# Patient Record
Sex: FEMALE | Race: WHITE
[De-identification: names, ages, dates, MRNs, and addresses within clinical notes are randomized per-mention and may not be internally consistent; named-entity substitution may affect disease eponyms.]

---

## 2017-03-16 ENCOUNTER — HOSPITAL ENCOUNTER (OUTPATIENT)
Dept: HOSPITAL 35 - PAIN | Age: 72
Discharge: HOME | End: 2017-03-16
Attending: ANESTHESIOLOGY
Payer: COMMERCIAL

## 2017-03-16 VITALS — WEIGHT: 284.6 LBS | BODY MASS INDEX: 50.43 KG/M2 | HEIGHT: 62.99 IN

## 2017-03-16 VITALS — DIASTOLIC BLOOD PRESSURE: 69 MMHG | SYSTOLIC BLOOD PRESSURE: 134 MMHG

## 2017-03-16 DIAGNOSIS — G89.29: ICD-10-CM

## 2017-03-16 DIAGNOSIS — M54.5: ICD-10-CM

## 2017-03-16 DIAGNOSIS — Z87.891: ICD-10-CM

## 2017-03-16 DIAGNOSIS — M54.16: Primary | ICD-10-CM

## 2017-05-31 ENCOUNTER — HOSPITAL ENCOUNTER (OUTPATIENT)
Dept: HOSPITAL 61 - PCVCCLINIC | Age: 72
Discharge: HOME | End: 2017-05-31
Attending: INTERNAL MEDICINE
Payer: MEDICARE

## 2017-05-31 DIAGNOSIS — Z96.653: ICD-10-CM

## 2017-05-31 DIAGNOSIS — Z79.82: ICD-10-CM

## 2017-05-31 DIAGNOSIS — Z87.891: ICD-10-CM

## 2017-05-31 DIAGNOSIS — Z90.710: ICD-10-CM

## 2017-05-31 DIAGNOSIS — I65.8: ICD-10-CM

## 2017-05-31 DIAGNOSIS — I48.91: Primary | ICD-10-CM

## 2017-05-31 DIAGNOSIS — Z79.899: ICD-10-CM

## 2017-05-31 DIAGNOSIS — I10: ICD-10-CM

## 2017-05-31 PROCEDURE — 80061 LIPID PANEL: CPT

## 2017-05-31 PROCEDURE — G0463 HOSPITAL OUTPT CLINIC VISIT: HCPCS

## 2017-05-31 PROCEDURE — 93005 ELECTROCARDIOGRAM TRACING: CPT

## 2017-06-06 ENCOUNTER — HOSPITAL ENCOUNTER (OUTPATIENT)
Dept: HOSPITAL 61 - PCVCIMAG | Age: 72
Discharge: HOME | End: 2017-06-06
Attending: INTERNAL MEDICINE
Payer: MEDICARE

## 2017-06-06 DIAGNOSIS — I48.91: Primary | ICD-10-CM

## 2017-06-06 PROCEDURE — 93306 TTE W/DOPPLER COMPLETE: CPT

## 2017-06-06 NOTE — PCVCIMAG
--------------- APPROVED REPORT --------------





Study performed:  06/06/2017 16:00:30



EXAM: Comprehensive 2D, Doppler, and color-flow 

Echocardiogram

Status:  routine



Indications

Dyspnea 

Atrial Fibrillation



2D Dimensions

IVSd:  14.69 (7-11mm)

LVDd:  39.16 mm

PWd:  13.63 (7-11mm)

LVDs:  33.25 (25-40mm)

Left Atrium:  45.24 (27-40mm)

Aortic Root:  34.47 mm

LV Single Plane 4CH:  42.90 %

LV Single Plane 2CH:  44.49 %Lott's LVEF:  43.69 %

Biplane EF:  43.3 %



Volumes

Left Atrial Volume (Systole)

Single Plane 4CH:  103.63 mLSingle Plane 2CH:  95.01 mL

LA ESV Index:  46.00 mL/m2



Aortic Valve

AoV Peak Merrick.:  1.45 m/s

AO Peak Gr.:  8.91 mmHgLVOT Max PG:  3.65 mmHg

LVOT Max V:  0.95 m/s



Pulmonary Valve

PV Peak Merrick.:  0.82 m/sPV Peak Gr.:  2.75 mmHg



Tricuspid Valve

TR Peak Merrick.:  2.44 m/s

TR Peak Gr.:  24.20 mmHg



Left Ventricle

The left ventricle is normal size. There is normal LV segmental wall 

motion. Moderate concentric left ventricular hypertrophy. Left 

ventricular systolic function is normal. The left ventricular 

ejection fraction is within the lower limits of normal range. 

Globally mildly decreased. LVEF is 45-50%. The left ventricular 

diastolic function is normal.



Right Ventricle

The right ventricle is normal size. The right ventricular systolic 

function is normal.



Atria

Left atrium is moderately- severely dilated. The right atrium size is 

mildly dilated.



Aortic Valve

The aortic valve is normal in structure. No aortic regurgitation is 

present. There is no aortic valvular stenosis.



Mitral Valve

Moderate posterior mitral annular calcification. Mild to moderate 

mitral regurgitation. No evidence of mitral valve 

stenosis.



Tricuspid Valve

The tricuspid valve is normal in structure. Mild tricuspid 

regurgitation with PAP of 34 mmHg.



Pulmonic Valve

The pulmonary valve is normal in structure. There is no pulmonic 

valvular regurgitation.



Great Vessels

The aortic root is normal in size. IVC is normal in size and 

collapses with >50% inspiration



Pericardium

There is no pericardial effusion.



<Conclusion>

The left ventricle is normal size.

Moderate concentric left ventricular hypertrophy.

Moderate concentric left ventricular hypertrophy.

Left ventricular systolic function is normal.

The left ventricular ejection fraction is within the lower limits of 

normal range. Globally mildly decreased.

LVEF is 45-50%.

The right ventricle is normal size.

Left atrium is moderately- severely dilated.

The right atrium size is mildly dilated.

The aortic valve is normal in structure.

Mild to moderate mitral regurgitation.

Mild tricuspid regurgitation with PAP of 34 mmHg.

IVC is normal in size and collapses with >50% inspiration

## 2017-06-27 ENCOUNTER — HOSPITAL ENCOUNTER (OUTPATIENT)
Dept: HOSPITAL 61 - PCVCCLINIC | Age: 72
Discharge: HOME | End: 2017-06-27
Attending: INTERNAL MEDICINE
Payer: MEDICARE

## 2017-06-27 DIAGNOSIS — I10: Primary | ICD-10-CM

## 2017-06-27 DIAGNOSIS — Z79.899: ICD-10-CM

## 2017-06-27 DIAGNOSIS — G47.33: ICD-10-CM

## 2017-06-27 DIAGNOSIS — R01.1: ICD-10-CM

## 2017-06-27 DIAGNOSIS — I48.1: ICD-10-CM

## 2017-06-27 DIAGNOSIS — Z87.891: ICD-10-CM

## 2017-06-27 DIAGNOSIS — E78.4: ICD-10-CM

## 2017-06-27 PROCEDURE — 93005 ELECTROCARDIOGRAM TRACING: CPT

## 2017-06-27 PROCEDURE — 80061 LIPID PANEL: CPT

## 2017-06-27 PROCEDURE — G0463 HOSPITAL OUTPT CLINIC VISIT: HCPCS

## 2017-07-18 ENCOUNTER — HOSPITAL ENCOUNTER (OUTPATIENT)
Dept: HOSPITAL 35 - CATH | Age: 72
Discharge: HOME | End: 2017-07-18
Attending: INTERNAL MEDICINE
Payer: COMMERCIAL

## 2017-07-18 VITALS — WEIGHT: 280 LBS | BODY MASS INDEX: 51.53 KG/M2 | HEIGHT: 62 IN

## 2017-07-18 VITALS — DIASTOLIC BLOOD PRESSURE: 66 MMHG | SYSTOLIC BLOOD PRESSURE: 110 MMHG

## 2017-07-18 DIAGNOSIS — Z82.49: ICD-10-CM

## 2017-07-18 DIAGNOSIS — Z98.890: ICD-10-CM

## 2017-07-18 DIAGNOSIS — Z96.653: ICD-10-CM

## 2017-07-18 DIAGNOSIS — E78.5: ICD-10-CM

## 2017-07-18 DIAGNOSIS — Z79.899: ICD-10-CM

## 2017-07-18 DIAGNOSIS — E66.01: ICD-10-CM

## 2017-07-18 DIAGNOSIS — I48.91: Primary | ICD-10-CM

## 2017-07-18 DIAGNOSIS — I10: ICD-10-CM

## 2017-07-18 DIAGNOSIS — Z79.01: ICD-10-CM

## 2017-07-18 LAB
ALBUMIN SERPL-MCNC: 3.3 G/DL (ref 3.4–5)
ALP SERPL-CCNC: 68 U/L (ref 46–116)
ALT SERPL-CCNC: 19 U/L (ref 30–65)
ANION GAP SERPL CALC-SCNC: 5 MMOL/L (ref 7–16)
AST SERPL-CCNC: 14 U/L (ref 15–37)
BILIRUB SERPL-MCNC: 0.7 MG/DL
BUN SERPL-MCNC: 21 MG/DL (ref 7–18)
CALCIUM SERPL-MCNC: 8.7 MG/DL (ref 8.5–10.1)
CHLORIDE SERPL-SCNC: 103 MMOL/L (ref 98–107)
CO2 SERPL-SCNC: 34 MMOL/L (ref 21–32)
CREAT SERPL-MCNC: 0.7 MG/DL (ref 0.6–1)
ERYTHROCYTE [DISTWIDTH] IN BLOOD BY AUTOMATED COUNT: 13.6 % (ref 10.5–14.5)
GLUCOSE SERPL-MCNC: 100 MG/DL (ref 74–106)
HCT VFR BLD CALC: 42.6 % (ref 37–47)
HGB BLD-MCNC: 14.1 GM/DL (ref 12–15)
INR PPP: 1.1
MCH RBC QN AUTO: 31.2 PG (ref 26–34)
MCHC RBC AUTO-ENTMCNC: 33.1 G/DL (ref 28–37)
MCV RBC: 94.1 FL (ref 80–100)
PLATELET # BLD: 218 THOU/UL (ref 150–400)
POTASSIUM SERPL-SCNC: 3.6 MMOL/L (ref 3.5–5.1)
PROT SERPL-MCNC: 6.9 G/DL (ref 6.4–8.2)
PROTHROMBIN TIME: 11.1 SECONDS (ref 9.3–11.4)
RBC # BLD AUTO: 4.53 MIL/UL (ref 4.2–5)
SODIUM SERPL-SCNC: 142 MMOL/L (ref 136–145)
WBC # BLD AUTO: 5.6 THOU/UL (ref 4–11)

## 2017-07-18 PROCEDURE — 62110: CPT

## 2017-07-18 PROCEDURE — 62900: CPT

## 2017-08-17 ENCOUNTER — HOSPITAL ENCOUNTER (OUTPATIENT)
Dept: HOSPITAL 35 - PAIN | Age: 72
Discharge: HOME | End: 2017-08-17
Attending: ANESTHESIOLOGY
Payer: COMMERCIAL

## 2017-08-17 VITALS — DIASTOLIC BLOOD PRESSURE: 47 MMHG | SYSTOLIC BLOOD PRESSURE: 113 MMHG

## 2017-08-17 VITALS — BODY MASS INDEX: 50.88 KG/M2 | WEIGHT: 280 LBS | HEIGHT: 62.01 IN

## 2017-08-17 DIAGNOSIS — E66.01: ICD-10-CM

## 2017-08-17 DIAGNOSIS — M51.36: Primary | ICD-10-CM

## 2017-08-17 DIAGNOSIS — I48.91: ICD-10-CM

## 2017-08-17 DIAGNOSIS — M54.16: ICD-10-CM

## 2017-08-17 DIAGNOSIS — Z79.899: ICD-10-CM

## 2017-08-17 DIAGNOSIS — Z98.890: ICD-10-CM

## 2017-08-17 DIAGNOSIS — G89.29: ICD-10-CM

## 2017-10-24 ENCOUNTER — HOSPITAL ENCOUNTER (OUTPATIENT)
Dept: HOSPITAL 35 - PAIN | Age: 72
Discharge: HOME | End: 2017-10-24
Attending: ANESTHESIOLOGY
Payer: COMMERCIAL

## 2017-10-24 VITALS — HEIGHT: 62.01 IN | WEIGHT: 283 LBS | BODY MASS INDEX: 51.42 KG/M2

## 2017-10-24 VITALS — DIASTOLIC BLOOD PRESSURE: 60 MMHG | SYSTOLIC BLOOD PRESSURE: 115 MMHG

## 2017-10-24 DIAGNOSIS — G89.29: ICD-10-CM

## 2017-10-24 DIAGNOSIS — M54.16: Primary | ICD-10-CM

## 2017-10-24 DIAGNOSIS — Z79.899: ICD-10-CM

## 2018-01-22 ENCOUNTER — HOSPITAL ENCOUNTER (OUTPATIENT)
Dept: HOSPITAL 35 - PAIN | Age: 73
Discharge: HOME | End: 2018-01-22
Attending: ANESTHESIOLOGY
Payer: COMMERCIAL

## 2018-01-22 VITALS — DIASTOLIC BLOOD PRESSURE: 59 MMHG | SYSTOLIC BLOOD PRESSURE: 100 MMHG

## 2018-01-22 VITALS — HEIGHT: 62.01 IN | BODY MASS INDEX: 51.78 KG/M2 | WEIGHT: 285 LBS

## 2018-01-22 DIAGNOSIS — Z79.899: ICD-10-CM

## 2018-01-22 DIAGNOSIS — E66.01: ICD-10-CM

## 2018-01-22 DIAGNOSIS — Z79.01: ICD-10-CM

## 2018-01-22 DIAGNOSIS — G89.29: ICD-10-CM

## 2018-01-22 DIAGNOSIS — I48.91: ICD-10-CM

## 2018-01-22 DIAGNOSIS — M54.16: Primary | ICD-10-CM

## 2018-01-22 DIAGNOSIS — M19.90: ICD-10-CM

## 2018-03-19 ENCOUNTER — HOSPITAL ENCOUNTER (OUTPATIENT)
Dept: HOSPITAL 61 - PCVCCLINIC | Age: 73
Discharge: HOME | End: 2018-03-19
Attending: INTERNAL MEDICINE
Payer: MEDICARE

## 2018-03-19 DIAGNOSIS — I08.1: ICD-10-CM

## 2018-03-19 DIAGNOSIS — Z79.899: ICD-10-CM

## 2018-03-19 DIAGNOSIS — Z87.891: ICD-10-CM

## 2018-03-19 DIAGNOSIS — E78.00: ICD-10-CM

## 2018-03-19 DIAGNOSIS — G47.33: ICD-10-CM

## 2018-03-19 DIAGNOSIS — I48.91: Primary | ICD-10-CM

## 2018-03-19 DIAGNOSIS — R94.31: ICD-10-CM

## 2018-03-19 PROCEDURE — 93005 ELECTROCARDIOGRAM TRACING: CPT

## 2018-03-19 PROCEDURE — 80061 LIPID PANEL: CPT

## 2018-04-23 ENCOUNTER — HOSPITAL ENCOUNTER (OUTPATIENT)
Dept: HOSPITAL 61 - PCVCCLINIC | Age: 73
Discharge: HOME | End: 2018-04-23
Attending: INTERNAL MEDICINE
Payer: MEDICARE

## 2018-04-23 DIAGNOSIS — I48.91: ICD-10-CM

## 2018-04-23 DIAGNOSIS — R06.02: ICD-10-CM

## 2018-04-23 DIAGNOSIS — Z79.899: ICD-10-CM

## 2018-04-23 DIAGNOSIS — Z01.818: Primary | ICD-10-CM

## 2018-04-23 DIAGNOSIS — I08.1: ICD-10-CM

## 2018-04-23 DIAGNOSIS — I87.303: ICD-10-CM

## 2018-04-23 DIAGNOSIS — Z87.891: ICD-10-CM

## 2018-04-23 DIAGNOSIS — R94.31: ICD-10-CM

## 2018-04-23 DIAGNOSIS — G47.33: ICD-10-CM

## 2018-04-23 DIAGNOSIS — I10: ICD-10-CM

## 2018-04-23 PROCEDURE — 93005 ELECTROCARDIOGRAM TRACING: CPT

## 2018-04-30 ENCOUNTER — HOSPITAL ENCOUNTER (OUTPATIENT)
Dept: HOSPITAL 61 - PCVCIMAG | Age: 73
Discharge: HOME | End: 2018-04-30
Attending: INTERNAL MEDICINE
Payer: MEDICARE

## 2018-04-30 DIAGNOSIS — R06.00: ICD-10-CM

## 2018-04-30 DIAGNOSIS — G47.33: ICD-10-CM

## 2018-04-30 DIAGNOSIS — E78.5: ICD-10-CM

## 2018-04-30 DIAGNOSIS — I10: ICD-10-CM

## 2018-04-30 DIAGNOSIS — Z01.818: Primary | ICD-10-CM

## 2018-04-30 PROCEDURE — 93306 TTE W/DOPPLER COMPLETE: CPT

## 2018-06-28 ENCOUNTER — HOSPITAL ENCOUNTER (OUTPATIENT)
Dept: HOSPITAL 35 - PAIN | Age: 73
Discharge: HOME | End: 2018-06-28
Attending: ANESTHESIOLOGY
Payer: COMMERCIAL

## 2018-06-28 VITALS — WEIGHT: 275.6 LBS | HEIGHT: 62.01 IN | BODY MASS INDEX: 50.08 KG/M2

## 2018-06-28 VITALS — DIASTOLIC BLOOD PRESSURE: 53 MMHG | SYSTOLIC BLOOD PRESSURE: 104 MMHG

## 2018-06-28 DIAGNOSIS — M54.16: Primary | ICD-10-CM

## 2018-06-28 DIAGNOSIS — E66.01: ICD-10-CM

## 2018-06-28 DIAGNOSIS — I48.91: ICD-10-CM

## 2018-06-28 DIAGNOSIS — Z79.899: ICD-10-CM

## 2018-06-28 DIAGNOSIS — M17.12: ICD-10-CM

## 2018-11-05 ENCOUNTER — HOSPITAL ENCOUNTER (OUTPATIENT)
Dept: HOSPITAL 35 - PAIN | Age: 73
Discharge: HOME | End: 2018-11-05
Attending: ANESTHESIOLOGY
Payer: COMMERCIAL

## 2018-11-05 VITALS — WEIGHT: 287 LBS | BODY MASS INDEX: 50.85 KG/M2 | HEIGHT: 62.99 IN

## 2018-11-05 VITALS — DIASTOLIC BLOOD PRESSURE: 49 MMHG | SYSTOLIC BLOOD PRESSURE: 104 MMHG

## 2018-11-05 DIAGNOSIS — G89.29: ICD-10-CM

## 2018-11-05 DIAGNOSIS — I48.91: ICD-10-CM

## 2018-11-05 DIAGNOSIS — M17.0: ICD-10-CM

## 2018-11-05 DIAGNOSIS — E66.01: ICD-10-CM

## 2018-11-05 DIAGNOSIS — M54.16: Primary | ICD-10-CM

## 2018-11-05 DIAGNOSIS — Z79.899: ICD-10-CM

## 2018-11-05 DIAGNOSIS — Z98.890: ICD-10-CM

## 2018-11-05 DIAGNOSIS — Z96.653: ICD-10-CM

## 2018-11-05 DIAGNOSIS — Z79.01: ICD-10-CM

## 2018-12-13 VITALS — DIASTOLIC BLOOD PRESSURE: 60 MMHG | SYSTOLIC BLOOD PRESSURE: 110 MMHG

## 2018-12-20 ENCOUNTER — HOSPITAL ENCOUNTER (OUTPATIENT)
Dept: HOSPITAL 35 - PAIN | Age: 73
Discharge: HOME | End: 2018-12-20
Attending: ANESTHESIOLOGY
Payer: COMMERCIAL

## 2018-12-20 VITALS — BODY MASS INDEX: 51.52 KG/M2 | WEIGHT: 290.8 LBS | HEIGHT: 62.99 IN

## 2018-12-20 VITALS — SYSTOLIC BLOOD PRESSURE: 106 MMHG | DIASTOLIC BLOOD PRESSURE: 52 MMHG

## 2018-12-20 DIAGNOSIS — M17.0: ICD-10-CM

## 2018-12-20 DIAGNOSIS — Z79.899: ICD-10-CM

## 2018-12-20 DIAGNOSIS — E66.01: ICD-10-CM

## 2018-12-20 DIAGNOSIS — I10: ICD-10-CM

## 2018-12-20 DIAGNOSIS — M47.26: Primary | ICD-10-CM

## 2018-12-20 DIAGNOSIS — Z79.01: ICD-10-CM

## 2018-12-20 DIAGNOSIS — Z98.890: ICD-10-CM

## 2018-12-20 DIAGNOSIS — Z96.653: ICD-10-CM

## 2018-12-20 DIAGNOSIS — I48.91: ICD-10-CM

## 2018-12-20 DIAGNOSIS — G89.29: ICD-10-CM

## 2019-01-10 ENCOUNTER — HOSPITAL ENCOUNTER (OUTPATIENT)
Dept: HOSPITAL 61 - PCVCCLINIC | Age: 74
Discharge: HOME | End: 2019-01-10
Attending: INTERNAL MEDICINE
Payer: MEDICARE

## 2019-01-10 DIAGNOSIS — J45.909: ICD-10-CM

## 2019-01-10 DIAGNOSIS — I10: ICD-10-CM

## 2019-01-10 DIAGNOSIS — E66.9: ICD-10-CM

## 2019-01-10 DIAGNOSIS — I48.0: Primary | ICD-10-CM

## 2019-01-10 DIAGNOSIS — E78.00: ICD-10-CM

## 2019-01-10 DIAGNOSIS — G47.33: ICD-10-CM

## 2019-01-10 DIAGNOSIS — Z87.891: ICD-10-CM

## 2019-01-10 DIAGNOSIS — R06.02: ICD-10-CM

## 2019-01-10 DIAGNOSIS — Z79.899: ICD-10-CM

## 2019-01-10 PROCEDURE — 80061 LIPID PANEL: CPT

## 2019-01-10 PROCEDURE — 93005 ELECTROCARDIOGRAM TRACING: CPT

## 2019-01-10 PROCEDURE — 36415 COLL VENOUS BLD VENIPUNCTURE: CPT

## 2019-01-10 PROCEDURE — G0463 HOSPITAL OUTPT CLINIC VISIT: HCPCS

## 2019-03-27 ENCOUNTER — HOSPITAL ENCOUNTER (OUTPATIENT)
Dept: HOSPITAL 35 - MRI | Age: 74
End: 2019-03-27
Attending: FAMILY MEDICINE
Payer: COMMERCIAL

## 2019-03-27 DIAGNOSIS — H53.2: Primary | ICD-10-CM

## 2019-03-27 DIAGNOSIS — R90.82: ICD-10-CM

## 2019-03-27 DIAGNOSIS — H49.10: ICD-10-CM

## 2019-03-27 LAB
ALBUMIN SERPL-MCNC: 3.6 G/DL (ref 3.4–5)
ALT SERPL-CCNC: 27 U/L (ref 30–65)
ANION GAP SERPL CALC-SCNC: 5 MMOL/L (ref 7–16)
AST SERPL-CCNC: 17 U/L (ref 15–37)
BILIRUB SERPL-MCNC: 0.5 MG/DL
BUN SERPL-MCNC: 16 MG/DL (ref 7–18)
CALCIUM SERPL-MCNC: 9 MG/DL (ref 8.5–10.1)
CHLORIDE SERPL-SCNC: 107 MMOL/L (ref 98–107)
CO2 SERPL-SCNC: 31 MMOL/L (ref 21–32)
CREAT SERPL-MCNC: 0.8 MG/DL (ref 0.6–1)
GLUCOSE SERPL-MCNC: 103 MG/DL (ref 74–106)
POTASSIUM SERPL-SCNC: 3.6 MMOL/L (ref 3.5–5.1)
PROT SERPL-MCNC: 6.7 G/DL (ref 6.4–8.2)
SODIUM SERPL-SCNC: 143 MMOL/L (ref 136–145)

## 2019-04-08 ENCOUNTER — HOSPITAL ENCOUNTER (OUTPATIENT)
Dept: HOSPITAL 61 - PCVCCLINIC | Age: 74
Discharge: HOME | End: 2019-04-08
Attending: INTERNAL MEDICINE
Payer: MEDICARE

## 2019-04-08 DIAGNOSIS — I48.0: Primary | ICD-10-CM

## 2019-04-08 DIAGNOSIS — G47.33: ICD-10-CM

## 2019-04-08 DIAGNOSIS — I87.303: ICD-10-CM

## 2019-04-08 DIAGNOSIS — R60.0: ICD-10-CM

## 2019-04-08 DIAGNOSIS — E78.5: ICD-10-CM

## 2019-04-08 DIAGNOSIS — J45.909: ICD-10-CM

## 2019-04-08 DIAGNOSIS — I10: ICD-10-CM

## 2019-04-08 PROCEDURE — G0463 HOSPITAL OUTPT CLINIC VISIT: HCPCS

## 2019-04-08 PROCEDURE — 93005 ELECTROCARDIOGRAM TRACING: CPT

## 2019-04-10 ENCOUNTER — HOSPITAL ENCOUNTER (OUTPATIENT)
Dept: HOSPITAL 61 - PCVCIMAG | Age: 74
Discharge: HOME | End: 2019-04-10
Attending: PREVENTIVE MEDICINE
Payer: MEDICARE

## 2019-04-10 ENCOUNTER — HOSPITAL ENCOUNTER (OUTPATIENT)
Dept: HOSPITAL 35 - HYPER | Age: 74
End: 2019-04-10
Attending: PREVENTIVE MEDICINE
Payer: COMMERCIAL

## 2019-04-10 DIAGNOSIS — E66.9: ICD-10-CM
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
## 2019-04-10 DIAGNOSIS — I73.9: Primary | ICD-10-CM
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
## 2019-04-10 DIAGNOSIS — Z79.01: ICD-10-CM

## 2019-04-10 DIAGNOSIS — Z87.891: ICD-10-CM
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
## 2019-04-10 DIAGNOSIS — I48.0: ICD-10-CM

## 2019-04-10 DIAGNOSIS — S85.892A: ICD-10-CM

## 2019-04-10 DIAGNOSIS — I87.303: Primary | ICD-10-CM
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
## 2019-04-10 DIAGNOSIS — R60.0: ICD-10-CM

## 2019-04-10 DIAGNOSIS — L97.821: ICD-10-CM

## 2019-04-10 DIAGNOSIS — L97.909: ICD-10-CM

## 2019-04-10 DIAGNOSIS — G47.33: ICD-10-CM

## 2019-04-10 DIAGNOSIS — E78.5: ICD-10-CM

## 2019-04-10 DIAGNOSIS — L97.811: ICD-10-CM

## 2019-04-10 DIAGNOSIS — J45.909: ICD-10-CM

## 2019-04-10 DIAGNOSIS — M19.90: ICD-10-CM

## 2019-04-10 PROCEDURE — 93925 LOWER EXTREMITY STUDY: CPT

## 2019-04-10 NOTE — PCVCIMAG
EXAM: BILATERAL LOWER EXTREMITY ARTERIAL DUPLEX



INDICATION: Peripheral Arterial Disease. Leg pain.  Nonhealing ulcer

left lower leg.



FINDINGS: 

Right Leg: Satisfactory arterial waveforms throughout the

common/profunda/superficial femoral, popliteal, anterior tibial,

peroneal, and posterior tibial arteries. No flow limiting stenosis

seen.    



Left Leg:  Satisfactory arterial waveforms throughout the

common/profunda/superficial femoral, popliteal, anterior tibial,

peroneal, and posterior tibial arteries. No flow limiting stenosis

seen.    



IMPRESSION: 

No flow limiting stenosis in the right lower extremity.

No flow limiting stenosis in the left lower extremity.

   



LOC:[id number]

## 2019-04-15 ENCOUNTER — HOSPITAL ENCOUNTER (OUTPATIENT)
Dept: HOSPITAL 35 - PAIN | Age: 74
Discharge: HOME | End: 2019-04-15
Attending: ANESTHESIOLOGY
Payer: COMMERCIAL

## 2019-04-15 VITALS — BODY MASS INDEX: 47.24 KG/M2 | WEIGHT: 266.6 LBS | HEIGHT: 62.99 IN

## 2019-04-15 VITALS — SYSTOLIC BLOOD PRESSURE: 116 MMHG | DIASTOLIC BLOOD PRESSURE: 41 MMHG

## 2019-04-15 DIAGNOSIS — I10: ICD-10-CM

## 2019-04-15 DIAGNOSIS — M48.061: ICD-10-CM

## 2019-04-15 DIAGNOSIS — Z98.890: ICD-10-CM

## 2019-04-15 DIAGNOSIS — G89.29: ICD-10-CM

## 2019-04-15 DIAGNOSIS — M17.0: ICD-10-CM

## 2019-04-15 DIAGNOSIS — Z96.653: ICD-10-CM

## 2019-04-15 DIAGNOSIS — Z79.899: ICD-10-CM

## 2019-04-15 DIAGNOSIS — Z79.01: ICD-10-CM

## 2019-04-15 DIAGNOSIS — M51.16: Primary | ICD-10-CM

## 2019-04-15 NOTE — NUR
Pain Clinic Assessment:
 
1. History of Osteoarthritis:
Left Lower Extremity
Right Lower Extremity
   History of Rheumatoid Arthritis:
Not Applicable
 
2. Height: 5 ft. 3 in. 160.0 cm.
   Weight: 266.6 lb.  oz. 120.929 kg.
   Patient's BMI: 47.2
 
3. Vital Signs:
   BP: 116/41 Pulse: 73 Resp: 16
   Temp:  02 Sat: 95 ECG Mon:
 
4. Pain Intensity: 2-3
 
5. Fall Risk:
   Dizziness: N  Needs help standing or walking: N
   Fallen in the last 3 months: N
   Fall risk comments:
 
 
6. Patient on Blood Thinner: ELIQUIS
 
7. History of Hypertension: Y
 
8. Opioid Therapy greater than 6 weeks: N
   Opiate Contract Signed:
 
9. Risk Assessment Tool Provided: low risk
 
10. Functional Assessment Tool: 35/70
 
11. Recreational Drug Use: Never Drug Type:
    Tobacco Use: Never Smoker Tobacco Type:
       Amount or Packs/day:  How Many Years:
    Alcohol Use: Yes  Frequency:  Quant:

## 2019-04-18 ENCOUNTER — HOSPITAL ENCOUNTER (OUTPATIENT)
Dept: HOSPITAL 35 - HYPER | Age: 74
End: 2019-04-18
Attending: PREVENTIVE MEDICINE
Payer: COMMERCIAL

## 2019-04-18 DIAGNOSIS — L97.811: ICD-10-CM

## 2019-04-18 DIAGNOSIS — J45.909: ICD-10-CM

## 2019-04-18 DIAGNOSIS — M19.90: ICD-10-CM

## 2019-04-18 DIAGNOSIS — Z79.01: ICD-10-CM

## 2019-04-18 DIAGNOSIS — G47.33: ICD-10-CM

## 2019-04-18 DIAGNOSIS — E66.9: ICD-10-CM

## 2019-04-18 DIAGNOSIS — R60.0: ICD-10-CM

## 2019-04-18 DIAGNOSIS — I48.0: ICD-10-CM

## 2019-04-18 DIAGNOSIS — E78.5: ICD-10-CM

## 2019-04-18 DIAGNOSIS — I87.313: Primary | ICD-10-CM

## 2019-04-18 DIAGNOSIS — Z87.891: ICD-10-CM

## 2019-04-18 DIAGNOSIS — L97.821: ICD-10-CM

## 2019-04-18 DIAGNOSIS — Z96.653: ICD-10-CM

## 2019-07-15 ENCOUNTER — HOSPITAL ENCOUNTER (OUTPATIENT)
Dept: HOSPITAL 61 - PCVCIMAG | Age: 74
Discharge: HOME | End: 2019-07-15
Attending: INTERNAL MEDICINE
Payer: MEDICARE

## 2019-07-15 DIAGNOSIS — J45.909: ICD-10-CM

## 2019-07-15 DIAGNOSIS — E78.00: ICD-10-CM

## 2019-07-15 DIAGNOSIS — I08.3: Primary | ICD-10-CM

## 2019-07-15 DIAGNOSIS — I48.0: ICD-10-CM

## 2019-07-15 DIAGNOSIS — G47.33: ICD-10-CM

## 2019-07-15 DIAGNOSIS — I87.2: ICD-10-CM

## 2019-07-15 DIAGNOSIS — I10: ICD-10-CM

## 2019-07-15 PROCEDURE — 93306 TTE W/DOPPLER COMPLETE: CPT

## 2019-07-15 PROCEDURE — 93005 ELECTROCARDIOGRAM TRACING: CPT

## 2019-07-15 PROCEDURE — G0463 HOSPITAL OUTPT CLINIC VISIT: HCPCS

## 2019-07-15 PROCEDURE — 80061 LIPID PANEL: CPT

## 2019-07-15 PROCEDURE — 36415 COLL VENOUS BLD VENIPUNCTURE: CPT

## 2019-07-15 NOTE — PCVCIMAG
--------------- APPROVED REPORT --------------





Study performed:  07/15/2019 10:31:58



EXAM: Comprehensive 2D, Doppler, and color-flow 

Echocardiogram

Patient Location: Echo lab

Status:  routine



BSA:         2.24

HR: 58 bpmBP:          138/78 mmHg

Rhythm: Bradycardia



Other Information 

Study Quality: Adequate



Risk Factors: 

Cardiac Risk Factors:  HTN



Indications

Atrial Fibrillation

Dyspnea 

obesity, edema



2D Dimensions

IVSd:  13.08 (7-11mm)

LVDd:  41.35 mm

PWd:  13.08 (7-11mm)Ascending Ao:  38.42 (22-36mm)

LVDs:  27.94 (25-40mm)

Left Atrium:  45.47 (27-40mm)

Aortic Root:  33.56 mm

LV Single Plane 4CH:  57.78 %

LV Single Plane 2CH:  56.94 %

Biplane EF:  57.6 %



Volumes

Left Atrial Volume (Systole)

Single Plane 4CH:  125.82 mLSingle Plane 2CH:  85.24 mL

LA ESV Index:  48.00 mL/m2



Aortic Valve

AoV Peak Merrick.:  2.28 m/s

AO Peak Gr.:  20.78 mmHgLVOT Max PG:  10.73 mmHg

LVOT Max V:  1.64 m/s



Mitral Valve

E/A Ratio:  0.8

MV Decel. Time:  259.18 ms

MV E Max Merrick.:  0.92 m/s

MV A Merrick.:  1.19 m/s

IVRT:  100.35 ms



Pulmonary Valve

PV Peak Merrick.:  0.92 m/sPV Peak Gr.:  3.37 mmHg



Pulmonary Vein

P Vein S:    0.39 m/sP Vein A:  0.29 m/s

P Vein D:   0.53 m/sP Vein A Dur.:  141.9 msec

P Vein S/D Ratio:  0.74



Tricuspid Valve

TR Peak Merrick.:  2.93 m/s

TR Peak Gr.:  34.42 mmHg

TV Vmax:  0.80 m/s



Left Ventricle

The left ventricle is normal size. There is normal LV segmental wall 

motion. Mild concentric left ventricular hypertrophy. Left 

ventricular systolic function is normal. The left ventricular 

ejection fraction is within the normal range. LVEF is 55-60%. Grade I 

- abnormal relaxation pattern.



Right Ventricle

The right ventricle is normal size. The right ventricular systolic 

function is normal.



Atria

Left atrium is severely dilated. Right atrium is moderately 

dilated.



Aortic Valve

The aortic valve is mildly sclerotic. Mild aortic regurgitation. 

There is no aortic valvular stenosis. LVOT velocity is mildly 

increased but no obstruction with valsalva maneuver.



Mitral Valve

The mitral valve is normal in structure. Mild mitral regurgitation. 

No evidence of mitral valve stenosis.



Tricuspid Valve

The tricuspid valve is normal in structure. Mild tricuspid 

regurgitation with PAP of 41 mmHg.



Pulmonic Valve

The pulmonary valve is normal in structure. There is trace pulmonic 

valvular regurgitation.



Great Vessels

The aortic root is normal in size. The ascending aorta is borderline 

dilated to 3.9 cm. IVC is normal in size and collapses >50% with 

inspiration.



Pericardium

There is no pericardial effusion. There is no pleural 

effusion.



<Conclusion>

The left ventricle is normal size.

LVEF is 55-60%.

Grade I - abnormal relaxation pattern.

The right ventricle is normal size.

Left atrium is severely dilated.

Right atrium is moderately dilated.

The aortic valve is mildly sclerotic.

Mild aortic regurgitation.

There is no aortic valvular stenosis. LVOT velocity is mildly 

increased but no obstruction with valsalva maneuver.

Mild mitral regurgitation.

Mild tricuspid regurgitation with PAP of 41 mmHg.

The aortic root is normal in size.

The ascending aorta is borderline dilated to 3.9 cm.

There is no pericardial effusion.

## 2019-07-22 ENCOUNTER — HOSPITAL ENCOUNTER (OUTPATIENT)
Dept: HOSPITAL 35 - PAIN | Age: 74
Discharge: HOME | End: 2019-07-22
Attending: ANESTHESIOLOGY
Payer: COMMERCIAL

## 2019-07-22 VITALS — DIASTOLIC BLOOD PRESSURE: 54 MMHG | SYSTOLIC BLOOD PRESSURE: 123 MMHG

## 2019-07-22 VITALS — WEIGHT: 285 LBS | BODY MASS INDEX: 50.5 KG/M2 | HEIGHT: 62.99 IN

## 2019-07-22 DIAGNOSIS — M51.16: Primary | ICD-10-CM

## 2019-07-22 DIAGNOSIS — G47.30: ICD-10-CM

## 2019-07-22 DIAGNOSIS — M19.90: ICD-10-CM

## 2019-07-22 DIAGNOSIS — I10: ICD-10-CM

## 2019-07-22 DIAGNOSIS — Z79.899: ICD-10-CM

## 2019-07-22 DIAGNOSIS — G89.29: ICD-10-CM

## 2019-07-22 NOTE — NUR
Pain Clinic Assessment:
 
1. History of Osteoarthritis:
Left Lower Extremity
Right Lower Extremity
   History of Rheumatoid Arthritis:
Not Applicable
 
2. Height: 5 ft. 3 in. 160.0 cm.
   Weight: 285.0 lb.  oz. 129.276 kg.
   Patient's BMI: 50.5
 
3. Vital Signs:
   BP: 123/54 Pulse: 60 Resp: 16
   Temp:  02 Sat: 98 ECG Mon:
 
4. Pain Intensity: 2-3
 
5. Fall Risk:
   Dizziness: Y  Needs help standing or walking: N
   Fallen in the last 3 months: N
   Fall risk comments:
 
 
6. Patient on Blood Thinner: ELIQUIS
 
7. History of Hypertension: Y
 
8. Opioid Therapy greater than 6 weeks: N
   Opiate Contract Signed:
 
9. Risk Assessment Tool Provided: low risk
 
10. Functional Assessment Tool: 35/70
 
11. Recreational Drug Use: Never Drug Type:
    Tobacco Use: Never Smoker Tobacco Type:
       Amount or Packs/day:  How Many Years:
    Alcohol Use: Yes  Frequency:  Quant:

## 2020-01-27 ENCOUNTER — HOSPITAL ENCOUNTER (OUTPATIENT)
Dept: HOSPITAL 35 - SJCVC | Age: 75
End: 2020-01-27
Attending: INTERNAL MEDICINE
Payer: COMMERCIAL

## 2020-01-27 DIAGNOSIS — R94.31: Primary | ICD-10-CM

## 2020-01-27 DIAGNOSIS — G47.33: ICD-10-CM

## 2020-01-27 DIAGNOSIS — I48.0: ICD-10-CM

## 2020-01-27 DIAGNOSIS — Z87.891: ICD-10-CM

## 2020-01-27 DIAGNOSIS — E78.5: ICD-10-CM

## 2020-01-27 DIAGNOSIS — I50.43: ICD-10-CM

## 2020-01-27 DIAGNOSIS — Z72.89: ICD-10-CM

## 2020-01-27 DIAGNOSIS — R06.02: ICD-10-CM

## 2020-01-27 DIAGNOSIS — I11.0: ICD-10-CM

## 2020-01-27 DIAGNOSIS — D68.59: ICD-10-CM

## 2020-01-27 DIAGNOSIS — Z86.79: ICD-10-CM

## 2020-09-16 ENCOUNTER — HOSPITAL ENCOUNTER (OUTPATIENT)
Dept: HOSPITAL 35 - SJCVCIMAG | Age: 75
End: 2020-09-16
Attending: INTERNAL MEDICINE
Payer: COMMERCIAL

## 2020-09-16 DIAGNOSIS — D68.59: ICD-10-CM

## 2020-09-16 DIAGNOSIS — Z86.79: ICD-10-CM

## 2020-09-16 DIAGNOSIS — E66.01: ICD-10-CM

## 2020-09-16 DIAGNOSIS — I35.8: ICD-10-CM

## 2020-09-16 DIAGNOSIS — I50.32: ICD-10-CM

## 2020-09-16 DIAGNOSIS — I35.0: ICD-10-CM

## 2020-09-16 DIAGNOSIS — I11.0: ICD-10-CM

## 2020-09-16 DIAGNOSIS — E78.5: ICD-10-CM

## 2020-09-16 DIAGNOSIS — E78.00: ICD-10-CM

## 2020-09-16 DIAGNOSIS — R94.31: Primary | ICD-10-CM

## 2020-09-16 DIAGNOSIS — R06.02: ICD-10-CM

## 2020-09-16 DIAGNOSIS — I48.0: ICD-10-CM

## 2021-12-08 NOTE — NUR
1045-PT RECOVERED FROM EGD. CONSENT FOR VIDEO CAPSULE ENDOSCOPY SIGNED PRIOR
TO EGD. VIDEO RECORDER AND BELT PLACED ON PT AND PILLCAM SYNCED. DEVICE
SWALLOWED. INSTRUCTED NPO X 2 HRS, THEN CL LIQ X 2 HRS, THEN MAY EAT LIGHT
MEAL AND RESUME MEDS. INSTRUCTED TO VIEW BLINKING LIGHT ON RECORDER EVERRY
30 MINUTES AND WHEN STOPS AT END OF DAY TO BRING RECORDER AND BELT BACK TO
 IN THE ED. VOICES UNDERSTANDING OF ABOVE. PILLCAM RECORDING AT
TIME  OF DISCHARGE.

## 2021-12-15 NOTE — P
[facility]
[address]
[place]   [address]                     PROCEDURE REPORT              
_______________________________________________________________________________
 
Name:       JOSE JUAN BELTRE               Room #:                     REG ALEXUSABRIL RUTLEDGE#:      [id number]                       Account #:      [id number]  
Admission:  12/08/21    Attend Phys:    Jose Martin Tan
Discharge:              Date of Birth:  [birth year]  
                                                          Report #: [id number]
                                                                    [id number] 
_______________________________________________________________________________
THIS REPORT FOR:  
 
cc:  Isidro Lora MD, Rene P. MD McElhinney, Christian C. MD                                   ~
 
 
cc:     Isidro Lora MD, Juan Ramon Hunt MD
DATE OF SERVICE: 12/08/2021
 
PROCEDURE PERFORMED:  Upper endoscopy.
 
HISTORY OF PRESENT ILLNESS:  The patient is a 76-year-old female, began noticing
dark stools within the last week.  Hemoglobin recently was in the 10 range.  She
had a repeat hemoglobin on Monday that was 7.7, a repeat today was 7.2.  She had
been on Eliquis.  She has been holding this for several days.  She was started 
on Protonix recently as well.  Previously no history of gastroesophageal reflux 
disease or peptic ulcer disease.  She had a colonoscopy by myself on 10/21/2019 
in which several polyps were removed and multiple diverticula were noted in the 
sigmoid colon.  Plan is for upper endoscopy.
 
DESCRIPTION OF PROCEDURE:  The risks and benefits of the procedure were 
explained to the patient, those risks including but not limited to bleeding, 
perforation and the risk of sedation.  She understood these risks and gave 
informed consent.  Sedation was given using propofol per anesthesia.  Next, 
using a standard Olympus upper endoscope, the scope was placed in the patient's 
mouth and advanced under direct vision through the esophagus, stomach and into 
the third portion of the duodenum.  The larynx was normal in appearance.  The 
esophagus was normal throughout.  The GE junction was normal.  Upon entering the
stomach, a small hiatal hernia was noted.  A single small 4 mm gastric erosion 
was noted in the upper body.  No evidence of bleeding.  Otherwise, the gastric 
mucosa was normal.  No evidence of ulcerations or blood were noted throughout 
the exam today.  The pylorus was normal and patent.  The duodenal bulb and first
portion were normal.  In the second portion of the duodenum, a large duodenal 
diverticulum was noted.  No evidence of bleeding or ulceration.  The third 
portion of the duodenum was normal.  No obvious AVMs, or blood was noted 
throughout the duodenum.  The scope was then withdrawn and the procedure 
terminated.  The patient tolerated the procedure well.
 
IMPRESSION:
1.  Small hiatal hernia.
2.  Small gastric erosion.
3.  Large duodenal diverticulum.
4.  Otherwise, normal upper endoscopy.
 
RECOMMENDATIONS:  No evidence of bleeding was noted on exam today.  [address]   [address]                     PROCEDURE REPORT              
_______________________________________________________________________________
 
Name:       JOSE JUAN BELTRE               Room #:                     REG LAUREN RUTLEDGE#:      [id number]                       Account #:      [id number]  
Admission:  12/08/21    Attend Phys:    Jose Martin Tan
Discharge:              Date of Birth:  [birth year]  
                                                          Report #: [id number]
                                                                    [id number] 
_______________________________________________________________________________
 
erosion was small.  Although this could have been an ulcer recently in the 
process of healing on PPI therapy.  I would recommend proceeding with a M2 
capsule endoscopy for further evaluation at this point.  I would repeat 
hemoglobin in the next few days.
 
Thank you for allowing me to participate in her care.
 
 
 
 
 
 
 
 
 
 
 
 
 
 
 
 
 
 
 
 
 
 
 
 
 
 
 
 
 
 
 
 
 
 
 
 
 
  <ELECTRONICALLY SIGNED>
   By: Jose Martin Macias MD   
  12/15/21     0813
D: 12/08/21 0930                           _____________________________________
T: 12/08/21 1303                           Jose Martin Macias MD     /nt

## 2023-03-01 ENCOUNTER — APPOINTMENT (OUTPATIENT)
Dept: CT IMAGING | Facility: HOSPITAL | Age: 78
End: 2023-03-01
Attending: EMERGENCY MEDICINE
Payer: MEDICARE

## 2023-03-01 ENCOUNTER — HOSPITAL ENCOUNTER (EMERGENCY)
Facility: HOSPITAL | Age: 78
Discharge: HOME | End: 2023-03-01
Attending: EMERGENCY MEDICINE
Payer: MEDICARE

## 2023-03-01 ENCOUNTER — TELEPHONE (OUTPATIENT)
Dept: SURGERY | Facility: CLINIC | Age: 78
End: 2023-03-01

## 2023-03-01 VITALS
OXYGEN SATURATION: 95 % | RESPIRATION RATE: 18 BRPM | HEART RATE: 71 BPM | TEMPERATURE: 98 F | WEIGHT: 276 LBS | DIASTOLIC BLOOD PRESSURE: 68 MMHG | SYSTOLIC BLOOD PRESSURE: 129 MMHG
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
## 2023-03-01 DIAGNOSIS — S01.01XA SCALP LACERATION, INITIAL ENCOUNTER: Primary | ICD-10-CM

## 2023-03-01 PROCEDURE — 99284 EMERGENCY DEPT VISIT MOD MDM: CPT

## 2023-03-01 PROCEDURE — 70450 CT HEAD/BRAIN W/O DYE: CPT | Performed by: EMERGENCY MEDICINE

## 2023-03-01 PROCEDURE — 70486 CT MAXILLOFACIAL W/O DYE: CPT | Performed by: EMERGENCY MEDICINE

## 2023-03-01 PROCEDURE — 12013 RPR F/E/E/N/L/M 2.6-5.0 CM: CPT

## 2023-03-01 PROCEDURE — 90471 IMMUNIZATION ADMIN: CPT

## 2023-03-01 RX ORDER — TETANUS AND DIPHTHERIA TOXOIDS ADSORBED 2; 2 [LF]/.5ML; [LF]/.5ML
0.5 INJECTION INTRAMUSCULAR ONCE
Status: COMPLETED | OUTPATIENT
Start: 2023-03-01 | End: 2023-03-01

## 2023-03-01 RX ORDER — LIDOCAINE HYDROCHLORIDE AND EPINEPHRINE 20; 5 MG/ML; UG/ML
INJECTION, SOLUTION EPIDURAL; INFILTRATION; INTRACAUDAL; PERINEURAL
Status: COMPLETED
Start: 2023-03-01 | End: 2023-03-01

## 2023-03-01 RX ORDER — LIDOCAINE HYDROCHLORIDE AND EPINEPHRINE 20; 5 MG/ML; UG/ML
20 INJECTION, SOLUTION EPIDURAL; INFILTRATION; INTRACAUDAL; PERINEURAL ONCE
Status: COMPLETED | OUTPATIENT
Start: 2023-03-01 | End: 2023-03-01

## 2023-03-01 NOTE — ED INITIAL ASSESSMENT (HPI)
Patient complains of falling today, hit her head sans loc, on blood thinners, lac to left forehead, denies other complaints

## 2023-03-01 NOTE — TELEPHONE ENCOUNTER
Received consult information from RN Dorla Osgood at [facility] ED. Laceration to left forehead. Dr. Zana Felton, Plastic Surgery, was consulted. Photo was received. Dr. Zana Felton ok with [facility] doing the repair. Patient offered scar management consult with Dr. Zana Felton 3-5 weeks after sutures are removed.

## 2023-03-02 NOTE — DISCHARGE INSTRUCTIONS
You should keep your laceration clean and dry for 48 hours and then washing it twice a day with a damp soapy towel until you have the sutures removed. Your stitches need to come out in 7 days.  You should arrange to have this done your primary care doctor's office or can go to urgent care to have them out   Please come back right away for severe pain, changes speech or vision, numbness, tingling, weakness redness warmth swelling or discharge around the wound or any other concerns

## 2023-03-02 NOTE — ED QUICK NOTES
Family bedside, pt and pt's family updated regarding next steps. Pt and pt's family verbalized understanding and denies needing anything at this moment.

## 2023-03-02 NOTE — ED QUICK NOTES
Pt ambulating in room, pt denies needing anything at this moment. Pt's laceration wrapped with bleeding controlled.

## 2023-03-02 NOTE — ED QUICK NOTES
Pt discharged in stable condition. Discharge instructions and follow up care reviewed with pt bedside. Pt denies any questions and/or concerns.  Steady gait

## 2023-03-18 ENCOUNTER — HOSPITAL ENCOUNTER (EMERGENCY)
Facility: HOSPITAL | Age: 78
Discharge: HOME | End: 2023-03-18
Attending: EMERGENCY MEDICINE
Payer: MEDICARE

## 2023-03-18 VITALS
TEMPERATURE: 97 F | DIASTOLIC BLOOD PRESSURE: 72 MMHG | OXYGEN SATURATION: 98 % | HEART RATE: 73 BPM | SYSTOLIC BLOOD PRESSURE: 158 MMHG | RESPIRATION RATE: 20 BRPM | WEIGHT: 276 LBS

## 2023-03-18 DIAGNOSIS — H11.32 SUBCONJUNCTIVAL HEMORRHAGE OF LEFT EYE: Primary | ICD-10-CM

## 2023-03-18 PROCEDURE — 99283 EMERGENCY DEPT VISIT LOW MDM: CPT

## 2023-03-18 PROCEDURE — 99282 EMERGENCY DEPT VISIT SF MDM: CPT

## 2023-03-18 NOTE — ED INITIAL ASSESSMENT (HPI)
The patient reports noticed left eye bloody sclera that started today. She incidentally was seen here 17 days ago for a laceration above her left eyebrow. The sclera is bloody.

## 2023-03-18 NOTE — ED QUICK NOTES
Pt  approx 5pm noticed her lt eye had something in it. States went to the mirror and found the sclera to have blood pooling.  17 days ago did have to be seen for laceration to lt eye brow,but has not had any issues with healing. Still has some healing bruises around lt eye.  is on blood thinners. Denies vision changes.

## 2023-04-27 ENCOUNTER — LAB ENCOUNTER (OUTPATIENT)
Dept: LAB | Facility: HOSPITAL | Age: 78
End: 2023-04-27
Attending: UROLOGY
Payer: MEDICARE

## 2023-04-27 DIAGNOSIS — R39.15 URGENCY OF URINATION: Primary | ICD-10-CM

## 2023-04-27 DIAGNOSIS — R35.0 URINARY FREQUENCY: ICD-10-CM

## 2023-04-27 LAB
ANION GAP SERPL CALC-SCNC: 8 MMOL/L (ref 0–18)
BUN BLD-MCNC: 19 MG/DL (ref 7–18)
BUN/CREAT SERPL: 23.5 (ref 10–20)
CALCIUM BLD-MCNC: 9.4 MG/DL (ref 8.5–10.1)
CHLORIDE SERPL-SCNC: 107 MMOL/L (ref 98–112)
CO2 SERPL-SCNC: 29 MMOL/L (ref 21–32)
CREAT BLD-MCNC: 0.81 MG/DL
FASTING STATUS PATIENT QL REPORTED: NO
GFR SERPLBLD BASED ON 1.73 SQ M-ARVRAT: 75 ML/MIN/1.73M2 (ref 60–?)
GLUCOSE BLD-MCNC: 100 MG/DL (ref 70–99)
OSMOLALITY SERPL CALC.SUM OF ELEC: 300 MOSM/KG (ref 275–295)
POTASSIUM SERPL-SCNC: 3.6 MMOL/L (ref 3.5–5.1)
SODIUM SERPL-SCNC: 144 MMOL/L (ref 136–145)

## 2023-04-27 PROCEDURE — 80048 BASIC METABOLIC PNL TOTAL CA: CPT

## 2023-04-27 PROCEDURE — 36415 COLL VENOUS BLD VENIPUNCTURE: CPT

## 2023-05-26 ENCOUNTER — HOSPITAL ENCOUNTER (OUTPATIENT)
Dept: ULTRASOUND IMAGING | Facility: HOSPITAL | Age: 78
Discharge: HOME | End: 2023-05-26
Attending: UROLOGY
Payer: MEDICARE

## 2023-05-26 DIAGNOSIS — R35.0 FREQUENCY OF URINATION: ICD-10-CM

## 2023-05-26 DIAGNOSIS — R39.15 URGENCY OF URINATION: ICD-10-CM

## 2023-05-26 PROCEDURE — 76770 US EXAM ABDO BACK WALL COMP: CPT | Performed by: UROLOGY

## 2023-06-14 ENCOUNTER — ORDER TRANSCRIPTION (OUTPATIENT)
Dept: PHYSICAL THERAPY | Facility: HOSPITAL | Age: 78
End: 2023-06-14

## 2023-06-14 ENCOUNTER — TELEPHONE (OUTPATIENT)
Dept: PHYSICAL THERAPY | Facility: HOSPITAL | Age: 78
End: 2023-06-14

## 2023-06-19 ENCOUNTER — ORDER TRANSCRIPTION (OUTPATIENT)
Dept: PHYSICAL THERAPY | Facility: HOSPITAL | Age: 78
End: 2023-06-19

## 2023-06-19 ENCOUNTER — TELEPHONE (OUTPATIENT)
Dept: PHYSICAL THERAPY | Facility: HOSPITAL | Age: 78
End: 2023-06-19

## 2023-06-19 DIAGNOSIS — R39.15 URGENCY OF URINATION: Primary | ICD-10-CM

## 2023-07-24 ENCOUNTER — HOSPITAL ENCOUNTER (OUTPATIENT)
Dept: CT IMAGING | Facility: HOSPITAL | Age: 78
Discharge: HOME | End: 2023-07-24
Attending: RADIOLOGY
Payer: MEDICARE

## 2023-07-24 DIAGNOSIS — I73.9 PAD (PERIPHERAL ARTERY DISEASE) (HCC): ICD-10-CM

## 2023-07-24 LAB
CREAT BLD-MCNC: 1 MG/DL
EGFRCR SERPLBLD CKD-EPI 2021: 58 ML/MIN/1.73M2 (ref 60–?)

## 2023-07-24 PROCEDURE — 82565 ASSAY OF CREATININE: CPT

## 2023-07-24 PROCEDURE — 73706 CT ANGIO LWR EXTR W/O&W/DYE: CPT | Performed by: RADIOLOGY

## 2023-08-14 ENCOUNTER — TELEPHONE (OUTPATIENT)
Dept: PHYSICAL THERAPY | Facility: HOSPITAL | Age: 78
End: 2023-08-14

## 2023-08-14 ENCOUNTER — APPOINTMENT (OUTPATIENT)
Dept: PHYSICAL THERAPY | Age: 78
End: 2023-08-14
Attending: UROLOGY
Payer: MEDICARE

## 2023-08-29 ENCOUNTER — TELEPHONE (OUTPATIENT)
Dept: PHYSICAL THERAPY | Facility: HOSPITAL | Age: 78
End: 2023-08-29

## 2023-08-29 ENCOUNTER — APPOINTMENT (OUTPATIENT)
Dept: PHYSICAL THERAPY | Age: 78
End: 2023-08-29
Attending: UROLOGY
Payer: MEDICARE

## 2023-09-05 ENCOUNTER — OFFICE VISIT (OUTPATIENT)
Dept: PHYSICAL THERAPY | Age: 78
End: 2023-09-05
Attending: UROLOGY
Payer: MEDICARE

## 2023-09-05 DIAGNOSIS — R39.15 URGENCY OF URINATION: Primary | ICD-10-CM

## 2023-09-05 PROCEDURE — 97110 THERAPEUTIC EXERCISES: CPT

## 2023-09-05 PROCEDURE — 97162 PT EVAL MOD COMPLEX 30 MIN: CPT

## 2023-09-05 PROCEDURE — 97112 NEUROMUSCULAR REEDUCATION: CPT

## 2023-09-05 NOTE — PROGRESS NOTES
PELVIC FLOOR EVALUATION:   Referring Physician: Dr. Ashley Howard  Diagnosis:   Urgency of urination (R39.15)     Date of onset: chronic Date of Service: 9/5/2023     PATIENT SUMMARY   Cristi Bautista is a 70year old female  who presents to therapy today with complaints of urinary urgency and leakage  Current symptoms include: urgency/frequency, incomplete emptying, and leakage  History of condition:  Patient reports she is taking water medication for retension. She has urgency and inability to hold her bladder. Mybetriq is really helping. Obybutin too. Now she is having minimal issues. Some urgency and not dripping urine. She is doing kegels and that helps. She wouldn't  leave her house until noon due to frequency but now she can leave the house earlier now. She is not waking up in the middle of the night to void. She is fully emptying urine. She can start flow easily. Rarely leaking with coughing and sneezing. She will leak on the way to the bathroom occasionally now but it is much less. Bowels:  is regular. Will have BM every day or every other. Bowels will start hard and then will be loose. Denies any pain. Obstetrical/Gynecological history:  Pregnant Now: No  [id number]  Complications during pregnancy/delivery: vaginal   Last menstrual period: mid to late 40's, had hysterectomy due to bleeding. Surgical history:hystecetomy   Urodynamic Test:   Occupation/Activities:     Claire Gross describes prior level of function no limitations  Pt goals include improve leakage. Past medical history was reviewed with Claire Gross.  Significant findings include   Past Medical History:   Diagnosis Date    Atrial fibrillation ([address]     Essential hypertension         Precautions:  None    URINARY HABITS  Types of symptoms: urge incontinence  Events associated with the onset of urinary complaints: unsure  Abdominal/Vaginal Pressure complaints: No   Urinary Frequency: 2-3 hrs, less in AM  Urine Stream: normal   Amount: normal   Leaking occurs: on the way to the bathroom  Episodes of Leakage: 2-3 times per week  Amount of leakage:  drips. Pad use: none  Pad Change frequency: NA  Nocturia: 0x  Fluid Intake: decaf a few times per week, water: 4-6 cups,  orange and cranberry juice, manhattan   Bladder irritants: juice  Urine Stop test: no,  Post void dribble: no  Hovering: no  Empty bladder just in case: yes  Do you ever leak urine without knowing it? no    BOWEL HABITS  Types of symptoms: regular    Frequency of bowel movements: daily  Stool consistency: [place] Stool Scale: 4  Do you strain with defecation: No   Laxative/Stool softener use: No      ASSESSMENT  Jose A Briggs presents to physical therapy evaluation with primary c/o urinary urgency and leakage. She reports much improvement with using 2 bladder medications. She is able to control her bladder much better with less leakage. She denies any bowel issues. Denies any pelvic pain. The results of the objective tests and measures show decreased PF awareness and decreased PF strength and endurance. Functional deficits include but are not limited to leakage of urine and urgency of urination. Signs and symptoms are consistent with diagnosis of Urge incontinence. Pt and PT discussed evaluation findings, pathology, POC and HEP. Pt voiced understanding and performs HEP correctly without reported pain. Skilled Pelvic Physical Therapy is medically necessary to address the above impairments and reach functional goals. OBJECTIVE:   Pelvic Floor Muscle strength: (PERF= Power/Endurance/Reps/Fast) MMT: 3 sec hold  External Anal Sphincter: WFL  Accessory Muscle Use: gluteals    Diastasis Recti: not tested    Today's Treatment and Response:   Patient education was provided on objective findings of external and internal evaluation and expectations with treatment outcomes.  Educated on pelvic anatomy and function with diagrams and pelvic model, bladder normatives, adequate hydration levels, proper toileting posture, stress/urge urinary incontinence strategies, and diaphragmatic breathing for PNS activation and pelvic floor relaxation     Patient was instructed in and issued a HEP for:     Charges: PT Jose Manuel Moderate Complexity, 1TE 1NM      Total Timed Treatment: 30 min     Total Treatment Time: 45 min     Based on clinical rationale and outcome measures, this evaluation involved Moderate Complexity decision making due to 1-2 personal factors/comorbidities, 3 body structures involved/activity limitations, and evolving symptoms including urge urinary incontinence  PLAN OF CARE:    Goals: (to be met in 10 visits)  Patient instructed on bladder irritants, increased water intake to 64 ounces /day    Patient to utilize proper toileting posture to allow for 5-7 BM per week for improved bowel function. Patient to report urinary frequency to every 2-4 hours to allow for independent ADLs  Patient instructed on Levator Ani contraction inverse to diaphragmatic breathing to allow for pelvic brace with ADLs without valsalva. Patient exhibits an increase in pelvic floor strength to 4/5  with 10 count hold to allow for pelvic brace with ADLs. Patient reports a reduction in UUI from 2x/ day to 0-1x/ day      Patient understands importance of performing HEP to prevent reoccurrence of symptoms. Frequency / Duration: Patient will be seen for 1 x/week or a total of 10 visits over a 90 day period. Treatment will include: Neuromuscular Re-education, Self-Care Home Management, Therapeutic Activities, Therapeutic Exercise, and Home Exercise Program instruction     Education or treatment limitation: None  Rehab Potential:good      Patient/Family/Caregiver was advised of these findings, precautions, and treatment options and has agreed to actively participate in planning and for this course of care.     Thank you for your referral. Please co-sign or sign and return this letter via fax as soon as possible to [phone]. If you have any questions, please contact me at Dept: [phone]    Sincerely,  Electronically signed by therapist: Cong Mcbride PT  Physician's certification required: Yes  I certify the need for these services furnished under this plan of treatment and while under my care.     X___________________________________________________ Date____________________    Certification From: 4/8/3501  To:12/4/2023

## 2023-09-11 ENCOUNTER — OFFICE VISIT (OUTPATIENT)
Dept: PHYSICAL THERAPY | Age: 78
End: 2023-09-11
Attending: UROLOGY
Payer: MEDICARE

## 2023-09-11 PROCEDURE — 97112 NEUROMUSCULAR REEDUCATION: CPT

## 2023-09-11 NOTE — PROGRESS NOTES
Dx:  Urgency of urination (R39.15)           Insurance (Authorized # of Visits):  Medicare 10 per POC       Authorizing Physician: Dr. Twila Cullen  Next MD visit: none scheduled  Fall Risk: standard         Precautions: n/a           Subjective: No leakage. Bowels are fine. Doing pretty well. Objective: min leakage    Assessment: Patient presents with no leakage. Able to hold bladder for normal voiding times. Initiated PF strengthening and coordination exercises. She is progressing well towards goals. Goals: (to be met in 10 visits)  Patient instructed on bladder irritants, increased water intake to 64 ounces /day IN PROGRESS     Patient to utilize proper toileting posture to allow for 5-7 BM per week for improved bowel function. IN PROGRESS     Patient to report urinary frequency to every 2-4 hours to allow for independent ADLs IN PROGRESS  Patient instructed on Levator Ani contraction inverse to diaphragmatic breathing to allow for pelvic brace with ADLs without valsalva. IN PROGRESS     Patient exhibits an increase in pelvic floor strength to 4/5  with 10 count hold to allow for pelvic brace with ADLs. IN PROGRESS     Patient reports a reduction in UUI from 2x/ day to 0-1x/ day  IN PROGRESS     Patient understands importance of performing HEP to prevent reoccurrence of symptoms. IN PROGRESS    Plan: Continue with PF strengthening and coordination  Date: 9/11/2023  TX#: 2/10 Date:                 TX#: 3/ Date:                 TX#: 4/ Date:                 TX#: 5/ Date:    Tx#: 6/   TherEx:       Manual:       NeuroMuscular Otis  Diaphragmatic breathing 20x  Diaphragmatic breathing with PF 10x   Hooklying add sqz 5 sec 15x  Seated PF with exhale 15x         Self care Home management:       HEP: PFC with breathing     Charges: 3NM 40 min      Total Timed Treatment: 40 min  Total Treatment Time: 45 min Assessment/Plan  DMT2: 59y Male with DM T2  day 2 post  surgery,  continue IV insulin drip  and close glucose monitoring  BFS this AM 97  CAD: day 2 post-op on medications, stable, monitored.  HTN: Controlled, On med.  HLD: On statin      Case discussed with the NP  Dr Alford [phone]

## 2023-09-18 ENCOUNTER — OFFICE VISIT (OUTPATIENT)
Dept: SURGERY | Facility: CLINIC | Age: 78
End: 2023-09-18
Payer: MEDICARE

## 2023-09-18 VITALS
HEIGHT: 62 IN | WEIGHT: 285 LBS | DIASTOLIC BLOOD PRESSURE: 70 MMHG | HEART RATE: 87 BPM | OXYGEN SATURATION: 94 % | BODY MASS INDEX: 52.44 KG/M2 | SYSTOLIC BLOOD PRESSURE: 108 MMHG

## 2023-09-18 DIAGNOSIS — M15.9 PRIMARY OSTEOARTHRITIS INVOLVING MULTIPLE JOINTS: ICD-10-CM

## 2023-09-18 DIAGNOSIS — G47.33 OSA ON CPAP: ICD-10-CM

## 2023-09-18 DIAGNOSIS — E78.5 DYSLIPIDEMIA: Primary | ICD-10-CM

## 2023-09-18 DIAGNOSIS — E66.01 MORBID OBESITY WITH BMI OF 50.0-59.9, ADULT (HCC): ICD-10-CM

## 2023-09-18 PROBLEM — M15.0 PRIMARY OSTEOARTHRITIS INVOLVING MULTIPLE JOINTS: Status: ACTIVE | Noted: 2023-09-18

## 2023-09-18 RX ORDER — GABAPENTIN 100 MG/1
CAPSULE ORAL
COMMUNITY

## 2023-09-18 RX ORDER — METOPROLOL SUCCINATE 100 MG/1
100 TABLET, EXTENDED RELEASE ORAL DAILY
COMMUNITY

## 2023-09-18 RX ORDER — SIMVASTATIN 10 MG
1 TABLET ORAL AS DIRECTED
COMMUNITY
Start: 2023-05-30

## 2023-09-18 RX ORDER — MIRABEGRON 25 MG/1
TABLET, FILM COATED, EXTENDED RELEASE ORAL
COMMUNITY
Start: 2023-08-29

## 2023-09-19 ENCOUNTER — OFFICE VISIT (OUTPATIENT)
Dept: PHYSICAL THERAPY | Age: 78
End: 2023-09-19
Attending: UROLOGY
Payer: MEDICARE

## 2023-09-19 PROCEDURE — 97112 NEUROMUSCULAR REEDUCATION: CPT

## 2023-09-19 NOTE — PROGRESS NOTES
Discharge Summary  Pt has attended 3 visits in Physical Therapy. Dx:  Urgency of urination (R39.15)           Insurance (Authorized # of Visits):  Medicare 10 per POC       Authorizing Physician: Dr. Saul Morgan MD visit: none scheduled  Fall Risk: standard         Precautions: n/a           Subjective: No bowel or bladder issues. Not thinking about bladder as much anymore. Objective: normal urgency    Assessment: Patient has attended 3 sessions of therapy for tx of Urgency of urination. She has met all functional goals. Able to perform activities in AM which she previously limited. She is able to hold bladder without leakage. She will be DC from therapy. Goals: (to be met in 10 visits)  Patient instructed on bladder irritants, increased water intake to 64 ounces /day met  Patient to utilize proper toileting posture to allow for 5-7 BM per week for improved bowel function. met     Patient to report urinary frequency to every 2-4 hours to allow for independent ADLs met  Patient instructed on Levator Ani contraction inverse to diaphragmatic breathing to allow for pelvic brace with ADLs without valsalva. met     Patient exhibits an increase in pelvic floor strength to 4/5  with 10 count hold to allow for pelvic brace with ADLs. met     Patient reports a reduction in UUI from 2x/ day to 0-1x/ day  met     Patient understands importance of performing HEP to prevent reoccurrence of symptoms. met      Date: 9/11/2023  TX#: 2/10 Date: 9/19/12             TX#: 3/10 Date:                 TX#: 4/ Date:                 TX#: 5/ Date:    Tx#: 6/   TherEx:       Manual:       NeuroMuscular Otis  Diaphragmatic breathing 20x  Diaphragmatic breathing with PF 10x   Hooklying add sqz 5 sec 15x  Seated PF with exhale 15x   NeuroMuscular Otis  Diaphragmatic breathing 20x  Diaphragmatic breathing with PF 10x   Hooklying add sqz 5 sec 15x  Seated PF with exhale 15x      Self care Home management:       HEP: full review of POC      Plan: DC to HEP    Patient/Family/Caregiver was advised of these findings, precautions, and treatment options and has agreed to actively participate in planning and for this course of care. Thank you for your referral. If you have any questions, please contact me at Dept: [phone].     Sincerely,  Electronically signed by therapist: Tip Winter PT       Charges: 3NM 42 min      Total Timed Treatment: 42 min  Total Treatment Time: 44 min

## 2023-09-26 ENCOUNTER — APPOINTMENT (OUTPATIENT)
Dept: PHYSICAL THERAPY | Age: 78
End: 2023-09-26
Attending: UROLOGY
Payer: MEDICARE

## 2023-10-03 ENCOUNTER — APPOINTMENT (OUTPATIENT)
Dept: PHYSICAL THERAPY | Age: 78
End: 2023-10-03
Attending: UROLOGY
Payer: MEDICARE

## 2023-10-10 ENCOUNTER — APPOINTMENT (OUTPATIENT)
Dept: PHYSICAL THERAPY | Age: 78
End: 2023-10-10
Attending: UROLOGY
Payer: MEDICARE

## 2023-10-12 ENCOUNTER — ORDER TRANSCRIPTION (OUTPATIENT)
Dept: PHYSICAL THERAPY | Facility: HOSPITAL | Age: 78
End: 2023-10-12

## 2023-10-12 DIAGNOSIS — G89.29 OTHER CHRONIC PAIN: ICD-10-CM

## 2023-10-12 DIAGNOSIS — M54.50 LOW BACK PAIN: Primary | ICD-10-CM

## 2023-10-12 DIAGNOSIS — R20.2 PARESTHESIA: ICD-10-CM

## 2023-10-17 ENCOUNTER — APPOINTMENT (OUTPATIENT)
Dept: PHYSICAL THERAPY | Age: 78
End: 2023-10-17
Attending: UROLOGY
Payer: MEDICARE

## 2023-10-26 ENCOUNTER — APPOINTMENT (OUTPATIENT)
Dept: PHYSICAL THERAPY | Facility: HOSPITAL | Age: 78
End: 2023-10-26
Attending: Other
Payer: MEDICARE

## 2023-10-30 ENCOUNTER — TELEPHONE (OUTPATIENT)
Dept: PHYSICAL THERAPY | Facility: HOSPITAL | Age: 78
End: 2023-10-30

## 2023-11-15 ENCOUNTER — OFFICE VISIT (OUTPATIENT)
Dept: PHYSICAL THERAPY | Facility: HOSPITAL | Age: 78
End: 2023-11-15
Attending: Other
Payer: MEDICARE

## 2023-11-15 PROCEDURE — 97110 THERAPEUTIC EXERCISES: CPT

## 2023-11-15 PROCEDURE — 97162 PT EVAL MOD COMPLEX 30 MIN: CPT

## 2023-11-17 ENCOUNTER — TELEPHONE (OUTPATIENT)
Dept: PHYSICAL THERAPY | Facility: HOSPITAL | Age: 78
End: 2023-11-17

## 2023-11-20 ENCOUNTER — OFFICE VISIT (OUTPATIENT)
Dept: PHYSICAL THERAPY | Facility: HOSPITAL | Age: 78
End: 2023-11-20
Attending: INTERNAL MEDICINE
Payer: MEDICARE

## 2023-11-20 PROCEDURE — 97140 MANUAL THERAPY 1/> REGIONS: CPT

## 2023-11-20 PROCEDURE — 97110 THERAPEUTIC EXERCISES: CPT

## 2023-11-20 PROCEDURE — 97112 NEUROMUSCULAR REEDUCATION: CPT

## 2023-11-20 NOTE — PROGRESS NOTES
Diagnosis:    Low back pain (M54.50)  Other chronic pain (G89.29)  Paresthesia (R20.2)     L foot pain: O89.706    Referring Provider: Jaron Ospina  Date of Evaluation:    11/15/2023    Precautions/PMH:  A-fib, HTN, obesity, possible neuropathy,  Taking cholesterol medication Next MD visit:   uncertain  Date of Surgery: n/a     Insurance Primary/Secondary: MEDICARE / [facility]     # Auth Visits:             Subjective: Pt reports no L lateral foot pain now but had it last evening and it was 6/10 with intermittent stabbing pain during standing/walking. Pain: 0/10 now. PER IE  complaints of sharp pain in the L foot, lateral > medial, since June 2023 for no apparent reason. Pt points to both the 5th ray and 1st ray on the plantar surface. Pt also c/o numbness in the middle of the foot on the plantar surface and reports being Dx with  neuropathy in the L foot. L foot pain:    Pt describes pain level current 0/10, at best 0/10, at worst 3-4/10 since meds, highest was 9/10;    Current functional limitations include standing, walking, doing stairs,  transferring sit<>stand, and bending over, difficulty grocery shopping (does grocery pick -up)   Misc. Pain better with: rest, meds: gabapentin and topical cream.    Night pain: no  Sleep position: back , sleep apnea/uses c -pap   Day pattern of pain: no pattern   Occupation: retired speech pathologist  Living situation:  [facility]  Activity level: no activities; sits about 60-75% of the day. Leisure activities: walking     Objective:     11/20/23  Posture:  fhp,with kyphotic-lordotic posture, extended knees, pes plano valgus L>R. R Bunion noted ; Pt wearing slippers  Gait:  slow, wide based waddling antalgic gait with insufficient DF resulting in midfoot over pronation,  L forefoot abduction and movement impairment of lateral column compression syndrome. Left STJ unable to convert from eversion to inversion to provide for solid lever for push off. Stairs:  not formally tested, assumed to be step to pattern with B rails based on gait and subj information. Functional Mobility:  Sit to stand: labored, slow, difficult,  appears to require arm rests most of the time    Stand to sit: mod. indep  Donning shoes/socks: unable to bend over to don/doff shoes, required assist from this therapist for same. Palpation: callus formation pl surface foot at cuboid as well as MTP  Edema:  mod edema noted L ankle/foot and minimal on the R with bandaids present, pt reports slight \"weeping\"   Arch in standing:  R mild pronation. L pes planovalgus       AROM: (* denotes performed with pain)  Foot/Ankle   DF: R 5; L 0  PF: R wfl; L wfl  Gross INV: R wfl; L 0  Gross EV: R wfl; L 5  Great toe ext: R 55; L 40      Accessory motion: Ankle /foot accessory jt motion restricted as follows: TCJ distraction, post talar glides;  Jt dysfunction medial cuneiform / MT 1  Restricted  A/P glides metatarsals; Restricted A-P glides proximal/distal tib/fib jts,  Restricted motion TMT 1,2 3. Foot  MMT   DF: R 4/5; L 4-/5   FDB: R 3+/5; L 3-/5  FDL: R 3+/5; L 3-/5  Lumbricals: R 3/5; L 3-/5  Soleus: R 3/5; L 3-/5  Gastroc: R 3-/5; L 2+/5  Tib Post: R 3/5; L 3-/5       Flexibility:  Hip Flexor: R short, L short  Gastroc-soleus: R short; L short        Assessment: Signs and symptoms are consistent with diagnosis of L foot pain as well as hypomobility of the L talocrural joint with insufficient left ankle dorsiflexion with midfoot over pronation, calcaneal eversion, and L forefoot abduction, resulting in movement impairment of lateral column compression syndrome       Goals:  (to be met in 10-12 visits)      Balance to be assessed next visit with appropriate goals   Pt will improve ROM of  talocrural jt  DF by 5-10 deg to promote normalized gait . Pt will be able to walk for 1-2 blocks on level ground  with minimal to no discomfort.    Pt will be able to stand 10-15 minutes with minimal to no discomfort. Pt will be able to go up/down stairs reciprocally with rails. Pt will improve L LE strength by  1/2 - 1 muscle grade to increase stability with gait and stairs. Pt will be indep with doing car/tub transfers with minimal to no discomfort. Pt will be able to do usual work/housework with minimal to no discomfort. Pt will be Indep with HEP for symptom management and recovery of function. Pt will improve LEFS score by 9 pts to show functional improvement. Plan: Plan to continue skilled PT to address jt restrictions, restore ROM, and progress with strengthening to achieve goals as outlined and to promote functional ADL. Date: 11/20/2023  TX#: 2/10-12 Date:                 TX#: 3/ Date:                 TX#: 4/ Date:                 TX#: 5/ Date:    Tx#: 6/   MT ( 20 min)   Jt mob L ankle/foot: gr3:   - TCJ  distraction,  post talar glides; MWM for ankle DF;    -mob medial,  intermediate, and lateral cuneiforms    - A/P glides metatarsals I,II,III  - A-P glides proximal/distal tib/fib jts   -Mobilization of navicular on talus, medial cuneiform on navicular, 1st metatarsal on medial cuneiform  -Mob L cuboid on 4th, 5th metatarsals      TE ( 15 min)  Seated:  -ankle df/pf 10x  -ankle inv/ev 10x (L unable to invert)   -ankle circles cw/ccw 10x2   -towel scrunches  -make a fist with toes/foot    -Shown how to do marble   at home      -jose luis stretch level 3 20 sec x3;     -Sidestepping at rail 5 ft x2 R/L ;      -DL heel raise slowly 10x    -hip hikes for PGM up-training:  R/L 15 sec x4 R/L;     NMR (10 min)  -ankle df with knee flex with foot on 6\" step with \"arch raise\" 10x ea foot.   -4\" step up with the L, down with the L;  then up with the R, down with the L still; (L knee pain when lowering R leg down 1st)  -airex step overs in ll bars 10x R/L                 HEP:  Towel scrunches  [place]   Ankle  rom ex  Standing Hip hikes HEP: HEP: HEP: HEP:            Charges: MT x1; TE x1; NMR x 1        Total Timed Treatment: 45 min  Total Treatment Time: 46 min

## 2023-11-21 ENCOUNTER — TELEPHONE (OUTPATIENT)
Dept: PHYSICAL THERAPY | Facility: HOSPITAL | Age: 78
End: 2023-11-21

## 2023-11-22 ENCOUNTER — APPOINTMENT (OUTPATIENT)
Dept: PHYSICAL THERAPY | Facility: HOSPITAL | Age: 78
End: 2023-11-22
Attending: Other
Payer: MEDICARE

## 2023-11-26 ENCOUNTER — HOSPITAL ENCOUNTER (INPATIENT)
Facility: HOSPITAL | Age: 78
LOS: 15 days | Discharge: HOME HEALTH | End: 2023-12-12
Attending: EMERGENCY MEDICINE | Admitting: HOSPITALIST
Payer: MEDICARE

## 2023-11-26 ENCOUNTER — APPOINTMENT (OUTPATIENT)
Dept: GENERAL RADIOLOGY | Facility: HOSPITAL | Age: 78
End: 2023-11-26
Attending: EMERGENCY MEDICINE
Payer: MEDICARE

## 2023-11-26 DIAGNOSIS — I50.9 ACUTE ON CHRONIC CONGESTIVE HEART FAILURE, UNSPECIFIED HEART FAILURE TYPE (HCC): ICD-10-CM

## 2023-11-26 DIAGNOSIS — I48.91 ATRIAL FIBRILLATION WITH RAPID VENTRICULAR RESPONSE (HCC): Primary | ICD-10-CM

## 2023-11-26 LAB
ALBUMIN SERPL-MCNC: 4.4 G/DL (ref 3.2–4.8)
ALBUMIN/GLOB SERPL: 1.6 {RATIO} (ref 1–2)
ALP LIVER SERPL-CCNC: 88 U/L
ALT SERPL-CCNC: 10 U/L
ANION GAP SERPL CALC-SCNC: 9 MMOL/L (ref 0–18)
AST SERPL-CCNC: 14 U/L (ref ?–34)
BASOPHILS # BLD AUTO: 0.02 X10(3) UL (ref 0–0.2)
BASOPHILS NFR BLD AUTO: 0.2 %
BILIRUB SERPL-MCNC: 0.4 MG/DL (ref 0.2–1.1)
BNP SERPL-MCNC: 371 PG/ML
BUN BLD-MCNC: 13 MG/DL (ref 9–23)
BUN/CREAT SERPL: 13.5 (ref 10–20)
CALCIUM BLD-MCNC: 9.1 MG/DL (ref 8.7–10.4)
CHLORIDE SERPL-SCNC: 108 MMOL/L (ref 98–112)
CO2 SERPL-SCNC: 28 MMOL/L (ref 21–32)
CREAT BLD-MCNC: 0.96 MG/DL
DEPRECATED RDW RBC AUTO: 47.7 FL (ref 35.1–46.3)
EGFRCR SERPLBLD CKD-EPI 2021: 61 ML/MIN/1.73M2 (ref 60–?)
EOSINOPHIL # BLD AUTO: 0.21 X10(3) UL (ref 0–0.7)
EOSINOPHIL NFR BLD AUTO: 2 %
ERYTHROCYTE [DISTWIDTH] IN BLOOD BY AUTOMATED COUNT: 13 % (ref 11–15)
FLUAV + FLUBV RNA SPEC NAA+PROBE: NEGATIVE
FLUAV + FLUBV RNA SPEC NAA+PROBE: NEGATIVE
GLOBULIN PLAS-MCNC: 2.7 G/DL (ref 2.8–4.4)
GLUCOSE BLD-MCNC: 114 MG/DL (ref 70–99)
HCT VFR BLD AUTO: 43.8 %
HGB BLD-MCNC: 14.1 G/DL
IMM GRANULOCYTES # BLD AUTO: 0.05 X10(3) UL (ref 0–1)
IMM GRANULOCYTES NFR BLD: 0.5 %
LYMPHOCYTES # BLD AUTO: 1 X10(3) UL (ref 1–4)
LYMPHOCYTES NFR BLD AUTO: 9.7 %
MCH RBC QN AUTO: 32.1 PG (ref 26–34)
MCHC RBC AUTO-ENTMCNC: 32.2 G/DL (ref 31–37)
MCV RBC AUTO: 99.8 FL
MONOCYTES # BLD AUTO: 0.28 X10(3) UL (ref 0.1–1)
MONOCYTES NFR BLD AUTO: 2.7 %
NEUTROPHILS # BLD AUTO: 8.7 X10 (3) UL (ref 1.5–7.7)
NEUTROPHILS # BLD AUTO: 8.7 X10(3) UL (ref 1.5–7.7)
NEUTROPHILS NFR BLD AUTO: 84.9 %
OSMOLALITY SERPL CALC.SUM OF ELEC: 301 MOSM/KG (ref 275–295)
PLATELET # BLD AUTO: 238 10(3)UL (ref 150–450)
POTASSIUM SERPL-SCNC: 3.1 MMOL/L (ref 3.5–5.1)
PROT SERPL-MCNC: 7.1 G/DL (ref 5.7–8.2)
RBC # BLD AUTO: 4.39 X10(6)UL
RSV RNA SPEC NAA+PROBE: NEGATIVE
SARS-COV-2 RNA RESP QL NAA+PROBE: NOT DETECTED
SODIUM SERPL-SCNC: 145 MMOL/L (ref 136–145)
TROPONIN I SERPL HS-MCNC: 5 NG/L
WBC # BLD AUTO: 10.3 X10(3) UL (ref 4–11)

## 2023-11-26 PROCEDURE — 71045 X-RAY EXAM CHEST 1 VIEW: CPT | Performed by: EMERGENCY MEDICINE

## 2023-11-26 RX ORDER — ONDANSETRON 2 MG/ML
4 INJECTION INTRAMUSCULAR; INTRAVENOUS ONCE
Status: COMPLETED | OUTPATIENT
Start: 2023-11-26 | End: 2023-11-26

## 2023-11-26 RX ORDER — DILTIAZEM HYDROCHLORIDE 5 MG/ML
INJECTION INTRAVENOUS
Status: COMPLETED
Start: 2023-11-26 | End: 2023-11-27

## 2023-11-26 RX ORDER — ONDANSETRON 2 MG/ML
4 INJECTION INTRAMUSCULAR; INTRAVENOUS ONCE
Status: COMPLETED | OUTPATIENT
Start: 2023-11-26 | End: 2023-11-27

## 2023-11-26 RX ORDER — DILTIAZEM HYDROCHLORIDE 5 MG/ML
5 INJECTION INTRAVENOUS ONCE
Status: COMPLETED | OUTPATIENT
Start: 2023-11-26 | End: 2023-11-26

## 2023-11-26 RX ORDER — DILTIAZEM HYDROCHLORIDE 5 MG/ML
INJECTION INTRAVENOUS
Status: COMPLETED
Start: 2023-11-26 | End: 2023-11-26

## 2023-11-26 RX ORDER — ONDANSETRON 2 MG/ML
INJECTION INTRAMUSCULAR; INTRAVENOUS
Status: COMPLETED
Start: 2023-11-26 | End: 2023-11-27

## 2023-11-26 RX ORDER — ONDANSETRON 2 MG/ML
INJECTION INTRAMUSCULAR; INTRAVENOUS
Status: COMPLETED
Start: 2023-11-26 | End: 2023-11-26

## 2023-11-26 RX ORDER — DILTIAZEM HYDROCHLORIDE 5 MG/ML
10 INJECTION INTRAVENOUS ONCE
Status: COMPLETED | OUTPATIENT
Start: 2023-11-26 | End: 2023-11-26

## 2023-11-26 RX ORDER — DILTIAZEM HYDROCHLORIDE 5 MG/ML
5 INJECTION INTRAVENOUS ONCE
Status: DISCONTINUED | OUTPATIENT
Start: 2023-11-26 | End: 2023-11-26

## 2023-11-27 ENCOUNTER — APPOINTMENT (OUTPATIENT)
Dept: GENERAL RADIOLOGY | Facility: HOSPITAL | Age: 78
End: 2023-11-27
Attending: HOSPITALIST
Payer: MEDICARE

## 2023-11-27 ENCOUNTER — TELEPHONE (OUTPATIENT)
Dept: PHYSICAL THERAPY | Facility: HOSPITAL | Age: 78
End: 2023-11-27

## 2023-11-27 ENCOUNTER — APPOINTMENT (OUTPATIENT)
Dept: CT IMAGING | Facility: HOSPITAL | Age: 78
End: 2023-11-27
Attending: HOSPITALIST
Payer: MEDICARE

## 2023-11-27 PROBLEM — R73.9 HYPERGLYCEMIA: Status: ACTIVE | Noted: 2023-11-27

## 2023-11-27 PROBLEM — E87.6 HYPOKALEMIA: Status: ACTIVE | Noted: 2023-11-27

## 2023-11-27 PROBLEM — E87.0 HYPERNATREMIA: Status: ACTIVE | Noted: 2023-11-27

## 2023-11-27 LAB
MAGNESIUM SERPL-MCNC: 1.7 MG/DL (ref 1.6–2.6)
POTASSIUM SERPL-SCNC: 2.9 MMOL/L (ref 3.5–5.1)
Q-T INTERVAL: 254 MS
QRS DURATION: 86 MS
QTC CALCULATION (BEZET): 403 MS
R AXIS: 83 DEGREES
T AXIS: 255 DEGREES
VENTRICULAR RATE: 152 BPM

## 2023-11-27 PROCEDURE — 74019 RADEX ABDOMEN 2 VIEWS: CPT | Performed by: HOSPITALIST

## 2023-11-27 PROCEDURE — 74176 CT ABD & PELVIS W/O CONTRAST: CPT | Performed by: HOSPITALIST

## 2023-11-27 RX ORDER — TRIAMCINOLONE ACETONIDE 1 MG/G
1 CREAM TOPICAL 2 TIMES DAILY PRN
COMMUNITY
Start: 2023-11-01 | End: 2024-10-26

## 2023-11-27 RX ORDER — MAGNESIUM SULFATE HEPTAHYDRATE 40 MG/ML
2 INJECTION, SOLUTION INTRAVENOUS ONCE
Status: COMPLETED | OUTPATIENT
Start: 2023-11-27 | End: 2023-11-27

## 2023-11-27 RX ORDER — OXYBUTYNIN CHLORIDE 10 MG/1
10 TABLET, EXTENDED RELEASE ORAL DAILY
COMMUNITY
End: 2023-12-12

## 2023-11-27 RX ORDER — POLYETHYLENE GLYCOL 3350 17 G/17G
17 POWDER, FOR SOLUTION ORAL DAILY PRN
Status: DISCONTINUED | OUTPATIENT
Start: 2023-11-27 | End: 2023-12-12

## 2023-11-27 RX ORDER — BISACODYL 10 MG
10 SUPPOSITORY, RECTAL RECTAL
Status: DISCONTINUED | OUTPATIENT
Start: 2023-11-27 | End: 2023-12-12

## 2023-11-27 RX ORDER — PROCHLORPERAZINE EDISYLATE 5 MG/ML
10 INJECTION INTRAMUSCULAR; INTRAVENOUS EVERY 6 HOURS PRN
Status: DISCONTINUED | OUTPATIENT
Start: 2023-11-27 | End: 2023-11-30

## 2023-11-27 RX ORDER — ONDANSETRON 2 MG/ML
4 INJECTION INTRAMUSCULAR; INTRAVENOUS EVERY 6 HOURS PRN
Status: DISCONTINUED | OUTPATIENT
Start: 2023-11-27 | End: 2023-12-12

## 2023-11-27 RX ORDER — POTASSIUM CHLORIDE 20 MEQ/1
40 TABLET, EXTENDED RELEASE ORAL EVERY 4 HOURS
Status: COMPLETED | OUTPATIENT
Start: 2023-11-27 | End: 2023-11-27

## 2023-11-27 RX ORDER — METOPROLOL SUCCINATE 25 MG/1
25 TABLET, EXTENDED RELEASE ORAL
Status: DISCONTINUED | OUTPATIENT
Start: 2023-11-27 | End: 2023-11-28

## 2023-11-27 RX ORDER — MELATONIN
1000 DAILY
COMMUNITY

## 2023-11-27 RX ORDER — FUROSEMIDE 10 MG/ML
40 INJECTION INTRAMUSCULAR; INTRAVENOUS ONCE
Status: COMPLETED | OUTPATIENT
Start: 2023-11-27 | End: 2023-11-27

## 2023-11-27 RX ORDER — ACETAMINOPHEN 500 MG
500 TABLET ORAL EVERY 4 HOURS PRN
Status: DISCONTINUED | OUTPATIENT
Start: 2023-11-27 | End: 2023-12-12

## 2023-11-27 RX ORDER — ACETAMINOPHEN 325 MG/1
650 TABLET ORAL 2 TIMES DAILY PRN
COMMUNITY

## 2023-11-27 RX ORDER — DILTIAZEM HYDROCHLORIDE 5 MG/ML
10 INJECTION INTRAVENOUS ONCE
Status: COMPLETED | OUTPATIENT
Start: 2023-11-27 | End: 2023-11-27

## 2023-11-27 RX ORDER — CYCLOSPORINE 0.5 MG/ML
1 EMULSION OPHTHALMIC 2 TIMES DAILY
COMMUNITY

## 2023-11-27 RX ORDER — METOCLOPRAMIDE HYDROCHLORIDE 5 MG/ML
5 INJECTION INTRAMUSCULAR; INTRAVENOUS EVERY 8 HOURS PRN
Status: DISCONTINUED | OUTPATIENT
Start: 2023-11-27 | End: 2023-12-12

## 2023-11-27 RX ORDER — MELATONIN
3 NIGHTLY PRN
Status: DISCONTINUED | OUTPATIENT
Start: 2023-11-27 | End: 2023-12-12

## 2023-11-27 RX ORDER — FUROSEMIDE 10 MG/ML
40 INJECTION INTRAMUSCULAR; INTRAVENOUS
Status: DISCONTINUED | OUTPATIENT
Start: 2023-11-27 | End: 2023-12-10

## 2023-11-27 RX ORDER — ENEMA 19; 7 G/133ML; G/133ML
1 ENEMA RECTAL ONCE AS NEEDED
Status: DISCONTINUED | OUTPATIENT
Start: 2023-11-27 | End: 2023-12-12

## 2023-11-27 RX ORDER — SENNOSIDES 8.6 MG
17.2 TABLET ORAL NIGHTLY PRN
Status: DISCONTINUED | OUTPATIENT
Start: 2023-11-27 | End: 2023-12-12

## 2023-11-27 RX ORDER — TORSEMIDE 20 MG/1
20 TABLET ORAL DAILY
COMMUNITY

## 2023-11-27 RX ORDER — PRAVASTATIN SODIUM 20 MG
20 TABLET ORAL NIGHTLY
Status: DISCONTINUED | OUTPATIENT
Start: 2023-11-27 | End: 2023-12-12

## 2023-11-27 RX ORDER — POTASSIUM CHLORIDE 14.9 MG/ML
20 INJECTION INTRAVENOUS ONCE
Qty: 100 ML | Refills: 0 | Status: COMPLETED | OUTPATIENT
Start: 2023-11-27 | End: 2023-11-27

## 2023-11-27 RX ORDER — POTASSIUM CHLORIDE 20 MEQ/1
10 TABLET, EXTENDED RELEASE ORAL DAILY
COMMUNITY

## 2023-11-27 RX ORDER — ACETAMINOPHEN 160 MG
2000 TABLET,DISINTEGRATING ORAL DAILY
COMMUNITY

## 2023-11-27 RX ORDER — GABAPENTIN 100 MG/1
200 CAPSULE ORAL 2 TIMES DAILY
Status: DISCONTINUED | OUTPATIENT
Start: 2023-11-27 | End: 2023-12-12

## 2023-11-27 NOTE — ED NOTES
Orders for admission, patient is aware of plan and ready to go upstairs. Any questions, please call ED RN Bernabe Sosa  at extension [phone].    Type of COVID test sent:genex  COVID Suspicion level: Low    Titratable drug(s) infusing: cardizem   Rate:15mg/hr     LOC at time of transport:x4    Other pertinent information:    Normally ambulatory     CIWA score=n/a  NIH score=n/a

## 2023-11-27 NOTE — PROGRESS NOTES
Received patient from ER to room 217. On O2 at 4 liters /NC, and Cardizem drip running at 15 ml/hr. Patient assisted with transfer. Alert and oriented x4. Hooked up to the monitor. Skin assessment done. Patient remains on Afib with HR in the 130's. Oriented to room, call light, plan of care. Verbalized understanding.

## 2023-11-27 NOTE — ED INITIAL ASSESSMENT (HPI)
Patient arrives via [place] [facility] with c/o of afib  w/rvr. Patient states around 1600 she got a notification on her apple watch that her heart rate was high, states that since then increasing fatigue, shortness of breath. Patient actively vomiting in ER.

## 2023-11-28 ENCOUNTER — APPOINTMENT (OUTPATIENT)
Dept: PHYSICAL THERAPY | Facility: HOSPITAL | Age: 78
End: 2023-11-28
Attending: Other
Payer: MEDICARE

## 2023-11-28 LAB
ANION GAP SERPL CALC-SCNC: 8 MMOL/L (ref 0–18)
BUN BLD-MCNC: 30 MG/DL (ref 9–23)
BUN/CREAT SERPL: 21.3 (ref 10–20)
CALCIUM BLD-MCNC: 8.7 MG/DL (ref 8.7–10.4)
CHLORIDE SERPL-SCNC: 105 MMOL/L (ref 98–112)
CO2 SERPL-SCNC: 31 MMOL/L (ref 21–32)
CREAT BLD-MCNC: 1.41 MG/DL
DEPRECATED RDW RBC AUTO: 49.4 FL (ref 35.1–46.3)
EGFRCR SERPLBLD CKD-EPI 2021: 39 ML/MIN/1.73M2 (ref 60–?)
ERYTHROCYTE [DISTWIDTH] IN BLOOD BY AUTOMATED COUNT: 13.6 % (ref 11–15)
GLUCOSE BLD-MCNC: 117 MG/DL (ref 70–99)
GLUCOSE BLDC GLUCOMTR-MCNC: 91 MG/DL (ref 70–99)
GLUCOSE BLDC GLUCOMTR-MCNC: 96 MG/DL (ref 70–99)
HCT VFR BLD AUTO: 38.1 %
HGB BLD-MCNC: 12.3 G/DL
MAGNESIUM SERPL-MCNC: 2.1 MG/DL (ref 1.6–2.6)
MCH RBC QN AUTO: 31.9 PG (ref 26–34)
MCHC RBC AUTO-ENTMCNC: 32.3 G/DL (ref 31–37)
MCV RBC AUTO: 98.7 FL
OSMOLALITY SERPL CALC.SUM OF ELEC: 305 MOSM/KG (ref 275–295)
PLATELET # BLD AUTO: 209 10(3)UL (ref 150–450)
POTASSIUM SERPL-SCNC: 3.8 MMOL/L (ref 3.5–5.1)
POTASSIUM SERPL-SCNC: 4 MMOL/L (ref 3.5–5.1)
RBC # BLD AUTO: 3.86 X10(6)UL
SODIUM SERPL-SCNC: 144 MMOL/L (ref 136–145)
WBC # BLD AUTO: 18.5 X10(3) UL (ref 4–11)

## 2023-11-28 RX ORDER — FAMOTIDINE 10 MG/ML
20 INJECTION, SOLUTION INTRAVENOUS DAILY
Status: DISCONTINUED | OUTPATIENT
Start: 2023-11-28 | End: 2023-11-29

## 2023-11-28 RX ORDER — POTASSIUM CHLORIDE 1.5 G/1.58G
40 POWDER, FOR SOLUTION ORAL ONCE
Status: COMPLETED | OUTPATIENT
Start: 2023-11-28 | End: 2023-11-28

## 2023-11-28 RX ORDER — FAMOTIDINE 10 MG
10 TABLET ORAL DAILY
Status: DISCONTINUED | OUTPATIENT
Start: 2023-11-28 | End: 2023-11-29

## 2023-11-28 RX ORDER — METOPROLOL SUCCINATE 50 MG/1
50 TABLET, EXTENDED RELEASE ORAL
Status: DISCONTINUED | OUTPATIENT
Start: 2023-11-29 | End: 2023-12-12

## 2023-11-28 RX ORDER — METOPROLOL SUCCINATE 25 MG/1
25 TABLET, EXTENDED RELEASE ORAL ONCE
Status: COMPLETED | OUTPATIENT
Start: 2023-11-28 | End: 2023-11-28

## 2023-11-28 RX ORDER — METOPROLOL SUCCINATE 25 MG/1
25 TABLET, EXTENDED RELEASE ORAL ONCE
Qty: 1 TABLET | Refills: 0 | Status: DISCONTINUED | OUTPATIENT
Start: 2023-11-28 | End: 2023-11-28

## 2023-11-28 NOTE — PHYSICAL THERAPY NOTE
PT evaluation orders recd, chart reviewed. Spoke with RN, Maureen Palmer, who requests to hold therapy at this time due to elevated HR and newly found to be + for C Diff, work up in progress. Will re attempt when appropriate.

## 2023-11-29 ENCOUNTER — APPOINTMENT (OUTPATIENT)
Dept: GENERAL RADIOLOGY | Facility: HOSPITAL | Age: 78
End: 2023-11-29
Attending: HOSPITALIST
Payer: MEDICARE

## 2023-11-29 ENCOUNTER — TELEPHONE (OUTPATIENT)
Dept: PHYSICAL THERAPY | Facility: HOSPITAL | Age: 78
End: 2023-11-29

## 2023-11-29 LAB
ADENOVIRUS F 40/41 PCR: NEGATIVE
ANION GAP SERPL CALC-SCNC: 5 MMOL/L (ref 0–18)
ASTROVIRUS PCR: NEGATIVE
BUN BLD-MCNC: 28 MG/DL (ref 9–23)
BUN/CREAT SERPL: 25.7 (ref 10–20)
C CAYETANENSIS DNA SPEC QL NAA+PROBE: NEGATIVE
C DIFF TOX B STL QL: NEGATIVE
CALCIUM BLD-MCNC: 9 MG/DL (ref 8.7–10.4)
CAMPY SP DNA.DIARRHEA STL QL NAA+PROBE: NEGATIVE
CHLORIDE SERPL-SCNC: 106 MMOL/L (ref 98–112)
CO2 SERPL-SCNC: 31 MMOL/L (ref 21–32)
CREAT BLD-MCNC: 1.09 MG/DL
CRYPTOSP DNA SPEC QL NAA+PROBE: NEGATIVE
DEPRECATED RDW RBC AUTO: 50.3 FL (ref 35.1–46.3)
EAEC PAA PLAS AGGR+AATA ST NAA+NON-PRB: NEGATIVE
EC STX1+STX2 + H7 FLIC SPEC NAA+PROBE: NEGATIVE
EGFRCR SERPLBLD CKD-EPI 2021: 53 ML/MIN/1.73M2 (ref 60–?)
ENTAMOEBA HISTOLYTICA PCR: NEGATIVE
EPEC EAE GENE STL QL NAA+NON-PROBE: NEGATIVE
ERYTHROCYTE [DISTWIDTH] IN BLOOD BY AUTOMATED COUNT: 13.3 % (ref 11–15)
ETEC LTA+ST1A+ST1B TOX ST NAA+NON-PROBE: NEGATIVE
GIARDIA LAMBLIA PCR: NEGATIVE
GLUCOSE BLD-MCNC: 103 MG/DL (ref 70–99)
HCT VFR BLD AUTO: 37.6 %
HGB BLD-MCNC: 12.1 G/DL
MCH RBC QN AUTO: 32.6 PG (ref 26–34)
MCHC RBC AUTO-ENTMCNC: 32.2 G/DL (ref 31–37)
MCV RBC AUTO: 101.3 FL
NOROVIRUS GI/GII PCR: NEGATIVE
OSMOLALITY SERPL CALC.SUM OF ELEC: 300 MOSM/KG (ref 275–295)
P SHIGELLOIDES DNA STL QL NAA+PROBE: NEGATIVE
PLATELET # BLD AUTO: 200 10(3)UL (ref 150–450)
POTASSIUM SERPL-SCNC: 4.7 MMOL/L (ref 3.5–5.1)
POTASSIUM SERPL-SCNC: 4.7 MMOL/L (ref 3.5–5.1)
RBC # BLD AUTO: 3.71 X10(6)UL
ROTAVIRUS A PCR: NEGATIVE
SALMONELLA DNA SPEC QL NAA+PROBE: NEGATIVE
SAPOVIRUS PCR: NEGATIVE
SHIGELLA SP+EIEC IPAH ST NAA+NON-PROBE: NEGATIVE
SODIUM SERPL-SCNC: 142 MMOL/L (ref 136–145)
V CHOLERAE DNA SPEC QL NAA+PROBE: NEGATIVE
VIBRIO DNA SPEC NAA+PROBE: NEGATIVE
WBC # BLD AUTO: 13.3 X10(3) UL (ref 4–11)
YERSINIA DNA SPEC NAA+PROBE: NEGATIVE

## 2023-11-29 PROCEDURE — 71045 X-RAY EXAM CHEST 1 VIEW: CPT | Performed by: HOSPITALIST

## 2023-11-29 RX ORDER — FUROSEMIDE 10 MG/ML
40 INJECTION INTRAMUSCULAR; INTRAVENOUS ONCE
Status: COMPLETED | OUTPATIENT
Start: 2023-11-29 | End: 2023-11-29

## 2023-11-29 RX ORDER — PANTOPRAZOLE SODIUM 40 MG/1
40 TABLET, DELAYED RELEASE ORAL
Status: DISCONTINUED | OUTPATIENT
Start: 2023-11-29 | End: 2023-12-04

## 2023-11-29 NOTE — PHYSICAL THERAPY NOTE
PHYSICAL THERAPY EVALUATION - INPATIENT     Room Number: 217/217-A  Evaluation Date: 11/29/2023  Type of Evaluation: Initial   Physician Order: PT Eval and Treat    Presenting Problem: acute on chronic CHF, afib with rvr  Co-Morbidities : afib, chf, htn, obesity  Reason for Therapy: Mobility Dysfunction and Discharge Planning    PHYSICAL THERAPY ASSESSMENT     Patient is a 70year old female admitted 11/26/2023 for acute on chronic CHF. Patient's current functional deficits include impaired bed mobility, transfers, ambulation, which are below the patient's pre-admission status. Patient will benefit from continued inpatient physical therapy to address above issues so that patient may achieve highest functional mobility level. Pt ok to see per rn, pt recd in supine, educated in role of PT, goals for session, importance of consistent mobility. Pt required much encouragement to work with therapy. Pt is alert and oriented x 4, able to follow all commands. Pt transferred supine to sit with min a, good sitting balance, /88 with no dizziness. Pt reporting chest pain, describes it as more superficial, rn aware. Pt stood to rw with min a, gait training with rw with emphasis on energy conservation, upright posture. Pt tolerating short distance ambulation well, no LOB, kyphotic, with no increased SOB. Pt required extra time in bathroom, Bm with extra time for pericare. Pt ambulated with rw with min a, made comfortable in recliner. Discussed POC, dc recommendations. Pt in agreement with home PT. Recommend cont ambulation also with [facility] staff in order to cont to improve pt endurance, strength, and activity tolerance. Rn made aware of session. The patient's Approx Degree of Impairment: 46.58% has been calculated based on documentation in the [facility] '6 clicks' Inpatient Basic Mobility Short Form.   Research supports that patients with this level of impairment may benefit from home with home PT.      DISCHARGE RECOMMENDATIONS  PT Discharge Recommendations: Home with home health PT    PLAN  PT Treatment Plan: Endurance; Energy conservation;Patient education;Gait training;Strengthening  Rehab Potential : Good  Frequency (Obs): 3-5x/week       PHYSICAL THERAPY MEDICAL/SOCIAL HISTORY        Problem List  Principal Problem:    Atrial fibrillation with rapid ventricular response (HCC)  Active Problems:    Hypernatremia    Hypokalemia    Hyperglycemia      HOME SITUATION  Home Situation  Type of Home: Independent living facility  Home Layout: One level  Lives With: Alone  Drives: Yes  Patient Owned Equipment: None     Prior Level of [place]: Pt lives in [address] [place] [address], reports ind with all mobility including ambulation without assistive device, ind with adls. SUBJECTIVE  Pt required much encouragement to participate with therapy    PHYSICAL THERAPY EXAMINATION     OBJECTIVE  Precautions: None  Fall Risk: Standard fall risk    WEIGHT BEARING RESTRICTION                PAIN ASSESSMENT  Rating: Unable to rate  Location: epigastric  Management Techniques: Activity promotion    COGNITION  Overall Cognitive Status:  WFL - within functional limits    RANGE OF MOTION AND STRENGTH ASSESSMENT  Upper extremity ROM and strength are within functional limits   Lower extremity ROM is within functional limits   Lower extremity strength is within functional limits , bilat LE with edema, redness. BALANCE  Static Sitting: Good  Dynamic Sitting: Good  Static Standing: Poor +  Dynamic Standing: Poor +      ACTIVITY TOLERANCE  Pulse: 103 (to 140's with h/o afib)  Heart Rate Source: Monitor     BP: 101/88  BP Location: Right arm  BP Method: Automatic  Patient Position: Sitting    O2 WALK  Oxygen Therapy  SPO2% on Oxygen at Rest: 95  At rest oxygen flow (liters per minute): 5    AM-PAC '6-Clicks' INPATIENT SHORT FORM - BASIC MOBILITY  How much difficulty does the patient currently have. ..   Patient Difficulty: Turning over in bed (including adjusting bedclothes, sheets and blankets)?: A Little   Patient Difficulty: Sitting down on and standing up from a chair with arms (e.g., wheelchair, bedside commode, etc.): A Little   Patient Difficulty: Moving from lying on back to sitting on the side of the bed?: A Little   How much help from another person does the patient currently need. .. Help from Another: Moving to and from a bed to a chair (including a wheelchair)?: A Little   Help from Another: Need to walk in hospital room?: A Little   Help from Another: Climbing 3-5 steps with a railing?: A Little     AM-PAC Score:  Raw Score: 18   Approx Degree of Impairment: 46.58%   Standardized Score (AM-PAC Scale): 43.63   CMS Modifier (G-Code): CK    FUNCTIONAL ABILITY STATUS  Functional Mobility/Gait Assessment  Gait Assistance: Minimum assistance  Distance (ft): 10'x2  Assistive Device: Rolling walker  Pattern: Shuffle      Exercise/Education Provided:  Bed mobility  Energy conservation  Functional activity tolerated  Gait training  Posture  Transfer training    Patient End of Session: Up in chair;Needs met;Call light within reach;RN aware of session/findings; All patient questions and concerns addressed    CURRENT GOALS    Goals to be met by: 12/4/23  Patient Goal Patient's self-stated goal is: to go home   Goal #1 Patient is able to demonstrate supine - sit EOB @ level: modified independent     Goal #1   Current Status    Goal #2 Patient is able to demonstrate transfers Sit to/from Stand at assistance level: modified independent with walker - rolling     Goal #2  Current Status    Goal #3 Patient is able to ambulate 50 feet with assist device: walker - rolling at assistance level: modified independent   Goal #3   Current Status    Goal #4    Goal #4   Current Status    Goal #5 Patient to demonstrate independence with home activity/exercise instructions provided to patient in preparation for discharge.    Goal #5   Current Status    Goal #6    Goal #6  Current Status Patient Evaluation Complexity Level:  History Moderate - 1 or 2 personal factors and/or co-morbidities   Examination of body systems Moderate - addressing a total of 3 or more elements   Clinical Presentation Moderate - Evolving   Clinical Decision Making Moderate Complexity     Therapeutic Activity: 30 minutes

## 2023-11-30 ENCOUNTER — APPOINTMENT (OUTPATIENT)
Dept: PHYSICAL THERAPY | Facility: HOSPITAL | Age: 78
End: 2023-11-30
Attending: Other
Payer: MEDICARE

## 2023-11-30 ENCOUNTER — APPOINTMENT (OUTPATIENT)
Dept: GENERAL RADIOLOGY | Facility: HOSPITAL | Age: 78
End: 2023-11-30
Attending: INTERNAL MEDICINE
Payer: MEDICARE

## 2023-11-30 LAB
ANION GAP SERPL CALC-SCNC: 7 MMOL/L (ref 0–18)
BUN BLD-MCNC: 20 MG/DL (ref 9–23)
BUN/CREAT SERPL: 25.6 (ref 10–20)
CALCIUM BLD-MCNC: 8.8 MG/DL (ref 8.7–10.4)
CHLORIDE SERPL-SCNC: 105 MMOL/L (ref 98–112)
CO2 SERPL-SCNC: 31 MMOL/L (ref 21–32)
CREAT BLD-MCNC: 0.78 MG/DL
DEPRECATED RDW RBC AUTO: 46.4 FL (ref 35.1–46.3)
EGFRCR SERPLBLD CKD-EPI 2021: 79 ML/MIN/1.73M2 (ref 60–?)
ERYTHROCYTE [DISTWIDTH] IN BLOOD BY AUTOMATED COUNT: 12.9 % (ref 11–15)
GLUCOSE BLD-MCNC: 102 MG/DL (ref 70–99)
HCT VFR BLD AUTO: 36.3 %
HGB BLD-MCNC: 11.9 G/DL
MCH RBC QN AUTO: 32.1 PG (ref 26–34)
MCHC RBC AUTO-ENTMCNC: 32.8 G/DL (ref 31–37)
MCV RBC AUTO: 97.8 FL
OSMOLALITY SERPL CALC.SUM OF ELEC: 299 MOSM/KG (ref 275–295)
PLATELET # BLD AUTO: 219 10(3)UL (ref 150–450)
POTASSIUM SERPL-SCNC: 3.3 MMOL/L (ref 3.5–5.1)
POTASSIUM SERPL-SCNC: 3.6 MMOL/L (ref 3.5–5.1)
POTASSIUM SERPL-SCNC: 4 MMOL/L (ref 3.5–5.1)
RBC # BLD AUTO: 3.71 X10(6)UL
SODIUM SERPL-SCNC: 143 MMOL/L (ref 136–145)
WBC # BLD AUTO: 10 X10(3) UL (ref 4–11)

## 2023-11-30 PROCEDURE — 74021 RADEX ABDOMEN 3+ VIEWS: CPT | Performed by: INTERNAL MEDICINE

## 2023-11-30 RX ORDER — POTASSIUM CHLORIDE 20 MEQ/1
40 TABLET, EXTENDED RELEASE ORAL EVERY 4 HOURS
Status: DISCONTINUED | OUTPATIENT
Start: 2023-11-30 | End: 2023-11-30

## 2023-11-30 RX ORDER — IPRATROPIUM BROMIDE AND ALBUTEROL SULFATE 2.5; .5 MG/3ML; MG/3ML
SOLUTION RESPIRATORY (INHALATION)
Status: DISPENSED
Start: 2023-11-30 | End: 2023-11-30

## 2023-11-30 RX ORDER — POTASSIUM CHLORIDE 20 MEQ/1
40 TABLET, EXTENDED RELEASE ORAL EVERY 4 HOURS
Qty: 4 TABLET | Refills: 0 | Status: COMPLETED | OUTPATIENT
Start: 2023-11-30 | End: 2023-11-30

## 2023-11-30 RX ORDER — IPRATROPIUM BROMIDE AND ALBUTEROL SULFATE 2.5; .5 MG/3ML; MG/3ML
3 SOLUTION RESPIRATORY (INHALATION) EVERY 6 HOURS PRN
Status: DISCONTINUED | OUTPATIENT
Start: 2023-11-30 | End: 2023-12-12

## 2023-11-30 RX ORDER — METOPROLOL TARTRATE 1 MG/ML
5 INJECTION, SOLUTION INTRAVENOUS EVERY 6 HOURS PRN
Status: ACTIVE | OUTPATIENT
Start: 2023-11-30 | End: 2023-12-01

## 2023-11-30 RX ORDER — METOCLOPRAMIDE HYDROCHLORIDE 5 MG/ML
10 INJECTION INTRAMUSCULAR; INTRAVENOUS EVERY 8 HOURS
Status: DISCONTINUED | OUTPATIENT
Start: 2023-11-30 | End: 2023-12-12

## 2023-11-30 NOTE — PLAN OF CARE
Patient is Aox4, vital signs stable, nasal cannula at 5L, heart rate controlled. Patient complains of having abdominal pain after swallowing the medication, MD aware. Xray done and results forwarded to the attending. Call light within reach. Fall precautions maintained. Problem: CARDIOVASCULAR - ADULT  Goal: Maintains optimal cardiac output and hemodynamic stability  Description: INTERVENTIONS:  - Monitor vital signs, rhythm, and trends  - Monitor for bleeding, hypotension and signs of decreased cardiac output  - Evaluate effectiveness of vasoactive medications to optimize hemodynamic stability  - Monitor arterial and/or venous puncture sites for bleeding and/or hematoma  - Assess quality of pulses, skin color and temperature  - Assess for signs of decreased coronary artery perfusion - ex.  Angina  - Evaluate fluid balance, assess for edema, trend weights  Outcome: Progressing     Problem: SKIN/TISSUE INTEGRITY - ADULT  Goal: Skin integrity remains intact  Description: INTERVENTIONS  - Assess and document risk factors for pressure ulcer development  - Assess and document skin integrity  - Monitor for areas of redness and/or skin breakdown  - Initiate interventions, skin care algorithm/standards of care as needed  Outcome: Progressing

## 2023-12-01 ENCOUNTER — APPOINTMENT (OUTPATIENT)
Dept: GENERAL RADIOLOGY | Facility: HOSPITAL | Age: 78
End: 2023-12-01
Attending: SURGERY
Payer: MEDICARE

## 2023-12-01 LAB
ANION GAP SERPL CALC-SCNC: 4 MMOL/L (ref 0–18)
BUN BLD-MCNC: 14 MG/DL (ref 9–23)
BUN/CREAT SERPL: 16.7 (ref 10–20)
CALCIUM BLD-MCNC: 9 MG/DL (ref 8.7–10.4)
CHLORIDE SERPL-SCNC: 106 MMOL/L (ref 98–112)
CO2 SERPL-SCNC: 34 MMOL/L (ref 21–32)
CREAT BLD-MCNC: 0.84 MG/DL
DEPRECATED RDW RBC AUTO: 46.8 FL (ref 35.1–46.3)
EGFRCR SERPLBLD CKD-EPI 2021: 72 ML/MIN/1.73M2 (ref 60–?)
ERYTHROCYTE [DISTWIDTH] IN BLOOD BY AUTOMATED COUNT: 12.8 % (ref 11–15)
GLUCOSE BLD-MCNC: 100 MG/DL (ref 70–99)
HCT VFR BLD AUTO: 38.7 %
HGB BLD-MCNC: 12.4 G/DL
MAGNESIUM SERPL-MCNC: 2.3 MG/DL (ref 1.6–2.6)
MCH RBC QN AUTO: 31.7 PG (ref 26–34)
MCHC RBC AUTO-ENTMCNC: 32 G/DL (ref 31–37)
MCV RBC AUTO: 99 FL
OSMOLALITY SERPL CALC.SUM OF ELEC: 299 MOSM/KG (ref 275–295)
PLATELET # BLD AUTO: 236 10(3)UL (ref 150–450)
POTASSIUM SERPL-SCNC: 4.3 MMOL/L (ref 3.5–5.1)
RBC # BLD AUTO: 3.91 X10(6)UL
SODIUM SERPL-SCNC: 144 MMOL/L (ref 136–145)
WBC # BLD AUTO: 7.3 X10(3) UL (ref 4–11)

## 2023-12-01 PROCEDURE — 74250 X-RAY XM SM INT 1CNTRST STD: CPT | Performed by: SURGERY

## 2023-12-01 RX ORDER — DILTIAZEM HYDROCHLORIDE 60 MG/1
60 TABLET, FILM COATED ORAL EVERY 6 HOURS SCHEDULED
Status: DISCONTINUED | OUTPATIENT
Start: 2023-12-01 | End: 2023-12-03

## 2023-12-01 RX ORDER — FUROSEMIDE 10 MG/ML
20 INJECTION INTRAMUSCULAR; INTRAVENOUS ONCE
Status: COMPLETED | OUTPATIENT
Start: 2023-12-01 | End: 2023-12-01

## 2023-12-01 RX ORDER — AMIODARONE HYDROCHLORIDE 200 MG/1
200 TABLET ORAL 2 TIMES DAILY WITH MEALS
Status: DISCONTINUED | OUTPATIENT
Start: 2023-12-02 | End: 2023-12-12

## 2023-12-01 NOTE — PROGRESS NOTES
OT orders received and chart reviewed. RN approving of pt participation in therapy. Contact coordinated w/ PT. Pt received in bed, alert and oriented x4. Role of OT in hospital setting discussed. Pt declining therapy at this time stating that she had been in radiology all day and just returned an hour ago and was too tired. Importance of oob activity stressed. OT eval deferred per pt.  RN aware

## 2023-12-01 NOTE — CM/SW NOTE
Social work met with the patient at bedside to discuss therapy recommendations. Social work advised the patient that PT is recommending home health. The patient is agreeable to the recommendation. Social work provided the patient with the [place] list.     Social work will follow up with choice. AMMON/JASON to remain available for support and/or discharge planning.      Emily NAVARRO, [facility]  Discharge Planner [id number]

## 2023-12-01 NOTE — PROGRESS NOTES
Attempted to insert NG tube, unsuccessful. TIMOTHY Fraire attempted as well, meeting resistance right away upon insertion. Surgeon aware.

## 2023-12-01 NOTE — PHYSICAL THERAPY NOTE
PT follow-up treatment attempted. RN approved activity. Patient in bed upon arrival, adamantly refusing participation in therapeutic interventions stating \"I've been in radiology all day and just returned one hour ago. I need a nap. \" Educated patient on benefits of out of bed activity and mobilization, continuing to decline. RN aware.     Maria Spann, PT, DPT  [facility]  Ext: [phone]

## 2023-12-02 LAB
ANION GAP SERPL CALC-SCNC: 5 MMOL/L (ref 0–18)
BUN BLD-MCNC: 13 MG/DL (ref 9–23)
BUN/CREAT SERPL: 14.8 (ref 10–20)
CALCIUM BLD-MCNC: 9.1 MG/DL (ref 8.7–10.4)
CHLORIDE SERPL-SCNC: 104 MMOL/L (ref 98–112)
CO2 SERPL-SCNC: 39 MMOL/L (ref 21–32)
CREAT BLD-MCNC: 0.88 MG/DL
DEPRECATED RDW RBC AUTO: 45.7 FL (ref 35.1–46.3)
EGFRCR SERPLBLD CKD-EPI 2021: 68 ML/MIN/1.73M2 (ref 60–?)
ERYTHROCYTE [DISTWIDTH] IN BLOOD BY AUTOMATED COUNT: 12.6 % (ref 11–15)
GLUCOSE BLD-MCNC: 95 MG/DL (ref 70–99)
HCT VFR BLD AUTO: 39.3 %
HGB BLD-MCNC: 13 G/DL
MAGNESIUM SERPL-MCNC: 2.3 MG/DL (ref 1.6–2.6)
MCH RBC QN AUTO: 32.5 PG (ref 26–34)
MCHC RBC AUTO-ENTMCNC: 33.1 G/DL (ref 31–37)
MCV RBC AUTO: 98.3 FL
OSMOLALITY SERPL CALC.SUM OF ELEC: 306 MOSM/KG (ref 275–295)
PLATELET # BLD AUTO: 255 10(3)UL (ref 150–450)
POTASSIUM SERPL-SCNC: 3.1 MMOL/L (ref 3.5–5.1)
POTASSIUM SERPL-SCNC: 3.2 MMOL/L (ref 3.5–5.1)
RBC # BLD AUTO: 4 X10(6)UL
SODIUM SERPL-SCNC: 148 MMOL/L (ref 136–145)
WBC # BLD AUTO: 7.5 X10(3) UL (ref 4–11)

## 2023-12-02 RX ORDER — DEXTROSE MONOHYDRATE 50 MG/ML
INJECTION, SOLUTION INTRAVENOUS CONTINUOUS
Status: DISCONTINUED | OUTPATIENT
Start: 2023-12-02 | End: 2023-12-02

## 2023-12-02 RX ORDER — POTASSIUM CHLORIDE 20 MEQ/1
40 TABLET, EXTENDED RELEASE ORAL EVERY 4 HOURS
Status: COMPLETED | OUTPATIENT
Start: 2023-12-02 | End: 2023-12-02

## 2023-12-02 RX ORDER — DILTIAZEM HYDROCHLORIDE 5 MG/ML
10 INJECTION INTRAVENOUS EVERY 2 HOUR PRN
Status: DISCONTINUED | OUTPATIENT
Start: 2023-12-02 | End: 2023-12-12

## 2023-12-02 NOTE — PHYSICAL THERAPY NOTE
PHYSICAL THERAPY TREATMENT NOTE - INPATIENT     Room Number: 340/340-A       Presenting Problem: acute on chronic CHF, afib with rvr    Problem List  Principal Problem:    Atrial fibrillation with rapid ventricular response (HCC)  Active Problems:    Hypernatremia    Hypokalemia    Hyperglycemia      PHYSICAL THERAPY ASSESSMENT   Chart reviewed. THIEN Boyd approved participation in physical therapy. PPE worn by therapist: gloves. Patient was not wearing a mask during session. Patient presented in bed and alarm activated with do not rate/10 pain. Patient with good  progress towards goals during this session. Education provided on Physical therapy plan of care, physiological benefits of out of bed mobility, and therapeutic exer BLE, body mechanics with bed mobility . Patient with good carryover. Patient able to sit EOB ~ 10 min d/t dizziness; VS checked and RN aware and approved transfer to chair. Amb limited d/t weakness and still c/o dizzy. Bed mobility: Min assist  Transfers: Min assist  Gait Assistance: Minimum assistance  Distance (ft): 10  Assistive Device: Rolling walker  Pattern: Shuffle     Patient was left in bedside chair at end of session with all needs in reach. The patient's Approx Degree of Impairment: 50.57% has been calculated based on documentation in the [facility] '6 clicks' Inpatient Basic Mobility Short Form. Research supports that patients with this level of impairment may benefit from Subacute Rehab. Patient may progress towards her functional indep and may return home. RN aware of patient status post session. DISCHARGE RECOMMENDATIONS  PT Discharge Recommendations: Sub-acute rehabilitation (if progressing pt able to HHPT)     PLAN  PT Treatment Plan: Endurance; Body mechanics;Gait training;Patient education;Transfer training;Balance training;Stair training    SUBJECTIVE  I will try today.      OBJECTIVE  Precautions: None    WEIGHT BEARING RESTRICTION  Weight Bearing Restriction: None PAIN ASSESSMENT   Rating: Unable to rate  Location: stomach; legs  Management Techniques: Body mechanics; Activity promotion    BALANCE                                                                                                                       Static Sitting: Good  Dynamic Sitting: Good           Static Standing: Fair -  Dynamic Standing: Poor +    ACTIVITY TOLERANCE  Pulse: (!) 152  Heart Rate Source: Monitor  Resp: 18  BP: 111/75  BP Location: Right arm  BP Method: Automatic  Patient Position: Sitting    O2 WALK  Oxygen Therapy  SPO2% on Room Air at Rest: 92  SPO2% on Oxygen at Rest: 4    AM-PAC '6-Clicks' INPATIENT SHORT FORM - BASIC MOBILITY  How much difficulty does the patient currently have. .. Patient Difficulty: Turning over in bed (including adjusting bedclothes, sheets and blankets)?: A Little   Patient Difficulty: Sitting down on and standing up from a chair with arms (e.g., wheelchair, bedside commode, etc.): A Little   Patient Difficulty: Moving from lying on back to sitting on the side of the bed?: A Little   How much help from another person does the patient currently need. .. Help from Another: Moving to and from a bed to a chair (including a wheelchair)?: A Little   Help from Another: Need to walk in hospital room?: A Little   Help from Another: Climbing 3-5 steps with a railing?: A Lot     AM-PAC Score:  Raw Score: 17   Approx Degree of Impairment: 50.57%   Standardized Score (AM-PAC Scale): 42.13   CMS Modifier (G-Code): CK      Additional information:     THERAPEUTIC EXERCISES  Lower Extremity Alternating marching  Ankle pumps  Knee extension     Position Sitting       Patient End of Session: Up in chair; All patient questions and concerns addressed;RN aware of session/findings;Call light within reach; Needs met; With [facility] staff    CURRENT GOALS     Goals to be met by: 12/4/23  Patient Goal Patient's self-stated goal is: to go home   Goal #1 Patient is able to demonstrate supine - sit EOB @ level: modified independent     Goal #1   Current Status Min A   Goal #2 Patient is able to demonstrate transfers Sit to/from Stand at assistance level: modified independent with walker - rolling     Goal #2  Current Status Min A   Goal #3 Patient is able to ambulate 50 feet with assist device: walker - rolling at assistance level: modified independent   Goal #3   Current Status 10 ft RW Min A   Goal #4    Goal #4   Current Status    Goal #5 Patient to demonstrate independence with home activity/exercise instructions provided to patient in preparation for discharge.    Goal #5   Current Status In progress   Goal #6    Goal #6  Current Status      Gait Training: 10 minutes  Therapeutic Activity: 25 minutes  Neuromuscular Re-education:  minutes  Therapeutic Exercise: 10 minutes   Canalith Repositioning:  minutes  Manual Therapy:  minutes  Can add/delete any of these

## 2023-12-03 LAB
ANION GAP SERPL CALC-SCNC: 4 MMOL/L (ref 0–18)
BUN BLD-MCNC: 12 MG/DL (ref 9–23)
BUN/CREAT SERPL: 15.2 (ref 10–20)
CALCIUM BLD-MCNC: 8.6 MG/DL (ref 8.7–10.4)
CHLORIDE SERPL-SCNC: 97 MMOL/L (ref 98–112)
CO2 SERPL-SCNC: 37 MMOL/L (ref 21–32)
CREAT BLD-MCNC: 0.79 MG/DL
DEPRECATED RDW RBC AUTO: 45.5 FL (ref 35.1–46.3)
EGFRCR SERPLBLD CKD-EPI 2021: 77 ML/MIN/1.73M2 (ref 60–?)
ERYTHROCYTE [DISTWIDTH] IN BLOOD BY AUTOMATED COUNT: 12.8 % (ref 11–15)
GLUCOSE BLD-MCNC: 105 MG/DL (ref 70–99)
HCT VFR BLD AUTO: 36.8 %
HGB BLD-MCNC: 12.4 G/DL
MAGNESIUM SERPL-MCNC: 2.2 MG/DL (ref 1.6–2.6)
MCH RBC QN AUTO: 32.4 PG (ref 26–34)
MCHC RBC AUTO-ENTMCNC: 33.7 G/DL (ref 31–37)
MCV RBC AUTO: 96.1 FL
OSMOLALITY SERPL CALC.SUM OF ELEC: 286 MOSM/KG (ref 275–295)
PLATELET # BLD AUTO: 276 10(3)UL (ref 150–450)
POTASSIUM SERPL-SCNC: 2.9 MMOL/L (ref 3.5–5.1)
POTASSIUM SERPL-SCNC: 3 MMOL/L (ref 3.5–5.1)
RBC # BLD AUTO: 3.83 X10(6)UL
SODIUM SERPL-SCNC: 138 MMOL/L (ref 136–145)
WBC # BLD AUTO: 7.7 X10(3) UL (ref 4–11)

## 2023-12-03 RX ORDER — FUROSEMIDE 10 MG/ML
20 INJECTION INTRAMUSCULAR; INTRAVENOUS ONCE
Status: COMPLETED | OUTPATIENT
Start: 2023-12-03 | End: 2023-12-03

## 2023-12-03 RX ORDER — POTASSIUM CHLORIDE 20 MEQ/1
40 TABLET, EXTENDED RELEASE ORAL EVERY 4 HOURS
Status: COMPLETED | OUTPATIENT
Start: 2023-12-03 | End: 2023-12-03

## 2023-12-04 ENCOUNTER — ANESTHESIA (OUTPATIENT)
Dept: ENDOSCOPY | Facility: HOSPITAL | Age: 78
End: 2023-12-04
Payer: MEDICARE

## 2023-12-04 ENCOUNTER — ANESTHESIA EVENT (OUTPATIENT)
Dept: ENDOSCOPY | Facility: HOSPITAL | Age: 78
End: 2023-12-04
Payer: MEDICARE

## 2023-12-04 PROBLEM — Z51.5 PALLIATIVE CARE BY SPECIALIST: Status: ACTIVE | Noted: 2023-12-04

## 2023-12-04 PROBLEM — Z71.89 GOALS OF CARE, COUNSELING/DISCUSSION: Status: ACTIVE | Noted: 2023-12-04

## 2023-12-04 PROBLEM — Z71.89 ADVANCE CARE PLANNING: Status: ACTIVE | Noted: 2023-12-04

## 2023-12-04 LAB
ANION GAP SERPL CALC-SCNC: 3 MMOL/L (ref 0–18)
BUN BLD-MCNC: 12 MG/DL (ref 9–23)
BUN/CREAT SERPL: 12.8 (ref 10–20)
CALCIUM BLD-MCNC: 8.5 MG/DL (ref 8.7–10.4)
CHLORIDE SERPL-SCNC: 99 MMOL/L (ref 98–112)
CO2 SERPL-SCNC: 37 MMOL/L (ref 21–32)
CREAT BLD-MCNC: 0.94 MG/DL
DEPRECATED RDW RBC AUTO: 44.6 FL (ref 35.1–46.3)
EGFRCR SERPLBLD CKD-EPI 2021: 63 ML/MIN/1.73M2 (ref 60–?)
ERYTHROCYTE [DISTWIDTH] IN BLOOD BY AUTOMATED COUNT: 12.7 % (ref 11–15)
GLUCOSE BLD-MCNC: 111 MG/DL (ref 70–99)
HCT VFR BLD AUTO: 37.9 %
HGB BLD-MCNC: 12.8 G/DL
MAGNESIUM SERPL-MCNC: 2.1 MG/DL (ref 1.6–2.6)
MCH RBC QN AUTO: 32.1 PG (ref 26–34)
MCHC RBC AUTO-ENTMCNC: 33.8 G/DL (ref 31–37)
MCV RBC AUTO: 95 FL
OSMOLALITY SERPL CALC.SUM OF ELEC: 288 MOSM/KG (ref 275–295)
PLATELET # BLD AUTO: 315 10(3)UL (ref 150–450)
POTASSIUM SERPL-SCNC: 3.3 MMOL/L (ref 3.5–5.1)
POTASSIUM SERPL-SCNC: 3.4 MMOL/L (ref 3.5–5.1)
POTASSIUM SERPL-SCNC: 3.5 MMOL/L (ref 3.5–5.1)
RBC # BLD AUTO: 3.99 X10(6)UL
SODIUM SERPL-SCNC: 139 MMOL/L (ref 136–145)
WBC # BLD AUTO: 8.2 X10(3) UL (ref 4–11)

## 2023-12-04 PROCEDURE — 99222 1ST HOSP IP/OBS MODERATE 55: CPT | Performed by: REGISTERED NURSE

## 2023-12-04 PROCEDURE — 0DJ08ZZ INSPECTION OF UPPER INTESTINAL TRACT, VIA NATURAL OR ARTIFICIAL OPENING ENDOSCOPIC: ICD-10-PCS | Performed by: INTERNAL MEDICINE

## 2023-12-04 RX ORDER — LIDOCAINE HYDROCHLORIDE 10 MG/ML
INJECTION, SOLUTION EPIDURAL; INFILTRATION; INTRACAUDAL; PERINEURAL AS NEEDED
Status: DISCONTINUED | OUTPATIENT
Start: 2023-12-04 | End: 2023-12-04 | Stop reason: SURG

## 2023-12-04 RX ORDER — POTASSIUM CHLORIDE 20 MEQ/1
40 TABLET, EXTENDED RELEASE ORAL EVERY 4 HOURS
Status: COMPLETED | OUTPATIENT
Start: 2023-12-04 | End: 2023-12-04

## 2023-12-04 RX ORDER — SODIUM CHLORIDE, SODIUM LACTATE, POTASSIUM CHLORIDE, CALCIUM CHLORIDE 600; 310; 30; 20 MG/100ML; MG/100ML; MG/100ML; MG/100ML
INJECTION, SOLUTION INTRAVENOUS CONTINUOUS
Status: DISCONTINUED | OUTPATIENT
Start: 2023-12-04 | End: 2023-12-07

## 2023-12-04 RX ORDER — POTASSIUM CHLORIDE 20 MEQ/1
40 TABLET, EXTENDED RELEASE ORAL EVERY 4 HOURS
Status: COMPLETED | OUTPATIENT
Start: 2023-12-04 | End: 2023-12-05

## 2023-12-04 RX ORDER — NALOXONE HYDROCHLORIDE 0.4 MG/ML
0.08 INJECTION, SOLUTION INTRAMUSCULAR; INTRAVENOUS; SUBCUTANEOUS ONCE AS NEEDED
Status: DISCONTINUED | OUTPATIENT
Start: 2023-12-04 | End: 2023-12-04 | Stop reason: HOSPADM

## 2023-12-04 RX ADMIN — LIDOCAINE HYDROCHLORIDE 50 MG: 10 INJECTION, SOLUTION EPIDURAL; INFILTRATION; INTRACAUDAL; PERINEURAL at 14:51:00

## 2023-12-04 NOTE — H&P
The H&P dated 12/2/23 by Dr. Jozef Avila was reviewed by Sangeeta Escalante MD today, the patient was examined and no significant changes have occurred in the patient's condition since the H&P was performed. I discussed with the patient and/or legal representative the potential benefits, risks and side effects of this procedure; the likelihood of the patient achieving goals; and potential problems that might occur during recuperation. I discussed reasonable alternatives to the procedure, including risks, benefits and side effects related to the alternatives and risks related to not receiving this procedure. We will proceed with procedure as planned. Informed consent was obtained for esophagogastroduodenoscopy with possible biopsy, dilation, polypectomy, therapy, banding and control of bleeding after explanation of risks, benefits and alternatives to the procedure. Risks include but not limited to bleeding, infection, perforation, missed polyps or cancer and risks of sedation.

## 2023-12-04 NOTE — ANESTHESIA POSTPROCEDURE EVALUATION
Patient: Jalil Garza    Procedure Summary       Date: 12/04/23 Room / Location: [address] [place] [address] ENDOSCOPY 05 / [address] [place] [address] ENDOSCOPY    Anesthesia Start: 8122 Anesthesia Stop:     Procedure: ESOPHAGOGASTRODUODENOSCOPY (EGD) Diagnosis: (gastroparesis, erosive esophagitis)    Surgeons: Dede Root MD Anesthesiologist: Silvestre Ken MD    Anesthesia Type: MAC ASA Status: 3            Anesthesia Type: MAC    Vitals Value Taken Time   /99 12/04/23 1512   Temp  12/04/23 1513   Pulse 92 12/04/23 1512   Resp 24 12/04/23 1512   SpO2 92 % 12/04/23 1512       [address] [place] [address] AN Post Evaluation:   Patient Evaluated in PACU  Patient Participation: complete - patient participated  Level of Consciousness: awake  Pain Score: 0  Pain Management: adequate  Airway Patency:patent  Dental exam unchanged from preop  Yes    Cardiovascular Status: acceptable  Respiratory Status: acceptable  Postoperative Hydration acceptable      Milagros Simmons MD  12/4/2023 3:13 PM

## 2023-12-04 NOTE — PHYSICAL THERAPY NOTE
PHYSICAL THERAPY TREATMENT NOTE - INPATIENT     Room Number: 340/340-A       Presenting Problem: acute on chronic CHF, afib with rvr  Co-Morbidities : CHF, HTN, obesity    Problem List  Principal Problem:    Atrial fibrillation with rapid ventricular response (HCC)  Active Problems:    Hypernatremia    Hypokalemia    Hyperglycemia    Goals of care, counseling/discussion    Advance care planning    Palliative care by specialist      PHYSICAL THERAPY ASSESSMENT     Pt ed with bed mobility with min a to EOB. Pt ed with transfers with min A with RW for all transfers and balance with CGA. Pt ed with gait progression 30' with RW with CGA with step to gait decreased step length and alana. Pt ed with therex jrarett  chair for ankle pumps, marching dn LAQ's x 10 reps. Pt reports she is motivated to return home with son assist and [facility] PT and has no interest in SHARYN options. A RW and ongoing [address] PT is indicated to decrease fall risk and improve functional mobility ain the home. The patient's Approx Degree of Impairment: 54.16% has been calculated based on documentation in the [facility] '6 clicks' Inpatient Basic Mobility Short Form. Research supports that patients with this level of impairment may benefit from Carlos[address] PT with family 24 hr assist with mobility and DL need in the home is recommended to regain maximal PLOF and safety with a  RW upon home D/C. DISCHARGE RECOMMENDATIONS  PT Discharge Recommendations: Home with home health PT;24 hour care/supervision (family assist with stairs and ADL needs with RW)     PLAN  PT Treatment Plan: Bed mobility; Body mechanics; Endurance; Energy conservation;Patient education;Gait training;Strengthening;Transfer training;Balance training  Frequency (Obs): 3-5x/week    SUBJECTIVE  I feel a bit of dizziness sometimes with movement but right now as long as I take my time I am ok.      OBJECTIVE  Precautions: None    WEIGHT BEARING RESTRICTION                PAIN ASSESSMENT   Ratin  Location: stomach; legs  Management Techniques: Body mechanics; Activity promotion    BALANCE  Static Sitting: Fair +  Dynamic Sitting: Fair  Static Standing: Fair -  Dynamic Standing: Poor +    ACTIVITY TOLERANCE  Pulse: 88  Heart Rate Source: Monitor  Resp: 18  BP: 92/50  BP Location: Right arm  BP Method: Automatic  Patient Position: Sitting     O2 WALK       AM-PAC '6-Clicks' INPATIENT SHORT FORM - BASIC MOBILITY  How much difficulty does the patient currently have. .. Patient Difficulty: Turning over in bed (including adjusting bedclothes, sheets and blankets)?: A Little   Patient Difficulty: Sitting down on and standing up from a chair with arms (e.g., wheelchair, bedside commode, etc.): A Little   Patient Difficulty: Moving from lying on back to sitting on the side of the bed?: A Lot   How much help from another person does the patient currently need. .. Help from Another: Moving to and from a bed to a chair (including a wheelchair)?: A Little   Help from Another: Need to walk in hospital room?: A Little   Help from Another: Climbing 3-5 steps with a railing?: A Lot     AM-PAC Score:  Raw Score: 16   Approx Degree of Impairment: 54.16%   Standardized Score (AM-PAC Scale): 40.78   CMS Modifier (G-Code): CK    FUNCTIONAL ABILITY STATUS  Functional Mobility/Gait Assessment  Gait Assistance: Minimum assistance  Distance (ft): 30  Assistive Device: Rolling walker  Pattern:  (decreased step length and alana, unsteady gait)    Additional information: therex in chair    THERAPEUTIC EXERCISES  Lower Extremity Ankle pumps  Heel raises  LAQ     Position Sitting       Patient End of Session: Up in chair; With [facility] staff;Needs met;Call light within reach;RN aware of session/findings; All patient questions and concerns addressed; Alarm set    CURRENT GOALS   Goals to be met by: 12/4/23  Patient Goal Patient's self-stated goal is: to go home   Goal #1 Patient is able to demonstrate supine - sit EOB @ level: modified independent Goal #1   Current Status  Min A   Goal #2 Patient is able to demonstrate transfers Sit to/from Stand at assistance level: modified independent with walker - rolling      Goal #2  Current Status  CGA with RW   Goal #3 Patient is able to ambulate 50 feet with assist device: walker - rolling at assistance level: modified independent   Goal #3   Current Status  CGA with RW 30' step to gait unsteady   Goal #4     Goal #4   Current Status     Goal #5 Patient to demonstrate independence with home activity/exercise instructions provided to patient in preparation for discharge.    Goal #5   Current Status  Ongoing     Gait Training: 15 minutes  Therapeutic Exercise: 8 minutes

## 2023-12-05 ENCOUNTER — APPOINTMENT (OUTPATIENT)
Dept: PHYSICAL THERAPY | Facility: HOSPITAL | Age: 78
End: 2023-12-05
Attending: Other
Payer: MEDICARE

## 2023-12-05 ENCOUNTER — APPOINTMENT (OUTPATIENT)
Dept: GENERAL RADIOLOGY | Facility: HOSPITAL | Age: 78
End: 2023-12-05
Attending: INTERNAL MEDICINE
Payer: MEDICARE

## 2023-12-05 ENCOUNTER — APPOINTMENT (OUTPATIENT)
Dept: NUCLEAR MEDICINE | Facility: HOSPITAL | Age: 78
End: 2023-12-05
Attending: INTERNAL MEDICINE
Payer: MEDICARE

## 2023-12-05 LAB
ANION GAP SERPL CALC-SCNC: 5 MMOL/L (ref 0–18)
BUN BLD-MCNC: 11 MG/DL (ref 9–23)
BUN/CREAT SERPL: 13.3 (ref 10–20)
CALCIUM BLD-MCNC: 8.5 MG/DL (ref 8.7–10.4)
CHLORIDE SERPL-SCNC: 101 MMOL/L (ref 98–112)
CO2 SERPL-SCNC: 32 MMOL/L (ref 21–32)
CREAT BLD-MCNC: 0.83 MG/DL
DEPRECATED RDW RBC AUTO: 46 FL (ref 35.1–46.3)
EGFRCR SERPLBLD CKD-EPI 2021: 73 ML/MIN/1.73M2 (ref 60–?)
ERYTHROCYTE [DISTWIDTH] IN BLOOD BY AUTOMATED COUNT: 12.8 % (ref 11–15)
GLUCOSE BLD-MCNC: 102 MG/DL (ref 70–99)
HCT VFR BLD AUTO: 38.8 %
HGB BLD-MCNC: 12.6 G/DL
MAGNESIUM SERPL-MCNC: 2.1 MG/DL (ref 1.6–2.6)
MCH RBC QN AUTO: 31.4 PG (ref 26–34)
MCHC RBC AUTO-ENTMCNC: 32.5 G/DL (ref 31–37)
MCV RBC AUTO: 96.8 FL
OSMOLALITY SERPL CALC.SUM OF ELEC: 286 MOSM/KG (ref 275–295)
PLATELET # BLD AUTO: 328 10(3)UL (ref 150–450)
POTASSIUM SERPL-SCNC: 3.4 MMOL/L (ref 3.5–5.1)
POTASSIUM SERPL-SCNC: 3.4 MMOL/L (ref 3.5–5.1)
RBC # BLD AUTO: 4.01 X10(6)UL
SODIUM SERPL-SCNC: 138 MMOL/L (ref 136–145)
WBC # BLD AUTO: 8.3 X10(3) UL (ref 4–11)

## 2023-12-05 PROCEDURE — 99231 SBSQ HOSP IP/OBS SF/LOW 25: CPT | Performed by: REGISTERED NURSE

## 2023-12-05 PROCEDURE — 78264 GASTRIC EMPTYING IMG STUDY: CPT | Performed by: INTERNAL MEDICINE

## 2023-12-05 NOTE — DISCHARGE INSTRUCTIONS
Please follow-up with PCP, cardiology in 1 week    Is follow-up with GI in 2 weeks. Sometimes managing your health at home requires assistance. The [facility] team has recognized your preference to use   [facility] [address]  Phone: [phone]  Fax: [phone]  A representative from the home health agency will contact you or your family to schedule your first visit. Residential Palliative APN to follow at discharge. Please call Residential Palliative care with any questions, they can be reached by phone at [phone].

## 2023-12-05 NOTE — CM/SW NOTE
Social work met with the patient at bedside [facility]. Social work advised the patient that her first choice ([address]) can accept her. The patient is agreeable. Social work reserved [address] in [place]. SW/CM to remain available for support and/or discharge planning.      Rosa Elena Aguilera MSW, [facility]  Discharge Planner [id number]

## 2023-12-06 ENCOUNTER — APPOINTMENT (OUTPATIENT)
Dept: GENERAL RADIOLOGY | Facility: HOSPITAL | Age: 78
End: 2023-12-06
Attending: INTERNAL MEDICINE
Payer: MEDICARE

## 2023-12-06 LAB
ANION GAP SERPL CALC-SCNC: 4 MMOL/L (ref 0–18)
BUN BLD-MCNC: 10 MG/DL (ref 9–23)
BUN/CREAT SERPL: 11.1 (ref 10–20)
CALCIUM BLD-MCNC: 8.4 MG/DL (ref 8.7–10.4)
CHLORIDE SERPL-SCNC: 105 MMOL/L (ref 98–112)
CO2 SERPL-SCNC: 29 MMOL/L (ref 21–32)
CREAT BLD-MCNC: 0.9 MG/DL
DEPRECATED RDW RBC AUTO: 45.5 FL (ref 35.1–46.3)
EGFRCR SERPLBLD CKD-EPI 2021: 66 ML/MIN/1.73M2 (ref 60–?)
ERYTHROCYTE [DISTWIDTH] IN BLOOD BY AUTOMATED COUNT: 12.9 % (ref 11–15)
GLUCOSE BLD-MCNC: 108 MG/DL (ref 70–99)
HCT VFR BLD AUTO: 37.6 %
HGB BLD-MCNC: 12.1 G/DL
MAGNESIUM SERPL-MCNC: 2 MG/DL (ref 1.6–2.6)
MCH RBC QN AUTO: 31.3 PG (ref 26–34)
MCHC RBC AUTO-ENTMCNC: 32.2 G/DL (ref 31–37)
MCV RBC AUTO: 97.2 FL
OSMOLALITY SERPL CALC.SUM OF ELEC: 286 MOSM/KG (ref 275–295)
PLATELET # BLD AUTO: 329 10(3)UL (ref 150–450)
POTASSIUM SERPL-SCNC: 4.1 MMOL/L (ref 3.5–5.1)
POTASSIUM SERPL-SCNC: 4.1 MMOL/L (ref 3.5–5.1)
RBC # BLD AUTO: 3.87 X10(6)UL
SODIUM SERPL-SCNC: 138 MMOL/L (ref 136–145)
WBC # BLD AUTO: 8.8 X10(3) UL (ref 4–11)

## 2023-12-06 PROCEDURE — 74240 X-RAY XM UPR GI TRC 1CNTRST: CPT | Performed by: INTERNAL MEDICINE

## 2023-12-06 NOTE — CM/SW NOTE
Social work received a consult to initiate palliative care referrals. Social work sent a [facility] referral to Residential Palliative. Social work notified the Residential liaison of the referral.     SW/CM to remain available for support and/or discharge planning.      Sun Washington MSW, [facility]  Discharge Planner [id number]

## 2023-12-06 NOTE — DIETARY NOTE
Brief Nutrition Note:    Nutrition reassessment completed yesterday (12/6). Pt reports GI recommending diet education - per GI note on 12/5, nutrition evaluation for gastroparesis diet (low-residue, smaller, more frequent meals). Pt out of room this morning for upper gi series with SBFT. Pt visited, reports wanting to hold off on education and asked writer to come back tomorrow as was told she has to go back later this afternoon for another scan. Provided diet intervention for gastroparesis handout for pt to read. Will f/u with pt as able for education review. RN aware. Please consult nutrition services if earlier intervention is indicated. Nathan Patel MS, FELA, RDN, LDN  Clinical Dietitian  P: [phone]  .

## 2023-12-06 NOTE — PROGRESS NOTES
12/06/23 1130   Clinical Encounter Type   Visited With Patient not available;Health care provider   Referral From [address] [place] [address] Requests During Visit / Hospitalization Unsure - Might Benefit   Patient Spiritual Encounters   Spiritual Assessment Completed No   Trigger for Consult   Trigger for Spiritual Care Consult No     Summary:  received referral for visit from POP patel. Writer attempted to visit but patient was not in room. Spiritual Care support can be requested via an [facility] consult.     -Yola Goodman.

## 2023-12-06 NOTE — PROGRESS NOTES
Community palliative referral received, processed, and can accept. Residential Liaison to reach out to family to discuss community palliative services and role of [facility] Palliative APN on care team. Thank you for the privilege. TREVOR Nicholas 6596  Residential Liaison  [phone]     4:44p, Outbound call to son, Mara Vázquez. Discussed [facility] Palliative services, benefits, philosophy and APN's role on care team once home. Mara Vázquez asks that writer discuss services with his mother as well. Writer to follow up with patient 12/7/23, and will leave Residential Community Palliative Care literature bedside for family review and share. Thank you.   TREVOR Nicholas 8335  Residential Liaison  [phone]

## 2023-12-06 NOTE — PALLIATIVE CARE NOTE
Pt is off the unit for test. GOC are established and planning for home with [facility] and [facility] palliative care program to follow on dc. Pt has completed all her advance directives and has DNAR/DNI-selective wishes. Updated Dr. Kiley Kelly    I will sign off.     Elysia Saldaña, ANP-BC

## 2023-12-07 LAB
ANION GAP SERPL CALC-SCNC: 5 MMOL/L (ref 0–18)
BASOPHILS # BLD AUTO: 0.05 X10(3) UL (ref 0–0.2)
BASOPHILS NFR BLD AUTO: 0.6 %
BUN BLD-MCNC: 9 MG/DL (ref 9–23)
BUN/CREAT SERPL: 10.7 (ref 10–20)
CALCIUM BLD-MCNC: 8.3 MG/DL (ref 8.7–10.4)
CHLORIDE SERPL-SCNC: 107 MMOL/L (ref 98–112)
CO2 SERPL-SCNC: 26 MMOL/L (ref 21–32)
CREAT BLD-MCNC: 0.84 MG/DL
DEPRECATED RDW RBC AUTO: 44.5 FL (ref 35.1–46.3)
EGFRCR SERPLBLD CKD-EPI 2021: 72 ML/MIN/1.73M2 (ref 60–?)
EOSINOPHIL # BLD AUTO: 0.34 X10(3) UL (ref 0–0.7)
EOSINOPHIL NFR BLD AUTO: 4 %
ERYTHROCYTE [DISTWIDTH] IN BLOOD BY AUTOMATED COUNT: 12.7 % (ref 11–15)
GLUCOSE BLD-MCNC: 101 MG/DL (ref 70–99)
HCT VFR BLD AUTO: 35.4 %
HGB BLD-MCNC: 11.9 G/DL
IMM GRANULOCYTES # BLD AUTO: 0.09 X10(3) UL (ref 0–1)
IMM GRANULOCYTES NFR BLD: 1.1 %
LYMPHOCYTES # BLD AUTO: 1.24 X10(3) UL (ref 1–4)
LYMPHOCYTES NFR BLD AUTO: 14.6 %
MCH RBC QN AUTO: 32.1 PG (ref 26–34)
MCHC RBC AUTO-ENTMCNC: 33.6 G/DL (ref 31–37)
MCV RBC AUTO: 95.4 FL
MONOCYTES # BLD AUTO: 0.7 X10(3) UL (ref 0.1–1)
MONOCYTES NFR BLD AUTO: 8.2 %
NEUTROPHILS # BLD AUTO: 6.09 X10 (3) UL (ref 1.5–7.7)
NEUTROPHILS # BLD AUTO: 6.09 X10(3) UL (ref 1.5–7.7)
NEUTROPHILS NFR BLD AUTO: 71.5 %
OSMOLALITY SERPL CALC.SUM OF ELEC: 285 MOSM/KG (ref 275–295)
PLATELET # BLD AUTO: 294 10(3)UL (ref 150–450)
POTASSIUM SERPL-SCNC: 3.4 MMOL/L (ref 3.5–5.1)
POTASSIUM SERPL-SCNC: 3.7 MMOL/L (ref 3.5–5.1)
RBC # BLD AUTO: 3.71 X10(6)UL
SODIUM SERPL-SCNC: 138 MMOL/L (ref 136–145)
WBC # BLD AUTO: 8.5 X10(3) UL (ref 4–11)

## 2023-12-07 RX ORDER — POTASSIUM CHLORIDE 20 MEQ/1
40 TABLET, EXTENDED RELEASE ORAL EVERY 4 HOURS
Status: COMPLETED | OUTPATIENT
Start: 2023-12-07 | End: 2023-12-07

## 2023-12-07 RX ORDER — POTASSIUM CHLORIDE 1.5 G/1.58G
40 POWDER, FOR SOLUTION ORAL ONCE
Status: COMPLETED | OUTPATIENT
Start: 2023-12-07 | End: 2023-12-07

## 2023-12-07 NOTE — PHYSICAL THERAPY NOTE
PHYSICAL THERAPY TREATMENT NOTE - INPATIENT     Room Number: 340/340-A       Presenting Problem: acute on chronic CHF, afib with rvr  Co-Morbidities : CHF, HTN, obesity    Problem List  Principal Problem:    Atrial fibrillation with rapid ventricular response (HCC)  Active Problems:    Hypernatremia    Hypokalemia    Hyperglycemia    Goals of care, counseling/discussion    Advance care planning    Palliative care by specialist      PHYSICAL THERAPY ASSESSMENT     Pt ed with bed mobility and transfers with min A with RW. Pt ed with gait progression 200' with CGA with decreased step length and alana. Pt ed with therex in the chair for ankle pumps, LAQ's x 10 reps. Pt is on track for Shai [address] PT with assist as medical progress allows. Pt would also like to work with OT in hospital setting to consider a toilet bench and training for ADL's in the bathroom. The patient's Approx Degree of Impairment: 46.58% has been calculated based on documentation in the [facility] '6 clicks' Inpatient Basic Mobility Short Form. Research supports that patients with this level of impairment may benefit from [address] PT with assist.    DISCHARGE RECOMMENDATIONS  PT Discharge Recommendations: Home with home health PT;24 hour care/supervision (family assist with stairs and ADL needs with RW)     PLAN  PT Treatment Plan: Bed mobility; Body mechanics; Endurance; Energy conservation;Patient education;Gait training;Strengthening;Transfer training;Balance training;Stoop training  Frequency (Obs): 5x/week    SUBJECTIVE  I feel better walking thanks for helping me progress that was the longest walk yet for me. OBJECTIVE  Precautions: None    WEIGHT BEARING RESTRICTION                PAIN ASSESSMENT   Rating: 3  Location: BLE with movement  Management Techniques: Activity promotion; Body mechanics;Breathing techniques;Relaxation;Repositioning    BALANCE  Static Sitting: Good  Dynamic Sitting: Fair +  Static Standing: Fair -  Dynamic Standing: Timothy Arzola -    ACTIVITY TOLERANCE  Pulse: 85  Heart Rate Source: Monitor  Resp: 20  BP: 93/62  BP Location: Right arm  BP Method: Automatic  Patient Position: Sitting     O2 WALK       AM-PAC '6-Clicks' INPATIENT SHORT FORM - BASIC MOBILITY  How much difficulty does the patient currently have. .. Patient Difficulty: Turning over in bed (including adjusting bedclothes, sheets and blankets)?: None   Patient Difficulty: Sitting down on and standing up from a chair with arms (e.g., wheelchair, bedside commode, etc.): A Little   Patient Difficulty: Moving from lying on back to sitting on the side of the bed?: A Little   How much help from another person does the patient currently need. .. Help from Another: Moving to and from a bed to a chair (including a wheelchair)?: A Little   Help from Another: Need to walk in hospital room?: A Little   Help from Another: Climbing 3-5 steps with a railing?: A Lot     AM-PAC Score:  Raw Score: 18   Approx Degree of Impairment: 46.58%   Standardized Score (AM-PAC Scale): 43.63   CMS Modifier (G-Code): CK    FUNCTIONAL ABILITY STATUS  Functional Mobility/Gait Assessment  Gait Assistance: Contact guard assist  Distance (ft): 200  Assistive Device: Rolling walker  Pattern:  (step throughg ait decreased step length)    Additional information: therex in the chair     THERAPEUTIC EXERCISES  Lower Extremity Ankle pumps  Heel slides  LAQ     Position Sitting       Patient End of Session: Up in chair; With [address] [facility] [address] staff;Needs met;Call light within reach;RN aware of session/findings; All patient questions and concerns addressed; Alarm set    CURRENT GOALS   Goals to be met by: 12/4/23  Patient Goal Patient's self-stated goal is: to go home   Goal #1 Patient is able to demonstrate supine - sit EOB @ level: modified independent      Goal #1   Current Status  Min A   Goal #2 Patient is able to demonstrate transfers Sit to/from Stand at assistance level: modified independent with walker - rolling      Goal #2  Current Status  CGA with RW   Goal #3 Patient is able to ambulate 50 feet with assist device: walker - rolling at assistance level: modified independent   Goal #3   Current Status  CGA with ' step to gait unsteady   Goal #4     Goal #4   Current Status     Goal #5 Patient to demonstrate independence with home activity/exercise instructions provided to patient in preparation for discharge.    Goal #5   Current Status  Ongoing     Gait Trainin minutes  Therapeutic Exercise: 10 minutes

## 2023-12-07 NOTE — PROGRESS NOTES
Residential Liaison met with patient bedside to discuss Residential Community Palliative services, benefits, philosophy and APN's role on care team in home setting. Levy Light voiced being agreeable to Residential Community Palliative APN consult(s). Residential Community Palliative Literature given to Levy Light for review, reference,share and keep. Call back number provided should questions arise prior to and following Kaleigh Miller. Thank you for the privilege.   [address]  Residential Liaison  [phone]

## 2023-12-08 ENCOUNTER — APPOINTMENT (OUTPATIENT)
Dept: PICC SERVICES | Facility: HOSPITAL | Age: 78
End: 2023-12-08
Attending: HOSPITALIST
Payer: MEDICARE

## 2023-12-08 LAB
ANION GAP SERPL CALC-SCNC: 5 MMOL/L (ref 0–18)
BASOPHILS # BLD AUTO: 0.04 X10(3) UL (ref 0–0.2)
BASOPHILS NFR BLD AUTO: 0.5 %
BUN BLD-MCNC: 7 MG/DL (ref 9–23)
BUN/CREAT SERPL: 8 (ref 10–20)
CALCIUM BLD-MCNC: 8.3 MG/DL (ref 8.7–10.4)
CHLORIDE SERPL-SCNC: 108 MMOL/L (ref 98–112)
CO2 SERPL-SCNC: 24 MMOL/L (ref 21–32)
CREAT BLD-MCNC: 0.88 MG/DL
DEPRECATED RDW RBC AUTO: 45.4 FL (ref 35.1–46.3)
EGFRCR SERPLBLD CKD-EPI 2021: 68 ML/MIN/1.73M2 (ref 60–?)
EOSINOPHIL # BLD AUTO: 0.32 X10(3) UL (ref 0–0.7)
EOSINOPHIL NFR BLD AUTO: 3.8 %
ERYTHROCYTE [DISTWIDTH] IN BLOOD BY AUTOMATED COUNT: 12.7 % (ref 11–15)
GLUCOSE BLD-MCNC: 92 MG/DL (ref 70–99)
HCT VFR BLD AUTO: 35.5 %
HGB BLD-MCNC: 11.7 G/DL
IMM GRANULOCYTES # BLD AUTO: 0.1 X10(3) UL (ref 0–1)
IMM GRANULOCYTES NFR BLD: 1.2 %
LYMPHOCYTES # BLD AUTO: 1.12 X10(3) UL (ref 1–4)
LYMPHOCYTES NFR BLD AUTO: 13.3 %
MCH RBC QN AUTO: 32 PG (ref 26–34)
MCHC RBC AUTO-ENTMCNC: 33 G/DL (ref 31–37)
MCV RBC AUTO: 97 FL
MONOCYTES # BLD AUTO: 0.84 X10(3) UL (ref 0.1–1)
MONOCYTES NFR BLD AUTO: 10 %
NEUTROPHILS # BLD AUTO: 5.98 X10 (3) UL (ref 1.5–7.7)
NEUTROPHILS # BLD AUTO: 5.98 X10(3) UL (ref 1.5–7.7)
NEUTROPHILS NFR BLD AUTO: 71.2 %
OSMOLALITY SERPL CALC.SUM OF ELEC: 282 MOSM/KG (ref 275–295)
PLATELET # BLD AUTO: 306 10(3)UL (ref 150–450)
POTASSIUM SERPL-SCNC: 3.8 MMOL/L (ref 3.5–5.1)
POTASSIUM SERPL-SCNC: 3.8 MMOL/L (ref 3.5–5.1)
RBC # BLD AUTO: 3.66 X10(6)UL
SODIUM SERPL-SCNC: 137 MMOL/L (ref 136–145)
WBC # BLD AUTO: 8.4 X10(3) UL (ref 4–11)

## 2023-12-08 RX ORDER — POTASSIUM CHLORIDE 20 MEQ/1
40 TABLET, EXTENDED RELEASE ORAL ONCE
Status: COMPLETED | OUTPATIENT
Start: 2023-12-08 | End: 2023-12-08

## 2023-12-08 RX ORDER — HEPARIN SODIUM 5000 [USP'U]/ML
5000 INJECTION, SOLUTION INTRAVENOUS; SUBCUTANEOUS EVERY 8 HOURS SCHEDULED
Status: DISCONTINUED | OUTPATIENT
Start: 2023-12-08 | End: 2023-12-11

## 2023-12-08 NOTE — PROGRESS NOTES
12/08/23 1326   Wound 11/29/23 Leg Left;Medial   Date First Assessed/Time First Assessed: 11/29/23 2208   Present on Original Admission: No  Primary Wound Type: Venous Ulcer  Location: Leg  Wound Location Orientation: Left;Medial  Wound Description (Comments): small open weeping ulcer   Wound Image     Site Assessment Moist;Fragile;Pink   Drainage Amount Small   Drainage Description Serous   Treatments Cleansed; Saline;Site Care  (lotion to dry skin)   Dressing Aquacel Foam;2x2s;Eva;Tape;Xeroform   Dressing Changed Changed   Dressing Status Clean;Dry; Intact;Dressing Changed   Wound Length (cm) 3.5 cm   Wound Width (cm) 3 cm   Wound Surface Area (cm^2) 10.5 cm^2   Wound Depth (cm) 0.1 cm   Wound Volume (cm^3) 1.05 cm^3   Non-staged Wound Description Partial thickness   Kamryn-wound Assessment Fragile;Edema; Red  (warm to touch)   Wound Odor None   Tunneling? No   Undermining? No   Sinus Tracts? No   Wound Follow Up   Follow up needed Yes     Nursing & Patient consult to see the pt's LLE ulcer, the pt. is sitting up in bed, she notes her legs are usually swollen, she does not wear compression. See the wound assessment, LLE cleansed and dressing, educated the pt. on compression, rec. possibly following up the outpt lymphedema clinic for compression wraps. Educated the pt. on the LLE skin care and dressing. All questions answered. Asked the pt. to keep her legs elevated in the bed and if up in the chair. She verbalized understanding. Spoke with the nurse.

## 2023-12-08 NOTE — VASCULAR ACCESS
Vascular access consult received to evaluate pt for frequently infiltrating PIV. Pt had 6 PIVs previously since admission. Pt currently on Erythromycin IV BID. Spoke with RN and JUN Mcnulty. Plan is to continue Erythromycin infusion until 12/11 when gj tube is placed. Came to assess patient's vein per ultrasound. Rt FA vein with good compressibilty. CVR 31% on 20g Bbraun at .5 cm depth. Patient informed of options PICC vs USG PIV, advantages of each, risks of another PIV infiltration with USG PIV. Patient opted for a short PIV option rather than a PICC. Encouraged patient to inform RN immediately for any pain or discomfort that may be a sign of infiltration/vein irritation. Please call VAT RN for any changes or concerns.

## 2023-12-09 LAB
ANION GAP SERPL CALC-SCNC: 5 MMOL/L (ref 0–18)
BASOPHILS # BLD AUTO: 0.05 X10(3) UL (ref 0–0.2)
BASOPHILS NFR BLD AUTO: 0.7 %
BUN BLD-MCNC: 6 MG/DL (ref 9–23)
BUN/CREAT SERPL: 7.4 (ref 10–20)
CALCIUM BLD-MCNC: 8.1 MG/DL (ref 8.7–10.4)
CHLORIDE SERPL-SCNC: 110 MMOL/L (ref 98–112)
CO2 SERPL-SCNC: 24 MMOL/L (ref 21–32)
CREAT BLD-MCNC: 0.81 MG/DL
DEPRECATED RDW RBC AUTO: 44.6 FL (ref 35.1–46.3)
EGFRCR SERPLBLD CKD-EPI 2021: 75 ML/MIN/1.73M2 (ref 60–?)
EOSINOPHIL # BLD AUTO: 0.32 X10(3) UL (ref 0–0.7)
EOSINOPHIL NFR BLD AUTO: 4.6 %
ERYTHROCYTE [DISTWIDTH] IN BLOOD BY AUTOMATED COUNT: 12.7 % (ref 11–15)
GLUCOSE BLD-MCNC: 92 MG/DL (ref 70–99)
HCT VFR BLD AUTO: 33 %
HGB BLD-MCNC: 11.1 G/DL
IMM GRANULOCYTES # BLD AUTO: 0.05 X10(3) UL (ref 0–1)
IMM GRANULOCYTES NFR BLD: 0.7 %
LYMPHOCYTES # BLD AUTO: 1.02 X10(3) UL (ref 1–4)
LYMPHOCYTES NFR BLD AUTO: 14.7 %
MCH RBC QN AUTO: 32.1 PG (ref 26–34)
MCHC RBC AUTO-ENTMCNC: 33.6 G/DL (ref 31–37)
MCV RBC AUTO: 95.4 FL
MONOCYTES # BLD AUTO: 0.85 X10(3) UL (ref 0.1–1)
MONOCYTES NFR BLD AUTO: 12.2 %
NEUTROPHILS # BLD AUTO: 4.65 X10 (3) UL (ref 1.5–7.7)
NEUTROPHILS # BLD AUTO: 4.65 X10(3) UL (ref 1.5–7.7)
NEUTROPHILS NFR BLD AUTO: 67.1 %
OSMOLALITY SERPL CALC.SUM OF ELEC: 285 MOSM/KG (ref 275–295)
PLATELET # BLD AUTO: 282 10(3)UL (ref 150–450)
POTASSIUM SERPL-SCNC: 3.6 MMOL/L (ref 3.5–5.1)
POTASSIUM SERPL-SCNC: 3.6 MMOL/L (ref 3.5–5.1)
POTASSIUM SERPL-SCNC: 4.5 MMOL/L (ref 3.5–5.1)
RBC # BLD AUTO: 3.46 X10(6)UL
SODIUM SERPL-SCNC: 139 MMOL/L (ref 136–145)
WBC # BLD AUTO: 6.9 X10(3) UL (ref 4–11)

## 2023-12-09 RX ORDER — POTASSIUM CHLORIDE 20 MEQ/1
40 TABLET, EXTENDED RELEASE ORAL EVERY 4 HOURS
Status: COMPLETED | OUTPATIENT
Start: 2023-12-09 | End: 2023-12-09

## 2023-12-10 LAB
ANION GAP SERPL CALC-SCNC: 5 MMOL/L (ref 0–18)
BASOPHILS # BLD AUTO: 0.04 X10(3) UL (ref 0–0.2)
BASOPHILS NFR BLD AUTO: 0.6 %
BUN BLD-MCNC: <5 MG/DL (ref 9–23)
CALCIUM BLD-MCNC: 8.5 MG/DL (ref 8.7–10.4)
CHLORIDE SERPL-SCNC: 113 MMOL/L (ref 98–112)
CO2 SERPL-SCNC: 21 MMOL/L (ref 21–32)
CREAT BLD-MCNC: 0.79 MG/DL
DEPRECATED RDW RBC AUTO: 45.7 FL (ref 35.1–46.3)
EGFRCR SERPLBLD CKD-EPI 2021: 77 ML/MIN/1.73M2 (ref 60–?)
EOSINOPHIL # BLD AUTO: 0.3 X10(3) UL (ref 0–0.7)
EOSINOPHIL NFR BLD AUTO: 4.1 %
ERYTHROCYTE [DISTWIDTH] IN BLOOD BY AUTOMATED COUNT: 12.7 % (ref 11–15)
GLUCOSE BLD-MCNC: 94 MG/DL (ref 70–99)
HCT VFR BLD AUTO: 33.5 %
HGB BLD-MCNC: 10.7 G/DL
IMM GRANULOCYTES # BLD AUTO: 0.05 X10(3) UL (ref 0–1)
IMM GRANULOCYTES NFR BLD: 0.7 %
LYMPHOCYTES # BLD AUTO: 0.98 X10(3) UL (ref 1–4)
LYMPHOCYTES NFR BLD AUTO: 13.5 %
MCH RBC QN AUTO: 31.1 PG (ref 26–34)
MCHC RBC AUTO-ENTMCNC: 31.9 G/DL (ref 31–37)
MCV RBC AUTO: 97.4 FL
MONOCYTES # BLD AUTO: 0.84 X10(3) UL (ref 0.1–1)
MONOCYTES NFR BLD AUTO: 11.6 %
NEUTROPHILS # BLD AUTO: 5.05 X10 (3) UL (ref 1.5–7.7)
NEUTROPHILS # BLD AUTO: 5.05 X10(3) UL (ref 1.5–7.7)
NEUTROPHILS NFR BLD AUTO: 69.5 %
PLATELET # BLD AUTO: 315 10(3)UL (ref 150–450)
POTASSIUM SERPL-SCNC: 4.1 MMOL/L (ref 3.5–5.1)
RBC # BLD AUTO: 3.44 X10(6)UL
SODIUM SERPL-SCNC: 139 MMOL/L (ref 136–145)
WBC # BLD AUTO: 7.3 X10(3) UL (ref 4–11)

## 2023-12-10 RX ORDER — TORSEMIDE 20 MG/1
20 TABLET ORAL DAILY
Status: DISCONTINUED | OUTPATIENT
Start: 2023-12-10 | End: 2023-12-12

## 2023-12-11 ENCOUNTER — APPOINTMENT (OUTPATIENT)
Dept: GENERAL RADIOLOGY | Facility: HOSPITAL | Age: 78
End: 2023-12-11
Attending: HOSPITALIST
Payer: MEDICARE

## 2023-12-11 ENCOUNTER — APPOINTMENT (OUTPATIENT)
Dept: INTERVENTIONAL RADIOLOGY/VASCULAR | Facility: HOSPITAL | Age: 78
End: 2023-12-11
Attending: CLINICAL NURSE SPECIALIST
Payer: MEDICARE

## 2023-12-11 LAB
ANION GAP SERPL CALC-SCNC: 7 MMOL/L (ref 0–18)
BASOPHILS # BLD AUTO: 0.05 X10(3) UL (ref 0–0.2)
BASOPHILS NFR BLD AUTO: 0.8 %
BUN BLD-MCNC: <5 MG/DL (ref 9–23)
CALCIUM BLD-MCNC: 7.9 MG/DL (ref 8.7–10.4)
CHLORIDE SERPL-SCNC: 111 MMOL/L (ref 98–112)
CO2 SERPL-SCNC: 24 MMOL/L (ref 21–32)
CREAT BLD-MCNC: 0.82 MG/DL
DEPRECATED RDW RBC AUTO: 44.3 FL (ref 35.1–46.3)
EGFRCR SERPLBLD CKD-EPI 2021: 74 ML/MIN/1.73M2 (ref 60–?)
EOSINOPHIL # BLD AUTO: 0.27 X10(3) UL (ref 0–0.7)
EOSINOPHIL NFR BLD AUTO: 4.3 %
ERYTHROCYTE [DISTWIDTH] IN BLOOD BY AUTOMATED COUNT: 12.6 % (ref 11–15)
GLUCOSE BLD-MCNC: 97 MG/DL (ref 70–99)
HCT VFR BLD AUTO: 34.1 %
HGB BLD-MCNC: 11.5 G/DL
IMM GRANULOCYTES # BLD AUTO: 0.04 X10(3) UL (ref 0–1)
IMM GRANULOCYTES NFR BLD: 0.6 %
LYMPHOCYTES # BLD AUTO: 0.84 X10(3) UL (ref 1–4)
LYMPHOCYTES NFR BLD AUTO: 13.4 %
MCH RBC QN AUTO: 31.8 PG (ref 26–34)
MCHC RBC AUTO-ENTMCNC: 33.7 G/DL (ref 31–37)
MCV RBC AUTO: 94.2 FL
MONOCYTES # BLD AUTO: 0.91 X10(3) UL (ref 0.1–1)
MONOCYTES NFR BLD AUTO: 14.6 %
NEUTROPHILS # BLD AUTO: 4.14 X10 (3) UL (ref 1.5–7.7)
NEUTROPHILS # BLD AUTO: 4.14 X10(3) UL (ref 1.5–7.7)
NEUTROPHILS NFR BLD AUTO: 66.3 %
PLATELET # BLD AUTO: 311 10(3)UL (ref 150–450)
POTASSIUM SERPL-SCNC: 3 MMOL/L (ref 3.5–5.1)
POTASSIUM SERPL-SCNC: 3.2 MMOL/L (ref 3.5–5.1)
RBC # BLD AUTO: 3.62 X10(6)UL
SODIUM SERPL-SCNC: 142 MMOL/L (ref 136–145)
WBC # BLD AUTO: 6.3 X10(3) UL (ref 4–11)

## 2023-12-11 PROCEDURE — 71045 X-RAY EXAM CHEST 1 VIEW: CPT | Performed by: HOSPITALIST

## 2023-12-11 RX ORDER — MIDAZOLAM HYDROCHLORIDE 1 MG/ML
INJECTION INTRAMUSCULAR; INTRAVENOUS
Status: DISCONTINUED
Start: 2023-12-11 | End: 2023-12-11 | Stop reason: WASHOUT

## 2023-12-11 RX ORDER — POTASSIUM CHLORIDE 14.9 MG/ML
20 INJECTION INTRAVENOUS ONCE
Status: DISCONTINUED | OUTPATIENT
Start: 2023-12-11 | End: 2023-12-12

## 2023-12-11 RX ORDER — LIDOCAINE HYDROCHLORIDE 20 MG/ML
INJECTION, SOLUTION INFILTRATION; PERINEURAL
Status: DISCONTINUED
Start: 2023-12-11 | End: 2023-12-11 | Stop reason: WASHOUT

## 2023-12-11 RX ORDER — POTASSIUM CHLORIDE 20 MEQ/1
40 TABLET, EXTENDED RELEASE ORAL EVERY 4 HOURS
Status: COMPLETED | OUTPATIENT
Start: 2023-12-11 | End: 2023-12-11

## 2023-12-11 NOTE — PHYSICAL THERAPY NOTE
PHYSICAL THERAPY TREATMENT NOTE - INPATIENT     Room Number: 340/340-A       Presenting Problem: acute on chronic CHF, afib with rvr    Problem List  Principal Problem:    Atrial fibrillation with rapid ventricular response (HCC)  Active Problems:    Hypernatremia    Hypokalemia    Hyperglycemia    Goals of care, counseling/discussion    Advance care planning    Palliative care by specialist      PHYSICAL THERAPY ASSESSMENT   Chart reviewed. RN  approved participation in physical therapy. PPE worn by therapist: mask, gloves, and goggles. Patient was wearing a mask during session. Patient presented in bed with 6/10 pain. Patient with good  progress towards goals during this session. Education provided on Physical therapy plan of care and physiological benefits of out of bed mobility. Patient with good carryover. Bed mobility: Min assist  Transfers: NT  Gait Assistance: Not tested            Pt seen daily. Pt refused OOB. PT RX limited to bed mobility ,positioning for pt comfort and there ex. Pt educated on deep breathing,relaxation as well as energy conservation technique. Family present;family education;all questions and concerns addressed. Patient was left in bed at end of session with all needs in reach. The patient's Approx Degree of Impairment: 46.58% has been calculated based on documentation in the [facility] '6 clicks' Inpatient Basic Mobility Short Form. Research supports that patients with this level of impairment may benefit from Home with home health PT.  RN aware of patient status post session. DISCHARGE RECOMMENDATIONS  PT Discharge Recommendations: Home with home health PT     PLAN  PT Treatment Plan: Bed mobility; Body mechanics; Endurance; Patient education; Family education    SUBJECTIVE  Limited participation    OBJECTIVE  Precautions: Bed/chair alarm    WEIGHT BEARING RESTRICTION  Weight Bearing Restriction: None                PAIN ASSESSMENT   Ratin  Location: BLE with movement  Management Techniques: Activity promotion; Body mechanics;Breathing techniques;Relaxation;Repositioning    BALANCE                                                                                                                       Static Sitting: Good  Dynamic Sitting: Fair +           Static Standing: Fair -  Dynamic Standing: Fair -    ACTIVITY TOLERANCE                         O2 WALK       AM-PAC '6-Clicks' INPATIENT SHORT FORM - BASIC MOBILITY  How much difficulty does the patient currently have. .. Patient Difficulty: Turning over in bed (including adjusting bedclothes, sheets and blankets)?: None   Patient Difficulty: Sitting down on and standing up from a chair with arms (e.g., wheelchair, bedside commode, etc.): A Little   Patient Difficulty: Moving from lying on back to sitting on the side of the bed?: A Little   How much help from another person does the patient currently need. .. Help from Another: Moving to and from a bed to a chair (including a wheelchair)?: A Little   Help from Another: Need to walk in hospital room?: A Little   Help from Another: Climbing 3-5 steps with a railing?: A Lot     AM-PAC Score:  Raw Score: 18   Approx Degree of Impairment: 46.58%   Standardized Score (AM-PAC Scale): 43.63   CMS Modifier (G-Code): CK      Additional information:     THERAPEUTIC EXERCISES  Lower Extremity Ankle pumps  Glut sets  Quad sets     Position Supine       Patient End of Session: Call light within reach;RN aware of session/findings; All patient questions and concerns addressed; In bed    CURRENT GOALS     Goal #1 Patient is able to demonstrate supine - sit EOB @ level: modified independent      Goal #1   Current Status  Min A   Goal #2 Patient is able to demonstrate transfers Sit to/from Stand at assistance level: modified independent with walker - rolling      Goal #2  Current Status NT   Goal #3 Patient is able to ambulate 50 feet with assist device: walker - rolling at assistance level: modified independent Goal #3   Current Status NT   Goal #4     Goal #4   Current Status     Goal #5 Patient to demonstrate independence with home activity/exercise instructions provided to patient in preparation for discharge.    Goal #5   Current Status  Ongoing     There activity: 16 minutes  Therapeutic Exercise: 15 minutes

## 2023-12-11 NOTE — PLAN OF CARE
Patient is alert and oriented, slightly forgetful, can be anxious. No acute distress, denies pain. On room air. Standby assist with walker. VAT consulted due to infiltrated IV and poor IV access. NPO. NG placed in R nare @58cm, pending CXR placement verification. Barium prep to be administered prior to G/Jtube procedure at 1230pm, endorsed to [facility].     Problem: GASTROINTESTINAL - ADULT  Goal: Minimal or absence of nausea and vomiting  Description: INTERVENTIONS:  - Maintain adequate hydration with IV or PO as ordered and tolerated  - Nasogastric tube to low intermittent suction as ordered  - Evaluate effectiveness of ordered antiemetic medications  - Provide nonpharmacologic comfort measures as appropriate  - Advance diet as tolerated, if ordered  - Obtain nutritional consult as needed  - Evaluate fluid balance  Outcome: Progressing

## 2023-12-12 ENCOUNTER — APPOINTMENT (OUTPATIENT)
Dept: PHYSICAL THERAPY | Facility: HOSPITAL | Age: 78
End: 2023-12-12
Attending: INTERNAL MEDICINE
Payer: MEDICARE

## 2023-12-12 VITALS
HEART RATE: 80 BPM | HEIGHT: 62 IN | DIASTOLIC BLOOD PRESSURE: 61 MMHG | RESPIRATION RATE: 18 BRPM | SYSTOLIC BLOOD PRESSURE: 107 MMHG | BODY MASS INDEX: 51.89 KG/M2 | TEMPERATURE: 98 F | WEIGHT: 282 LBS | OXYGEN SATURATION: 93 %

## 2023-12-12 LAB
ANION GAP SERPL CALC-SCNC: 9 MMOL/L (ref 0–18)
BASOPHILS # BLD AUTO: 0.05 X10(3) UL (ref 0–0.2)
BASOPHILS NFR BLD AUTO: 0.8 %
BUN BLD-MCNC: 7 MG/DL (ref 9–23)
BUN/CREAT SERPL: 8.8 (ref 10–20)
CALCIUM BLD-MCNC: 8.1 MG/DL (ref 8.7–10.4)
CHLORIDE SERPL-SCNC: 111 MMOL/L (ref 98–112)
CO2 SERPL-SCNC: 23 MMOL/L (ref 21–32)
CREAT BLD-MCNC: 0.8 MG/DL
DEPRECATED RDW RBC AUTO: 44.9 FL (ref 35.1–46.3)
EGFRCR SERPLBLD CKD-EPI 2021: 76 ML/MIN/1.73M2 (ref 60–?)
EOSINOPHIL # BLD AUTO: 0.24 X10(3) UL (ref 0–0.7)
EOSINOPHIL NFR BLD AUTO: 3.7 %
ERYTHROCYTE [DISTWIDTH] IN BLOOD BY AUTOMATED COUNT: 13 % (ref 11–15)
GLUCOSE BLD-MCNC: 92 MG/DL (ref 70–99)
HCT VFR BLD AUTO: 36.8 %
HGB BLD-MCNC: 11.9 G/DL
IMM GRANULOCYTES # BLD AUTO: 0.04 X10(3) UL (ref 0–1)
IMM GRANULOCYTES NFR BLD: 0.6 %
LYMPHOCYTES # BLD AUTO: 1.03 X10(3) UL (ref 1–4)
LYMPHOCYTES NFR BLD AUTO: 15.9 %
MCH RBC QN AUTO: 30.5 PG (ref 26–34)
MCHC RBC AUTO-ENTMCNC: 32.3 G/DL (ref 31–37)
MCV RBC AUTO: 94.4 FL
MONOCYTES # BLD AUTO: 0.96 X10(3) UL (ref 0.1–1)
MONOCYTES NFR BLD AUTO: 14.8 %
NEUTROPHILS # BLD AUTO: 4.15 X10 (3) UL (ref 1.5–7.7)
NEUTROPHILS # BLD AUTO: 4.15 X10(3) UL (ref 1.5–7.7)
NEUTROPHILS NFR BLD AUTO: 64.2 %
OSMOLALITY SERPL CALC.SUM OF ELEC: 294 MOSM/KG (ref 275–295)
PLATELET # BLD AUTO: 338 10(3)UL (ref 150–450)
POTASSIUM SERPL-SCNC: 3.3 MMOL/L (ref 3.5–5.1)
POTASSIUM SERPL-SCNC: 3.5 MMOL/L (ref 3.5–5.1)
POTASSIUM SERPL-SCNC: 3.6 MMOL/L (ref 3.5–5.1)
RBC # BLD AUTO: 3.9 X10(6)UL
SODIUM SERPL-SCNC: 143 MMOL/L (ref 136–145)
WBC # BLD AUTO: 6.5 X10(3) UL (ref 4–11)

## 2023-12-12 RX ORDER — POTASSIUM CHLORIDE 20 MEQ/1
40 TABLET, EXTENDED RELEASE ORAL ONCE
Status: COMPLETED | OUTPATIENT
Start: 2023-12-12 | End: 2023-12-12

## 2023-12-12 RX ORDER — AMIODARONE HYDROCHLORIDE 200 MG/1
200 TABLET ORAL DAILY
Qty: 30 TABLET | Refills: 0 | Status: SHIPPED | OUTPATIENT
Start: 2023-12-12

## 2023-12-12 RX ORDER — DILTIAZEM HYDROCHLORIDE 300 MG/1
300 CAPSULE, EXTENDED RELEASE ORAL DAILY
Qty: 30 CAPSULE | Refills: 0 | Status: SHIPPED | OUTPATIENT
Start: 2023-12-13 | End: 2024-01-12

## 2023-12-12 RX ORDER — PANTOPRAZOLE SODIUM 40 MG/1
40 TABLET, DELAYED RELEASE ORAL
Qty: 60 TABLET | Refills: 0 | Status: SHIPPED | OUTPATIENT
Start: 2023-12-12

## 2023-12-12 RX ORDER — POTASSIUM CHLORIDE 20 MEQ/1
40 TABLET, EXTENDED RELEASE ORAL EVERY 4 HOURS
Status: COMPLETED | OUTPATIENT
Start: 2023-12-12 | End: 2023-12-12

## 2023-12-12 NOTE — PROGRESS NOTES
Interventional Radiology  Progress Note    Patient came to IR for this evening for gastrojejunostomy tube placement. During informed consent discussion, patient expressed concerns about the procedure including its goal, risks, effect on quality of life, and ability to eat. I had an extensive conversation with the patient and her son. The patient decided she does not want to proceed at this time and would like more time to think. Discussed with Dr. George Cornelius (gastroenterology). IR will follow up with the patient tomorrow.     Miguelina Stoner MD  12/11/2023  [address] [place] [address]

## 2023-12-12 NOTE — DISCHARGE PLANNING
Patient was provided with discharge instructions, education, and follow up information. Prescriptions were already sent electronically to patient's pharmacy. Patient verbalizes understanding of follow up information, specifically PCP, cardiology, and GI. Patient has no questions after reviewing all instructions and will be going home.      Jaydon Smoker, Discharge Leader [id number]

## 2023-12-12 NOTE — PROGRESS NOTES
Pt returned from IR. Discussed with Dr. Ruben Baum, G tube not placed. Pt refusing second bag of IV Potassium, wants oral route but is NPO. Per Dr. Horatio Collet in am can have sips with meds for cardiac meds. Sips given with pm Amiodarone. Waiting for okay from GI to advance diet from NPO.

## 2023-12-12 NOTE — DISCHARGE SUMMARY
General Medicine Discharge Summary     Patient ID:  John Adair  70year old  1/30/1945    Admit date: 11/26/2023    Discharge date and time: 12/12/2023    Attending Physician: Heike Sims MD     Consults: IP CONSULT TO CARDIOLOGY  IP CONSULT TO CARDIOLOGY  IP CONSULT TO GENERAL SURGERY  IP CONSULT TO GASTROENTEROLOGY  IP CONSULT PALLIATIVE CARE  IP CONSULT TO SOCIAL WORK  IP CONSULT TO SOCIAL WORK  IP CONSULT TO INTERVENTIONAL RADIOLOGY  CONSULT TO WOUND OSTOMY  IP CONSULT TO VASCULAR ACCESS TEAM  IP CONSULT TO [facility]    Primary Care Physician: Sean Linares MD     Reason for admission: Abdominal pain, A-fib with RVR    Risk For Readmission: Low    Discharge Diagnoses: Atrial fibrillation with rapid ventricular response ([facility] [address] [I48.91]  See Additional Discharge Diagnoses in Hospital Course    Discharged Condition: good    Follow-up with labs/images appointments: Follow-up with PCP, GI, cardiology in 1 to 2 weeks    Exam  Gen: No acute distress  Pulm: Lungs clear, normal respiratory effort  CV: Heart with regular rate and rhythm  Abd: Abdomen soft,   EXT: no edema     HPI:   Ms. Suzie Vázquez is a 71 yo F with PMH of Afib, CHF, HTN, who presented with elevated HR. Patient lives alone in senior living apartment, noticed her HR was elevated on her apple watch and noticed increased shortness of breath and fatigue. Had been feeling ok prior to getting the alert on her watch. No fevers or chills recently. States her cardiologist stopped amiodarone in July as she has been well controlled      In the ED, HR 150s, started on diltiazem gtt. Noted to have nausea/vomiting on admission. States no BM in a few days, normally goes daily. Hospital Course:   Ms. Suzie Vázquez is a 71 yo F with PMH of Afib, CHF, HTN, who presented with elevated HR, found to have Afib with RVR and fluid overload, clinically improved with rate controlling meds, and IV diuresis.   course complicated with ileus vs gastroparesis,  S/p EGD 12/4/23 with esophagitis, erosive gastritis, dilated duodenum. Repeat abdominal films show retained contrast from 12/1/2023. Consistent with gastroparesis. Marked gastric distention. GI recommended GJ tube for patient, which is initially set up for 12/11, however patient changed her mind and would rather try conservative therapy, diet was advanced which patient tolerated, cleared by GI for discharge home, and close follow-up, also cleared by cardiology for discharge home. Commended for close follow-up with GI, cardiology, PCP on discharge. Afib with RVR- improved  - HR 150s in ED  - s/p diltiazem gtt, now complete on orals   - cardiology consulted, started amio  - continue metoprolol, Eliquis BID resumed  -Continue with Amio, Dilt, metoprolol on discharge     Acute hypoxic respiratory failure  Acute on chronic systolic CHF   - EF 84-67% on most recent TTE 2023  - CXR with fluid overload  -Status post IV diuresis, weaned off of oxygen, transition to oral torsemide  -Follow-up with cardiology on discharge     Esophagitis, Erosive gastritis, Dilated duodenum  Nausea/vomiting/diarrhea  Ileus, SBO now ruled out, workup consistent with gastroparesis  - feeling of food being stuck in the epigastric region   - symptoms started around the same time as Afib RVR  - obstructive series with possible SBO, CT A/P with nonspecific gastroenteritis   - C.  Diff and GI stool panel negative   - PPI added  - f/u obstructive series, surgery consulted, NGT to LIS complete  - Incomplete gastric emptying cause unclear, likely gastroparesis  - GI consulted S/p EGD 12/4/23 with esophagitis, erosive gastritis, dilated duodenum  -No weight loss, patient's weight is up 10 pounds from March 2023.  - GI discussed with patient and recommended GJ tube, initial plans for GJ tube arranged with IR to place, however patient changed her mind and decided not to undergo this procedure, diet was initiated and advanced and patient tolerating this well, patient will continue PPI therapy on discharge, follow-up with GI as an outpatient in 1 to 2 weeks. MARINO- improved  Hypernatremia resolved   -Resolved     Leukocytosis resolved   - WBC 18 on admit->now resolved   - no PNA on CXR, possibly 2/2 diarrhea  - C.diff and GI stool panel negative   - monitor off abx for now  - monitor lower ext erythema, stable   HTN  - metoprolol, diltiazem      PAD/HL  - mild on CTA LE 2023  - statin     ILD  SUSAN  - CPAP, O2      Morbid obesity  - weight loss endeavors       Operative Procedures: Procedure(s) (LRB):  ESOPHAGOGASTRODUODENOSCOPY (EGD) (N/A)     Imaging: XR CHEST AP PORTABLE  (CPT=71045)    Result Date: 12/11/2023  CONCLUSION:  1. Borderline cardiomegaly. Tortuous atherosclerotic aorta. 2. Perihilar and bibasilar parenchymal abnormality with slight progression suggesting acute on chronic changes. Mild acute basilar pneumonitis can not be excluded. Left basilar pleural reaction. 3. Metallic tip enteric tube extends into the body of the stomach. New line residual contrast in the colon from previous GI examination. Marked gastric distention has decompressed. Dictated by (CST): Rosie Wong MD on 12/11/2023 at 7:35 AM     Finalized by (CST): Rosie Wong MD on 12/11/2023 at 7:39 AM             Disposition: home    Activity: activity as tolerated  Diet: regular diet  Wound Care: as directed  Code Status: DNAR/Selective Treatment      Home Medication Changes:     Med list     Medication List        START taking these medications      amiodarone 200 MG Tabs  Commonly known as: Pacerone  Take 1 tablet (200 mg total) by mouth daily. dilTIAZem HCl ER Beads 300 MG Cp24  Commonly known as: TIAZAC  Take 1 capsule (300 mg total) by mouth daily.   Start taking on: December 13, 2023     pantoprazole 40 MG Tbec  Commonly known as: Protonix  Take 1 tablet (40 mg total) by mouth every morning before breakfast.            CONTINUE taking these medications acetaminophen 325 MG Tabs  Commonly known as: Tylenol     apixaban 5 MG Tabs  Commonly known as: Eliquis     cholecalciferol 50 MCG (2000 UT) Caps  Commonly known as: Vitamin D3     cyanocobalamin 1000 MCG Tabs  Commonly known as: Vitamin B12     cycloSPORINE 0.05 % Emul  Commonly known as: RESTASIS     gabapentin 100 MG Caps  Commonly known as: Neurontin     metoprolol succinate  MG Tb24  Commonly known as: Toprol XL     Myrbetriq 25 MG Tb24  Generic drug: Mirabegron ER     potassium chloride 20 MEQ Tbcr  Commonly known as: K-Dur     simvastatin 10 MG Tabs  Commonly known as: Zocor     torsemide 20 MG Tabs  Commonly known as: Demadex     triamcinolone 0.1 % Crea  Commonly known as: Kenalog            STOP taking these medications      oxybutynin ER 10 MG Tb24  Commonly known as: Ditropan-XL               Where to Get Your Medications        These medications were sent to [facility]/pharmacy #[id number] - [place], [place] - [address] [facility]. AT [address] [place] [address], [phone], [address] [place] [address], [place] [address]      Hours: 24-hours Phone: [phone]   amiodarone 200 MG Tabs  dilTIAZem HCl ER Beads 300 MG Cp24  pantoprazole 40 MG Tbec         FU   Follow-up Information       Papito Andres MD. Schedule an appointment as soon as possible for a visit in 1 week(s). Specialty: Internal Medicine  Contact information:  [facility]  [phone]               Deann John MD. Schedule an appointment as soon as possible for a visit in 1 week(s). Specialties: Cardiovascular Diseases, CARDIOLOGY  Contact information:  [address]               Lonny Lanza MD. Schedule an appointment as soon as possible for a visit in 2 week(s). Specialty: GASTROENTEROLOGY  Contact information:  [address]  [address] [facility] [address] [phone]                             DC instructions:       Other Discharge Instructions:         Sometimes managing your health at home requires assistance. [facility] team has recognized your preference to use   [address], [address] [place] [address], [address] [place] [address]  Phone: [phone]  Fax: [phone]  A representative from the home health agency will contact you or your family to schedule your first visit. [facility] APN to follow at discharge. Please call [facility] Pall[facility] care with any questions, they can be reached by phone at [phone]. I reconciled current and discharge medications on day of discharge, discussed changes with patient and noted changes above.        Total Time Coordinating Care: Greater than 30 minutes    Patient had opportunity to ask questions and state understand and agree with therapeutic plan as outlined    Thank You,    Blake Chen MD   Hospitalist with [facility]

## 2023-12-12 NOTE — CM/SW NOTE
12/12/23 1000   Discharge disposition   Expected discharge disposition Home-Health   Post Acute Care Provider Other  ([address])   Additional Home Care/Hospice Provider Residential  ([facility])   Home services after discharge None   Discharge transportation Private car     The patient received a MDO for discharge. The patient is reserved with [address] and [facility]. The patient will be transported home by her son via private car. The patient has no questions or concerns at this time. SW/CM to remain available for support and/or discharge planning.      Franchesca NAVARRO, [facility]  Discharge Planner [id number]

## 2023-12-12 NOTE — PLAN OF CARE
Potassium coverage per protocol. Diet changed to soft per GI. NG tube removed without issue. Denies N/V at this time. Oral meds for heart rate control. Problem: CARDIOVASCULAR - ADULT  Goal: Maintains optimal cardiac output and hemodynamic stability  Description: INTERVENTIONS:  - Monitor vital signs, rhythm, and trends  - Monitor for bleeding, hypotension and signs of decreased cardiac output  - Evaluate effectiveness of vasoactive medications to optimize hemodynamic stability  - Monitor arterial and/or venous puncture sites for bleeding and/or hematoma  - Assess quality of pulses, skin color and temperature  - Assess for signs of decreased coronary artery perfusion - ex.  Angina  - Evaluate fluid balance, assess for edema, trend weights  Outcome: Not Progressing  Goal: Absence of cardiac arrhythmias or at baseline  Description: INTERVENTIONS:  - Continuous cardiac monitoring, monitor vital signs, obtain 12 lead EKG if indicated  - Evaluate effectiveness of antiarrhythmic and heart rate control medications as ordered  - Initiate emergency measures for life threatening arrhythmias  - Monitor electrolytes and administer replacement therapy as ordered  Outcome: Not Progressing     Problem: RESPIRATORY - ADULT  Goal: Achieves optimal ventilation and oxygenation  Description: INTERVENTIONS:  - Assess for changes in respiratory status  - Assess for changes in mentation and behavior  - Position to facilitate oxygenation and minimize respiratory effort  - Oxygen supplementation based on oxygen saturation or ABGs  - Provide Smoking Cessation handout, if applicable  - Encourage broncho-pulmonary hygiene including cough, deep breathe, Incentive Spirometry  - Assess the need for suctioning and perform as needed  - Assess and instruct to report SOB or any respiratory difficulty  - Respiratory Therapy support as indicated  - Manage/alleviate anxiety  - Monitor for signs/symptoms of CO2 retention  Outcome: Not Progressing

## 2023-12-12 NOTE — PROGRESS NOTES
PT attempted 3x today. Pt requested PM session in am to allow rest. 2nd attempt pt requesting clean up help from PCT d/t leaking pure wick. 3rd attempt pt requested rest prior to discharging with son home in pm to time to eat and prepare for D/C.

## 2023-12-14 ENCOUNTER — APPOINTMENT (OUTPATIENT)
Dept: PHYSICAL THERAPY | Facility: HOSPITAL | Age: 78
End: 2023-12-14
Attending: INTERNAL MEDICINE
Payer: MEDICARE

## 2023-12-15 NOTE — CM/SW NOTE
Per hand off from [facility] on 12/14/23, stated that patient discharged home from  [address] [place] [address] on 12/12/23 with [facility], after a 15 day admission at [address] [place] [address], and patient is now stating that she feels that she needs [facility] placement. [address] [place] [address] PT notes recommend home with [facility]. Referrals submitted via [facility] for [facility] in [place]. This is the only place patient wants to go. Roger Handing at [facility] stated in [address] that hospital PT notes recommend [facility] but she will speak with [facility] Physical therapist and get back to patient. Roger Handing will keep [facility] updated.

## 2023-12-15 NOTE — CM/SW NOTE
Received call from Baljinder Ellison, admitting with [facility] in [place] and they are accepting patient from home to SNF. [place] reserved [facility] in [place].

## 2023-12-19 ENCOUNTER — APPOINTMENT (OUTPATIENT)
Dept: PHYSICAL THERAPY | Facility: HOSPITAL | Age: 78
End: 2023-12-19
Attending: INTERNAL MEDICINE
Payer: MEDICARE

## 2024-01-23 RX ORDER — DILTIAZEM HYDROCHLORIDE 240 MG/1
240 CAPSULE, EXTENDED RELEASE ORAL DAILY
COMMUNITY

## 2024-01-26 ENCOUNTER — LAB ENCOUNTER (OUTPATIENT)
Dept: LAB | Facility: HOSPITAL | Age: 79
End: 2024-01-26
Attending: INTERNAL MEDICINE
Payer: MEDICARE

## 2024-01-26 DIAGNOSIS — Z01.818 PREOP TESTING: ICD-10-CM

## 2024-01-26 LAB
ANION GAP SERPL CALC-SCNC: 9 MMOL/L (ref 0–18)
BUN BLD-MCNC: 17 MG/DL (ref 9–23)
BUN/CREAT SERPL: 15.9 (ref 10–20)
CALCIUM BLD-MCNC: 8.7 MG/DL (ref 8.7–10.4)
CHLORIDE SERPL-SCNC: 107 MMOL/L (ref 98–112)
CO2 SERPL-SCNC: 24 MMOL/L (ref 21–32)
CREAT BLD-MCNC: 1.07 MG/DL
EGFRCR SERPLBLD CKD-EPI 2021: 54 ML/MIN/1.73M2 (ref 60–?)
FASTING STATUS PATIENT QL REPORTED: NO
GLUCOSE BLD-MCNC: 104 MG/DL (ref 70–99)
OSMOLALITY SERPL CALC.SUM OF ELEC: 292 MOSM/KG (ref 275–295)
POTASSIUM SERPL-SCNC: 3.4 MMOL/L (ref 3.5–5.1)
SODIUM SERPL-SCNC: 140 MMOL/L (ref 136–145)

## 2024-01-26 PROCEDURE — 36415 COLL VENOUS BLD VENIPUNCTURE: CPT

## 2024-01-26 PROCEDURE — 80048 BASIC METABOLIC PNL TOTAL CA: CPT

## 2024-01-30 ENCOUNTER — HOSPITAL ENCOUNTER (OUTPATIENT)
Dept: INTERVENTIONAL RADIOLOGY/VASCULAR | Facility: HOSPITAL | Age: 79
Discharge: HOME | End: 2024-01-30
Attending: INTERNAL MEDICINE | Admitting: INTERNAL MEDICINE
Payer: MEDICARE

## 2024-01-30 VITALS
DIASTOLIC BLOOD PRESSURE: 50 MMHG | BODY MASS INDEX: 50.79 KG/M2 | HEIGHT: 62 IN | SYSTOLIC BLOOD PRESSURE: 100 MMHG | OXYGEN SATURATION: 91 % | WEIGHT: 276 LBS | TEMPERATURE: 97 F | HEART RATE: 72 BPM | RESPIRATION RATE: 21 BRPM

## 2024-01-30 DIAGNOSIS — Z01.818 PREOP TESTING: Primary | ICD-10-CM

## 2024-01-30 DIAGNOSIS — I48.0 PAF (PAROXYSMAL ATRIAL FIBRILLATION) (HCC): ICD-10-CM

## 2024-01-30 LAB
ATRIAL RATE: 68 BPM
P AXIS: 69 DEGREES
P-R INTERVAL: 218 MS
Q-T INTERVAL: 418 MS
QRS DURATION: 94 MS
QTC CALCULATION (BEZET): 444 MS
R AXIS: 31 DEGREES
T AXIS: 53 DEGREES
VENTRICULAR RATE: 68 BPM

## 2024-01-30 PROCEDURE — 93005 ELECTROCARDIOGRAM TRACING: CPT

## 2024-01-30 PROCEDURE — 36415 COLL VENOUS BLD VENIPUNCTURE: CPT

## 2024-01-30 PROCEDURE — 92960 CARDIOVERSION ELECTRIC EXT: CPT | Performed by: INTERNAL MEDICINE

## 2024-01-30 PROCEDURE — 93010 ELECTROCARDIOGRAM REPORT: CPT | Performed by: STUDENT IN AN ORGANIZED HEALTH CARE EDUCATION/TRAINING PROGRAM

## 2024-01-30 PROCEDURE — 5A2204Z RESTORATION OF CARDIAC RHYTHM, SINGLE: ICD-10-PCS | Performed by: INTERNAL MEDICINE

## 2024-01-30 PROCEDURE — 99152 MOD SED SAME PHYS/QHP 5/>YRS: CPT | Performed by: INTERNAL MEDICINE

## 2024-01-30 RX ORDER — MIDAZOLAM HYDROCHLORIDE 1 MG/ML
INJECTION INTRAMUSCULAR; INTRAVENOUS
Status: COMPLETED
Start: 2024-01-30 | End: 2024-01-30

## 2024-01-30 RX ORDER — MIDAZOLAM HYDROCHLORIDE 1 MG/ML
4 INJECTION INTRAMUSCULAR; INTRAVENOUS ONCE
Status: COMPLETED | OUTPATIENT
Start: 2024-01-30 | End: 2024-01-30

## 2024-01-30 RX ORDER — SODIUM CHLORIDE 9 MG/ML
INJECTION, SOLUTION INTRAVENOUS CONTINUOUS
Status: DISCONTINUED | OUTPATIENT
Start: 2024-01-30 | End: 2024-01-30

## 2024-01-30 RX ADMIN — MIDAZOLAM HYDROCHLORIDE 4 MG: 1 INJECTION INTRAMUSCULAR; INTRAVENOUS at 10:00:00

## 2024-01-30 RX ADMIN — SODIUM CHLORIDE: 9 INJECTION, SOLUTION INTRAVENOUS at 08:00:00

## 2024-01-30 NOTE — IVS NOTE
DISCHARGE NOTE     Pt is able to sit up and ambulate without difficulty.   Pt tolerated fluids.   Procedural site remains dry and intact.  Pt denies any pain or discomfort at this time.  IV access removed  Instructions provided, patient/family verbalizes understanding.   Dr. Murphy spoke with patient/family post procedure.     Pt discharge via wheelchair to [facility]      Follow up Appointment: follow-up with Dr. Murphy or Margaret LITTLE in 2 weeks    New Prescription: n/a

## 2024-01-30 NOTE — DISCHARGE INSTRUCTIONS
Discharge Instruction for Cardioversion    Your healthcare provider performed a procedure called cardioversion. Your healthcare provider used a controlled electric shock or a medicine to briefly stop all electrical activity in your heart. This helped restore your heart’s normal rhythm. Here are some instructions to follow while you recover.    Home Care  Because a cardioversion typically requires sedation, you won't be able to drive home, drink alcohol or make any important legal decisions for the next 24 hours. You will need a ride. Wait at least 24 hours before driving a car or operating heavy machinery after receiving sedating medicines.  Don’t be alarmed if the skin on your chest is irritated or feels like it is sunburned. Your healthcare provider may prescribe a soothing lotion to relieve this discomfort. These minor symptoms will go away in a few days  Ask your healthcare provider about medicines to keep your heart rhythm steady.  If you were prescribed medicine, take it as instructed by your healthcare provider. Don’t skip doses or take double doses. Cardioversion requires blood thinners for at least 4 weeks to prevent a delayed risk of stroke when treating atrial fibrillation or atrial flutter. Be sure you discuss which medicine you are taking to prevent stroke. Ask when you need to have your medicine levels checked, and whether you may be able to stop taking it in the future or whether it is recommended that you take it for life. Some of these blood-thinning medicines will have the dose adjusted, and interact with other medicines or foods. Your healthcare team will give you full instructions on what to watch out for. Report bleeding or symptoms of stroke immediately to your healthcare team and seek emergency medical attention.  Learn to take your own pulse. Keep a record of your results. Ask your healthcare provider when you should seek emergency medical attention. He/she will tell you which pulse rate  reading is dangerous.   Keep in mind this procedure may need to be repeated if the abnormal heart rhythm returns. After the procedure, your healthcare provider will tell you if the treatment worked or if you will need further treatments or medication.    Follow Up Care   Make a follow-up appointment, or as directed.    Call 911:  Call 911 right away if you have:  Chest pain  Shortness of breath  Loss of vision, speech, or strength or coordination in any body part    When to call your Healthcare Provider:  Call your healthcare provider right away if you:  Feel faint, dizzy, or lightheaded  Have chest pain with increased activity  Have irregular heartbeat or fast pulse

## 2024-01-30 NOTE — PROCEDURES
Procedure Report    Indication for procedure: persistent atrial fibrillation    Procedures performed:  1) Direct current cardioversion with 360J x 1 with restoration of sinus rhythm  2) Supervision of moderate sedation from 0837 to 0847, with a total of 4mg versed and 75mcg fentanyl.  Sedation administered by certified nursing staff and supervised by me directly    After informed consent was obtained patient was brought to the cardiac holding unit.  Moderate sedation provided by certified nursing staff and supervised by me directly.  Direct current cardioversion performed with 360J x 1 with successful restoration of sinus rhythm.  Patient tolerated procedure well without immediate complications, transferred to recovery area in stable condition.    Conclusions:  Successful DCCV with 360J x 1 with restoration of sinus rhythm    Recommendations:  1) ECG to confirm sinus rhythm  2) Continue metoprolol, diltiazem, amiodarone; titrate metoprolol/diltiazem as needed based on BP  3) Cont eliquis for stroke prevention  4) Discharge home later today, outpatient f/u 1-2 weeks

## 2024-01-30 NOTE — H&P
History & Physical Examination    Patient Name: Adore Covington  MRN: [id number]  CSN: [id number]  YOB: 1945    Diagnosis: persistent atrial fibrillation    Present Illness: 78 year old female with pmh pAF on amiodarone, HL, HFrEF, SUSAN and ILD presenting for DCCV.    Medications Prior to Admission   Medication Sig Dispense Refill Last Dose    dilTIAZem HCl  MG Oral Capsule SR 24 Hr Take 1 capsule (240 mg total) by mouth daily.   1/30/2024    amiodarone 200 MG Oral Tab Take 1 tablet (200 mg total) by mouth daily. 30 tablet 0 1/30/2024    pantoprazole 40 MG Oral Tab EC Take 1 tablet (40 mg total) by mouth every morning before breakfast. 60 tablet 0 1/29/2024    apixaban 5 MG Oral Tab Take 1 tablet (5 mg total) by mouth 2 (two) times daily.   1/30/2024    potassium chloride 20 MEQ Oral Tab CR Take 1 tablet (20 mEq total) by mouth daily.   1/29/2024    torsemide 20 MG Oral Tab Take 1 tablet (20 mg total) by mouth daily. Taking daily and as needed   1/29/2024    cycloSPORINE 0.05 % Ophthalmic Emulsion Place 1 drop into both eyes 2 (two) times daily.   1/29/2024    cholecalciferol 50 MCG (2000 UT) Oral Cap Take 1 capsule (2,000 Units total) by mouth daily.   1/29/2024    cyanocobalamin 1000 MCG Oral Tab Take 1 tablet (1,000 mcg total) by mouth daily.   1/29/2024    simvastatin 10 MG Oral Tab Take 1 tablet (10 mg total) by mouth As Directed.   1/29/2024    gabapentin 100 MG Oral Cap Take 2 capsules (200 mg total) by mouth 2 (two) times daily.   1/30/2024    metoprolol succinate  MG Oral Tablet 24 Hr Take 1 tablet (100 mg total) by mouth daily. 0.5 tablet per day   1/30/2024    acetaminophen 325 MG Oral Tab Take 2 tablets (650 mg total) by mouth 2 (two) times daily as needed for Pain.   Unknown    triamcinolone 0.1 % External Cream Apply 1 Application topically 2 (two) times daily as needed.   Unknown    MYRBETRIQ 25 MG Oral Tablet 24 Hr 1 TABLET BY MOUTH EVERYDAY FOR 90 DAYS   More than a month      Current Facility-Administered Medications   Medication Dose Route Frequency    fentaNYL (Sublimaze) 50 mcg/mL injection        midazolam (Versed) 2 MG/2ML injection        sodium chloride 0.9% infusion   Intravenous Continuous       Allergies: No Known Allergies    Past Medical History:   Diagnosis Date    Arrhythmia     Atrial fibrillation (HCC)     Congestive heart disease (HCC)     COPD (chronic obstructive pulmonary disease) (HCC)     Essential hypertension     High blood pressure     High cholesterol     Morbid obesity with BMI of 50.0-59.9, adult (HCC)     Peripheral vascular disease (HCC)     Sleep apnea      Past Surgical History:   Procedure Laterality Date    KNEE SURGERY Bilateral      History reviewed. No pertinent family history.  Social History     Tobacco Use    Smoking status: Former     Types: Cigarettes     Quit date:      Years since quittin.1    Smokeless tobacco: Never   Substance Use Topics    Alcohol use: Yes     Comment: 1-2 drinks daily       SYSTEM Check if Review is Normal Check if Physical Exam is Normal If not normal, please explain:   HEENT x x    NECK & BACK x x    HEART x x    LUNGS x x    ABDOMEN x x    UROGENITAL x x    EXTREMITIES x x    OTHER        [ x ] I have discussed the risks and benefits and alternatives with the patient/family.  They understand and agree to proceed with plan of care.  [ x ] I have reviewed the History and Physical done within the last 30 days.  Any changes noted above.    Eben Murphy MD  2024  9:04 AM

## 2024-02-09 ENCOUNTER — OFFICE VISIT (OUTPATIENT)
Dept: WOUND CARE | Facility: HOSPITAL | Age: 79
End: 2024-02-09
Attending: FAMILY MEDICINE
Payer: MEDICARE

## 2024-02-09 VITALS
OXYGEN SATURATION: 94 % | TEMPERATURE: 97 F | SYSTOLIC BLOOD PRESSURE: 99 MMHG | WEIGHT: 270 LBS | BODY MASS INDEX: 47.84 KG/M2 | RESPIRATION RATE: 20 BRPM | HEART RATE: 66 BPM | DIASTOLIC BLOOD PRESSURE: 64 MMHG | HEIGHT: 63 IN

## 2024-02-09 DIAGNOSIS — I48.91 ATRIAL FIBRILLATION WITH RAPID VENTRICULAR RESPONSE (HCC): ICD-10-CM

## 2024-02-09 DIAGNOSIS — E66.01 CLASS 3 SEVERE OBESITY DUE TO EXCESS CALORIES WITH SERIOUS COMORBIDITY AND BODY MASS INDEX (BMI) OF 45.0 TO 49.9 IN ADULT (HCC): ICD-10-CM

## 2024-02-09 DIAGNOSIS — I87.333 CHRONIC VENOUS HYPERTENSION (IDIOPATHIC) WITH ULCER AND INFLAMMATION OF BILATERAL LOWER EXTREMITY (HCC): Primary | ICD-10-CM

## 2024-02-09 DIAGNOSIS — Z86.79 H/O CHF: ICD-10-CM

## 2024-02-09 PROCEDURE — 99205 OFFICE O/P NEW HI 60 MIN: CPT | Performed by: FAMILY MEDICINE

## 2024-02-09 RX ORDER — CEPHALEXIN 500 MG/1
500 CAPSULE ORAL 2 TIMES DAILY
COMMUNITY

## 2024-02-09 NOTE — PROGRESS NOTES
Weekly Wound Education Note    Teaching Provided To: Patient  Training Topics: Cleasing and general instructions;Compression;Dressing;Edema control;Skin care;Signs and symptoms of infection  Training Method: Demonstration  Training Response: Reinforcement needed        Notes: Right lower leg-Enluxtra, Keramax, kerlix, tape, spandagrip F Left lower leg-Enluxtra, Kerramax, kerlix, tape Coflex lite, wound C&S sent of left leg wound

## 2024-02-09 NOTE — PATIENT INSTRUCTIONS
PATIENT INSTRUCTIONS      FOR CHICHO Longo 1945    DATE OF SERVICE: 2024    LEFT LEG:   Enluxtra  Kerramax  Coflex lite    RIGHT LEG:   Enluxtra  Kerramax  Spandagrip    Home health to change the dressings 3 times a week on  and Friday    I WILL ORDER A VENOUS INSUFFICIENCY ULTRASOUND BILATERAL LOWER EXTREMITIES.     Follow up with Dr. Tinajero 2 WEEKS

## 2024-02-11 RX ORDER — LEVOFLOXACIN 500 MG/1
500 TABLET, FILM COATED ORAL DAILY
Qty: 10 TABLET | Refills: 0 | Status: SHIPPED | OUTPATIENT
Start: 2024-02-11

## 2024-02-12 ENCOUNTER — TELEPHONE (OUTPATIENT)
Dept: WOUND CARE | Facility: HOSPITAL | Age: 79
End: 2024-02-12

## 2024-02-13 ENCOUNTER — APPOINTMENT (OUTPATIENT)
Dept: URBAN - METROPOLITAN AREA CLINIC 244 | Age: 79
Setting detail: DERMATOLOGY
End: 2024-02-15

## 2024-02-13 DIAGNOSIS — I87.2 VENOUS INSUFFICIENCY (CHRONIC) (PERIPHERAL): ICD-10-CM

## 2024-02-13 DIAGNOSIS — L20.89 OTHER ATOPIC DERMATITIS: ICD-10-CM

## 2024-02-13 PROCEDURE — OTHER ADDITIONAL NOTES: OTHER

## 2024-02-13 PROCEDURE — OTHER PRESCRIPTION: OTHER

## 2024-02-13 PROCEDURE — 99204 OFFICE O/P NEW MOD 45 MIN: CPT

## 2024-02-13 PROCEDURE — OTHER COUNSELING: OTHER

## 2024-02-13 PROCEDURE — OTHER GENTLE SKIN CARE INSTRUCTIONS: OTHER

## 2024-02-13 RX ORDER — TRIAMCINOLONE ACETONIDE 1 MG/G
OINTMENT TOPICAL
Qty: 80 | Refills: 2 | Status: ERX | COMMUNITY
Start: 2024-02-13

## 2024-02-13 RX ORDER — TRIAMCINOLONE ACETONIDE 1 MG/G
OINTMENT TOPICAL
Qty: 80 | Refills: 2 | Status: ERX

## 2024-02-13 ASSESSMENT — LOCATION SIMPLE DESCRIPTION DERM
LOCATION SIMPLE: LEFT HAND
LOCATION SIMPLE: RIGHT PRETIBIAL REGION
LOCATION SIMPLE: RIGHT HAND
LOCATION SIMPLE: LEFT PRETIBIAL REGION

## 2024-02-13 ASSESSMENT — LOCATION DETAILED DESCRIPTION DERM
LOCATION DETAILED: RIGHT DISTAL PRETIBIAL REGION
LOCATION DETAILED: LEFT ULNAR DORSAL HAND
LOCATION DETAILED: LEFT PROXIMAL PRETIBIAL REGION
LOCATION DETAILED: RIGHT DORSAL INDEX FINGER METACARPOPHALANGEAL JOINT

## 2024-02-13 ASSESSMENT — LOCATION ZONE DERM
LOCATION ZONE: LEG
LOCATION ZONE: HAND

## 2024-02-13 NOTE — PROCEDURE: ADDITIONAL NOTES
Render Risk Assessment In Note?: no
Detail Level: Simple
Additional Notes: Patient is following up with wound care clinic

## 2024-02-14 ENCOUNTER — TELEPHONE (OUTPATIENT)
Dept: WOUND CARE | Facility: HOSPITAL | Age: 79
End: 2024-02-14

## 2024-02-23 ENCOUNTER — OFFICE VISIT (OUTPATIENT)
Dept: WOUND CARE | Facility: HOSPITAL | Age: 79
End: 2024-02-23
Attending: FAMILY MEDICINE
Payer: MEDICARE

## 2024-02-23 VITALS
BODY MASS INDEX: 49 KG/M2 | WEIGHT: 277.5 LBS | TEMPERATURE: 98 F | SYSTOLIC BLOOD PRESSURE: 103 MMHG | OXYGEN SATURATION: 95 % | HEART RATE: 67 BPM | RESPIRATION RATE: 20 BRPM | DIASTOLIC BLOOD PRESSURE: 66 MMHG

## 2024-02-23 DIAGNOSIS — I87.333 CHRONIC VENOUS HYPERTENSION (IDIOPATHIC) WITH ULCER AND INFLAMMATION OF BILATERAL LOWER EXTREMITY (HCC): Primary | ICD-10-CM

## 2024-02-23 DIAGNOSIS — E66.01 CLASS 3 SEVERE OBESITY DUE TO EXCESS CALORIES WITH SERIOUS COMORBIDITY AND BODY MASS INDEX (BMI) OF 45.0 TO 49.9 IN ADULT (HCC): ICD-10-CM

## 2024-02-23 DIAGNOSIS — Z86.79 H/O CHF: ICD-10-CM

## 2024-02-23 DIAGNOSIS — I48.91 ATRIAL FIBRILLATION WITH RAPID VENTRICULAR RESPONSE (HCC): ICD-10-CM

## 2024-02-23 PROCEDURE — 99215 OFFICE O/P EST HI 40 MIN: CPT | Performed by: FAMILY MEDICINE

## 2024-02-23 NOTE — PROGRESS NOTES
Chief Complaint   Patient presents with    Wound Recheck     Bilateral legs       HPI:     Patient here for follow-up of bilateral lower extremity wounds.  She is doing fine but thinks wounds have gotten slightly worse than previous.  She denies any fever or chills.    Lab Results   Component Value Date    BUN 17 01/26/2024    CREATSERUM 1.07 (H) 01/26/2024    ALB 4.4 11/26/2023    TP 7.1 11/26/2023         Current Outpatient Medications:     levoFLOXacin (LEVAQUIN) 500 MG Oral Tab, Take 1 tablet (500 mg total) by mouth daily., Disp: 10 tablet, Rfl: 0    cephalexin 500 MG Oral Cap, Take 1 capsule (500 mg total) by mouth 2 (two) times daily., Disp: , Rfl:     dilTIAZem HCl  MG Oral Capsule SR 24 Hr, Take 1 capsule (240 mg total) by mouth daily., Disp: , Rfl:     amiodarone 200 MG Oral Tab, Take 1 tablet (200 mg total) by mouth daily., Disp: 30 tablet, Rfl: 0    pantoprazole 40 MG Oral Tab EC, Take 1 tablet (40 mg total) by mouth every morning before breakfast., Disp: 60 tablet, Rfl: 0    apixaban 5 MG Oral Tab, Take 1 tablet (5 mg total) by mouth 2 (two) times daily., Disp: , Rfl:     potassium chloride 20 MEQ Oral Tab CR, Take 1 tablet (20 mEq total) by mouth daily., Disp: , Rfl:     torsemide 20 MG Oral Tab, Take 1 tablet (20 mg total) by mouth daily. Taking daily and as needed, Disp: , Rfl:     cycloSPORINE 0.05 % Ophthalmic Emulsion, Place 1 drop into both eyes 2 (two) times daily., Disp: , Rfl:     cholecalciferol 50 MCG (2000 UT) Oral Cap, Take 1 capsule (2,000 Units total) by mouth daily., Disp: , Rfl:     acetaminophen 325 MG Oral Tab, Take 2 tablets (650 mg total) by mouth 2 (two) times daily as needed for Pain., Disp: , Rfl:     cyanocobalamin 1000 MCG Oral Tab, Take 1 tablet (1,000 mcg total) by mouth daily., Disp: , Rfl:     triamcinolone 0.1 % External Cream, Apply 1 Application topically 2 (two) times daily as needed., Disp: , Rfl:     MYRBETRIQ 25 MG Oral Tablet 24 Hr, 1 TABLET BY MOUTH EVERYDAY  FOR 90 DAYS, Disp: , Rfl:     simvastatin 10 MG Oral Tab, Take 1 tablet (10 mg total) by mouth As Directed., Disp: , Rfl:     gabapentin 100 MG Oral Cap, Take 2 capsules (200 mg total) by mouth 2 (two) times daily., Disp: , Rfl:     metoprolol succinate  MG Oral Tablet 24 Hr, Take 1 tablet (100 mg total) by mouth daily. 0.5 tablet per day, Disp: , Rfl:     No Known Allergies         REVIEW OF SYSTEMS:     Review of Systems (ROS)  This information was obtained from the patient, chart    A comprehensive 10 point review of systems was completed.  Pertinent positives and negatives noted in the the HPI.     Past medical, surgical, family and social history updated where appropriate.    PHYSICAL EXAM:   /66   Pulse 67   Temp 98 °F (36.7 °C)   Resp 20   Wt 277 lb 8 oz   SpO2 95%   BMI 49.16 kg/m²    Estimated body mass index is 49.16 kg/m² as calculated from the following:    Height as of 2/9/24: 63\".    Weight as of this encounter: 277 lb 8 oz.   Vital signs reviewed.Appears stated age, well groomed.      Awake, alert, in no acute distress  HENT:  normocephalic, atraumatic, external ear canals clear bilaterally, tympanic membranes appear opaque, non-bulging, non-erythematous, nasal turbinates are non-inflamed and not swollen, oropharynx without erythema, swelling, or exudate, gingiva and lips without any apparent lesions.   Cardiac:  S1 S2 regular rate and rhythm, no murmur, gallop, or rub  Respiratory:  Bilaterally clear to auscultation, no chest tenderness to palpation, no wheezing, no rhonchi, no rales, air entry is adequate, no accessory respiratory muscle use, no chest asymmetry, normal excursion.  GI:  bowel sound positive in all four quadrants, abdomen is soft, non-tender, non-distended, no hepatosplenomegaly, no abnormal aortic pulsation.     Calf  Point of Measurement - Left Calf: 34  Point of Measurement - Right Calf: 34  Left Calf from:: Heel  Calf Left cm:: 50 (47.5)  Right Calf from::  Heel  Right Calf cm:: 50.5    Ankle  Point of Measurement - Left Ankle: 10  Point of Measurement - Right Ankle: 10  Left Ankle from:: Heel  Left Ankle cm:: 23     Right Ankle from:: Heel  Right Ankle cm:: 23       Foot  Point of Measurement - Left Foot: 8  Left Foot from:: Great toe  Left Foot cm:: 23  Left Heel to Knee: 38  Point of Measurement - Right Foot: 8  Right Foot from:: Great toe  Right Foot cm:: 24  Right Heel to Knee: 38    Wound 02/09/24 1 Leg Right;Lower (Active)   Date First Assessed/Time First Assessed: 02/09/24 1452    Wound Number (Wound Clinic Only): 1  Location: Leg  Wound Location Orientation: Right;Lower      Assessments 2/9/2024  2:54 PM 2/23/2024  1:21 PM   Wound Image          Site Assessment Clean;Moist;Yellow;Pale;Pink Clean;Moist;Pale;Pink   Closure Not approximated Not approximated   Drainage Amount Large Large   Drainage Description Yellow Yellow   Treatments Cleansed;Compression;Saline Cleansed;Compression;Saline   Dressing Kerlix roll;Tape Kerlix roll;Tape   Dressing Changed New New   Dressing Status Clean;Dry;Intact Clean;Dry;Intact   Wound Length (cm) 8 cm 6.5 cm   Wound Width (cm) 7 cm 13 cm   Wound Surface Area (cm^2) 56 cm^2 84.5 cm^2   Wound Depth (cm) 0.1 cm 0.1 cm   Wound Volume (cm^3) 5.6 cm^3 8.45 cm^3   Wound Healing % -- -51   Margins Attached edges Attached edges;Poorly defined   Non-staged Wound Description Full thickness Full thickness   Kamryn-wound Assessment Hemosiderin staining;Edema Hemosiderin staining;Edema   Wound Granulation Tissue Pink Pink;Red   Wound Bed Granulation (%) 100 % 100 %   Wound Bed Epithelium (%) 0 % 0 %   Wound Bed Slough (%) 0 % 0 %   Wound Bed Eschar (%) 0 % 0 %   Wound Bed Fibrin (%) 0 % 0 %   State of Healing Early/partial granulation Early/partial granulation;Fully granulated   Wound Odor None None       No associated orders.       Wound 02/09/24 2 Leg Left;Lower (Active)   Date First Assessed/Time First Assessed: 02/09/24 1453    Wound  Number (Wound Clinic Only): 2  Location: Leg  Wound Location Orientation: Left;Lower      Assessments 2/9/2024  2:54 PM 2/23/2024  1:21 PM   Wound Image          Site Assessment Moist;Yellow;Pink;Red;Edema Moist;Pink;Red;Edema   Closure Not approximated Not approximated   Drainage Amount Large Large   Drainage Description Green Yellow   Treatments Cleansed;Saline;Compression Cleansed;Saline;Compression   Dressing Kerlix roll;Tape Kerlix roll;Tape   Dressing Changed New New   Dressing Status -- Clean;Dry;Intact   Wound Length (cm) 24 cm 22 cm   Wound Width (cm) 28 cm 30 cm   Wound Surface Area (cm^2) 672 cm^2 660 cm^2   Wound Depth (cm) 0.1 cm 0.1 cm   Wound Volume (cm^3) 67.2 cm^3 66 cm^3   Wound Healing % -- 2   Margins Attached edges Attached edges;Poorly defined   Non-staged Wound Description Full thickness Full thickness   Kamryn-wound Assessment Edema;Pink;Hemosiderin staining;Dry Edema;Pink;Hemosiderin staining;Maceration   Wound Granulation Tissue Pink Pink   Wound Bed Granulation (%) 50 % 50 %   Wound Bed Epithelium (%) 0 % 50 %   Wound Bed Slough (%) 50 % 0 %   Wound Bed Eschar (%) 0 % 0 %   Wound Bed Fibrin (%) 0 % 0 %   State of Healing Early/partial granulation Early/partial granulation   Wound Odor None None       No associated orders.       Compression Wrap 02/09/24 Leg Right;Lower (Active)   Placement Date: 02/09/24   Location: Leg  Wound Location Orientation: Right;Lower      Assessments 2/9/2024  2:54 PM 2/23/2024  1:21 PM   Response to Treatment Well tolerated Well tolerated   Compression Layers Single Multilayer   Compression Product Type Tubigrip/Spanda Coflex   Dressing Applied Yes Yes   Compression Wrap Location Toes to Knee Toes to Knee   Compression Wrap Status Clean;Intact;Dry Clean;Intact;Dry       No associated orders.       Compression Wrap 02/09/24 Leg Left;Lower (Active)   Placement Date: 02/09/24   Location: Leg  Wound Location Orientation: Left;Lower      Assessments 2/9/2024  2:54 PM  2/23/2024  1:21 PM   Response to Treatment Well tolerated Well tolerated   Compression Layers 2 2   Compression Product Type Artiflex 10cm;Coflex lite Coflex   Dressing Applied Yes Yes   Compression Wrap Location Toes to Knee Toes to Knee   Compression Wrap Status Clean;Dry;Intact Clean;Dry;Intact       No associated orders.          ASSESSMENT AND PLAN:      1. Chronic venous hypertension (idiopathic) with ulcer and inflammation of bilateral lower extremity ([facility])    2. Atrial fibrillation with rapid ventricular response ([facility])    3. H/O CHF    4. Class 3 severe obesity due to excess calories with serious comorbidity and body mass index (BMI) of 45.0 to 49.9 in adult ([facility])    Clinically the wounds are a little bit more macerated and slightly larger than previous visit.  Will switch to Hydrofera Blue transfer, Kerramax, increase to Coflex calamine compression wrap 30 to 40 mmHg bilateral lower extremities.  Home health to change the dressings on  Mondays Wednesdays and Fridays and patient to follow-up with me in 2 weeks.  Encouraged to weight reduction as well.      Risks, benefits, and alternatives of current treatment plan discussed in detail.  Questions and concerns addressed. Red flags to RTC or ED reviewed.  Patient (or parent) agrees to plan.      Return in about 2 weeks (around 3/8/2024) for MD visit for 30 min.    Also refer to patient instructions.     I spent 40 minutes with the patient. This time included:  preparing to see the patient (eg, review notes and recent diagnostics), seeing the patient, performing a medically appropriate examination and/or evaluation, counseling and educating the patient, and documenting in the record.    I agree with staff documentation except for any changes made in my note.       Dontrell Tinajero M.D., Wound Care Physician  [facility]  2/23/2024

## 2024-02-23 NOTE — PROGRESS NOTES
Weekly Wound Education Note    Teaching Provided To: Patient  Training Topics: Cleasing and general instructions;Compression;Dressing;Edema control;Skin care;Signs and symptoms of infection  Training Method: Demonstration  Training Response: Reinforcement needed        Notes: Bilateral legs- hydrofera blue, kerramax, kerlix and coflex standard compression wrap.

## 2024-02-23 NOTE — PATIENT INSTRUCTIONS
PATIENT INSTRUCTIONS      FOR CHICHO Longo 1945    DATE OF SERVICE: 2024    You can call [phone] to schedule your ultrasound    LEFT LEG:   Hydrofera Blue Transfer  Kerramax  Coflex Calamine compression wrap 30-40mmHg    RIGHT LEG:   Hydrofera Blue Transfer  Kerramax  Coflex Calamine compression wrap 30-40mmHg    Home health to change the dressings 3 times a week on  and Friday      Follow up with Dr. Tinajero 2 WEEKS

## 2024-02-26 ENCOUNTER — TELEPHONE (OUTPATIENT)
Dept: WOUND CARE | Facility: HOSPITAL | Age: 79
End: 2024-02-26

## 2024-03-06 ENCOUNTER — HOSPITAL ENCOUNTER (OUTPATIENT)
Dept: ULTRASOUND IMAGING | Facility: HOSPITAL | Age: 79
Discharge: HOME | End: 2024-03-06
Attending: FAMILY MEDICINE
Payer: MEDICARE

## 2024-03-06 DIAGNOSIS — I48.91 ATRIAL FIBRILLATION WITH RAPID VENTRICULAR RESPONSE (HCC): ICD-10-CM

## 2024-03-06 DIAGNOSIS — Z86.79 H/O CHF: ICD-10-CM

## 2024-03-06 DIAGNOSIS — E66.01 CLASS 3 SEVERE OBESITY DUE TO EXCESS CALORIES WITH SERIOUS COMORBIDITY AND BODY MASS INDEX (BMI) OF 45.0 TO 49.9 IN ADULT (HCC): ICD-10-CM

## 2024-03-06 DIAGNOSIS — I87.333 CHRONIC VENOUS HYPERTENSION (IDIOPATHIC) WITH ULCER AND INFLAMMATION OF BILATERAL LOWER EXTREMITY (HCC): ICD-10-CM

## 2024-03-06 PROCEDURE — 93970 EXTREMITY STUDY: CPT | Performed by: FAMILY MEDICINE

## 2024-03-08 ENCOUNTER — LAB ENCOUNTER (OUTPATIENT)
Dept: LAB | Facility: HOSPITAL | Age: 79
End: 2024-03-08
Attending: NURSE PRACTITIONER
Payer: MEDICARE

## 2024-03-08 ENCOUNTER — OFFICE VISIT (OUTPATIENT)
Dept: WOUND CARE | Facility: HOSPITAL | Age: 79
End: 2024-03-08
Attending: FAMILY MEDICINE
Payer: MEDICARE

## 2024-03-08 VITALS
SYSTOLIC BLOOD PRESSURE: 126 MMHG | HEART RATE: 68 BPM | TEMPERATURE: 98 F | OXYGEN SATURATION: 94 % | DIASTOLIC BLOOD PRESSURE: 84 MMHG | RESPIRATION RATE: 18 BRPM

## 2024-03-08 DIAGNOSIS — Z86.79 H/O CHF: ICD-10-CM

## 2024-03-08 DIAGNOSIS — I50.33 ACUTE ON CHRONIC HEART FAILURE WITH PRESERVED EJECTION FRACTION (HCC): Primary | ICD-10-CM

## 2024-03-08 DIAGNOSIS — E66.01 CLASS 3 SEVERE OBESITY DUE TO EXCESS CALORIES WITH SERIOUS COMORBIDITY AND BODY MASS INDEX (BMI) OF 45.0 TO 49.9 IN ADULT (HCC): ICD-10-CM

## 2024-03-08 DIAGNOSIS — I87.333 CHRONIC VENOUS HYPERTENSION (IDIOPATHIC) WITH ULCER AND INFLAMMATION OF BILATERAL LOWER EXTREMITY (HCC): Primary | ICD-10-CM

## 2024-03-08 DIAGNOSIS — I48.91 ATRIAL FIBRILLATION WITH RAPID VENTRICULAR RESPONSE (HCC): ICD-10-CM

## 2024-03-08 LAB
ANION GAP SERPL CALC-SCNC: 5 MMOL/L (ref 0–18)
BUN BLD-MCNC: 12 MG/DL (ref 9–23)
BUN/CREAT SERPL: 13.8 (ref 10–20)
CALCIUM BLD-MCNC: 9.1 MG/DL (ref 8.7–10.4)
CHLORIDE SERPL-SCNC: 107 MMOL/L (ref 98–112)
CO2 SERPL-SCNC: 30 MMOL/L (ref 21–32)
CREAT BLD-MCNC: 0.87 MG/DL
EGFRCR SERPLBLD CKD-EPI 2021: 69 ML/MIN/1.73M2 (ref 60–?)
FASTING STATUS PATIENT QL REPORTED: NO
GLUCOSE BLD-MCNC: 104 MG/DL (ref 70–99)
OSMOLALITY SERPL CALC.SUM OF ELEC: 294 MOSM/KG (ref 275–295)
POTASSIUM SERPL-SCNC: 3 MMOL/L (ref 3.5–5.1)
SODIUM SERPL-SCNC: 142 MMOL/L (ref 136–145)

## 2024-03-08 PROCEDURE — 36415 COLL VENOUS BLD VENIPUNCTURE: CPT

## 2024-03-08 PROCEDURE — 80048 BASIC METABOLIC PNL TOTAL CA: CPT

## 2024-03-08 PROCEDURE — 99215 OFFICE O/P EST HI 40 MIN: CPT | Performed by: FAMILY MEDICINE

## 2024-03-08 NOTE — PATIENT INSTRUCTIONS
PATIENT INSTRUCTIONS      FOR Adore Lema CHICHO Covington 1945    DATE OF SERVICE: 3/8/2024    Please see [facility], [phone], take copy of your ultrasound report to them.    Switch to [facility], [phone]    LEFT LEG:   Hydrofera Blue Transfer  Kerramax  Coflex Calamine compression wrap 30-40mmHg    RIGHT LEG:   Hydrofera Blue Transfer  Kerramax  Coflex Calamine compression wrap 30-40mmHg    [facility] to change the dressings 3 times a week on  and Friday      Follow up with Dr. Tinajero 2 WEEKS

## 2024-03-08 NOTE — PROGRESS NOTES
No chief complaint on file.      HPI:     Patient here for follow-up of bilateral lower extremity wounds.  She had home health doing the dressing changes with [facility] and the compression wraps that were put on the lower applied only longterm up the legs and bilateral.  They had started to slide down.  The wounds have become somewhat worse she thinks.    Lab Results   Component Value Date    BUN 17 01/26/2024    CREATSERUM 1.07 (H) 01/26/2024    ALB 4.4 11/26/2023    TP 7.1 11/26/2023         Current Outpatient Medications:     levoFLOXacin (LEVAQUIN) 500 MG Oral Tab, Take 1 tablet (500 mg total) by mouth daily., Disp: 10 tablet, Rfl: 0    cephalexin 500 MG Oral Cap, Take 1 capsule (500 mg total) by mouth 2 (two) times daily., Disp: , Rfl:     dilTIAZem HCl  MG Oral Capsule SR 24 Hr, Take 1 capsule (240 mg total) by mouth daily., Disp: , Rfl:     amiodarone 200 MG Oral Tab, Take 1 tablet (200 mg total) by mouth daily., Disp: 30 tablet, Rfl: 0    pantoprazole 40 MG Oral Tab EC, Take 1 tablet (40 mg total) by mouth every morning before breakfast., Disp: 60 tablet, Rfl: 0    apixaban 5 MG Oral Tab, Take 1 tablet (5 mg total) by mouth 2 (two) times daily., Disp: , Rfl:     potassium chloride 20 MEQ Oral Tab CR, Take 1 tablet (20 mEq total) by mouth daily., Disp: , Rfl:     torsemide 20 MG Oral Tab, Take 1 tablet (20 mg total) by mouth daily. Taking daily and as needed, Disp: , Rfl:     cycloSPORINE 0.05 % Ophthalmic Emulsion, Place 1 drop into both eyes 2 (two) times daily., Disp: , Rfl:     cholecalciferol 50 MCG (2000 UT) Oral Cap, Take 1 capsule (2,000 Units total) by mouth daily., Disp: , Rfl:     acetaminophen 325 MG Oral Tab, Take 2 tablets (650 mg total) by mouth 2 (two) times daily as needed for Pain., Disp: , Rfl:     cyanocobalamin 1000 MCG Oral Tab, Take 1 tablet (1,000 mcg total) by mouth daily., Disp: , Rfl:     triamcinolone 0.1 % External Cream, Apply 1 Application topically 2 (two)  times daily as needed., Disp: , Rfl:     MYRBETRIQ 25 MG Oral Tablet 24 Hr, 1 TABLET BY MOUTH EVERYDAY FOR 90 DAYS, Disp: , Rfl:     simvastatin 10 MG Oral Tab, Take 1 tablet (10 mg total) by mouth As Directed., Disp: , Rfl:     gabapentin 100 MG Oral Cap, Take 2 capsules (200 mg total) by mouth 2 (two) times daily., Disp: , Rfl:     metoprolol succinate  MG Oral Tablet 24 Hr, Take 1 tablet (100 mg total) by mouth daily. 0.5 tablet per day, Disp: , Rfl:     No Known Allergies         REVIEW OF SYSTEMS:     Review of Systems (ROS)  This information was obtained from the patient, chart    A comprehensive 10 point review of systems was completed.  Pertinent positives and negatives noted in the the HPI.     Past medical, surgical, family and social history updated where appropriate.    PHYSICAL EXAM:   There were no vitals taken for this visit.   Estimated body mass index is 49.16 kg/m² as calculated from the following:    Height as of 2/9/24: 63\".    Weight as of 2/23/24: 277 lb 8 oz.   Vital signs reviewed.Appears stated age, well groomed.      Awake, alert, in no acute distress  HENT:  normocephalic, atraumatic, external ear canals clear bilaterally, tympanic membranes appear opaque, non-bulging, non-erythematous, nasal turbinates are non-inflamed and not swollen, oropharynx without erythema, swelling, or exudate, gingiva and lips without any apparent lesions.   Cardiac:  S1 S2 regular rate and rhythm, no murmur, gallop, or rub  Respiratory:  Bilaterally clear to auscultation, no chest tenderness to palpation, no wheezing, no rhonchi, no rales, air entry is adequate, no accessory respiratory muscle use, no chest asymmetry, normal excursion.  GI:  bowel sound positive in all four quadrants, abdomen is soft, non-tender, non-distended, no hepatosplenomegaly, no abnormal aortic pulsation.     Calf                      Ankle                            Foot                            Wound 02/09/24 1 Leg Right;Lower  (Active)   Date First Assessed/Time First Assessed: 02/09/24 1452    Wound Number (Wound Clinic Only): 1  Location: Leg  Wound Location Orientation: Right;Lower      Assessments 2/9/2024  2:54 PM 2/23/2024  1:21 PM   Wound Image          Site Assessment Clean;Moist;Yellow;Pale;Pink Clean;Moist;Pale;Pink   Closure Not approximated Not approximated   Drainage Amount Large Large   Drainage Description Yellow Yellow   Treatments Cleansed;Compression;Saline Cleansed;Compression;Saline   Dressing Kerlix roll;Tape Kerlix roll;Tape   Dressing Changed New New   Dressing Status Clean;Dry;Intact Clean;Dry;Intact   Wound Length (cm) 8 cm 6.5 cm   Wound Width (cm) 7 cm 13 cm   Wound Surface Area (cm^2) 56 cm^2 84.5 cm^2   Wound Depth (cm) 0.1 cm 0.1 cm   Wound Volume (cm^3) 5.6 cm^3 8.45 cm^3   Wound Healing % -- -51   Margins Attached edges Attached edges;Poorly defined   Non-staged Wound Description Full thickness Full thickness   Kamryn-wound Assessment Hemosiderin staining;Edema Hemosiderin staining;Edema   Wound Granulation Tissue Pink Pink;Red   Wound Bed Granulation (%) 100 % 100 %   Wound Bed Epithelium (%) 0 % 0 %   Wound Bed Slough (%) 0 % 0 %   Wound Bed Eschar (%) 0 % 0 %   Wound Bed Fibrin (%) 0 % 0 %   State of Healing Early/partial granulation Early/partial granulation;Fully granulated   Wound Odor None None       No associated orders.       Wound 02/09/24 2 Leg Left;Lower (Active)   Date First Assessed/Time First Assessed: 02/09/24 1453    Wound Number (Wound Clinic Only): 2  Location: Leg  Wound Location Orientation: Left;Lower      Assessments 2/9/2024  2:54 PM 2/23/2024  1:21 PM   Wound Image          Site Assessment Moist;Yellow;Pink;Red;Edema Moist;Pink;Red;Edema   Closure Not approximated Not approximated   Drainage Amount Large Large   Drainage Description Green Yellow   Treatments Cleansed;Saline;Compression Cleansed;Saline;Compression   Dressing Kerlix roll;Tape Kerlix roll;Tape   Dressing Changed New New    Dressing Status -- Clean;Dry;Intact   Wound Length (cm) 24 cm 22 cm   Wound Width (cm) 28 cm 30 cm   Wound Surface Area (cm^2) 672 cm^2 660 cm^2   Wound Depth (cm) 0.1 cm 0.1 cm   Wound Volume (cm^3) 67.2 cm^3 66 cm^3   Wound Healing % -- 2   Margins Attached edges Attached edges;Poorly defined   Non-staged Wound Description Full thickness Full thickness   Kamryn-wound Assessment Edema;Pink;Hemosiderin staining;Dry Edema;Pink;Hemosiderin staining;Maceration   Wound Granulation Tissue Pink Pink   Wound Bed Granulation (%) 50 % 50 %   Wound Bed Epithelium (%) 0 % 50 %   Wound Bed Slough (%) 50 % 0 %   Wound Bed Eschar (%) 0 % 0 %   Wound Bed Fibrin (%) 0 % 0 %   State of Healing Early/partial granulation Early/partial granulation   Wound Odor None None       No associated orders.       Compression Wrap 02/09/24 Leg Right;Lower (Active)   Placement Date: 02/09/24   Location: Leg  Wound Location Orientation: Right;Lower      Assessments 2/9/2024  2:54 PM 2/23/2024  1:21 PM   Response to Treatment Well tolerated Well tolerated   Compression Layers Single Multilayer   Compression Product Type Tubigrip/Spanda Coflex   Dressing Applied Yes Yes   Compression Wrap Location Toes to Knee Toes to Knee   Compression Wrap Status Clean;Intact;Dry Clean;Intact;Dry       No associated orders.       Compression Wrap 02/09/24 Leg Left;Lower (Active)   Placement Date: 02/09/24   Location: Leg  Wound Location Orientation: Left;Lower      Assessments 2/9/2024  2:54 PM 2/23/2024  1:21 PM   Response to Treatment Well tolerated Well tolerated   Compression Layers 2 2   Compression Product Type Artiflex 10cm;Coflex lite Coflex   Dressing Applied Yes Yes   Compression Wrap Location Toes to Knee Toes to Knee   Compression Wrap Status Clean;Dry;Intact Clean;Dry;Intact       No associated orders.          ASSESSMENT AND PLAN:      1. Chronic venous hypertension (idiopathic) with ulcer and inflammation of bilateral lower extremity (HCC)    2.  Atrial fibrillation with rapid ventricular response (HCC)    3. H/O CHF    4. Class 3 severe obesity due to excess calories with serious comorbidity and body mass index (BMI) of 45.0 to 49.9 in adult (HCC)  There is some worsening of the wounds noted bilateral has they are more weeping.  Will switch home health companies begets someone more experience to make sure that the compression wraps are being applied correctly.  Continue Hydrofera Blue transfer, Kerramax, Coflex calamine compression wrap bilateral lower extremities.  Patient should also see at [facility] or other vein specialist as I did review venous ultrasound and she does have issues with reflux.  We did discuss venous disease in relation to wounds great length.        Risks, benefits, and alternatives of current treatment plan discussed in detail.  Questions and concerns addressed. Red flags to RTC or ED reviewed.  Patient (or parent) agrees to plan.      No follow-ups on file.    Also refer to patient instructions.     I spent 40 minutes with the patient. This time included:  preparing to see the patient (eg, review notes and recent diagnostics), seeing the patient, performing a medically appropriate examination and/or evaluation, counseling and educating the patient, and documenting in the record.    I agree with staff documentation except for any changes made in my note.       Dontrell Tinajero M.D., Wound Care Physician  [facility]  3/8/2024

## 2024-03-11 ENCOUNTER — TELEPHONE (OUTPATIENT)
Dept: WOUND CARE | Facility: HOSPITAL | Age: 79
End: 2024-03-11

## 2024-03-11 NOTE — TELEPHONE ENCOUNTER
[facility], [facility] called and will except the patient.  They will start home care services on 3/13/24.  They will need to order the supplies.  Patient will need to supply the Rx betamethasone.  Patient was called to inform.

## 2024-03-22 ENCOUNTER — OFFICE VISIT (OUTPATIENT)
Dept: WOUND CARE | Facility: HOSPITAL | Age: 79
End: 2024-03-22
Attending: FAMILY MEDICINE
Payer: MEDICARE

## 2024-03-22 ENCOUNTER — LAB ENCOUNTER (OUTPATIENT)
Dept: LAB | Facility: HOSPITAL | Age: 79
End: 2024-03-22
Attending: NURSE PRACTITIONER
Payer: MEDICARE

## 2024-03-22 VITALS
HEART RATE: 71 BPM | TEMPERATURE: 98 F | OXYGEN SATURATION: 93 % | RESPIRATION RATE: 17 BRPM | SYSTOLIC BLOOD PRESSURE: 121 MMHG | DIASTOLIC BLOOD PRESSURE: 64 MMHG

## 2024-03-22 DIAGNOSIS — E87.6 HYPOPOTASSEMIA: ICD-10-CM

## 2024-03-22 DIAGNOSIS — Z86.79 H/O CHF: ICD-10-CM

## 2024-03-22 DIAGNOSIS — I48.91 ATRIAL FIBRILLATION, UNSPECIFIED TYPE (HCC): ICD-10-CM

## 2024-03-22 DIAGNOSIS — E66.01 CLASS 3 SEVERE OBESITY DUE TO EXCESS CALORIES WITH SERIOUS COMORBIDITY AND BODY MASS INDEX (BMI) OF 45.0 TO 49.9 IN ADULT (HCC): ICD-10-CM

## 2024-03-22 DIAGNOSIS — I87.333 CHRONIC VENOUS HYPERTENSION (IDIOPATHIC) WITH ULCER AND INFLAMMATION OF BILATERAL LOWER EXTREMITY (HCC): ICD-10-CM

## 2024-03-22 DIAGNOSIS — I50.33 ACUTE ON CHRONIC DIASTOLIC HEART FAILURE (HCC): Primary | ICD-10-CM

## 2024-03-22 DIAGNOSIS — R21 RASH: Primary | ICD-10-CM

## 2024-03-22 LAB
ANION GAP SERPL CALC-SCNC: 6 MMOL/L (ref 0–18)
BUN BLD-MCNC: 16 MG/DL (ref 9–23)
BUN/CREAT SERPL: 17.8 (ref 10–20)
CALCIUM BLD-MCNC: 9.3 MG/DL (ref 8.7–10.4)
CHLORIDE SERPL-SCNC: 109 MMOL/L (ref 98–112)
CO2 SERPL-SCNC: 28 MMOL/L (ref 21–32)
CREAT BLD-MCNC: 0.9 MG/DL
EGFRCR SERPLBLD CKD-EPI 2021: 66 ML/MIN/1.73M2 (ref 60–?)
FASTING STATUS PATIENT QL REPORTED: NO
GLUCOSE BLD-MCNC: 98 MG/DL (ref 70–99)
OSMOLALITY SERPL CALC.SUM OF ELEC: 297 MOSM/KG (ref 275–295)
POTASSIUM SERPL-SCNC: 4.1 MMOL/L (ref 3.5–5.1)
SODIUM SERPL-SCNC: 143 MMOL/L (ref 136–145)

## 2024-03-22 PROCEDURE — 80048 BASIC METABOLIC PNL TOTAL CA: CPT

## 2024-03-22 PROCEDURE — 99215 OFFICE O/P EST HI 40 MIN: CPT | Performed by: FAMILY MEDICINE

## 2024-03-22 PROCEDURE — 36415 COLL VENOUS BLD VENIPUNCTURE: CPT

## 2024-03-22 PROCEDURE — 17250 CHEM CAUT OF GRANLTJ TISSUE: CPT | Performed by: FAMILY MEDICINE

## 2024-03-22 NOTE — PROGRESS NOTES
Chief Complaint   Patient presents with    Wound Recheck     Bilateral legs       HPI:     Patient here for follow-up of bilateral lower extremity wounds.  She is doing well and thinks the wound has significantly improved.  She is happy with the progress now.    Lab Results   Component Value Date    BUN 16 03/22/2024    CREATSERUM 0.90 03/22/2024    ALB 4.4 11/26/2023    TP 7.1 11/26/2023         Current Outpatient Medications:     betamethasone 0.1 % External Cream, Apply to wounds as directed, Disp: 45 g, Rfl: 0    levoFLOXacin (LEVAQUIN) 500 MG Oral Tab, Take 1 tablet (500 mg total) by mouth daily., Disp: 10 tablet, Rfl: 0    cephalexin 500 MG Oral Cap, Take 1 capsule (500 mg total) by mouth 2 (two) times daily., Disp: , Rfl:     dilTIAZem HCl  MG Oral Capsule SR 24 Hr, Take 1 capsule (240 mg total) by mouth daily., Disp: , Rfl:     amiodarone 200 MG Oral Tab, Take 1 tablet (200 mg total) by mouth daily., Disp: 30 tablet, Rfl: 0    pantoprazole 40 MG Oral Tab EC, Take 1 tablet (40 mg total) by mouth every morning before breakfast., Disp: 60 tablet, Rfl: 0    apixaban 5 MG Oral Tab, Take 1 tablet (5 mg total) by mouth 2 (two) times daily., Disp: , Rfl:     potassium chloride 20 MEQ Oral Tab CR, Take 1 tablet (20 mEq total) by mouth daily., Disp: , Rfl:     torsemide 20 MG Oral Tab, Take 1 tablet (20 mg total) by mouth daily. Taking daily and as needed, Disp: , Rfl:     cycloSPORINE 0.05 % Ophthalmic Emulsion, Place 1 drop into both eyes 2 (two) times daily., Disp: , Rfl:     cholecalciferol 50 MCG (2000 UT) Oral Cap, Take 1 capsule (2,000 Units total) by mouth daily., Disp: , Rfl:     acetaminophen 325 MG Oral Tab, Take 2 tablets (650 mg total) by mouth 2 (two) times daily as needed for Pain., Disp: , Rfl:     cyanocobalamin 1000 MCG Oral Tab, Take 1 tablet (1,000 mcg total) by mouth daily., Disp: , Rfl:     triamcinolone 0.1 % External Cream, Apply 1 Application topically 2 (two) times daily as needed.,  Disp: , Rfl:     MYRBETRIQ 25 MG Oral Tablet 24 Hr, 1 TABLET BY MOUTH EVERYDAY FOR 90 DAYS, Disp: , Rfl:     simvastatin 10 MG Oral Tab, Take 1 tablet (10 mg total) by mouth As Directed., Disp: , Rfl:     gabapentin 100 MG Oral Cap, Take 2 capsules (200 mg total) by mouth 2 (two) times daily., Disp: , Rfl:     metoprolol succinate  MG Oral Tablet 24 Hr, Take 1 tablet (100 mg total) by mouth daily. 0.5 tablet per day, Disp: , Rfl:     No Known Allergies         REVIEW OF SYSTEMS:     Review of Systems (ROS)  This information was obtained from the patient, chart    A comprehensive 10 point review of systems was completed.  Pertinent positives and negatives noted in the the HPI.     Past medical, surgical, family and social history updated where appropriate.    PHYSICAL EXAM:   /64   Pulse 71   Temp 97.9 °F (36.6 °C)   Resp 17   SpO2 93%    Estimated body mass index is 49.16 kg/m² as calculated from the following:    Height as of 2/9/24: 63\".    Weight as of 2/23/24: 277 lb 8 oz.   Vital signs reviewed.Appears stated age, well groomed.      Awake, alert, in no acute distress  HENT:  normocephalic, atraumatic, external ear canals clear bilaterally, tympanic membranes appear opaque, non-bulging, non-erythematous, nasal turbinates are non-inflamed and not swollen, oropharynx without erythema, swelling, or exudate, gingiva and lips without any apparent lesions.   Cardiac:  S1 S2 regular rate and rhythm, no murmur, gallop, or rub  Respiratory:  Bilaterally clear to auscultation, no chest tenderness to palpation, no wheezing, no rhonchi, no rales, air entry is adequate, no accessory respiratory muscle use, no chest asymmetry, normal excursion.  GI:  bowel sound positive in all four quadrants, abdomen is soft, non-tender, non-distended, no hepatosplenomegaly, no abnormal aortic pulsation.     Calf                      Ankle                            Foot                            Wound 02/09/24 1 Leg  Right;Lower (Active)   Date First Assessed/Time First Assessed: 02/09/24 1452    Wound Number (Wound Clinic Only): 1  Location: Leg  Wound Location Orientation: Right;Lower      Assessments 2/9/2024  2:54 PM 3/22/2024 11:01 AM   Wound Image         Site Assessment Clean;Moist;Yellow;Pale;Pink Clean;Epithelialization;Dry;Intact   Closure Not approximated --   Drainage Amount Large None   Drainage Description Yellow --   Treatments Cleansed;Compression;Saline Cleansed;Compression;Saline   Dressing Kerlix roll;Tape Kerlix roll;Tape;Hydrofera ready   Dressing Changed New Changed   Dressing Status Clean;Dry;Intact Clean;Dry;Intact   Wound Length (cm) 8 cm 0.3 cm   Wound Width (cm) 7 cm 0.2 cm   Wound Surface Area (cm^2) 56 cm^2 0.06 cm^2   Wound Depth (cm) 0.1 cm 0.1 cm   Wound Volume (cm^3) 5.6 cm^3 0.006 cm^3   Wound Healing % -- 100   Margins Attached edges Attached edges;Poorly defined   Non-staged Wound Description Full thickness Partial thickness   Kamryn-wound Assessment Hemosiderin staining;Edema Hemosiderin staining;Edema;Pink   Wound Granulation Tissue Pink --   Wound Bed Granulation (%) 100 % 0 %   Wound Bed Epithelium (%) 0 % 98 %   Wound Bed Slough (%) 0 % 0 %   Wound Bed Eschar (%) 0 % 0 %   Wound Bed Fibrin (%) 0 % 0 %   State of Healing Early/partial granulation --   Wound Odor None None       No associated orders.       Wound 02/09/24 2 Leg Left;Lower (Active)   Date First Assessed/Time First Assessed: 02/09/24 1453    Wound Number (Wound Clinic Only): 2  Location: Leg  Wound Location Orientation: Left;Lower      Assessments 2/9/2024  2:54 PM 3/22/2024 11:01 AM   Wound Image         Site Assessment Moist;Yellow;Pink;Red;Edema Clean;Dry;Intact;Epithelialization   Closure Not approximated Approximated   Drainage Amount Large None   Drainage Description Green --   Treatments Cleansed;Saline;Compression Cleansed;Saline;Compression   Dressing Kerlix roll;Tape --   Dressing Changed New --   Dressing Status --  Clean;Dry;Intact   Wound Length (cm) 24 cm 0 cm   Wound Width (cm) 28 cm 0 cm   Wound Surface Area (cm^2) 672 cm^2 0 cm^2   Wound Depth (cm) 0.1 cm 0 cm   Wound Volume (cm^3) 67.2 cm^3 0 cm^3   Wound Healing % -- 100   Margins Attached edges Flat and Intact   Non-staged Wound Description Full thickness Not applicable   Kamryn-wound Assessment Edema;Pink;Hemosiderin staining;Dry Hemosiderin staining;Clean;Dry;Intact   Wound Granulation Tissue Pink --   Wound Bed Granulation (%) 50 % --   Wound Bed Epithelium (%) 0 % 100 %   Wound Bed Slough (%) 50 % --   Wound Bed Eschar (%) 0 % --   Wound Bed Fibrin (%) 0 % 0 %   State of Healing Early/partial granulation Epithelialized;Closed wound edges   Wound Odor None None       No associated orders.       Compression Wrap 02/09/24 Leg Right;Lower (Active)   Placement Date: 02/09/24   Location: Leg  Wound Location Orientation: Right;Lower      Assessments 2/9/2024  2:54 PM 3/22/2024 11:01 AM   Response to Treatment Well tolerated Well tolerated   Compression Layers Single Multilayer   Compression Product Type Tubigrip/Spanda Coflex   Dressing Applied Yes Yes   Compression Wrap Location Toes to Knee Toes to Knee   Compression Wrap Status Clean;Intact;Dry Clean;Intact;Dry       No associated orders.       Compression Wrap 02/09/24 Leg Left;Lower (Active)   Placement Date: 02/09/24   Location: Leg  Wound Location Orientation: Left;Lower      Assessments 2/9/2024  2:54 PM 3/22/2024 11:01 AM   Response to Treatment Well tolerated Well tolerated   Compression Layers 2 2   Compression Product Type Artiflex 10cm;Coflex lite Coflex   Dressing Applied Yes Yes   Compression Wrap Location Toes to Knee Toes to Knee   Compression Wrap Status Clean;Dry;Intact Clean;Dry;Intact       No associated orders.       Wound 03/22/24 3 Foot Left (Active)   Date First Assessed: 03/22/24    Wound Number (Wound Clinic Only): 3  Location: (c) Foot  Wound Location Orientation: Left      Assessments 3/22/2024  11:01 AM   Wound Image     Site Assessment Edema;Moist;Pink   Closure Not approximated   Drainage Amount Moderate   Drainage Description Clear   Treatments Cleansed;Compression;Wound    Dressing 4x4s;Kerlix roll;Hydrofera ready   Dressing Changed New   Dressing Status Clean;Dry;Intact   Wound Length (cm) 12 cm   Wound Width (cm) 7 cm   Wound Surface Area (cm^2) 84 cm^2   Wound Depth (cm) 0.1 cm   Wound Volume (cm^3) 8.4 cm^3   Margins Attached edges   Non-staged Wound Description Partial thickness   Kamryn-wound Assessment Callous;Maceration;Fragile   Wound Bed Epithelium (%) 90 %   Wound Odor None       No associated orders.          ASSESSMENT AND PLAN:      1. Rash  - Allergy Referral - In Network    2. Chronic venous hypertension (idiopathic) with ulcer and inflammation of bilateral lower extremity ([facility])    3. H/O CHF    4. Class 3 severe obesity due to excess calories with serious comorbidity and body mass index (BMI) of 45.0 to 49.9 in adult ([facility])    5. Atrial fibrillation, unspecified type ([facility])    Clinically her wounds are much better now.  There is only 1 small area on the right anterior leg that is 3 to 4 mm anterior and weeping.  There is a small area of weeping about 1 cm on the medial left foot.  Silver nitrate was applied to both of these wounds.  Will apply Hydrofera Blue transfer, Kerramax, continue Coflex calamine compression wrap 30 to 40 mmHg to both lower extremities.  Patient tolerating well.    Atrial fibrillation is stable    No symptoms of CHF.      Risks, benefits, and alternatives of current treatment plan discussed in detail.  Questions and concerns addressed. Red flags to RTC or ED reviewed.  Patient (or parent) agrees to plan.      Return in about 3 weeks (around 4/12/2024) for MD visit for 30 min.    Also refer to patient instructions.     I spent 40 minutes with the patient. This time included:  preparing to see the patient (eg, review notes and recent diagnostics), seeing the  patient, performing a medically appropriate examination and/or evaluation, counseling and educating the patient, and documenting in the record.    I agree with staff documentation except for any changes made in my note.       Dontrell Tinajero M.D., Wound Care Physician  [facility]  3/22/2024

## 2024-03-22 NOTE — PATIENT INSTRUCTIONS
PATIENT INSTRUCTIONS      FOR CHICHO Longo 1945    DATE OF SERVICE: 3/22/2024      PLEASE SEE AN ALLERGIST, SEE REFERRAL    LEFT LEG:   Hydrofera Blue Transfer  Kerramax  Coflex Calamine compression wrap 30-40mmHg    RIGHT LEG:   Hydrofera Blue Transfer  Kerramax  Coflex Calamine compression wrap 30-40mmHg    Home health to change the dressings 3 times a week on  and Friday      Follow up with Dr. Tinajero 3 WEEKS

## 2024-04-05 ENCOUNTER — OFFICE VISIT (OUTPATIENT)
Dept: WOUND CARE | Facility: HOSPITAL | Age: 79
End: 2024-04-05
Attending: FAMILY MEDICINE
Payer: MEDICARE

## 2024-04-05 VITALS
HEART RATE: 69 BPM | OXYGEN SATURATION: 93 % | DIASTOLIC BLOOD PRESSURE: 73 MMHG | RESPIRATION RATE: 18 BRPM | SYSTOLIC BLOOD PRESSURE: 119 MMHG | TEMPERATURE: 98 F

## 2024-04-05 DIAGNOSIS — Z86.79 H/O CHF: ICD-10-CM

## 2024-04-05 DIAGNOSIS — I87.333 CHRONIC VENOUS HYPERTENSION (IDIOPATHIC) WITH ULCER AND INFLAMMATION OF BILATERAL LOWER EXTREMITY (HCC): Primary | ICD-10-CM

## 2024-04-05 DIAGNOSIS — E66.01 CLASS 3 SEVERE OBESITY DUE TO EXCESS CALORIES WITH SERIOUS COMORBIDITY AND BODY MASS INDEX (BMI) OF 45.0 TO 49.9 IN ADULT (HCC): ICD-10-CM

## 2024-04-05 PROCEDURE — 99215 OFFICE O/P EST HI 40 MIN: CPT | Performed by: FAMILY MEDICINE

## 2024-04-05 NOTE — PATIENT INSTRUCTIONS
PATIENT INSTRUCTIONS      FOR CHICHO Longo 1945    DATE OF SERVICE: 2024      Dahlia collagen into the wound base  Cover with Hydrofera Blue ready  Felted foam to the bottom of the left foot    Change the dressing on Tuesday and Friday of each week with home health, can reuse felted foam    Allergist: [facility], [phone], Dr. Kyaw Rodriguez or associate.         Follow up with Dr. Tinajero 2 WEEKS

## 2024-04-08 ENCOUNTER — TELEPHONE (OUTPATIENT)
Dept: WOUND CARE | Facility: HOSPITAL | Age: 79
End: 2024-04-08

## 2024-04-08 NOTE — PROGRESS NOTES
Chief Complaint   Patient presents with    Wound Recheck     Bilateral lower legs       HPI:     Patient here for follow-up of bilateral lower extremity wounds.  She thinks her leg wounds have healed nicely.  She did develop a new wound on the bottom of her foot on the left.  She is tolerating compression.    Lab Results   Component Value Date    BUN 16 03/22/2024    CREATSERUM 0.90 03/22/2024    ALB 4.4 11/26/2023    TP 7.1 11/26/2023         Current Outpatient Medications:     betamethasone 0.1 % External Cream, Apply to wounds as directed, Disp: 45 g, Rfl: 0    levoFLOXacin (LEVAQUIN) 500 MG Oral Tab, Take 1 tablet (500 mg total) by mouth daily., Disp: 10 tablet, Rfl: 0    cephalexin 500 MG Oral Cap, Take 1 capsule (500 mg total) by mouth 2 (two) times daily., Disp: , Rfl:     dilTIAZem HCl  MG Oral Capsule SR 24 Hr, Take 1 capsule (240 mg total) by mouth daily., Disp: , Rfl:     amiodarone 200 MG Oral Tab, Take 1 tablet (200 mg total) by mouth daily., Disp: 30 tablet, Rfl: 0    pantoprazole 40 MG Oral Tab EC, Take 1 tablet (40 mg total) by mouth every morning before breakfast., Disp: 60 tablet, Rfl: 0    apixaban 5 MG Oral Tab, Take 1 tablet (5 mg total) by mouth 2 (two) times daily., Disp: , Rfl:     potassium chloride 20 MEQ Oral Tab CR, Take 1 tablet (20 mEq total) by mouth daily., Disp: , Rfl:     torsemide 20 MG Oral Tab, Take 1 tablet (20 mg total) by mouth daily. Taking daily and as needed, Disp: , Rfl:     cycloSPORINE 0.05 % Ophthalmic Emulsion, Place 1 drop into both eyes 2 (two) times daily., Disp: , Rfl:     cholecalciferol 50 MCG (2000 UT) Oral Cap, Take 1 capsule (2,000 Units total) by mouth daily., Disp: , Rfl:     acetaminophen 325 MG Oral Tab, Take 2 tablets (650 mg total) by mouth 2 (two) times daily as needed for Pain., Disp: , Rfl:     cyanocobalamin 1000 MCG Oral Tab, Take 1 tablet (1,000 mcg total) by mouth daily., Disp: , Rfl:     triamcinolone 0.1 % External Cream, Apply 1  Application topically 2 (two) times daily as needed., Disp: , Rfl:     MYRBETRIQ 25 MG Oral Tablet 24 Hr, 1 TABLET BY MOUTH EVERYDAY FOR 90 DAYS, Disp: , Rfl:     simvastatin 10 MG Oral Tab, Take 1 tablet (10 mg total) by mouth As Directed., Disp: , Rfl:     gabapentin 100 MG Oral Cap, Take 2 capsules (200 mg total) by mouth 2 (two) times daily., Disp: , Rfl:     metoprolol succinate  MG Oral Tablet 24 Hr, Take 1 tablet (100 mg total) by mouth daily. 0.5 tablet per day, Disp: , Rfl:     No Known Allergies         REVIEW OF SYSTEMS:     Review of Systems (ROS)  This information was obtained from the patient, chart    A comprehensive 10 point review of systems was completed.  Pertinent positives and negatives noted in the the HPI.     Past medical, surgical, family and social history updated where appropriate.    PHYSICAL EXAM:   /73 (BP Location: Right arm, Patient Position: Sitting, Cuff Size: adult)   Pulse 69   Temp 97.6 °F (36.4 °C) (Oral)   Resp 18   SpO2 93%    Estimated body mass index is 49.16 kg/m² as calculated from the following:    Height as of 2/9/24: 63\".    Weight as of 2/23/24: 277 lb 8 oz (125.9 kg).   Vital signs reviewed.Appears stated age, well groomed.      Awake, alert, in no acute distress  HENT:  normocephalic, atraumatic, external ear canals clear bilaterally, tympanic membranes appear opaque, non-bulging, non-erythematous, nasal turbinates are non-inflamed and not swollen, oropharynx without erythema, swelling, or exudate, gingiva and lips without any apparent lesions.   Cardiac:  S1 S2 regular rate and rhythm, no murmur, gallop, or rub  Respiratory:  Bilaterally clear to auscultation, no chest tenderness to palpation, no wheezing, no rhonchi, no rales, air entry is adequate, no accessory respiratory muscle use, no chest asymmetry, normal excursion.  GI:  bowel sound positive in all four quadrants, abdomen is soft, non-tender, non-distended, no hepatosplenomegaly, no abnormal  aortic pulsation.     Calf                      Ankle                            Foot                            Wound 02/09/24 1 Leg Right;Lower (Active)   Date First Assessed/Time First Assessed: 02/09/24 1452    Wound Number (Wound Clinic Only): 1  Location: Leg  Wound Location Orientation: Right;Lower  Wound Outcome: Healed      Assessments 2/9/2024  2:54 PM 4/5/2024  3:39 PM   Wound Image        Site Assessment Clean;Moist;Yellow;Pale;Pink Clean;Epithelialization;Dry;Intact   Closure Not approximated --   Drainage Amount Large None   Drainage Description Yellow --   Treatments Cleansed;Compression;Saline --   Dressing Kerlix roll;Tape --   Dressing Changed New --   Dressing Status Clean;Dry;Intact --   Wound Length (cm) 8 cm 0 cm   Wound Width (cm) 7 cm 0 cm   Wound Surface Area (cm^2) 56 cm^2 0 cm^2   Wound Depth (cm) 0.1 cm 0 cm   Wound Volume (cm^3) 5.6 cm^3 0 cm^3   Wound Healing % -- 100   Margins Attached edges Flat and Intact   Non-staged Wound Description Full thickness Not applicable   Kamryn-wound Assessment Hemosiderin staining;Edema Hemosiderin staining;Edema;Pink   Wound Granulation Tissue Pink --   Wound Bed Granulation (%) 100 % 0 %   Wound Bed Epithelium (%) 0 % 100 %   Wound Bed Slough (%) 0 % 0 %   Wound Bed Eschar (%) 0 % 0 %   Wound Bed Fibrin (%) 0 % 0 %   State of Healing Early/partial granulation Closed wound edges;Epithelialized   Wound Odor None --       No associated orders.       Wound 02/09/24 2 Leg Left;Lower (Active)   Date First Assessed/Time First Assessed: 02/09/24 1453    Wound Number (Wound Clinic Only): 2  Location: Leg  Wound Location Orientation: Left;Lower  Wound Outcome: Healed      Assessments 2/9/2024  2:54 PM 4/5/2024  3:39 PM   Wound Image         Site Assessment Moist;Yellow;Pink;Red;Edema --   Closure Not approximated Approximated   Drainage Amount Large None   Drainage Description Green --   Treatments Cleansed;Saline;Compression Cleansed;Wound    Dressing  Kerlix roll;Tape --   Dressing Changed New --   Wound Length (cm) 24 cm 0 cm   Wound Width (cm) 28 cm 0 cm   Wound Surface Area (cm^2) 672 cm^2 0 cm^2   Wound Depth (cm) 0.1 cm 0 cm   Wound Volume (cm^3) 67.2 cm^3 0 cm^3   Wound Healing % -- 100   Margins Attached edges Flat and Intact   Non-staged Wound Description Full thickness Not applicable   Kamryn-wound Assessment Edema;Pink;Hemosiderin staining;Dry Hemosiderin staining;Clean;Dry;Intact   Wound Granulation Tissue Pink --   Wound Bed Granulation (%) 50 % --   Wound Bed Epithelium (%) 0 % 100 %   Wound Bed Slough (%) 50 % --   Wound Bed Eschar (%) 0 % --   Wound Bed Fibrin (%) 0 % --   State of Healing Early/partial granulation Epithelialized;Hyperkeratosis;Fully granulated   Wound Odor None --       No associated orders.       Compression Wrap 02/09/24 Leg Right;Lower (Active)   Placement Date: 02/09/24   Location: Leg  Wound Location Orientation: Right;Lower      Assessments 2/9/2024  2:54 PM 4/5/2024  3:39 PM   Response to Treatment Well tolerated Well tolerated   Compression Layers Single Single   Compression Product Type Tubigrip/Spanda Tubigrip/Spanda   Dressing Applied Yes No   Compression Wrap Location Toes to Knee Toes to Knee   Compression Wrap Status Clean;Intact;Dry Clean;Intact;Dry       No associated orders.       Compression Wrap 02/09/24 Leg Left;Lower (Active)   Placement Date: 02/09/24   Location: Leg  Wound Location Orientation: Left;Lower      Assessments 2/9/2024  2:54 PM 4/5/2024  3:39 PM   Response to Treatment Well tolerated Well tolerated   Compression Layers 2 Single   Compression Product Type Artiflex 10cm;Coflex lite Tubigrip/Spanda   Dressing Applied Yes Yes   Compression Wrap Location Toes to Knee Toes to Knee   Compression Wrap Status Clean;Dry;Intact Clean;Dry;Intact       No associated orders.       Wound 03/22/24 3 Foot Left;Plantar (Active)   Date First Assessed: 03/22/24    Wound Number (Wound Clinic Only): 3  Location: (c)  Foot  Wound Location Orientation: Left;Plantar      Assessments 3/22/2024 11:01 AM 4/5/2024  3:59 PM   Wound Image       Site Assessment Edema;Moist;Pink --   Closure Not approximated --   Drainage Amount Moderate --   Drainage Description Clear --   Treatments Cleansed;Compression;Wound  --   Dressing 4x4s;Kerlix roll;Hydrofera ready --   Dressing Changed New --   Dressing Status Clean;Dry;Intact --   Wound Length (cm) 12 cm --   Wound Width (cm) 7 cm --   Wound Surface Area (cm^2) 84 cm^2 --   Wound Depth (cm) 0.1 cm --   Wound Volume (cm^3) 8.4 cm^3 --   Margins Attached edges --   Non-staged Wound Description Partial thickness --   Kamryn-wound Assessment Callous;Maceration;Fragile --   Wound Bed Epithelium (%) 90 % --   Wound Odor None --       No associated orders.          ASSESSMENT AND PLAN:      1. Chronic venous hypertension (idiopathic) with ulcer and inflammation of bilateral lower extremity (HCC)    2. H/O CHF    3. Class 3 severe obesity due to excess calories with serious comorbidity and body mass index (BMI) of 45.0 to 49.9 in adult (HCC)    The leg wounds appear to be completely healed there is some rash formation and she has is not only on the leg but on the EXTR arms and trunk.  I did advise her previously that she should see a specialist such as an allergist for this.  Information given for allergy specialist.  For the plantar foot wound we will apply Dahlia collagen, Hydrofera Blue ready and felted foam to the bottom of the foot.  Patient did have dressing change on Tuesday and Friday of each week by home health.  She does have compression stockings at home and can wear those.    Risks, benefits, and alternatives of current treatment plan discussed in detail.  Questions and concerns addressed. Red flags to RTC or ED reviewed.  Patient (or parent) agrees to plan.      Return in about 2 weeks (around 4/19/2024) for MD visit for 30 min.    Also refer to patient instructions.     I spent 40  minutes with the patient. This time included:  preparing to see the patient (eg, review notes and recent diagnostics), seeing the patient, performing a medically appropriate examination and/or evaluation, counseling and educating the patient, and documenting in the record.    I agree with staff documentation except for any changes made in my note.       Dontrell Tinajero M.D., Wound Care Physician  [facility]  4/8/2024

## 2024-04-08 NOTE — TELEPHONE ENCOUNTER
Will need to wait for home health to assess and evaluate the wound before any orders are given.  Home health to let us know status of the wound after they see her.

## 2024-04-09 ENCOUNTER — TELEPHONE (OUTPATIENT)
Dept: WOUND CARE | Facility: HOSPITAL | Age: 79
End: 2024-04-09

## 2024-04-09 NOTE — TELEPHONE ENCOUNTER
Trenton stated that there are 3 small areas of clear drainage on the tubigrip; however, he did not see any open wounds on the leg. I still worry that she has reopened given that her legs have some weeping.. Should we restart hydrofera blue on the weeping areas and wrap with coflex?

## 2024-04-12 ENCOUNTER — TELEPHONE (OUTPATIENT)
Dept: WOUND CARE | Facility: HOSPITAL | Age: 79
End: 2024-04-12

## 2024-04-12 ENCOUNTER — APPOINTMENT (OUTPATIENT)
Dept: WOUND CARE | Facility: HOSPITAL | Age: 79
End: 2024-04-12
Attending: FAMILY MEDICINE
Payer: MEDICARE

## 2024-04-18 ENCOUNTER — TELEPHONE (OUTPATIENT)
Dept: WOUND CARE | Facility: HOSPITAL | Age: 79
End: 2024-04-18

## 2024-04-22 ENCOUNTER — OFFICE VISIT (OUTPATIENT)
Dept: WOUND CARE | Facility: HOSPITAL | Age: 79
End: 2024-04-22
Attending: FAMILY MEDICINE
Payer: MEDICARE

## 2024-04-22 VITALS
TEMPERATURE: 98 F | DIASTOLIC BLOOD PRESSURE: 75 MMHG | RESPIRATION RATE: 18 BRPM | HEART RATE: 63 BPM | SYSTOLIC BLOOD PRESSURE: 126 MMHG | OXYGEN SATURATION: 95 %

## 2024-04-22 DIAGNOSIS — I87.333 CHRONIC VENOUS HYPERTENSION (IDIOPATHIC) WITH ULCER AND INFLAMMATION OF BILATERAL LOWER EXTREMITY (HCC): Primary | ICD-10-CM

## 2024-04-22 DIAGNOSIS — E66.01 CLASS 3 SEVERE OBESITY DUE TO EXCESS CALORIES WITH SERIOUS COMORBIDITY AND BODY MASS INDEX (BMI) OF 45.0 TO 49.9 IN ADULT (HCC): ICD-10-CM

## 2024-04-22 DIAGNOSIS — R21 RASH: ICD-10-CM

## 2024-04-22 DIAGNOSIS — Z86.79 H/O CHF: ICD-10-CM

## 2024-04-22 PROCEDURE — 99215 OFFICE O/P EST HI 40 MIN: CPT | Performed by: FAMILY MEDICINE

## 2024-04-22 NOTE — PROGRESS NOTES
Weekly Wound Education Note    Teaching Provided To: Patient  Training Topics: Cleasing and general instructions;Compression;Dressing;Edema control;Skin care;Signs and symptoms of infection  Training Method: Demonstration  Training Response: Reinforcement needed        Notes: Left plantar foot: nicol, hydrofera blue, 2 layer offloading and medipore tape, spandagrip E   right lower leg: Aquacel Ag bordered foam, spandagrip E

## 2024-04-22 NOTE — PATIENT INSTRUCTIONS
PATIENT INSTRUCTIONS      FOR CHICHO Longo 1945    DATE OF SERVICE: 2024  Use   LEFT FOOT:   Dahlia collagen into the wound base  Cover with Hydrofera Blue ready  Felted foam to the bottom of the left foot  Change the dressing on Tuesday and Friday of each week with home health, can reuse felted foam    RIGHT OUTER LEG:  Silver foam dressing  Home Health to change twice a week as long as there is drainage      Follow up with Dr. Tinajero 2 WEEKS

## 2024-04-22 NOTE — PROGRESS NOTES
Chief Complaint   Patient presents with    Wound Recheck       left plantar foot           HPI:     Patient here for follow-up of left plantar foot wound.  She is doing well and has an appointment to see allergist in about 2 weeks.  She does have itchy rash on both lower extremities.  Also has it on other parts of the body.  No new symptoms.  No shortness of breath or wheezing.  No fever or chills.    Lab Results   Component Value Date    BUN 16 03/22/2024    CREATSERUM 0.90 03/22/2024    ALB 4.4 11/26/2023    TP 7.1 11/26/2023         Current Outpatient Medications:     betamethasone 0.1 % External Cream, Apply to wounds as directed, Disp: 45 g, Rfl: 0    levoFLOXacin (LEVAQUIN) 500 MG Oral Tab, Take 1 tablet (500 mg total) by mouth daily., Disp: 10 tablet, Rfl: 0    cephalexin 500 MG Oral Cap, Take 1 capsule (500 mg total) by mouth 2 (two) times daily., Disp: , Rfl:     dilTIAZem HCl  MG Oral Capsule SR 24 Hr, Take 1 capsule (240 mg total) by mouth daily., Disp: , Rfl:     amiodarone 200 MG Oral Tab, Take 1 tablet (200 mg total) by mouth daily., Disp: 30 tablet, Rfl: 0    pantoprazole 40 MG Oral Tab EC, Take 1 tablet (40 mg total) by mouth every morning before breakfast., Disp: 60 tablet, Rfl: 0    apixaban 5 MG Oral Tab, Take 1 tablet (5 mg total) by mouth 2 (two) times daily., Disp: , Rfl:     potassium chloride 20 MEQ Oral Tab CR, Take 1 tablet (20 mEq total) by mouth daily., Disp: , Rfl:     torsemide 20 MG Oral Tab, Take 1 tablet (20 mg total) by mouth daily. Taking daily and as needed, Disp: , Rfl:     cycloSPORINE 0.05 % Ophthalmic Emulsion, Place 1 drop into both eyes 2 (two) times daily., Disp: , Rfl:     cholecalciferol 50 MCG (2000 UT) Oral Cap, Take 1 capsule (2,000 Units total) by mouth daily., Disp: , Rfl:     acetaminophen 325 MG Oral Tab, Take 2 tablets (650 mg total) by mouth 2 (two) times daily as needed for Pain., Disp: , Rfl:     cyanocobalamin 1000 MCG Oral Tab, Take 1 tablet (1,000 mcg  total) by mouth daily., Disp: , Rfl:     triamcinolone 0.1 % External Cream, Apply 1 Application topically 2 (two) times daily as needed., Disp: , Rfl:     MYRBETRIQ 25 MG Oral Tablet 24 Hr, 1 TABLET BY MOUTH EVERYDAY FOR 90 DAYS, Disp: , Rfl:     simvastatin 10 MG Oral Tab, Take 1 tablet (10 mg total) by mouth As Directed., Disp: , Rfl:     gabapentin 100 MG Oral Cap, Take 2 capsules (200 mg total) by mouth 2 (two) times daily., Disp: , Rfl:     metoprolol succinate  MG Oral Tablet 24 Hr, Take 1 tablet (100 mg total) by mouth daily. 0.5 tablet per day, Disp: , Rfl:     No Known Allergies         REVIEW OF SYSTEMS:     Review of Systems (ROS)  This information was obtained from the patient, chart    A comprehensive 10 point review of systems was completed.  Pertinent positives and negatives noted in the the HPI.     Past medical, surgical, family and social history updated where appropriate.    PHYSICAL EXAM:   /75   Pulse 63   Temp 97.8 °F (36.6 °C) (Oral)   Resp 18   SpO2 95%    Estimated body mass index is 49.16 kg/m² as calculated from the following:    Height as of 2/9/24: 63\".    Weight as of 2/23/24: 277 lb 8 oz.   Vital signs reviewed.Appears stated age, well groomed.      Awake, alert, in no acute distress  HENT:  normocephalic, atraumatic, external ear canals clear bilaterally, tympanic membranes appear opaque, non-bulging, non-erythematous, nasal turbinates are non-inflamed and not swollen, oropharynx without erythema, swelling, or exudate, gingiva and lips without any apparent lesions.   Cardiac:  S1 S2 regular rate and rhythm, no murmur, gallop, or rub  Respiratory:  Bilaterally clear to auscultation, no chest tenderness to palpation, no wheezing, no rhonchi, no rales, air entry is adequate, no accessory respiratory muscle use, no chest asymmetry, normal excursion.  GI:  bowel sound positive in all four quadrants, abdomen is soft, non-tender, non-distended, no hepatosplenomegaly, no  abnormal aortic pulsation.     Calf                      Ankle                            Foot                            Compression Wrap 02/09/24 Leg Right;Lower (Active)   Placement Date: 02/09/24   Location: Leg  Wound Location Orientation: Right;Lower      Assessments 2/9/2024  2:54 PM 4/22/2024  1:14 PM   Response to Treatment Well tolerated Well tolerated   Compression Layers Single Single   Compression Product Type Tubigrip/Spanda Tubigrip/Spanda   Dressing Applied Yes No   Compression Wrap Location Toes to Knee Toes to Knee   Compression Wrap Status Clean;Intact;Dry Clean;Intact;Dry       No associated orders.       Compression Wrap 02/09/24 Leg Left;Lower (Active)   Placement Date: 02/09/24   Location: Leg  Wound Location Orientation: Left;Lower      Assessments 2/9/2024  2:54 PM 4/22/2024  1:14 PM   Response to Treatment Well tolerated Well tolerated   Compression Layers 2 Single   Compression Product Type Artiflex 10cm;Coflex lite Tubigrip/Spanda   Dressing Applied Yes Yes   Compression Wrap Location Toes to Knee Toes to Knee   Compression Wrap Status Clean;Dry;Intact Clean;Dry;Intact       No associated orders.       Wound 03/22/24 3 Foot Left;Plantar (Active)   Date First Assessed: 03/22/24    Wound Number (Wound Clinic Only): 3  Location: (c) Foot  Wound Location Orientation: Left;Plantar      Assessments 3/22/2024 11:01 AM 4/22/2024  1:14 PM   Wound Image       Site Assessment Edema;Moist;Pink Dry;Pink   Closure Not approximated Not approximated   Drainage Amount Moderate Scant   Drainage Description Clear Clear   Treatments Cleansed;Compression;Wound  Cleansed;Wound    Dressing 4x4s;Kerlix roll;Hydrofera ready Hydrofera ready;Tape   Dressing Changed New Changed   Dressing Status Clean;Dry;Intact Dressing Changed;Removed;Old drainage   Wound Length (cm) 12 cm 0.5 cm   Wound Width (cm) 7 cm 0.5 cm   Wound Surface Area (cm^2) 84 cm^2 0.25 cm^2   Wound Depth (cm) 0.1 cm 0.1 cm   Wound  Volume (cm^3) 8.4 cm^3 0.025 cm^3   Wound Healing % -- 100   Margins Attached edges Attached edges   Non-staged Wound Description Partial thickness Full thickness   Kamryn-wound Assessment Callous;Maceration;Fragile Callous;Dry   Wound Granulation Tissue -- Pink   Wound Bed Granulation (%) -- 20 %   Wound Bed Epithelium (%) 90 % 80 %   Wound Bed Slough (%) -- 0 %   Wound Bed Eschar (%) -- 0 %   Wound Bed Fibrin (%) -- 0 %   State of Healing -- Non-healing   Wound Odor None None       No associated orders.       Wound 04/22/24 3 Leg Right;Lower;Lateral (Active)   Date First Assessed/Time First Assessed: 04/22/24 1334    Wound Number (Wound Clinic Only): 3  Primary Wound Type: Venous Ulcer  Location: Leg  Wound Location Orientation: Right;Lower;Lateral      Assessments 4/22/2024  1:14 PM   Wound Image     Site Assessment Moist;Pink;Edema;Fragile   Closure Not approximated   Drainage Amount Small   Drainage Description Serous;Clear   Treatments Cleansed;Saline;Compression   Dressing Changed New   Dressing Status Clean;Dry;Intact   Wound Length (cm) 0.5 cm   Wound Width (cm) 0.3 cm   Wound Surface Area (cm^2) 0.15 cm^2   Wound Depth (cm) 0.1 cm   Wound Volume (cm^3) 0.015 cm^3   Margins Attached edges   Non-staged Wound Description Partial thickness   Kamryn-wound Assessment Edema;Moist;Fragile;Red   Wound Bed Epithelium (%) 90 %   Wound Odor None       No associated orders.          ASSESSMENT AND PLAN:      1. Chronic venous hypertension (idiopathic) with ulcer and inflammation of bilateral lower extremity ([facility])    2. H/O CHF    3. Class 3 severe obesity due to excess calories with serious comorbidity and body mass index (BMI) of 45.0 to 49.9 in adult ([facility])    4. Rash    Clinically the plantar foot wound is almost completely healed now.  Will continue with Dahlia collagen and Hydrofera Blue and felted foam to the bottom of the foot for offloading.  I did have a long discussion regarding proper offloading and she should  see podiatry to have custom orthotic made for the foot to help prevent callus or recurring wounds in the midfoot plantar aspect.  She does wear leg very thin slipper type footwear and I recommended that she wear footwear which thicker sole and custom orthotics.    She does have a maculopapular rash scattered and diffuse on both lower extremities to about two thirds the way up the tibia.  Will have her try betamethasone cream twice a day to the areas of rash and rub in.  Risk and benefit of betamethasone discussed.  I still would like her to see allergist to see why she is having a rash in other parts of the body.      Risks, benefits, and alternatives of current treatment plan discussed in detail.  Questions and concerns addressed. Red flags to RTC or ED reviewed.  Patient (or parent) agrees to plan.      Return in about 1 week (around 4/29/2024) for APN visit x 30 mins.    Also refer to patient instructions.     I spent 40 minutes with the patient. This time included:  preparing to see the patient (eg, review notes and recent diagnostics), seeing the patient, performing a medically appropriate examination and/or evaluation, counseling and educating the patient, and documenting in the record.    I agree with staff documentation except for any changes made in my note.       Dontrell Tinajero M.D., Wound Care Physician  [facility]  4/22/2024

## 2024-04-29 ENCOUNTER — OFFICE VISIT (OUTPATIENT)
Dept: ALLERGY | Facility: CLINIC | Age: 79
End: 2024-04-29

## 2024-04-29 VITALS
HEIGHT: 62 IN | BODY MASS INDEX: 48.03 KG/M2 | WEIGHT: 261 LBS | SYSTOLIC BLOOD PRESSURE: 99 MMHG | DIASTOLIC BLOOD PRESSURE: 72 MMHG | HEART RATE: 69 BPM | OXYGEN SATURATION: 97 %

## 2024-04-29 DIAGNOSIS — Z23 FLU VACCINE NEED: ICD-10-CM

## 2024-04-29 DIAGNOSIS — Z23 NEED FOR PROPHYLACTIC VACCINATION WITH STREPTOCOCCUS PNEUMONIAE (PNEUMOCOCCUS) AND INFLUENZA VACCINES: ICD-10-CM

## 2024-04-29 DIAGNOSIS — L30.9 DERMATITIS: Primary | ICD-10-CM

## 2024-04-29 DIAGNOSIS — Z23 NEED FOR COVID-19 VACCINE: ICD-10-CM

## 2024-04-29 PROCEDURE — 99204 OFFICE O/P NEW MOD 45 MIN: CPT | Performed by: ALLERGY & IMMUNOLOGY

## 2024-04-29 NOTE — PROGRESS NOTES
Adore Covington is a 79 year old female.    HPI:     Chief Complaint   Patient presents with    Rash     Patient states she has had a body rash in January and then again March- red, itchy.     Patient is a 79-year-old female who presents for allergy evaluationUpon referral of her wound care physician with a chief complaint of rash.    Prior notes visit with wound care physician notes a previous left foot wound that has been healing.  Wound care physician noted an itchy rash to the lateral lower extremities.    Medication list include betamethasone and triamcinolone.    COVID-vaccine x 2 doses last in 2021  No flu vaccine or pneumonia vaccine on record    Today patient reports      Rash   Duration: Started in January 2024  Recurred in March 2024  Location: all over   Prior derm eval  4 weeks ago, no psoriasis   Timing: intermittent  Symptoms: Red itchy flat,   Denies blisters vesicles or drainage  Severity: Moderate   Triggers:?  Allergies  Tried: Triamcinolone, betamethasone(yet to try )   Pets or smokers: none  Nonsmoker   New meds prior  to rash   Denies  current rash   No acute reactions to foods in past   Flares can last  4-5 weeks     In hospital end of Nov to [facility] and then rehab afib and gastroparesis     Hx of asthma, ad, or food allergy:   Denies  History of COPD        HISTORY:  Past Medical History:    Arrhythmia    Atrial fibrillation (HCC)    Congestive heart disease (HCC)    COPD (chronic obstructive pulmonary disease) (HCC)    Essential hypertension    High blood pressure    High cholesterol    Morbid obesity with BMI of 50.0-59.9, adult (HCC)    Peripheral vascular disease (HCC)    Sleep apnea      Past Surgical History:   Procedure Laterality Date    Knee surgery Bilateral       History reviewed. No pertinent family history.   Social History:   Social History     Socioeconomic History    Marital status:    Tobacco Use    Smoking status: Former     Current packs/day: 0.00     Types:  Cigarettes     Quit date:      Years since quittin.3     Passive exposure: Never    Smokeless tobacco: Never   Substance and Sexual Activity    Alcohol use: Yes     Comment: 1-2 drinks daily    Drug use: Never     Social Determinants of Health     Financial Resource Strain: Unknown (2021)    Received from [facility]    Overall Financial Resource Strain (CARDIA)     Difficulty of Paying Living Expenses: Patient declined   Food Insecurity: No Food Insecurity (2023)    Food Insecurity     Food Insecurity: Never true   Transportation Needs: No Transportation Needs (2023)    Transportation Needs     Lack of Transportation: No   Housing Stability: Low Risk  (2023)    Housing Stability     Housing Instability: No        Medications (Active prior to today's visit):  Current Outpatient Medications   Medication Sig Dispense Refill    dilTIAZem HCl  MG Oral Capsule SR 24 Hr Take 1 capsule (240 mg total) by mouth daily.      amiodarone 200 MG Oral Tab Take 1 tablet (200 mg total) by mouth daily. 30 tablet 0    pantoprazole 40 MG Oral Tab EC Take 1 tablet (40 mg total) by mouth every morning before breakfast. 60 tablet 0    apixaban 5 MG Oral Tab Take 1 tablet (5 mg total) by mouth 2 (two) times daily.      potassium chloride 20 MEQ Oral Tab CR Take 1 tablet (20 mEq total) by mouth daily.      cycloSPORINE 0.05 % Ophthalmic Emulsion Place 1 drop into both eyes 2 (two) times daily.      cholecalciferol 50 MCG (2000 UT) Oral Cap Take 1 capsule (2,000 Units total) by mouth daily.      cyanocobalamin 1000 MCG Oral Tab Take 1 tablet (1,000 mcg total) by mouth daily.      triamcinolone 0.1 % External Cream Apply 1 Application topically 2 (two) times daily as needed.      MYRBETRIQ 25 MG Oral Tablet 24 Hr 1 TABLET BY MOUTH EVERYDAY FOR 90 DAYS      simvastatin 10 MG Oral Tab Take 1 tablet (10 mg total) by mouth As Directed.      gabapentin 100 MG Oral Cap Take 2 capsules (200 mg total) by  mouth 2 (two) times daily.      metoprolol succinate  MG Oral Tablet 24 Hr Take 1 tablet (100 mg total) by mouth daily. 0.5 tablet per day      apixaban 5 MG Oral Tab Take 1 tablet (5 mg total) by mouth 2 (two) times daily. (Patient not taking: Reported on 4/29/2024)      betamethasone 0.1 % External Cream Apply and rub into areas of rash twice a day on both legs (Patient not taking: Reported on 4/29/2024) 45 g 0    betamethasone 0.1 % External Cream Apply to wounds as directed (Patient not taking: Reported on 4/29/2024) 45 g 0    levoFLOXacin (LEVAQUIN) 500 MG Oral Tab Take 1 tablet (500 mg total) by mouth daily. (Patient not taking: Reported on 4/29/2024) 10 tablet 0    cephalexin 500 MG Oral Cap Take 1 capsule (500 mg total) by mouth 2 (two) times daily. (Patient not taking: Reported on 4/29/2024)      torsemide 20 MG Oral Tab Take 1 tablet (20 mg total) by mouth daily. Taking daily and as needed (Patient not taking: Reported on 4/29/2024)      acetaminophen 325 MG Oral Tab Take 2 tablets (650 mg total) by mouth 2 (two) times daily as needed for Pain. (Patient not taking: Reported on 4/29/2024)         Allergies:  Allergies   Allergen Reactions    Lactose OTHER (SEE COMMENTS)         ROS:     Allergic/Immuno:  See HPI  Cardiovascular:  Negative for irregular heartbeat/palpitations, chest pain, edema  Constitutional:  Negative night sweats,weight loss, irritability and lethargy  Endocrine:  Negative for cold intolerance, polydipsia and polyphagia  ENMT:  Negative for ear drainage, hearing loss and nasal drainage  Eyes:  Negative for eye discharge and vision loss  Gastrointestinal:  Negative for abdominal pain, diarrhea and vomiting  Genitourinary:  Negative for dysuria and hematuria  Hema/Lymph:  Negative for easy bleeding and easy bruising  Integumentary:  see  hpi   Musculoskeletal:  Negative for joint symptoms  Neurological:  Negative for dizziness, seizures  Psychiatric:  Negative for inappropriate  interaction and psychiatric symptoms  Respiratory:  Negative for cough, dyspnea and wheezing      PHYSICAL EXAM:   Constitutional: responsive, no acute distress noted  Head/Face: NC/Atraumatic  Eyes/Vision: conjunctiva and lids are normal extraocular motion is intact   Ears/Audiometry: tympanic membranes are normal bilaterally hearing is grossly intact  Nose/Mouth/Throat: nose and throat are clear mucous membranes are moist   Neck/Thyroid: neck is supple without adenopathy  Lymphatic: no abnormal cervical, supraclavicular or axillary adenopathy is noted  Respiratory: normal to inspection lungs are clear to auscultation bilaterally normal respiratory effort   Cardiovascular: regular rate and rhythm no murmurs, gallups, or rubs  Abdomen: soft non-tender non-distended  Skin/Hair: Posterior back with scattered scabbed excoriations pinpoint in size none larger than 5 mm  No vesicles blisters hives pustules noted  Extremities: no edema, cyanosis, or clubbing  Neurological:Oriented to time, place, person & situation       ASSESSMENT/PLAN:   Assessment   Encounter Diagnoses   Name Primary?    Dermatitis Yes    Flu vaccine need     Need for COVID-19 vaccine     Need for prophylactic vaccination with Streptococcus pneumoniae (Pneumococcus) and Influenza vaccines      #1 dermatitis  2 flares each lasting approximately 2 to 3 weeks 1 in January 1 in March.  No current significant rash now  Some excoriations on her upper back.  Has tried triamcinolone.  Has yet to try betamethasone.  Trial of betamethasone twice a day if the rash returns for up to 7 days  Recommend patch testing in the future to screen for allergic contact dermatitis.  Prior dermatology evaluation in the past when rash was not active.  No prior biopsy.  If rash worsens will recommend follow-up with dermatology to see if biopsy is warranted  Reviewed with patient and patch testing can be performed once her back is cleared and patient has been off topical steroids  for least 2 to 3 weeks.  Information on true test patch testing and their website provided  Patient defers testing to environmental allergens foods at this time    #2 flu vaccine recommended in the fall    #3 COVID vaccines recommended.  Reviewed I do not have the vaccine in my office    4.  Pneumonia vaccine recommended with Prevnar 20           Orders This Visit:  No orders of the defined types were placed in this encounter.      Meds This Visit:  Requested Prescriptions      No prescriptions requested or ordered in this encounter       Imaging & Referrals:  None     4/29/2024  Moi Vaughn MD      If medication samples were provided today, they were provided solely for patient education and training related to self administration of these medications.  Teaching, instruction and sample was provided to the patient by myself.  Teaching included  a review of potential adverse side effects as well as potential efficacy.  Patient's questions were answered in regards to medication administration and dosing and potential side effects. Teaching was provided via the teach back method

## 2024-04-29 NOTE — PATIENT INSTRUCTIONS
#1 dermatitis  2 flares each lasting approximately 2 to 3 weeks 1 in January 1 in March.  No current significant rash now  Some excoriations on her upper back.  Has tried triamcinolone.  Has yet to try betamethasone.  Trial of betamethasone twice a day if the rash returns for up to 7 days  Recommend patch testing in the future to screen for allergic contact dermatitis.  Prior dermatology evaluation in the past when rash was not active.  No prior biopsy.  If rash worsens will recommend follow-up with dermatology to see if biopsy is warranted  Reviewed with patient and patch testing can be performed once her back is cleared and patient has been off topical steroids for least 2 to 3 weeks.  Information on true test patch testing and their website provided  Patient defers testing to environmental allergens foods at this time    #2 flu vaccine recommended in the fall    #3 COVID vaccines recommended.  Reviewed I do not have the vaccine in my office    4.  Pneumonia vaccine recommended with Prevnar 20

## 2024-05-03 ENCOUNTER — APPOINTMENT (OUTPATIENT)
Dept: GENERAL RADIOLOGY | Facility: HOSPITAL | Age: 79
DRG: 871 | End: 2024-05-03
Attending: STUDENT IN AN ORGANIZED HEALTH CARE EDUCATION/TRAINING PROGRAM

## 2024-05-03 ENCOUNTER — APPOINTMENT (OUTPATIENT)
Dept: CT IMAGING | Facility: HOSPITAL | Age: 79
DRG: 871 | End: 2024-05-03
Attending: STUDENT IN AN ORGANIZED HEALTH CARE EDUCATION/TRAINING PROGRAM

## 2024-05-03 ENCOUNTER — HOSPITAL ENCOUNTER (INPATIENT)
Facility: HOSPITAL | Age: 79
LOS: 7 days | Discharge: NURSING, REHAB OR LONG-TERM CARE | DRG: 871 | End: 2024-05-11
Attending: STUDENT IN AN ORGANIZED HEALTH CARE EDUCATION/TRAINING PROGRAM | Admitting: INTERNAL MEDICINE

## 2024-05-03 ENCOUNTER — TELEPHONE (OUTPATIENT)
Dept: WOUND CARE | Facility: HOSPITAL | Age: 79
End: 2024-05-03

## 2024-05-03 DIAGNOSIS — I95.2 HYPOTENSION DUE TO DRUGS: Primary | ICD-10-CM

## 2024-05-03 LAB
ALBUMIN SERPL-MCNC: 3.9 G/DL (ref 3.2–4.8)
ALBUMIN/GLOB SERPL: 1.4 {RATIO} (ref 1–2)
ALP LIVER SERPL-CCNC: 58 U/L
ALT SERPL-CCNC: 10 U/L
ANION GAP SERPL CALC-SCNC: 9 MMOL/L (ref 0–18)
AST SERPL-CCNC: 17 U/L (ref ?–34)
BASOPHILS # BLD AUTO: 0.02 X10(3) UL (ref 0–0.2)
BASOPHILS NFR BLD AUTO: 0.2 %
BILIRUB SERPL-MCNC: 0.9 MG/DL (ref 0.2–1.1)
BILIRUB UR QL: NEGATIVE
BNP SERPL-MCNC: 193 PG/ML
BUN BLD-MCNC: 22 MG/DL (ref 9–23)
BUN/CREAT SERPL: 14.7 (ref 10–20)
CALCIUM BLD-MCNC: 8.9 MG/DL (ref 8.7–10.4)
CHLORIDE SERPL-SCNC: 102 MMOL/L (ref 98–112)
CO2 SERPL-SCNC: 27 MMOL/L (ref 21–32)
COLOR UR: YELLOW
CREAT BLD-MCNC: 1.5 MG/DL
DEPRECATED RDW RBC AUTO: 50.3 FL (ref 35.1–46.3)
EGFRCR SERPLBLD CKD-EPI 2021: 36 ML/MIN/1.73M2 (ref 60–?)
EOSINOPHIL # BLD AUTO: 0.01 X10(3) UL (ref 0–0.7)
EOSINOPHIL NFR BLD AUTO: 0.1 %
ERYTHROCYTE [DISTWIDTH] IN BLOOD BY AUTOMATED COUNT: 14.6 % (ref 11–15)
GLOBULIN PLAS-MCNC: 2.8 G/DL (ref 2–3.5)
GLUCOSE BLD-MCNC: 107 MG/DL (ref 70–99)
GLUCOSE BLDC GLUCOMTR-MCNC: 106 MG/DL (ref 70–99)
GLUCOSE UR-MCNC: NORMAL MG/DL
HCT VFR BLD AUTO: 36.3 %
HGB BLD-MCNC: 11.8 G/DL
HGB UR QL STRIP.AUTO: NEGATIVE
HYALINE CASTS #/AREA URNS AUTO: PRESENT /LPF
IMM GRANULOCYTES # BLD AUTO: 0.05 X10(3) UL (ref 0–1)
IMM GRANULOCYTES NFR BLD: 0.5 %
KETONES UR-MCNC: NEGATIVE MG/DL
LEUKOCYTE ESTERASE UR QL STRIP.AUTO: NEGATIVE
LYMPHOCYTES # BLD AUTO: 0.22 X10(3) UL (ref 1–4)
LYMPHOCYTES NFR BLD AUTO: 2 %
MAGNESIUM SERPL-MCNC: 1.5 MG/DL (ref 1.6–2.6)
MCH RBC QN AUTO: 30.6 PG (ref 26–34)
MCHC RBC AUTO-ENTMCNC: 32.5 G/DL (ref 31–37)
MCV RBC AUTO: 94.3 FL
MONOCYTES # BLD AUTO: 0.27 X10(3) UL (ref 0.1–1)
MONOCYTES NFR BLD AUTO: 2.5 %
MRSA DNA SPEC QL NAA+PROBE: POSITIVE
NEUTROPHILS # BLD AUTO: 10.31 X10 (3) UL (ref 1.5–7.7)
NEUTROPHILS # BLD AUTO: 10.31 X10(3) UL (ref 1.5–7.7)
NEUTROPHILS NFR BLD AUTO: 94.7 %
NITRITE UR QL STRIP.AUTO: NEGATIVE
OSMOLALITY SERPL CALC.SUM OF ELEC: 290 MOSM/KG (ref 275–295)
PH UR: 5.5 [PH] (ref 5–8)
PLATELET # BLD AUTO: 257 10(3)UL (ref 150–450)
POTASSIUM SERPL-SCNC: 3.3 MMOL/L (ref 3.5–5.1)
PROT SERPL-MCNC: 6.7 G/DL (ref 5.7–8.2)
PROT UR-MCNC: 30 MG/DL
RBC # BLD AUTO: 3.85 X10(6)UL
SODIUM SERPL-SCNC: 138 MMOL/L (ref 136–145)
SP GR UR STRIP: 1.03 (ref 1–1.03)
TROPONIN I SERPL HS-MCNC: 10 NG/L
UROBILINOGEN UR STRIP-ACNC: NORMAL
WBC # BLD AUTO: 10.9 X10(3) UL (ref 4–11)

## 2024-05-03 PROCEDURE — 83880 ASSAY OF NATRIURETIC PEPTIDE: CPT | Performed by: STUDENT IN AN ORGANIZED HEALTH CARE EDUCATION/TRAINING PROGRAM

## 2024-05-03 PROCEDURE — 87641 MR-STAPH DNA AMP PROBE: CPT | Performed by: STUDENT IN AN ORGANIZED HEALTH CARE EDUCATION/TRAINING PROGRAM

## 2024-05-03 PROCEDURE — 96361 HYDRATE IV INFUSION ADD-ON: CPT

## 2024-05-03 PROCEDURE — 84443 ASSAY THYROID STIM HORMONE: CPT | Performed by: STUDENT IN AN ORGANIZED HEALTH CARE EDUCATION/TRAINING PROGRAM

## 2024-05-03 PROCEDURE — 99291 CRITICAL CARE FIRST HOUR: CPT

## 2024-05-03 PROCEDURE — 83735 ASSAY OF MAGNESIUM: CPT | Performed by: STUDENT IN AN ORGANIZED HEALTH CARE EDUCATION/TRAINING PROGRAM

## 2024-05-03 PROCEDURE — 96366 THER/PROPH/DIAG IV INF ADDON: CPT

## 2024-05-03 PROCEDURE — 80053 COMPREHEN METABOLIC PANEL: CPT | Performed by: STUDENT IN AN ORGANIZED HEALTH CARE EDUCATION/TRAINING PROGRAM

## 2024-05-03 PROCEDURE — 82550 ASSAY OF CK (CPK): CPT | Performed by: STUDENT IN AN ORGANIZED HEALTH CARE EDUCATION/TRAINING PROGRAM

## 2024-05-03 PROCEDURE — 96365 THER/PROPH/DIAG IV INF INIT: CPT

## 2024-05-03 PROCEDURE — 71045 X-RAY EXAM CHEST 1 VIEW: CPT | Performed by: STUDENT IN AN ORGANIZED HEALTH CARE EDUCATION/TRAINING PROGRAM

## 2024-05-03 PROCEDURE — 81001 URINALYSIS AUTO W/SCOPE: CPT | Performed by: STUDENT IN AN ORGANIZED HEALTH CARE EDUCATION/TRAINING PROGRAM

## 2024-05-03 PROCEDURE — 99292 CRITICAL CARE ADDL 30 MIN: CPT

## 2024-05-03 PROCEDURE — 71260 CT THORAX DX C+: CPT | Performed by: STUDENT IN AN ORGANIZED HEALTH CARE EDUCATION/TRAINING PROGRAM

## 2024-05-03 PROCEDURE — 85025 COMPLETE CBC W/AUTO DIFF WBC: CPT | Performed by: STUDENT IN AN ORGANIZED HEALTH CARE EDUCATION/TRAINING PROGRAM

## 2024-05-03 PROCEDURE — 93005 ELECTROCARDIOGRAM TRACING: CPT

## 2024-05-03 PROCEDURE — 82962 GLUCOSE BLOOD TEST: CPT

## 2024-05-03 PROCEDURE — 93010 ELECTROCARDIOGRAM REPORT: CPT

## 2024-05-03 PROCEDURE — 84484 ASSAY OF TROPONIN QUANT: CPT | Performed by: STUDENT IN AN ORGANIZED HEALTH CARE EDUCATION/TRAINING PROGRAM

## 2024-05-03 RX ORDER — MAGNESIUM SULFATE HEPTAHYDRATE 40 MG/ML
2 INJECTION, SOLUTION INTRAVENOUS ONCE
Status: COMPLETED | OUTPATIENT
Start: 2024-05-03 | End: 2024-05-03

## 2024-05-03 NOTE — ED INITIAL ASSESSMENT (HPI)
Patient s/p fall from [facility]. Patient did not loose consciousness. Patient slid down to floor and did not hit self. Normally patient is ambulatory without assistive device. Hypotensive on arrival, IVF continued. Patient is on eliquis.

## 2024-05-04 ENCOUNTER — APPOINTMENT (OUTPATIENT)
Dept: CV DIAGNOSTICS | Facility: HOSPITAL | Age: 79
DRG: 871 | End: 2024-05-04
Attending: INTERNAL MEDICINE

## 2024-05-04 LAB
ALBUMIN SERPL-MCNC: 3.3 G/DL (ref 3.2–4.8)
ALBUMIN/GLOB SERPL: 1.2 {RATIO} (ref 1–2)
ALP LIVER SERPL-CCNC: 53 U/L
ALT SERPL-CCNC: 10 U/L
ANION GAP SERPL CALC-SCNC: 7 MMOL/L (ref 0–18)
AST SERPL-CCNC: 20 U/L (ref ?–34)
ATRIAL RATE: 98 BPM
BILIRUB SERPL-MCNC: 0.5 MG/DL (ref 0.2–1.1)
BUN BLD-MCNC: 20 MG/DL (ref 9–23)
BUN/CREAT SERPL: 20.8 (ref 10–20)
CALCIUM BLD-MCNC: 8.5 MG/DL (ref 8.7–10.4)
CHLORIDE SERPL-SCNC: 106 MMOL/L (ref 98–112)
CK SERPL-CCNC: 56 U/L
CO2 SERPL-SCNC: 25 MMOL/L (ref 21–32)
CORTIS SERPL-MCNC: 49.9 UG/DL
CREAT BLD-MCNC: 0.96 MG/DL
EGFRCR SERPLBLD CKD-EPI 2021: 61 ML/MIN/1.73M2 (ref 60–?)
GLOBULIN PLAS-MCNC: 2.7 G/DL (ref 2–3.5)
GLUCOSE BLD-MCNC: 99 MG/DL (ref 70–99)
MAGNESIUM SERPL-MCNC: 1.9 MG/DL (ref 1.6–2.6)
OSMOLALITY SERPL CALC.SUM OF ELEC: 289 MOSM/KG (ref 275–295)
P AXIS: 63 DEGREES
P-R INTERVAL: 174 MS
POTASSIUM SERPL-SCNC: 3.9 MMOL/L (ref 3.5–5.1)
PROCALCITONIN SERPL-MCNC: 58.02 NG/ML (ref ?–0.05)
PROT SERPL-MCNC: 6 G/DL (ref 5.7–8.2)
Q-T INTERVAL: 326 MS
QRS DURATION: 78 MS
QTC CALCULATION (BEZET): 416 MS
R AXIS: 35 DEGREES
SODIUM SERPL-SCNC: 138 MMOL/L (ref 136–145)
T AXIS: 39 DEGREES
TSI SER-ACNC: 3.37 MIU/ML (ref 0.55–4.78)
VENTRICULAR RATE: 98 BPM

## 2024-05-04 PROCEDURE — 84145 PROCALCITONIN (PCT): CPT | Performed by: INTERNAL MEDICINE

## 2024-05-04 PROCEDURE — 93306 TTE W/DOPPLER COMPLETE: CPT | Performed by: INTERNAL MEDICINE

## 2024-05-04 PROCEDURE — 87040 BLOOD CULTURE FOR BACTERIA: CPT | Performed by: INTERNAL MEDICINE

## 2024-05-04 PROCEDURE — 94660 CPAP INITIATION&MGMT: CPT

## 2024-05-04 PROCEDURE — 80053 COMPREHEN METABOLIC PANEL: CPT | Performed by: INTERNAL MEDICINE

## 2024-05-04 PROCEDURE — 82533 TOTAL CORTISOL: CPT | Performed by: STUDENT IN AN ORGANIZED HEALTH CARE EDUCATION/TRAINING PROGRAM

## 2024-05-04 PROCEDURE — 83735 ASSAY OF MAGNESIUM: CPT | Performed by: INTERNAL MEDICINE

## 2024-05-04 RX ORDER — ONDANSETRON 2 MG/ML
4 INJECTION INTRAMUSCULAR; INTRAVENOUS EVERY 6 HOURS PRN
Status: DISCONTINUED | OUTPATIENT
Start: 2024-05-04 | End: 2024-05-11

## 2024-05-04 RX ORDER — POLYETHYLENE GLYCOL 3350 17 G/17G
17 POWDER, FOR SOLUTION ORAL DAILY PRN
Status: DISCONTINUED | OUTPATIENT
Start: 2024-05-04 | End: 2024-05-11

## 2024-05-04 RX ORDER — MELATONIN
2000 2 TIMES DAILY
Status: DISCONTINUED | OUTPATIENT
Start: 2024-05-04 | End: 2024-05-11

## 2024-05-04 RX ORDER — PANTOPRAZOLE SODIUM 40 MG/1
40 TABLET, DELAYED RELEASE ORAL
Status: DISCONTINUED | OUTPATIENT
Start: 2024-05-04 | End: 2024-05-11

## 2024-05-04 RX ORDER — SODIUM CHLORIDE, SODIUM LACTATE, POTASSIUM CHLORIDE, CALCIUM CHLORIDE 600; 310; 30; 20 MG/100ML; MG/100ML; MG/100ML; MG/100ML
INJECTION, SOLUTION INTRAVENOUS CONTINUOUS
Status: DISCONTINUED | OUTPATIENT
Start: 2024-05-04 | End: 2024-05-04

## 2024-05-04 RX ORDER — SODIUM CHLORIDE 9 MG/ML
INJECTION, SOLUTION INTRAVENOUS CONTINUOUS
Status: DISCONTINUED | OUTPATIENT
Start: 2024-05-04 | End: 2024-05-04

## 2024-05-04 RX ORDER — BISACODYL 10 MG
10 SUPPOSITORY, RECTAL RECTAL
Status: DISCONTINUED | OUTPATIENT
Start: 2024-05-04 | End: 2024-05-11

## 2024-05-04 RX ORDER — CHOLECALCIFEROL (VITAMIN D3) 125 MCG
1000 CAPSULE ORAL DAILY
Status: DISCONTINUED | OUTPATIENT
Start: 2024-05-05 | End: 2024-05-11

## 2024-05-04 RX ORDER — PRAVASTATIN SODIUM 20 MG
20 TABLET ORAL NIGHTLY
Status: DISCONTINUED | OUTPATIENT
Start: 2024-05-04 | End: 2024-05-11

## 2024-05-04 RX ORDER — SPIRONOLACTONE 25 MG/1
25 TABLET ORAL DAILY
Status: ON HOLD | COMMUNITY
End: 2024-05-04

## 2024-05-04 RX ORDER — ACETAMINOPHEN 500 MG
500 TABLET ORAL EVERY 4 HOURS PRN
Status: DISCONTINUED | OUTPATIENT
Start: 2024-05-04 | End: 2024-05-11

## 2024-05-04 RX ORDER — METOCLOPRAMIDE HYDROCHLORIDE 5 MG/ML
5 INJECTION INTRAMUSCULAR; INTRAVENOUS EVERY 8 HOURS PRN
Status: DISCONTINUED | OUTPATIENT
Start: 2024-05-04 | End: 2024-05-11

## 2024-05-04 RX ORDER — ACETAMINOPHEN 160 MG
2000 TABLET,DISINTEGRATING ORAL 2 TIMES DAILY
COMMUNITY

## 2024-05-04 RX ORDER — MELATONIN
3 NIGHTLY PRN
Status: DISCONTINUED | OUTPATIENT
Start: 2024-05-04 | End: 2024-05-11

## 2024-05-04 RX ORDER — VANCOMYCIN 1.75 GRAM/500 ML IN 0.9 % SODIUM CHLORIDE INTRAVENOUS
15 EVERY 24 HOURS
Status: DISCONTINUED | OUTPATIENT
Start: 2024-05-04 | End: 2024-05-09

## 2024-05-04 RX ORDER — GABAPENTIN 100 MG/1
200 CAPSULE ORAL 2 TIMES DAILY
Status: DISCONTINUED | OUTPATIENT
Start: 2024-05-04 | End: 2024-05-11

## 2024-05-04 RX ORDER — SODIUM CHLORIDE, SODIUM LACTATE, POTASSIUM CHLORIDE, CALCIUM CHLORIDE 600; 310; 30; 20 MG/100ML; MG/100ML; MG/100ML; MG/100ML
INJECTION, SOLUTION INTRAVENOUS CONTINUOUS
Status: DISCONTINUED | OUTPATIENT
Start: 2024-05-04 | End: 2024-05-05

## 2024-05-04 RX ORDER — MELATONIN
1000 DAILY
COMMUNITY

## 2024-05-04 RX ORDER — SENNOSIDES 8.6 MG
17.2 TABLET ORAL NIGHTLY PRN
Status: DISCONTINUED | OUTPATIENT
Start: 2024-05-04 | End: 2024-05-11

## 2024-05-04 NOTE — H&P
[facility] Hospitalist H&P       CC:   Chief Complaint   Patient presents with    Fall    Hypotension        PCP: Maral Huffman MD    History of Present Illness: Patient is a 79 year old female with PMH sig for COPD, A- Fib on Eliquis, CHF, HTN, HLD, PAD, Neuropathy, SUSAN who presents with weakness and left leg redness. Patient started having chills 2 days ago and was not feeling herself.  She denies fevers, abd pain, n/v/d.  No CP and did not notice any SOB.  She has been getting wound care twice a week for her left leg for several months and did not think they were getting worse. Her bumex does was decreased at her last cardiology visit and amio was stopped     In the ER afebrile and hypotensive, tachypnea on O2  No leukocytosis with mild miranda      PMH  Past Medical History:    Arrhythmia    Atrial fibrillation (HCC)    Congestive heart disease (HCC)    COPD (chronic obstructive pulmonary disease) (HCC)    Essential hypertension    High blood pressure    High cholesterol    Morbid obesity with BMI of 50.0-59.9, adult (HCC)    Muscle weakness    Neuropathy    Peripheral vascular disease (HCC)    Sleep apnea    Visual impairment        PSH  Past Surgical History:   Procedure Laterality Date    Knee surgery Bilateral         ALL:  Allergies   Allergen Reactions    Lactose OTHER (SEE COMMENTS)     Patient denies allergy        Home Medications:  No outpatient medications have been marked as taking for the 5/3/24 encounter (Hospital Encounter).         Soc Hx  Social History     Tobacco Use    Smoking status: Former     Current packs/day: 0.00     Types: Cigarettes     Quit date:      Years since quittin.3     Passive exposure: Never    Smokeless tobacco: Never   Substance Use Topics    Alcohol use: Yes     Comment: 1-2 drinks daily        Fam Hx  History reviewed. No pertinent family history.    Review of Systems  Comprehensive ROS reviewed and negative except for what's stated above.     OBJECTIVE:  BP (!) 83/49    Pulse 84   Temp 97.8 °F (36.6 °C) (Temporal)   Resp 25   Ht 5' 2\" (1.575 m)   Wt 261 lb 3.9 oz (118.5 kg)   SpO2 93%   BMI 47.78 kg/m²   General: Alert, no acute distress  HEENT: oral mucosa normal   Neck: non tender, no adenopathy   Lungs: clear to ausculation bilaterally  Heart: Regular rate and rhythm  Abdomen: soft, non tender, non distended   Extremities: mild edema, left leg more red than right with chronic wounds   Skin: no new rash, normal color  Neuro: 5/5 strength in bilateral extremities, normal sensations  Psych: appropriate affect   Diagnostic Data:    CBC/Chem  Recent Labs   Lab 05/03/24  1850   WBC 10.9   HGB 11.8*   MCV 94.3   .0       Recent Labs   Lab 05/03/24  1850      K 3.3*      CO2 27.0   BUN 22   CREATSERUM 1.50*   *   CA 8.9   MG 1.5*       Recent Labs   Lab 05/03/24  1850   ALT 10   AST 17   ALB 3.9       No results for input(s): \"TROP\" in the last 168 hours.    Additional Diagnostics: ECG: nsr    CXR: image personally reviewed     Radiology: CT CHEST PE AORTA (IV ONLY) (CPT=71260)    Result Date: 5/3/2024  CONCLUSION:   No evidence of acute pulmonary embolism to the level of the first order subsegmental pulmonary artery branches.  Severe enlargement of the main pulmonary artery measuring 4.5 cm, as is seen in chronic pulmonary arterial hypertension  No suspicious right hilar mass.  Finding on chest radiograph corresponds to engorged pulmonary vasculature.  Mild cardiomegaly with pulmonary edema.   Scant bibasilar opacities which may be secondary to subsegmental atelectasis, with less likely differential of alveolar edema.  Trace bilateral pleural effusions.    Dictated by (CST): Isabela Baker MD on 5/03/2024 at 9:51 PM     Finalized by (CST): Isabela Baker MD on 5/03/2024 at 9:58 PM          XR CHEST AP PORTABLE  (CPT=71045)    Result Date: 5/3/2024  CONCLUSION:   Mild cardiomegaly with interstitial pulmonary edema.  Left retrocardiac opacity,  possibly combination of subsegmental atelectasis and/or alveolar edema.  Trace left pleural effusion.  Nodular appearance of right kareen, thought to represent a vascular congestion.  Recommend radiographic follow-up to resolution.  If this finding does not resolve with treatment of pulmonary edema, contrast enhanced CT chest would then be indicated to exclude underlying mass/adenopathy.     Dictated by (CST): Isabela Baker MD on 5/03/2024 at 7:13 PM     Finalized by (CST): Isabela Baker MD on 5/03/2024 at 7:15 PM             ASSESSMENT / PLAN:   Patient is a 79 year old female with PMH sig for COPD, A- Fib on Eliquis, CHF, HTN, HLD, PAD, Neuropathy, SUSAN who presents with weakness and left leg redness.     Sepsis Cellulitis   Left Leg   MRSA positive   - hypotensive   - UA okay  - CXR and CT with some edema, no obvious signs of infection   - CT chest with no PE  - no fevers or leukocytosis   - CK normal   - procal elevated   - f/u blood cx  - IV vanco started   - ID consulted   - Wound care     Shock   - likely 2/2 sepsis  - TSH and cortisol normal   - IVF and pressor support   - treat underlying cause   - Echo   - Crit Care on consult     Acute Hypoxic Respiratory Failure   H/o COPD not on inhalers   SUSAN  - CXR and CT with some edema, no obvious signs of infection   - CT chest with no PE  - supplemental O2 support   - cpap as tolerated     MARINO  - likely pre renal and sepsis component   - gentle IVF, pressor support   - avoid hypotension   - avoid nephrotoxic agents     Chronic  A-fib  Secondary Hypercoagulable state   S/p Cardioversion   - Eliquis   - off Amio  - hold metop and dilt in sepsis hypotension   - in NSR     CHF  - CXR and CT with some edema  -   - will check echo  - hold BB and CBB  - hold home bumex   - monitor fluid status closely     HTN - hold BP meds for shock   HLD - statin   PAD - Eliquis and statin   Neuropathy - gabapentin     FN:  - IVF: 0.9  - Diet: Cardiac     DVT Prophy: SCDs  Eliquis   Atrophy: Ambulate   Lines: Piv    Dispo: pending clinical course    Outpatient records or previous hospital records reviewed.     Further recommendations pending patient's clinical course.  [facility] hospitalist to continue to follow patient while in house    Patient and/or patient's family given opportunity to ask questions and note understanding and agreeing with therapeutic plan as outlined    Thank You,  Norman Luo MD    [facility]  Answering Service number: [phone]

## 2024-05-04 NOTE — ED QUICK NOTES
Orders for admission, patient is aware of plan and ready to go upstairs. Any questions, please call ED RN Toyin at extension [phone].     Patient Covid vaccination status: Fully vaccinated     COVID Test Ordered in ED: None    COVID Suspicion at Admission: N/A    Running Infusions:    norepinephrine 14 mcg/min (05/03/24 2128)        Mental Status/LOC at time of transport: Aaox3-Aaox4, forgetful    Other pertinent information:   CIWA score: N/A   NIH score:  N/A

## 2024-05-04 NOTE — ED PROVIDER NOTES
[place] Emergency Department Note  Patient: Adore Covington Age: 79 year old Sex: female      MRN: [id number]  : 1945    Patient Seen in: [facility] Emergency Department    History     Chief Complaint   Patient presents with    Fall    Hypotension     Stated Complaint:     History obtained from: Patient and EMS report    79-year-old female with past medical history hypertension, hyperlipidemia, CHF, COPD, A-fib presenting for evaluation of generalized weakness.  Patient states over the past day, she has been feeling generally weak.  States that today, her legs gave out on her and she slid to the ground.  Denies hitting her head or losing consciousness.  She denies any associated chest pain, shortness of breath, palpitations, fevers, chills, cough, lower extremity edema.  Numbness, tingling, weakness, slurred speech.  Per EMS report, the patient was found to be hypotensive upon arrival.  Was given a 250 cc IV fluid bolus with transient improvement of blood pressures.  Patient states that her amiodarone dose was recently decreased within the past couple weeks.    Review of Systems:  Review of Systems  Positive for stated complaint: . Constitutional and vital signs reviewed. All other systems reviewed and negative except as noted above.    Patient History:  Past Medical History:    Arrhythmia    Atrial fibrillation (HCC)    Congestive heart disease (HCC)    COPD (chronic obstructive pulmonary disease) (HCC)    Essential hypertension    High blood pressure    High cholesterol    Morbid obesity with BMI of 50.0-59.9, adult (HCC)    Peripheral vascular disease (HCC)    Sleep apnea       Past Surgical History:   Procedure Laterality Date    Knee surgery Bilateral         No family history on file.    Specific Social Determinants of Health:   Social History     Socioeconomic History    Marital status:    Tobacco Use    Smoking status: Former     Current packs/day: 0.00     Types: Cigarettes     Quit  date:      Years since quittin.3     Passive exposure: Never    Smokeless tobacco: Never   Substance and Sexual Activity    Alcohol use: Yes     Comment: 1-2 drinks daily    Drug use: Never     Social Determinants of Health     Financial Resource Strain: Unknown (2021)    Received from [facility]    Overall Financial Resource Strain (CARDIA)     Difficulty of Paying Living Expenses: Patient declined   Food Insecurity: No Food Insecurity (2023)    Food Insecurity     Food Insecurity: Never true   Transportation Needs: No Transportation Needs (2023)    Transportation Needs     Lack of Transportation: No   Housing Stability: Low Risk  (2023)    Housing Stability     Housing Instability: No           PSFH elements reviewed from today and agreed except as otherwise stated in HPI.    Physical Exam     ED Triage Vitals [24 1837]   BP (!) 78/48   Pulse 96   Resp 19   Temp 97.9 °F (36.6 °C)   Temp src Oral   SpO2 92 %   O2 Device None (Room air)       Current:/63   Pulse 87   Temp 97.9 °F (36.6 °C) (Oral)   Resp 17   Ht 157.5 cm (5' 2\")   Wt 118.7 kg   SpO2 94%   BMI 47.85 kg/m²         Physical Exam  Constitutional:       General: She is not in acute distress.  HENT:      Head: Normocephalic and atraumatic.      Mouth/Throat:      Mouth: Mucous membranes are moist.   Eyes:      Extraocular Movements: Extraocular movements intact.   Cardiovascular:      Rate and Rhythm: Normal rate and regular rhythm.      Heart sounds: Normal heart sounds.   Pulmonary:      Effort: Pulmonary effort is normal. No respiratory distress.      Breath sounds: Normal breath sounds.   Abdominal:      Palpations: Abdomen is soft.      Tenderness: There is no abdominal tenderness.   Skin:     General: Skin is warm and dry.      Capillary Refill: Capillary refill takes less than 2 seconds.      Findings: No rash.   Neurological:      General: No focal deficit present.      Mental Status: She is  alert and oriented to person, place, and time.   Psychiatric:         Mood and Affect: Mood normal.         Behavior: Behavior normal.         ED Course   Labs:   Labs Reviewed   COMP METABOLIC PANEL (14) - Abnormal; Notable for the following components:       Result Value    Glucose 107 (*)     Potassium 3.3 (*)     Creatinine 1.50 (*)     eGFR-Cr 36 (*)     All other components within normal limits   URINALYSIS WITH CULTURE REFLEX - Abnormal; Notable for the following components:    Clarity Urine Turbid (*)     Protein Urine 30 (*)     RBC Urine 3-5 (*)     Squamous Epi. Cells Few (*)     Hyaline Casts Present (*)     All other components within normal limits   BNP (B TYPE NATRIURETIC PEPTIDE) - Abnormal; Notable for the following components:    Beta Natriuretic Peptide 193 (*)     All other components within normal limits   MAGNESIUM - Abnormal; Notable for the following components:    Magnesium 1.5 (*)     All other components within normal limits   POCT GLUCOSE - Abnormal; Notable for the following components:    POC Glucose  106 (*)     All other components within normal limits   ED/MRSA SCREEN BY PCR-CC - Abnormal; Notable for the following components:    MRSA Screen By PCR Positive (*)     All other components within normal limits    Narrative:     A positive result does not necessarily indicate the presence of viable organisms. For confirmation, epidemiological typing and susceptibility testing, appropriate culture is needed.    PLEASE REFER TO MRSA SCREENING PROTOCOL FOR TREATMENT.     CBC W/ DIFFERENTIAL - Abnormal; Notable for the following components:    HGB 11.8 (*)     RDW-SD 50.3 (*)     Neutrophil Absolute Prelim 10.31 (*)     Neutrophil Absolute 10.31 (*)     Lymphocyte Absolute 0.22 (*)     All other components within normal limits   TROPONIN I HIGH SENSITIVITY - Normal   CBC WITH DIFFERENTIAL WITH PLATELET    Narrative:     The following orders were created for panel order CBC With Differential  With Platelet.  Procedure                               Abnormality         Status                     ---------                               -----------         ------                     CBC W/ DIFFERENTIAL[[id number]]          Abnormal            Final result                 Please view results for these tests on the individual orders.     Radiology findings:  I personally reviewed the images.   CT CHEST PE AORTA (IV ONLY) (CPT=71260)    Result Date: 5/3/2024  CONCLUSION:   No evidence of acute pulmonary embolism to the level of the first order subsegmental pulmonary artery branches.  Severe enlargement of the main pulmonary artery measuring 4.5 cm, as is seen in chronic pulmonary arterial hypertension  No suspicious right hilar mass.  Finding on chest radiograph corresponds to engorged pulmonary vasculature.  Mild cardiomegaly with pulmonary edema.   Scant bibasilar opacities which may be secondary to subsegmental atelectasis, with less likely differential of alveolar edema.  Trace bilateral pleural effusions.    Dictated by (CST): Isabela Baker MD on 5/03/2024 at 9:51 PM     Finalized by (CST): Isabela Baker MD on 5/03/2024 at 9:58 PM          XR CHEST AP PORTABLE  (CPT=71045)    Result Date: 5/3/2024  CONCLUSION:   Mild cardiomegaly with interstitial pulmonary edema.  Left retrocardiac opacity, possibly combination of subsegmental atelectasis and/or alveolar edema.  Trace left pleural effusion.  Nodular appearance of right kareen, thought to represent a vascular congestion.  Recommend radiographic follow-up to resolution.  If this finding does not resolve with treatment of pulmonary edema, contrast enhanced CT chest would then be indicated to exclude underlying mass/adenopathy.     Dictated by (CST): Isabela Baker MD on 5/03/2024 at 7:13 PM     Finalized by (CST): Isabela Baker MD on 5/03/2024 at 7:15 PM           EKG as interpreted by me: Ventricular rate 98, normal sinus rhythm, normal axis, no ID  interval prolongation, narrow QRS, QTc 460 ms, no ST segment elevations or depressions, no abnormal T wave inversions  Cardiac Monitor: Interpreted by me.   Pulse Readings from Last 1 Encounters:   05/03/24 87   , sinus,     External non-ED records reviewed independently by me: Echocardiogram from 7/27/2023 reviewed showing ejection fraction of 60% with no regional wall motion abnormalities.    MDM   79-year-old female with a past medical history of hypertension, hyperlipidemia, CHF, COPD, A-fib presenting for evaluation of generalized weakness.  Upon arrival to the emergency department, patient is hypotensive but otherwise vitals are within normal limits which is well-appearing and in no acute distress with no focal neurologic deficit.  Bedside echocardiogram that shows approximately normal ejection fraction with no evidence of pericardial effusion.    Differential diagnoses considered includes, but is not limited to: Adverse side effect of medications, electrolyte or metabolic derangement, cardiogenic shock, sepsis,    Will obtain the following tests: CBC, CMP, magnesium, troponin, BNP, urinalysis, chest x-ray, CT PE, EKG  Please see ED course for my independent review of these tests/imaging results.    Initial Medications/Therapeutics administered: 1 L IV fluid bolus, 2 g of IV magnesium, Levophed    Chronic conditions affecting care: HTN, HLD, CHF, COPD, A-fib    Social Determinants of Health that impacted care: None    ED Course as of 05/03/24 2336  ------------------------------------------------------------  Time: 05/03 2333  Comment: Labs fairly reassuring.  Mild hypomagnesemia which patient received 2 g of magnesium.  Otherwise electrolytes within normal limits.  ER with creatinine of 1.5 from baseline of 1.  No significant leukocytosis.  No anemia.  Urinalysis without evidence of urinary tract infection.  I independently reviewed the chest x-ray images that show evidence of mild pulmonary edema.  Fluids  were stopped.  Patient remained hypotensive and therefore was started on peripheral Levophed.  Patient is on beta-blockers, calcium channel blockers, and amiodarone.  I believe her hypotension today is likely secondary to over-medications.  I suspect that her hypotension will resolve as medications are metabolized.  Blood pressure significantly improved on peripheral Levophed.  I discussed patient's case with the [facility] hospitalist as well as [facility] pulmonology, Dr. Power, accepted the patient for admission to ICU for further management.            Critical Care:  I spent a total of 40 minutes of critical care time in obtaining history, performing a physical exam, bedside monitoring of interventions, collecting and interpreting tests and discussion with consultants but not including time spent performing procedures.      Disposition and Plan     Clinical Impression:  1. Hypotension due to drugs        Disposition:  Admit    Follow-up:  No follow-up provider specified.    Medications Prescribed:  Current Discharge Medication List          Hospital Problems       Present on Admission  Date Reviewed: 4/29/2024            ICD-10-CM Noted POA    * (Principal) Hypotension due to drugs I95.2 5/3/2024 Unknown        This note may have been created using voice dictation technology and may include inadvertent errors.      Laura Ann MD  Emergency Medicine

## 2024-05-04 NOTE — PROGRESS NOTES
[facility] Pharmacy Dosing Service    Initial Pharmacokinetic Consult for Aminoglycoside Dosing      Adore Covington is a 79 year old patient who is being treated for sepsis.  Pharmacy has been asked to dose amikacin  by Dr. Kc.    Allergies:  Lactose    Other current Anti-infective medication(s):   Anti-infectives (From admission, onward)      Start     Dose/Rate Route Frequency Ordered Stop    24 1500  piperacillin-tazobactam (Zosyn) 4.5 g in dextrose 5% 100 mL IVPB-ADDV         4.5 g  25 mL/hr over 4 Hours Intravenous Every 8 hours 24 1435      24 1500  amikacin (Amikin) 1,150 mg in sodium chloride 0.9% 250 mL IVPB         15 mg/kg × 77.5 kg (Adjusted)  250 mL/hr over 60 Minutes Intravenous Every 24 hours 24 1447      24 0900  vancomycin (Vancocin) 1.75 g in sodium chloride 0.9% 500mL IVPB premix         15 mg/kg × 118.5 kg  250 mL/hr over 120 Minutes Intravenous Every 24 hours 24 0726              Vitals:   BP 98/54   Pulse 88   Temp 98.5 °F (36.9 °C) (Temporal)   Resp (!) 30   Ht 1.575 m (5' 2\")   Wt 118.5 kg (261 lb 3.9 oz)   SpO2 91%   BMI 47.78 kg/m²   Wt Readings from Last 1 Encounters:   24 118.5 kg (261 lb 3.9 oz)     Ideal body weight: 50.1 kg (110 lb 7.2 oz)  Adjusted ideal body weight: 77.5 kg (170 lb 12.3 oz)    Temp (24hrs), Av °F (36.7 °C), Min:97.6 °F (36.4 °C), Max:98.5 °F (36.9 °C)      Labs:  Recent Labs     24  1850   WBC 10.9   CREATSERUM 1.50*     CrCl:  Estimated Creatinine Clearance: 24.8 mL/min (A) (based on SCr of 1.5 mg/dL (H)).      Intake/Output Summary (Last 24 hours) at 2024 1501  Last data filed at 2024 1500  Gross per 24 hour   Intake 1954.2 ml   Output 501 ml   Net 1453.2 ml       Cultures: No results found for this visit on 24.      Based on the above:    1.  Start amikacin)  600mg(8mg/kg)  every 48 hours based upon adjusted body weight of 77.5 kg and renal function.    2.  Pharmacy ordered amikacin  peak/trough level around 3rd or 4th dose.   Goal peak/trough  Peak 25-35/ and Trough <5  mcg/mL    3. Pharmacy will need SCr daily while on amikacin to assess renal function.    4.  Pharmacy will follow and monitor renal function changes, toxicity and efficacy.    Pharmacy will continue to follow.  We appreciate the opportunity to assist in the care of this patient.    Karine Archuleta, Zaida  5/4/2024  3:01 PM

## 2024-05-04 NOTE — PLAN OF CARE
Received admission from ED. Pt arrived on 3 mcg/min Levo gtt. HoTn noted after settled. Titrated Levo throughout night as appropriate. BP very labile. Pt received on 2L NC. Desaturation to low 80s after settled. Titrated to 5L NC. RT bedside later to place pt on CPAP, tolerated <1 hour then removed w/o notifying staff. Intermittent desaturations. Sustained on RA for >30 min before being placed back on supplemental O2. Purewick intact but pt not using. No void or BM during remainder of shift. Hypotensive episodes w/ asymptomatic presentation. Pt A/Ox3, following commands. Forgetful w/ instructions, poor attention span. Wounds traced and charted. Wound consult, PT/OT consult ordered. Pt had one episode of coughing after sipping water. Transient. Generally tolerating PO.     All safety measures provided. Call light w/in reach. Bed low/locked, side rails in use.     Problem: CARDIOVASCULAR - ADULT  Goal: Maintains optimal cardiac output and hemodynamic stability  Description: INTERVENTIONS:  - Monitor vital signs, rhythm, and trends  - Monitor for bleeding, hypotension and signs of decreased cardiac output  - Evaluate effectiveness of vasoactive medications to optimize hemodynamic stability  - Monitor arterial and/or venous puncture sites for bleeding and/or hematoma  - Assess quality of pulses, skin color and temperature  - Assess for signs of decreased coronary artery perfusion - ex. Angina  - Evaluate fluid balance, assess for edema, trend weights  Outcome: Not Progressing  Goal: Absence of cardiac arrhythmias or at baseline  Description: INTERVENTIONS:  - Continuous cardiac monitoring, monitor vital signs, obtain 12 lead EKG if indicated  - Evaluate effectiveness of antiarrhythmic and heart rate control medications as ordered  - Initiate emergency measures for life threatening arrhythmias  - Monitor electrolytes and administer replacement therapy as ordered  Outcome: Progressing     Problem: RESPIRATORY -  ADULT  Goal: Achieves optimal ventilation and oxygenation  Description: INTERVENTIONS:  - Assess for changes in respiratory status  - Assess for changes in mentation and behavior  - Position to facilitate oxygenation and minimize respiratory effort  - Oxygen supplementation based on oxygen saturation or ABGs  - Provide Smoking Cessation handout, if applicable  - Encourage broncho-pulmonary hygiene including cough, deep breathe, Incentive Spirometry  - Assess the need for suctioning and perform as needed  - Assess and instruct to report SOB or any respiratory difficulty  - Respiratory Therapy support as indicated  - Manage/alleviate anxiety  - Monitor for signs/symptoms of CO2 retention  Outcome: Not Progressing     Problem: GENITOURINARY - ADULT  Goal: Absence of urinary retention  Description: INTERVENTIONS:  - Assess patient’s ability to void and empty bladder  - Monitor intake/output and perform bladder scan as needed  - Follow urinary retention protocol/standard of care  - Consider collaborating with pharmacy to review patient's medication profile  - Implement strategies to promote bladder emptying  Outcome: Not Progressing     Problem: METABOLIC/FLUID AND ELECTROLYTES - ADULT  Goal: Electrolytes maintained within normal limits  Description: INTERVENTIONS:  - Monitor labs and rhythm and assess patient for signs and symptoms of electrolyte imbalances  - Administer electrolyte replacement as ordered  - Monitor response to electrolyte replacements, including rhythm and repeat lab results as appropriate  - Fluid restriction as ordered  - Instruct patient on fluid and nutrition restrictions as appropriate  Outcome: Progressing  Goal: Hemodynamic stability and optimal renal function maintained  Description: INTERVENTIONS:  - Monitor labs and assess for signs and symptoms of volume excess or deficit  - Monitor intake, output and patient weight  - Monitor urine specific gravity, serum osmolarity and serum sodium as  indicated or ordered  - Monitor response to interventions for patient's volume status, including labs, urine output, blood pressure (other measures as available)  - Encourage oral intake as appropriate  - Instruct patient on fluid and nutrition restrictions as appropriate  Outcome: Not Progressing     Problem: SKIN/TISSUE INTEGRITY - ADULT  Goal: Skin integrity remains intact  Description: INTERVENTIONS  - Assess and document risk factors for pressure ulcer development  - Assess and document skin integrity  - Monitor for areas of redness and/or skin breakdown  - Initiate interventions, skin care algorithm/standards of care as needed  Outcome: Not Progressing  Goal: Incision(s), wounds(s) or drain site(s) healing without S/S of infection  Description: INTERVENTIONS:  - Assess and document risk factors for pressure ulcer development  - Assess and document skin integrity  - Assess and document dressing/incision, wound bed, drain sites and surrounding tissue  - Implement wound care per orders  - Initiate isolation precautions as appropriate  - Initiate Pressure Ulcer prevention bundle as indicated  Outcome: Not Progressing  Goal: Oral mucous membranes remain intact  Description: INTERVENTIONS  - Assess oral mucosa and hygiene practices  - Implement preventative oral hygiene regimen  - Implement oral medicated treatments as ordered  Outcome: Progressing     Problem: HEMATOLOGIC - ADULT  Goal: Maintains hematologic stability  Description: INTERVENTIONS  - Assess for signs and symptoms of bleeding or hemorrhage  - Monitor labs and vital signs for trends  - Administer supportive blood products/factors, fluids and medications as ordered and appropriate  - Administer supportive blood products/factors as ordered and appropriate  Outcome: Progressing     Problem: MUSCULOSKELETAL - ADULT  Goal: Return mobility to safest level of function  Description: INTERVENTIONS:  - Assess patient stability and activity tolerance for  standing, transferring and ambulating w/ or w/o assistive devices  - Assist with transfers and ambulation using safe patient handling equipment as needed  - Ensure adequate protection for wounds/incisions during mobilization  - Obtain PT/OT consults as needed  - Advance activity as appropriate  - Communicate ordered activity level and limitations with patient/family  Outcome: Not Progressing

## 2024-05-04 NOTE — PROGRESS NOTES
[facility] Pharmacy Dosing Service      Initial Pharmacokinetic Consult for Vancomycin Dosing     Adore Covington is a 79 year old female who is being initiated on vancomycin therapy for cellulitis.  Pharmacy has been asked to dose vancomycin by Ang Quinteros MD.  The initial treatment and monitoring approach will be non-AUC strategy.        Weight and Temperature:    Wt Readings from Last 1 Encounters:   24 118.5 kg (261 lb 3.9 oz)        Temp Readings from Last 1 Encounters:   24 97.8 °F (36.6 °C) (Temporal)      Labs:   Recent Labs   Lab 24  1850   CREATSERUM 1.50*      Estimated Creatinine Clearance: 24.8 mL/min (A) (based on SCr of 1.5 mg/dL (H)).     Recent Labs   Lab 24  1850   WBC 10.9          The Pharmacokinetic Target is:    Trough/random 10-15 mg/L    Renal Dosing Considerations:    MARINO/ARF     Assessment/Plan:   Initial/Loading dose: Will receive 1750mg (15 mg/kg, capped at 2250 mg) x 1 loading dose      Maintenance dose: Pharmacy will dose vancomycin at 1750 mg IV every 24 hours    Monitorin) Plan for vancomycin trough to be obtained before the 3rd dose    2) Pharmacy will order SCr as clinically indicated to assess renal function.    3) Pharmacy will monitor for toxicity and efficacy, adjust vancomycin dose and/or frequency, and order vancomycin levels as appropriate per the Pharmacy and Therapeutics Committee approved protocol until discontinuation of the medication.       We appreciate the opportunity to assist in the care of this patient.     Karine Archuleta PharmD  2024  7:36 AM  [place]  Pharmacy Extension: [phone]

## 2024-05-04 NOTE — CONSULTS
Infectious Disease Initial Consultation      Date of admission: 5/3/2024  6:34 PM     Date of service: 05/04/24 10:27 AM    Consult requested by: Norman Luo MD     Reason for consult: Cellulitis, septic shock    Chief complaint: Septic shock    History of present illness: Adore Covington is a 79 year old female with with history of atrial fibrillation, hypertension, morbid obesity, who presents to the hospital with hypotension and septic shock in the setting of left lower extremity cellulitis.  The patient reports that this redness has started a few days ago and has been getting worse.    In the emergency room, she was afebrile, hypotensive requiring vasopressor support.  Labs revealed unremarkable white blood cell count; however, there was a left shift.  BMP showed creatinine 1.5.  LFTs unremarkable.  Procalcitonin was elevated 58.  UA without UTI.  MRSA screen was positive.  CT chest PE protocol did not show evidence of PE.  No pneumonia.  Given the positive MRSA screen, the patient was started on IV vancomycin and admitted to the ICU.    Risk factors/Exposures:  Living situation: At home with family  Sick contacts: None  Animals: No pets or other animal exposure  Travel: No recent local/international travel  /retirement/senior living: None  Outdoor activities: Nothing unusual  Sexual behavior: Heterosexual, no high risk sexual behavior  Active TB exposure: None  Employment: Currently retired  IV drug use: None    Review of systems:  All other components of the review of systems are negative, except those described in the history of present illness.     Past Medical History:    Arrhythmia    Atrial fibrillation (HCC)    Congestive heart disease (HCC)    COPD (chronic obstructive pulmonary disease) (HCC)    Essential hypertension    High blood pressure    High cholesterol    Morbid obesity with BMI of 50.0-59.9, adult (HCC)    Muscle weakness    Neuropathy    Peripheral vascular disease (HCC)    Sleep apnea     Visual impairment     Past Surgical History:   Procedure Laterality Date    Knee surgery Bilateral      Social History     Socioeconomic History    Marital status:    Tobacco Use    Smoking status: Former     Current packs/day: 0.00     Types: Cigarettes     Quit date:      Years since quittin.3     Passive exposure: Never    Smokeless tobacco: Never   Substance and Sexual Activity    Alcohol use: Yes     Comment: 1-2 drinks daily    Drug use: Never     Social Determinants of Health     Financial Resource Strain: Unknown (2021)    Received from [facility]    Overall Financial Resource Strain (CARDIA)     Difficulty of Paying Living Expenses: Patient declined   Food Insecurity: No Food Insecurity (2024)    Food Insecurity     Food Insecurity: Never true   Transportation Needs: No Transportation Needs (2024)    Transportation Needs     Lack of Transportation: No   Housing Stability: Low Risk  (2024)    Housing Stability     Housing Instability: No     History reviewed. No pertinent family history.  Reviewed, see above    Medications:    acetaminophen    ondansetron    metoclopramide    polyethylene glycol (PEG 3350)    sennosides    bisacodyl    melatonin    apixaban    gabapentin    pantoprazole    pravastatin    vancomycin    sodium chloride    norepinephrine     Allergies:  Allergies   Allergen Reactions    Lactose OTHER (SEE COMMENTS)     Patient denies allergy       Physical Exam:  Vitals:    24 0800   BP: 91/67   Pulse: 84   Resp: 26   Temp:      Vitals signs and nursing note reviewed.   Constitutional:       Appearance: Normal appearance.   HENT:      Head: Normocephalic and atraumatic.      Mouth: Mucous membranes are moist.   Neck:      Musculoskeletal: Neck supple.   Cardiovascular:      Rate and Rhythm: Normal rate.      Heart sounds: Normal heart sounds. No murmur. No friction rub. No gallop.    Pulmonary:      Effort: Pulmonary effort is normal. No respiratory  distress.      Breath sounds: Normal breath sounds. No stridor. No wheezing, rhonchi or rales.   Chest:      Chest wall: No tenderness.   Abdominal:      General: Abdomen is flat. There is no distension.      Palpations: Abdomen is soft. There is no mass.      Tenderness: There is no tenderness. There is no guarding or rebound.      Hernia: No hernia is present.   Musculoskeletal:      Right lower leg: No edema.      Left lower leg: +3 edema.  Acute cellulitis extending from her foot all the way to her mid thigh.  Examination of the foot revealed a healing ulcer at the plantar aspect of the foot; however, there is tenderness and swelling noted at the site without ulcer  Skin:     General: Skin is warm and dry.   Neurological:      General: No focal deficit present.      Mental Status: Alert and oriented to person, place, and time.     Laboratory data:  I have independently reviewed all lab results; including old microbiological results.  Lab Results   Component Value Date    WBC 10.9 05/03/2024    HGB 11.8 05/03/2024    HCT 36.3 05/03/2024    .0 05/03/2024    CREATSERUM 1.50 05/03/2024    BUN 22 05/03/2024     05/03/2024    K 3.3 05/03/2024     05/03/2024    CO2 27.0 05/03/2024     05/03/2024    CA 8.9 05/03/2024    ALB 3.9 05/03/2024    ALKPHO 58 05/03/2024    BILT 0.9 05/03/2024    TP 6.7 05/03/2024    AST 17 05/03/2024    ALT 10 05/03/2024    TSH 3.369 05/03/2024    MG 1.5 05/03/2024    TROPHS 10 05/03/2024    CK 56 05/03/2024        Recent Labs   Lab 05/03/24  1850   RBC 3.85   HGB 11.8*   HCT 36.3   MCV 94.3   MCH 30.6   MCHC 32.5   RDW 14.6   NEPRELIM 10.31*   WBC 10.9   .0       Microbiology data:  No results found for this visit on 05/03/24.      Radiology:  I have reviewed all imaging data available independently.   CT PE protocol on 5/8/2024:  No evidence of PE.  No pneumonia.  No acute infectious processes    Impression:  Adore Covington is a 79 year old female with      Acute left lower extremity cellulitis presenting here with septic shock with risk for life  MRSA screen positive  Currently on IV vancomycin  Foot examination is concerning for a deeper tissue infection/osteomyelitis/abscess  Continues to worsen from a vasopressor requirement perspective  MARINO  Antibiotics will be needed adjusted  Diarrhea  C. difficile pending    Recommendations:     Continue IV vancomycin  Start IV Zosyn 3.375 g every 8 extended infusion  Will give her a dose of amikacin 15 mg/kg, pharmacy to dose thereafter  Check MRI of the left foot with and without contrast to rule out underlying osteomyelitis/abscess  Continue to follow-up on blood culture data  Continue to monitor daily labs for antibiotic toxicity  Further recommendations will depend on the above work-up and clinical progress     The plan of care was discussed with the primary hospital team, Norman Luo MD     Recommendations were also discussed with the patient; all questions were answered.     Thank you for this consultation. Please don't hesitate to call the ID team for questions or any acute changes in patient's clinical condition.    Please note that this report has been produced using speech recognition software and may contain errors related to that system including, but not limited to, errors in grammar, punctuation, and spelling, as well as words and phrases that possibly may have been recognized inappropriately.  If there are any questions or concerns, contact the dictating provider for clarification.    The 21st Century Cures Act makes medical notes like these available to patients in the interest of transparency. Please be advised this is a medical document. Medical documents are intended to carry relevant information, facts as evident, and the clinical opinion of the practitioner. The medical note is intended as peer to peer communication and may appear blunt or direct. It is written in medical language and may contain  abbreviations or verbiage that are unfamiliar.     MD RE[facility]. Tel: [phone]. Fax: [phone].     Adore Pachecones : 1945 MRN: [id number] [facility]: [id number]

## 2024-05-04 NOTE — CONSULTS
Pulmonary Consult       NAME: Adore Covington - ROOM: 214/214-A - MRN: [id number] - Age: 79 year old - :  1945    Date of Admission: 5/3/2024  6:34 PM  Admission Diagnosis: Hypotension due to drugs [I95.2]    History of Present Illness: Asked to see patient for hypotension.  The patient states that for the past 2 days she has been feeling weak and nauseous.  She came to the ED and was found to be hypotensive.  She denies chest pain, no dyspnea.  She admits that her left leg has been red but is not sure if the current redness is more than her usual.  She is on anticoagulation for afib.  She uses CPAP at home     Past Medical History:    Arrhythmia    Atrial fibrillation (HCC)    Congestive heart disease (HCC)    COPD (chronic obstructive pulmonary disease) (HCC)    Essential hypertension    High blood pressure    High cholesterol    Morbid obesity with BMI of 50.0-59.9, adult (HCC)    Muscle weakness    Neuropathy    Peripheral vascular disease (HCC)    Sleep apnea    Visual impairment      Past Surgical History:   Procedure Laterality Date    Knee surgery Bilateral       Medications Prior to Admission   Medication Sig Dispense Refill Last Dose    dilTIAZem HCl  MG Oral Capsule SR 24 Hr Take 1 capsule (240 mg total) by mouth daily.       amiodarone 200 MG Oral Tab Take 1 tablet (200 mg total) by mouth daily. 30 tablet 0     pantoprazole 40 MG Oral Tab EC Take 1 tablet (40 mg total) by mouth every morning before breakfast. 60 tablet 0     apixaban 5 MG Oral Tab Take 1 tablet (5 mg total) by mouth 2 (two) times daily.       potassium chloride 20 MEQ Oral Tab CR Take 1 tablet (20 mEq total) by mouth daily.       cycloSPORINE 0.05 % Ophthalmic Emulsion Place 1 drop into both eyes 2 (two) times daily.       triamcinolone 0.1 % External Cream Apply 1 Application topically 2 (two) times daily as needed.       MYRBETRIQ 25 MG Oral Tablet 24 Hr 1 TABLET BY MOUTH EVERYDAY FOR 90 DAYS       simvastatin 10 MG  Oral Tab Take 1 tablet (10 mg total) by mouth As Directed.       gabapentin 100 MG Oral Cap Take 2 capsules (200 mg total) by mouth 2 (two) times daily.       metoprolol succinate  MG Oral Tablet 24 Hr Take 1 tablet (100 mg total) by mouth daily. 0.5 tablet per day        Allergies   Allergen Reactions    Lactose OTHER (SEE COMMENTS)     Patient denies allergy       Social History     Socioeconomic History    Marital status:      Spouse name: Not on file    Number of children: Not on file    Years of education: Not on file    Highest education level: Not on file   Occupational History    Not on file   Tobacco Use    Smoking status: Former     Current packs/day: 0.00     Types: Cigarettes     Quit date:      Years since quittin.3     Passive exposure: Never    Smokeless tobacco: Never   Substance and Sexual Activity    Alcohol use: Yes     Comment: 1-2 drinks daily    Drug use: Never    Sexual activity: Not on file   Other Topics Concern    Not on file   Social History Narrative    Not on file     Social Determinants of Health     Financial Resource Strain: Unknown (2021)    Received from [facility]    Overall Financial Resource Strain (CARDIA)     Difficulty of Paying Living Expenses: Patient declined   Food Insecurity: No Food Insecurity (2024)    Food Insecurity     Food Insecurity: Never true   Transportation Needs: No Transportation Needs (2024)    Transportation Needs     Lack of Transportation: No   Physical Activity: Not on file   Stress: Not on file   Social Connections: Not on file   Housing Stability: Low Risk  (2024)    Housing Stability     Housing Instability: No     Housing Instability Emergency: Not on file     History reviewed. No pertinent family history.      Scheduled Medication:   apixaban  5 mg Oral BID    gabapentin  200 mg Oral BID    pantoprazole  40 mg Oral QAM AC    pravastatin  20 mg Oral Nightly    potassium chloride  40 mEq Intravenous Once      Continuous Infusing Medication:   norepinephrine 6 mcg/min (05/04/24 0515)     PRN Medication:  acetaminophen    ondansetron    metoclopramide    polyethylene glycol (PEG 3350)    sennosides    bisacodyl    melatonin     REVIEW OF SYSTEMS:   GENERAL: feels weak  SKIN: leg wounds   EYES: denies blurred vision or double vision   HEENT: denies nasal congestion  RESPIRATORY:as above   CARDIOVASCULAR: denies chest pain on exertion   GI: denies abdominal pain, denies heartburn   : denies hematuria  MUSCULOSKELETAL: denies back pain   NEURO: denies headaches   PSYCHE: denies uncontrolled depression or anxiety   HEMATOLOGIC: denies hx of VTE  ENDOCRINE: denies thyroid history          OBJECTIVE:    Oxygen Therapy  SpO2: 93 %  O2 Device: Nasal cannula  O2 Flow Rate (L/min): 5 L/min                  Wt Readings from Last 3 Encounters:   05/04/24 261 lb 3.9 oz (118.5 kg)   04/29/24 261 lb (118.4 kg)   02/23/24 277 lb 8 oz (125.9 kg)         Intake/Output Summary (Last 24 hours) at 5/4/2024 0708  Last data filed at 5/4/2024 0600  Gross per 24 hour   Intake 560 ml   Output 1 ml   Net 559 ml       BP (!) 83/49   Pulse 84   Temp 97.8 °F (36.6 °C) (Temporal)   Resp 25   Ht 5' 2\" (1.575 m)   Wt 261 lb 3.9 oz (118.5 kg)   SpO2 93%   BMI 47.78 kg/m²   General:  Alert, cooperative, no distress, appears stated age.   Head:  Normocephalic, without obvious abnormality, atraumatic.   Eyes:  Conjunctivae/corneas clear.   Neck: Supple   Lungs:   Clear to auscultation bilaterally.   Chest wall:  No tenderness or deformity.   Heart:  Regular rate and rhythm,    Abdomen:   Soft, non-tender. Non distended   Extremities: Left LE erythema    Neurologic:  no focal deficits     Recent Results (from the past 24 hour(s))   Comp Metabolic Panel (14)    Collection Time: 05/03/24  6:50 PM   Result Value Ref Range    Glucose 107 (H) 70 - 99 mg/dL    Sodium 138 136 - 145 mmol/L    Potassium 3.3 (L) 3.5 - 5.1 mmol/L    Chloride 102 98 - 112  mmol/L    CO2 27.0 21.0 - 32.0 mmol/L    Anion Gap 9 0 - 18 mmol/L    BUN 22 9 - 23 mg/dL    Creatinine 1.50 (H) 0.55 - 1.02 mg/dL    BUN/CREA Ratio 14.7 10.0 - 20.0    Calcium, Total 8.9 8.7 - 10.4 mg/dL    Calculated Osmolality 290 275 - 295 mOsm/kg    eGFR-Cr 36 (L) >=60 mL/min/1.73m2    ALT 10 10 - 49 U/L    AST 17 <=34 U/L    Alkaline Phosphatase 58 55 - 142 U/L    Bilirubin, Total 0.9 0.2 - 1.1 mg/dL    Total Protein 6.7 5.7 - 8.2 g/dL    Albumin 3.9 3.2 - 4.8 g/dL    Globulin  2.8 2.0 - 3.5 g/dL    A/G Ratio 1.4 1.0 - 2.0   Troponin I (High Sensitivity)    Collection Time: 05/03/24  6:50 PM   Result Value Ref Range    Troponin I (High Sensitivity) 10 <=34 ng/L   BNP (Brain Natriuretic Peptide)    Collection Time: 05/03/24  6:50 PM   Result Value Ref Range    Beta Natriuretic Peptide 193 (H) <100 pg/mL   Magnesium    Collection Time: 05/03/24  6:50 PM   Result Value Ref Range    Magnesium 1.5 (L) 1.6 - 2.6 mg/dL   CBC W/ DIFFERENTIAL    Collection Time: 05/03/24  6:50 PM   Result Value Ref Range    WBC 10.9 4.0 - 11.0 x10(3) uL    RBC 3.85 3.80 - 5.30 x10(6)uL    HGB 11.8 (L) 12.0 - 16.0 g/dL    HCT 36.3 35.0 - 48.0 %    MCV 94.3 80.0 - 100.0 fL    MCH 30.6 26.0 - 34.0 pg    MCHC 32.5 31.0 - 37.0 g/dL    RDW-SD 50.3 (H) 35.1 - 46.3 fL    RDW 14.6 11.0 - 15.0 %    .0 150.0 - 450.0 10(3)uL    Neutrophil Absolute Prelim 10.31 (H) 1.50 - 7.70 x10 (3) uL    Neutrophil Absolute 10.31 (H) 1.50 - 7.70 x10(3) uL    Lymphocyte Absolute 0.22 (L) 1.00 - 4.00 x10(3) uL    Monocyte Absolute 0.27 0.10 - 1.00 x10(3) uL    Eosinophil Absolute 0.01 0.00 - 0.70 x10(3) uL    Basophil Absolute 0.02 0.00 - 0.20 x10(3) uL    Immature Granulocyte Absolute 0.05 0.00 - 1.00 x10(3) uL    Neutrophil % 94.7 %    Lymphocyte % 2.0 %    Monocyte % 2.5 %    Eosinophil % 0.1 %    Basophil % 0.2 %    Immature Granulocyte % 0.5 %   TSH W Reflex To Free T4    Collection Time: 05/03/24  6:50 PM   Result Value Ref Range    TSH 3.369 0.550 -  4.780 mIU/mL   CK (Creatine Kinase) (Not Creatinine)    Collection Time: 05/03/24  6:50 PM   Result Value Ref Range    CK 56 34 - 145 U/L   EKG 12 Lead    Collection Time: 05/03/24  6:50 PM   Result Value Ref Range    Ventricular rate 98 BPM    Atrial rate 98 BPM    P-R Interval 174 ms    QRS Duration 78 ms    Q-T Interval 326 ms    QTC Calculation (Bezet) 416 ms    P Axis 63 degrees    R Axis 35 degrees    T Axis 39 degrees   POCT Glucose    Collection Time: 05/03/24  6:55 PM   Result Value Ref Range    POC Glucose  106 (H) 70 - 99 mg/dL   Urinalysis with Culture Reflex    Collection Time: 05/03/24  9:59 PM    Specimen: Urine, clean catch   Result Value Ref Range    Urine Color Yellow Yellow    Clarity Urine Turbid (A) Clear    Spec Gravity 1.030 1.005 - 1.030    Glucose Urine Normal Normal mg/dL    Bilirubin Urine Negative Negative    Ketones Urine Negative Negative mg/dL    Blood Urine Negative Negative    pH Urine 5.5 5.0 - 8.0    Protein Urine 30 (A) Negative mg/dL    Urobilinogen Urine Normal Normal    Nitrite Urine Negative Negative    Leukocyte Esterase Urine Negative Negative    WBC Urine 1-5 0 - 5 /HPF    RBC Urine 3-5 (A) 0 - 2 /HPF    Bacteria Urine None Seen None Seen /HPF    Squamous Epi. Cells Few (A) None Seen /HPF    Renal Tubular Epithelial Cells None Seen None Seen /HPF    Transitional Cells None Seen None Seen /HPF    Hyaline Casts Present (A) None Seen /LPF    Yeast Urine None Seen None Seen /HPF   Emergency MRSA Screen by PCR    Collection Time: 05/03/24 10:03 PM   Result Value Ref Range    MRSA Screen By PCR Positive (A) Negative   Cortisol    Collection Time: 05/04/24  3:45 AM   Result Value Ref Range    Cortisol 49.9 ug/dL   RAINBOW DRAW LIGHT GREEN    Collection Time: 05/04/24  4:10 AM   Result Value Ref Range    Hold Lt Green Auto Resulted    RAINBOW DRAW LAVENDER    Collection Time: 05/04/24  4:10 AM   Result Value Ref Range    Hold Lavender Auto Resulted      Echo 7/2023 - EF 60% and  diastolic dysfunction     Imaging: CT chest   Impression   CONCLUSION:     No evidence of acute pulmonary embolism to the level of the first order subsegmental pulmonary artery branches.     Severe enlargement of the main pulmonary artery measuring 4.5 cm, as is seen in chronic pulmonary arterial hypertension     No suspicious right hilar mass.  Finding on chest radiograph corresponds to engorged pulmonary vasculature.     Mild cardiomegaly with pulmonary edema.       Scant bibasilar opacities which may be secondary to subsegmental atelectasis, with less likely differential of alveolar edema.     Trace bilateral pleural effusions.         Imaging personally reviewed by me    ASSESSMENT/PLAN:    Shock - due to combination of hypovolumia and sepsis.  No evidence to suggest pulmonary embolus or cardiac dysfunction  -IVF bolus and drip   -wean off pressors   Sepsis from cellulitis of the left LE in patient that is MRSA positive  -procalcitonin   -ID to see (consult placed and physician messaged)  -vancomycin   Acute hypoxemic respiratory failure  requiring O2 due to above and atelectasis   -wean off O2  Obstructive sleep apnea   -CPAP with sleep 13 cm H2O  Acute kidney injury due to above   -IVF  -follow labs   GI   -PPI  Paroxysmal afib  -on anticoagulation    Advanced directives -  DNR  Dispo - in ICU at risk for worsening and death  -will follow     Critical care time 40 minutes                 Ang Quinteros MD  5/4/2024

## 2024-05-05 ENCOUNTER — APPOINTMENT (OUTPATIENT)
Dept: MRI IMAGING | Facility: HOSPITAL | Age: 79
DRG: 871 | End: 2024-05-05
Attending: INTERNAL MEDICINE

## 2024-05-05 LAB
ALBUMIN SERPL-MCNC: 2.9 G/DL (ref 3.2–4.8)
ALBUMIN/GLOB SERPL: 1.2 {RATIO} (ref 1–2)
ALP LIVER SERPL-CCNC: 52 U/L
ALT SERPL-CCNC: 7 U/L
ANION GAP SERPL CALC-SCNC: 6 MMOL/L (ref 0–18)
AST SERPL-CCNC: 17 U/L (ref ?–34)
BILIRUB SERPL-MCNC: 0.6 MG/DL (ref 0.2–1.1)
BUN BLD-MCNC: 18 MG/DL (ref 9–23)
BUN/CREAT SERPL: 21.4 (ref 10–20)
C DIFF TOX B STL QL: NEGATIVE
CALCIUM BLD-MCNC: 8.2 MG/DL (ref 8.7–10.4)
CHLORIDE SERPL-SCNC: 108 MMOL/L (ref 98–112)
CO2 SERPL-SCNC: 24 MMOL/L (ref 21–32)
CREAT BLD-MCNC: 0.84 MG/DL
DEPRECATED RDW RBC AUTO: 51.8 FL (ref 35.1–46.3)
EGFRCR SERPLBLD CKD-EPI 2021: 72 ML/MIN/1.73M2 (ref 60–?)
ERYTHROCYTE [DISTWIDTH] IN BLOOD BY AUTOMATED COUNT: 15.1 % (ref 11–15)
GLOBULIN PLAS-MCNC: 2.5 G/DL (ref 2–3.5)
GLUCOSE BLD-MCNC: 90 MG/DL (ref 70–99)
HCT VFR BLD AUTO: 32.1 %
HGB BLD-MCNC: 10.5 G/DL
MAGNESIUM SERPL-MCNC: 1.9 MG/DL (ref 1.6–2.6)
MCH RBC QN AUTO: 30.7 PG (ref 26–34)
MCHC RBC AUTO-ENTMCNC: 32.7 G/DL (ref 31–37)
MCV RBC AUTO: 93.9 FL
OSMOLALITY SERPL CALC.SUM OF ELEC: 287 MOSM/KG (ref 275–295)
PLATELET # BLD AUTO: 212 10(3)UL (ref 150–450)
POTASSIUM SERPL-SCNC: 3.8 MMOL/L (ref 3.5–5.1)
PROT SERPL-MCNC: 5.4 G/DL (ref 5.7–8.2)
RBC # BLD AUTO: 3.42 X10(6)UL
SODIUM SERPL-SCNC: 138 MMOL/L (ref 136–145)
WBC # BLD AUTO: 15.7 X10(3) UL (ref 4–11)

## 2024-05-05 PROCEDURE — 73720 MRI LWR EXTREMITY W/O&W/DYE: CPT | Performed by: INTERNAL MEDICINE

## 2024-05-05 PROCEDURE — 87493 C DIFF AMPLIFIED PROBE: CPT | Performed by: INTERNAL MEDICINE

## 2024-05-05 PROCEDURE — 83735 ASSAY OF MAGNESIUM: CPT | Performed by: INTERNAL MEDICINE

## 2024-05-05 PROCEDURE — 85027 COMPLETE CBC AUTOMATED: CPT | Performed by: INTERNAL MEDICINE

## 2024-05-05 PROCEDURE — A9575 INJ GADOTERATE MEGLUMI 0.1ML: HCPCS | Performed by: INTERNAL MEDICINE

## 2024-05-05 PROCEDURE — 80053 COMPREHEN METABOLIC PANEL: CPT | Performed by: INTERNAL MEDICINE

## 2024-05-05 RX ORDER — GADOTERATE MEGLUMINE 376.9 MG/ML
20 INJECTION INTRAVENOUS
Status: COMPLETED | OUTPATIENT
Start: 2024-05-05 | End: 2024-05-05

## 2024-05-05 RX ORDER — POTASSIUM CHLORIDE 20 MEQ/1
40 TABLET, EXTENDED RELEASE ORAL ONCE
Qty: 2 TABLET | Refills: 0 | Status: COMPLETED | OUTPATIENT
Start: 2024-05-05 | End: 2024-05-05

## 2024-05-05 NOTE — PROGRESS NOTES
[facility] Hospitalist Progress Note     CC: Hospital Follow up    PCP: Maral Huffman MD       Assessment/Plan:     Principal Problem:    Hypotension due to drugs    Patient is a 79 year old female with PMH sig for COPD, A- Fib on Eliquis, CHF, HTN, HLD, PAD, Neuropathy, SUSAN who presents with weakness and left leg redness.  Admitted for Sepsis Cellulitis.      Sepsis Cellulitis   Left Leg   MRSA positive   Leukocytosis   - hypotensive on admission, now resolved    - UA okay  - CXR and CT with some edema, no obvious signs of infection   - CT chest with no PE  - no fevers   - CK normal   - procal elevated   - f/u blood cx  - IV vanco and zosyn started   - ID consulted and one dose Amikacin given   - Wound care  - MRI left foot to r/o OM       Shock resolved   - likely 2/2 sepsis  - TSH and cortisol normal   - IVF and pressor support complete   - treat underlying cause   - Echo   - Crit Care on consult      Acute Hypoxic Respiratory Failure   H/o COPD not on inhalers   SUSAN  - CXR and CT with some edema, no obvious signs of infection   - CT chest with no PE  - supplemental O2 support   - cpap as tolerated      MARINO resolved   - likely pre renal and sepsis component   - gentle IVF complete, pressor support as needed   - avoid hypotension   - avoid nephrotoxic agents   - straight cath as needed      Chronic  A-fib  Secondary Hypercoagulable state   S/p Cardioversion   - Eliquis   - off Amio  - hold metop and dilt in sepsis hypotension   - in NSR      CHF  - CXR and CT with some edema  -   - will check echo  - hold BB and CBB  - hold home bumex   - monitor fluid status closely      HTN - hold BP meds for hypotension   HLD - statin   PAD - Eliquis and statin   Neuropathy - gabapentin      FN:  - IVF: complete   - Diet: Cardiac      DVT Prophy: SCDs Eliquis   Atrophy: Ambulate   Lines: Piv     Dispo: pending clinical course     Outpatient records or previous hospital records reviewed.      Further recommendations pending  patient's clinical course.  [facility] hospitalist to continue to follow patient while in house     Patient and/or patient's family given opportunity to ask questions and note understanding and agreeing with therapeutic plan as outlined     Thank You,  Norman Luo MD     [facility] Hospitalist  Answering Service number: [phone]     Subjective:     No CP, SOB, or N/V.  Feels better today.  Had some urine retention last night and needed straight cath.     OBJECTIVE:    Blood pressure 97/53, pulse 88, temperature 97.9 °F (36.6 °C), temperature source Temporal, resp. rate 19, height 5' 2\" (1.575 m), weight 257 lb 8 oz (116.8 kg), SpO2 93%.    Temp:  [97.9 °F (36.6 °C)-98.5 °F (36.9 °C)] 97.9 °F (36.6 °C)  Pulse:  [77-98] 88  Resp:  [14-32] 19  BP: ()/(41-96) 97/53  SpO2:  [86 %-96 %] 93 %      Intake/Output:    Intake/Output Summary (Last 24 hours) at 5/5/2024 1126  Last data filed at 5/5/2024 1044  Gross per 24 hour   Intake 5707.3 ml   Output 1675 ml   Net 4032.3 ml       Last 3 Weights   05/05/24 0600 257 lb 8 oz (116.8 kg)   05/04/24 0324 261 lb 3.9 oz (118.5 kg)   05/03/24 1837 261 lb 9.6 oz (118.7 kg)   05/04/24 1456 261 lb 3.9 oz (118.5 kg)   04/29/24 1532 261 lb (118.4 kg)       Exam   General: Alert, no acute distress  HEENT: oral mucosa normal   Neck: non tender, no adenopathy   Lungs: clear to ausculation bilaterally  Heart: Regular rate and rhythm  Abdomen: soft, non tender, non distended   Extremities: mild edema, left leg more red than right with chronic wounds   Skin: no new rash, normal color  Neuro: 5/5 strength in bilateral extremities, normal sensations  Psych: appropriate affect       Data Review:       Labs:     Recent Labs   Lab 05/03/24  1850 05/05/24  0457   RBC 3.85 3.42*   HGB 11.8* 10.5*   HCT 36.3 32.1*   MCV 94.3 93.9   MCH 30.6 30.7   MCHC 32.5 32.7   RDW 14.6 15.1*   NEPRELIM 10.31*  --    WBC 10.9 15.7*   .0 212.0         Recent Labs   Lab 05/03/24  5258  05/04/24  1743 05/05/24  0457   * 99 90   BUN 22 20 18   CREATSERUM 1.50* 0.96 0.84   EGFRCR 36* 61 72   CA 8.9 8.5* 8.2*    138 138   K 3.3* 3.9 3.8    106 108   CO2 27.0 25.0 24.0       Recent Labs   Lab 05/03/24  1850 05/04/24  1743 05/05/24  0457   ALT 10 10 7*   AST 17 20 17   ALB 3.9 3.3 2.9*         Imaging:  CT CHEST PE AORTA (IV ONLY) (CPT=71260)    Result Date: 5/3/2024  CONCLUSION:   No evidence of acute pulmonary embolism to the level of the first order subsegmental pulmonary artery branches.  Severe enlargement of the main pulmonary artery measuring 4.5 cm, as is seen in chronic pulmonary arterial hypertension  No suspicious right hilar mass.  Finding on chest radiograph corresponds to engorged pulmonary vasculature.  Mild cardiomegaly with pulmonary edema.   Scant bibasilar opacities which may be secondary to subsegmental atelectasis, with less likely differential of alveolar edema.  Trace bilateral pleural effusions.    Dictated by (CST): Isabela Baker MD on 5/03/2024 at 9:51 PM     Finalized by (CST): Isabela Baker MD on 5/03/2024 at 9:58 PM          XR CHEST AP PORTABLE  (CPT=71045)    Result Date: 5/3/2024  CONCLUSION:   Mild cardiomegaly with interstitial pulmonary edema.  Left retrocardiac opacity, possibly combination of subsegmental atelectasis and/or alveolar edema.  Trace left pleural effusion.  Nodular appearance of right kareen, thought to represent a vascular congestion.  Recommend radiographic follow-up to resolution.  If this finding does not resolve with treatment of pulmonary edema, contrast enhanced CT chest would then be indicated to exclude underlying mass/adenopathy.     Dictated by (CST): Isabela Baker MD on 5/03/2024 at 7:13 PM     Finalized by (CST): Isabela Baker MD on 5/03/2024 at 7:15 PM             Meds:      amikacin  8 mg/kg (Adjusted) Intravenous Q24H    apixaban  5 mg Oral BID    gabapentin  200 mg Oral BID    pantoprazole  40 mg Oral QAM AC     pravastatin  20 mg Oral Nightly    vancomycin  15 mg/kg Intravenous Q24H    piperacillin-tazobactam  4.5 g Intravenous Q8H    cyanocobalamin  1,000 mcg Oral Daily    cholecalciferol  2,000 Units Oral BID    glycerin-hypromellose-  1 drop Both Eyes BID      norepinephrine Stopped (05/05/24 4690)       acetaminophen    ondansetron    metoclopramide    polyethylene glycol (PEG 3350)    sennosides    bisacodyl    melatonin

## 2024-05-05 NOTE — PLAN OF CARE
Problem: Patient Centered Care  Goal: Patient preferences are identified and integrated in the patient's plan of care  Description: Interventions:  - What would you like us to know as we care for you? I have 2 sons involved in myy care  - Provide timely, complete, and accurate information to patient/family  - Incorporate patient and family knowledge, values, beliefs, and cultural backgrounds into the planning and delivery of care  - Encourage patient/family to participate in care and decision-making at the level they choose  - Honor patient and family perspectives and choices  Outcome: Progressing     Problem: CARDIOVASCULAR - ADULT  Goal: Maintains optimal cardiac output and hemodynamic stability  Description: INTERVENTIONS:  - Monitor vital signs, rhythm, and trends  - Monitor for bleeding, hypotension and signs of decreased cardiac output  - Evaluate effectiveness of vasoactive medications to optimize hemodynamic stability  - Monitor arterial and/or venous puncture sites for bleeding and/or hematoma  - Assess quality of pulses, skin color and temperature  - Assess for signs of decreased coronary artery perfusion - ex. Angina  - Evaluate fluid balance, assess for edema, trend weights  Outcome: Progressing  Goal: Absence of cardiac arrhythmias or at baseline  Description: INTERVENTIONS:  - Continuous cardiac monitoring, monitor vital signs, obtain 12 lead EKG if indicated  - Evaluate effectiveness of antiarrhythmic and heart rate control medications as ordered  - Initiate emergency measures for life threatening arrhythmias  - Monitor electrolytes and administer replacement therapy as ordered  Outcome: Progressing     Problem: RESPIRATORY - ADULT  Goal: Achieves optimal ventilation and oxygenation  Description: INTERVENTIONS:  - Assess for changes in respiratory status  - Assess for changes in mentation and behavior  - Position to facilitate oxygenation and minimize respiratory effort  - Oxygen supplementation  based on oxygen saturation or ABGs  - Provide Smoking Cessation handout, if applicable  - Encourage broncho-pulmonary hygiene including cough, deep breathe, Incentive Spirometry  - Assess the need for suctioning and perform as needed  - Assess and instruct to report SOB or any respiratory difficulty  - Respiratory Therapy support as indicated  - Manage/alleviate anxiety  - Monitor for signs/symptoms of CO2 retention  Outcome: Progressing     Problem: GENITOURINARY - ADULT  Goal: Absence of urinary retention  Description: INTERVENTIONS:  - Assess patient’s ability to void and empty bladder  - Monitor intake/output and perform bladder scan as needed  - Follow urinary retention protocol/standard of care  - Consider collaborating with pharmacy to review patient's medication profile  - Implement strategies to promote bladder emptying  Outcome: Not Progressing     Problem: METABOLIC/FLUID AND ELECTROLYTES - ADULT  Goal: Electrolytes maintained within normal limits  Description: INTERVENTIONS:  - Monitor labs and rhythm and assess patient for signs and symptoms of electrolyte imbalances  - Administer electrolyte replacement as ordered  - Monitor response to electrolyte replacements, including rhythm and repeat lab results as appropriate  - Fluid restriction as ordered  - Instruct patient on fluid and nutrition restrictions as appropriate  Outcome: Progressing  Goal: Hemodynamic stability and optimal renal function maintained  Description: INTERVENTIONS:  - Monitor labs and assess for signs and symptoms of volume excess or deficit  - Monitor intake, output and patient weight  - Monitor urine specific gravity, serum osmolarity and serum sodium as indicated or ordered  - Monitor response to interventions for patient's volume status, including labs, urine output, blood pressure (other measures as available)  - Encourage oral intake as appropriate  - Instruct patient on fluid and nutrition restrictions as appropriate  Outcome:  Progressing     Problem: SKIN/TISSUE INTEGRITY - ADULT  Goal: Skin integrity remains intact  Description: INTERVENTIONS  - Assess and document risk factors for pressure ulcer development  - Assess and document skin integrity  - Monitor for areas of redness and/or skin breakdown  - Initiate interventions, skin care algorithm/standards of care as needed  Outcome: Not Progressing  Goal: Incision(s), wounds(s) or drain site(s) healing without S/S of infection  Description: INTERVENTIONS:  - Assess and document risk factors for pressure ulcer development  - Assess and document skin integrity  - Assess and document dressing/incision, wound bed, drain sites and surrounding tissue  - Implement wound care per orders  - Initiate isolation precautions as appropriate  - Initiate Pressure Ulcer prevention bundle as indicated  Outcome: Progressing  Goal: Oral mucous membranes remain intact  Description: INTERVENTIONS  - Assess oral mucosa and hygiene practices  - Implement preventative oral hygiene regimen  - Implement oral medicated treatments as ordered  Outcome: Progressing     Problem: HEMATOLOGIC - ADULT  Goal: Maintains hematologic stability  Description: INTERVENTIONS  - Assess for signs and symptoms of bleeding or hemorrhage  - Monitor labs and vital signs for trends  - Administer supportive blood products/factors, fluids and medications as ordered and appropriate  - Administer supportive blood products/factors as ordered and appropriate  Outcome: Progressing  Goal: Free from bleeding injury  Description: (Example usage: patient with low platelets)  INTERVENTIONS:  - Avoid intramuscular injections, enemas and rectal medication administration  - Ensure safe mobilization of patient  - Hold pressure on venipuncture sites to achieve adequate hemostasis  - Assess for signs and symptoms of internal bleeding  - Monitor lab trends  - Patient is to report abnormal signs of bleeding to staff  - Avoid use of toothpicks and dental  floss  - Use electric shaver for shaving  - Use soft bristle tooth brush  - Limit straining and forceful nose blowing  Outcome: Progressing     Problem: MUSCULOSKELETAL - ADULT  Goal: Return mobility to safest level of function  Description: INTERVENTIONS:  - Assess patient stability and activity tolerance for standing, transferring and ambulating w/ or w/o assistive devices  - Assist with transfers and ambulation using safe patient handling equipment as needed  - Ensure adequate protection for wounds/incisions during mobilization  - Obtain PT/OT consults as needed  - Advance activity as appropriate  - Communicate ordered activity level and limitations with patient/family  Outcome: Progressing     Problem: Impaired Functional Mobility  Goal: Achieve highest/safest level of mobility/gait  Description: Interventions:  - Assess patient's functional ability and stability  - Promote increasing activity/tolerance for mobility and gait  - Educate and engage patient/family in tolerated activity level and precautions  - Recommend use of total lift for transfers  - Recommend use of  RW for transfers and ambulation  - Recommend patient transfer to bedside chair toward strongest side  - When transferring patient, block weaker knee for safety  - Recommend use of chair position in bed 3 times per day  Outcome: Progressing     Oriented x 3, forgetful at times, able to follow commands. NSR on tele-monitor, Titrated off Oxy, tolerating room air. Complaints of Left knee pain, Tylenol PRN given with reported relief. Tolerating PO diet, though poor appetite. Assisted up to chair x 2 mod-max assist with walker. Assisted back to bed, difficulty standing and was placed back to bed using elizabeth lift. PT/OT on consult. Urinary retention noted, intermittent straight cath per protocol. Dr Luo notified, orders given for indwelling ivey insertion and orders carried out. Bed locked in lowest position, call light within reach; patient updated  in plan of care; awaiting MRI Left foot results.

## 2024-05-05 NOTE — PLAN OF CARE
Pt remains alert & oriented but forgetful. Pt on CPAP overnight. Pt updated on plan of care and all questions answered at this time.       Problem: Patient Centered Care  Goal: Patient preferences are identified and integrated in the patient's plan of care  Description: Interventions:  - What would you like us to know as we care for you? I have 2 sons involved in myy care  - Provide timely, complete, and accurate information to patient/family  - Incorporate patient and family knowledge, values, beliefs, and cultural backgrounds into the planning and delivery of care  - Encourage patient/family to participate in care and decision-making at the level they choose  - Honor patient and family perspectives and choices  Outcome: Progressing     Problem: CARDIOVASCULAR - ADULT  Goal: Maintains optimal cardiac output and hemodynamic stability  Description: INTERVENTIONS:  - Monitor vital signs, rhythm, and trends  - Monitor for bleeding, hypotension and signs of decreased cardiac output  - Evaluate effectiveness of vasoactive medications to optimize hemodynamic stability  - Monitor arterial and/or venous puncture sites for bleeding and/or hematoma  - Assess quality of pulses, skin color and temperature  - Assess for signs of decreased coronary artery perfusion - ex. Angina  - Evaluate fluid balance, assess for edema, trend weights  Outcome: Progressing  Goal: Absence of cardiac arrhythmias or at baseline  Description: INTERVENTIONS:  - Continuous cardiac monitoring, monitor vital signs, obtain 12 lead EKG if indicated  - Evaluate effectiveness of antiarrhythmic and heart rate control medications as ordered  - Initiate emergency measures for life threatening arrhythmias  - Monitor electrolytes and administer replacement therapy as ordered  Outcome: Progressing     Problem: RESPIRATORY - ADULT  Goal: Achieves optimal ventilation and oxygenation  Description: INTERVENTIONS:  - Assess for changes in respiratory status  - Assess  for changes in mentation and behavior  - Position to facilitate oxygenation and minimize respiratory effort  - Oxygen supplementation based on oxygen saturation or ABGs  - Provide Smoking Cessation handout, if applicable  - Encourage broncho-pulmonary hygiene including cough, deep breathe, Incentive Spirometry  - Assess the need for suctioning and perform as needed  - Assess and instruct to report SOB or any respiratory difficulty  - Respiratory Therapy support as indicated  - Manage/alleviate anxiety  - Monitor for signs/symptoms of CO2 retention  Outcome: Progressing     Problem: METABOLIC/FLUID AND ELECTROLYTES - ADULT  Goal: Electrolytes maintained within normal limits  Description: INTERVENTIONS:  - Monitor labs and rhythm and assess patient for signs and symptoms of electrolyte imbalances  - Administer electrolyte replacement as ordered  - Monitor response to electrolyte replacements, including rhythm and repeat lab results as appropriate  - Fluid restriction as ordered  - Instruct patient on fluid and nutrition restrictions as appropriate  Outcome: Progressing  Goal: Hemodynamic stability and optimal renal function maintained  Description: INTERVENTIONS:  - Monitor labs and assess for signs and symptoms of volume excess or deficit  - Monitor intake, output and patient weight  - Monitor urine specific gravity, serum osmolarity and serum sodium as indicated or ordered  - Monitor response to interventions for patient's volume status, including labs, urine output, blood pressure (other measures as available)  - Encourage oral intake as appropriate  - Instruct patient on fluid and nutrition restrictions as appropriate  Outcome: Progressing     Problem: SKIN/TISSUE INTEGRITY - ADULT  Goal: Skin integrity remains intact  Description: INTERVENTIONS  - Assess and document risk factors for pressure ulcer development  - Assess and document skin integrity  - Monitor for areas of redness and/or skin breakdown  - Initiate  interventions, skin care algorithm/standards of care as needed  Outcome: Progressing  Goal: Incision(s), wounds(s) or drain site(s) healing without S/S of infection  Description: INTERVENTIONS:  - Assess and document risk factors for pressure ulcer development  - Assess and document skin integrity  - Assess and document dressing/incision, wound bed, drain sites and surrounding tissue  - Implement wound care per orders  - Initiate isolation precautions as appropriate  - Initiate Pressure Ulcer prevention bundle as indicated  Outcome: Progressing  Goal: Oral mucous membranes remain intact  Description: INTERVENTIONS  - Assess oral mucosa and hygiene practices  - Implement preventative oral hygiene regimen  - Implement oral medicated treatments as ordered  Outcome: Progressing     Problem: HEMATOLOGIC - ADULT  Goal: Maintains hematologic stability  Description: INTERVENTIONS  - Assess for signs and symptoms of bleeding or hemorrhage  - Monitor labs and vital signs for trends  - Administer supportive blood products/factors, fluids and medications as ordered and appropriate  - Administer supportive blood products/factors as ordered and appropriate  Outcome: Progressing  Goal: Free from bleeding injury  Description: (Example usage: patient with low platelets)  INTERVENTIONS:  - Avoid intramuscular injections, enemas and rectal medication administration  - Ensure safe mobilization of patient  - Hold pressure on venipuncture sites to achieve adequate hemostasis  - Assess for signs and symptoms of internal bleeding  - Monitor lab trends  - Patient is to report abnormal signs of bleeding to staff  - Avoid use of toothpicks and dental floss  - Use electric shaver for shaving  - Use soft bristle tooth brush  - Limit straining and forceful nose blowing  Outcome: Progressing     Problem: MUSCULOSKELETAL - ADULT  Goal: Return mobility to safest level of function  Description: INTERVENTIONS:  - Assess patient stability and activity  tolerance for standing, transferring and ambulating w/ or w/o assistive devices  - Assist with transfers and ambulation using safe patient handling equipment as needed  - Ensure adequate protection for wounds/incisions during mobilization  - Obtain PT/OT consults as needed  - Advance activity as appropriate  - Communicate ordered activity level and limitations with patient/family  Outcome: Progressing     Problem: Impaired Functional Mobility  Goal: Achieve highest/safest level of mobility/gait  Description: Interventions:  - Assess patient's functional ability and stability  - Promote increasing activity/tolerance for mobility and gait  - Educate and engage patient/family in tolerated activity level and precautions  Outcome: Progressing     Problem: GENITOURINARY - ADULT  Goal: Absence of urinary retention  Description: INTERVENTIONS:  - Assess patient’s ability to void and empty bladder  - Monitor intake/output and perform bladder scan as needed  - Follow urinary retention protocol/standard of care  - Consider collaborating with pharmacy to review patient's medication profile  - Implement strategies to promote bladder emptying  Outcome: Not Progressing

## 2024-05-05 NOTE — PROGRESS NOTES
[facility]  part of [facility] Infectious Disease  Progress Note    Adore Covington Patient Status:  Inpatient    1945 MRN [id number]   Location [facility] 2W/SW Attending Norman Luo MD   Hosp Day # 1 PCP Maral Huffman MD     Subjective:    Pt seen and examined resting in bed, tolerating the IV abx. States she is feeling better. RN at bedside patient off pressors since ~5am.    Review of Systems:  Review of systems reviewed and negative except as mentioned    Objective:  Blood pressure 107/55, pulse 87, temperature 97.9 °F (36.6 °C), temperature source Temporal, resp. rate 20, height 5' 2\" (1.575 m), weight 257 lb 8 oz (116.8 kg), SpO2 91%.         Physical Exam:  General: Awake, alert, non-tox, NAD.  HEENT:  Oropharynx clear, trachea ML.  Heart: RRR S1S2 no murmurs.  Lungs: Essentially CTA b/l, no rhonchi, rales, wheezes.  Abdomen: Soft, NT/ND.  BS present.  No organomegaly.  Extremity: +LLE erythema/edema, +plantar foot with ulcer no drainage   Neurological: No focal deficits.  Derm:  Warm, dry, free from rashes.  +FMS        Lab Data Review:  Lab Results   Component Value Date    WBC 15.7 2024    HGB 10.5 2024    HCT 32.1 2024    .0 2024    CREATSERUM 0.84 2024    BUN 18 2024     2024    K 3.8 2024     2024    CO2 24.0 2024    GLU 90 2024    CA 8.2 2024    ALB 2.9 2024    ALKPHO 52 2024    BILT 0.6 2024    TP 5.4 2024    AST 17 2024    ALT 7 2024    MG 1.9 2024        Cultures:  No results found for this visit on 24.     Radiology:  CT CHEST PE AORTA (IV ONLY) (CPT=71260)    Result Date: 5/3/2024  CONCLUSION:   No evidence of acute pulmonary embolism to the level of the first order subsegmental pulmonary artery branches.  Severe enlargement of the main pulmonary artery measuring 4.5 cm, as is seen in chronic  pulmonary arterial hypertension  No suspicious right hilar mass.  Finding on chest radiograph corresponds to engorged pulmonary vasculature.  Mild cardiomegaly with pulmonary edema.   Scant bibasilar opacities which may be secondary to subsegmental atelectasis, with less likely differential of alveolar edema.  Trace bilateral pleural effusions.    Dictated by (CST): Isabela Baker MD on 5/03/2024 at 9:51 PM     Finalized by (CST): Isabela Baker MD on 5/03/2024 at 9:58 PM          XR CHEST AP PORTABLE  (CPT=71045)    Result Date: 5/3/2024  CONCLUSION:   Mild cardiomegaly with interstitial pulmonary edema.  Left retrocardiac opacity, possibly combination of subsegmental atelectasis and/or alveolar edema.  Trace left pleural effusion.  Nodular appearance of right kareen, thought to represent a vascular congestion.  Recommend radiographic follow-up to resolution.  If this finding does not resolve with treatment of pulmonary edema, contrast enhanced CT chest would then be indicated to exclude underlying mass/adenopathy.     Dictated by (CST): Isabela Baker MD on 5/03/2024 at 7:13 PM     Finalized by (CST): Isabela Baker MD on 5/03/2024 at 7:15 PM             Assessment and Plan:  Acute left lower extremity cellulitis presenting here with septic shock  - MRSA screen positive  - blood cultures pending  - Foot examination is concerning for a deeper tissue infection/osteomyelitis/abscess  - MRI ordered and pending   - IV Vancomycin, amikacin and Zosyn ongoing    MARINO  - Antibiotics will be needed adjusted    Diarrhea  - C. difficile pending    Leukocytosis  - 2/2 above  - will trend    Recommendations  - Continue IV Vancomcyin, Amikacin, Zosyn  - C.diff pending, if positive recommend starting PO Vancomycin QID  - Follow pending cultures  - MRI ordered and pending  - Repeat labs tomorrow  - Further recommendations to follow    Plan of care discussed with Dr. Beckman, he is in agreement with the plan of care    Plan of  care discussed with patient and RN.    If you have any questions or concerns please call [facility] Infectious Disease at [phone].     JUN Armstrong    5/5/2024  7:20 AM

## 2024-05-05 NOTE — PROGRESS NOTES
Pulmonary Progress Note        NAME: Adore Covington - ROOM: 214/214-A - MRN: [id number] - Age: 79 year old - : 1945    Past Medical History:    Arrhythmia    Atrial fibrillation (HCC)    Congestive heart disease (HCC)    COPD (chronic obstructive pulmonary disease) (HCC)    Essential hypertension    High blood pressure    High cholesterol    Morbid obesity with BMI of 50.0-59.9, adult (HCC)    Muscle weakness    Neuropathy    Peripheral vascular disease (HCC)    Sleep apnea    Visual impairment         SUBJECTIVE: No new events overnight, feels better     OBJECTIVE:    Oxygen Therapy  SpO2: 91 %  O2 Device: CPAP  O2 Flow Rate (L/min): 3 L/min                  Intake/Output Summary (Last 24 hours) at 2024 0659  Last data filed at 2024 2134  Gross per 24 hour   Intake 5800.8 ml   Output 1200 ml   Net 4600.8 ml       Scheduled Medication:   potassium chloride  40 mEq Oral Once    apixaban  5 mg Oral BID    gabapentin  200 mg Oral BID    pantoprazole  40 mg Oral QAM AC    pravastatin  20 mg Oral Nightly    vancomycin  15 mg/kg Intravenous Q24H    piperacillin-tazobactam  4.5 g Intravenous Q8H    amikacin  8 mg/kg (Adjusted) Intravenous Q48H    cyanocobalamin  1,000 mcg Oral Daily    cholecalciferol  2,000 Units Oral BID    glycerin-hypromellose-  1 drop Both Eyes BID     Continuous Infusing Medication:   norepinephrine Stopped (24 0450)       /55 (BP Location: Left arm)   Pulse 87   Temp 97.9 °F (36.6 °C) (Temporal)   Resp 20   Ht 5' 2\" (1.575 m)   Wt 257 lb 8 oz (116.8 kg)   SpO2 91%   BMI 47.08 kg/m²   General:  Alert, no distress   Lungs:   Clear to auscultation bilaterally.   Heart:   S1, S2 normal,    Abdomen:   Soft, non-tender, non distended   Extremities:  Left leg erythema    Neurologic: Oriented  Times 3       Recent Labs   Lab 24  1850 24  0457   RBC 3.85 3.42*   HGB 11.8* 10.5*   HCT 36.3 32.1*   MCV 94.3 93.9   MCH 30.6 30.7   MCHC 32.5 32.7   RDW  14.6 15.1*   NEPRELIM 10.31*  --    WBC 10.9 15.7*   .0 212.0         Recent Labs   Lab 05/03/24  1850 05/04/24  1743 05/05/24  0457   * 99 90   BUN 22 20 18   CREATSERUM 1.50* 0.96 0.84   EGFRCR 36* 61 72   CA 8.9 8.5* 8.2*    138 138   K 3.3* 3.9 3.8    106 108   CO2 27.0 25.0 24.0         ASSESSMENT/PLAN:       Shock - due to combination of hypovolemia and sepsis. Improved and off pressors this AM  -PRN pressors   Sepsis from cellulitis of the left LE in patient that is MRSA positive  -ID managing antibiotics   Acute hypoxemic respiratory failure  requiring O2 due to above and atelectasis.  Better   -wean off O2  Obstructive sleep apnea - tolerating CPAP   -CPAP with sleep 13 cm H2O  Acute kidney injury - resolved   GI   -PPI  Paroxysmal afib  -on anticoagulation    Advanced directives -  DNR  Dispo - improved   -will follow     Ang Quinteros MD

## 2024-05-05 NOTE — PLAN OF CARE
Problem: Patient Centered Care  Goal: Patient preferences are identified and integrated in the patient's plan of care  Description: Interventions:  - What would you like us to know as we care for you? I have 2 sons involved in myy care  - Provide timely, complete, and accurate information to patient/family  - Incorporate patient and family knowledge, values, beliefs, and cultural backgrounds into the planning and delivery of care  - Encourage patient/family to participate in care and decision-making at the level they choose  - Honor patient and family perspectives and choices  Outcome: Progressing     Problem: CARDIOVASCULAR - ADULT  Goal: Maintains optimal cardiac output and hemodynamic stability  Description: INTERVENTIONS:  - Monitor vital signs, rhythm, and trends  - Monitor for bleeding, hypotension and signs of decreased cardiac output  - Evaluate effectiveness of vasoactive medications to optimize hemodynamic stability  - Monitor arterial and/or venous puncture sites for bleeding and/or hematoma  - Assess quality of pulses, skin color and temperature  - Assess for signs of decreased coronary artery perfusion - ex. Angina  - Evaluate fluid balance, assess for edema, trend weights  Outcome: Not Progressing  Goal: Absence of cardiac arrhythmias or at baseline  Description: INTERVENTIONS:  - Continuous cardiac monitoring, monitor vital signs, obtain 12 lead EKG if indicated  - Evaluate effectiveness of antiarrhythmic and heart rate control medications as ordered  - Initiate emergency measures for life threatening arrhythmias  - Monitor electrolytes and administer replacement therapy as ordered  Outcome: Progressing     Problem: RESPIRATORY - ADULT  Goal: Achieves optimal ventilation and oxygenation  Description: INTERVENTIONS:  - Assess for changes in respiratory status  - Assess for changes in mentation and behavior  - Position to facilitate oxygenation and minimize respiratory effort  - Oxygen supplementation  based on oxygen saturation or ABGs  - Provide Smoking Cessation handout, if applicable  - Encourage broncho-pulmonary hygiene including cough, deep breathe, Incentive Spirometry  - Assess the need for suctioning and perform as needed  - Assess and instruct to report SOB or any respiratory difficulty  - Respiratory Therapy support as indicated  - Manage/alleviate anxiety  - Monitor for signs/symptoms of CO2 retention  Outcome: Progressing     Problem: GENITOURINARY - ADULT  Goal: Absence of urinary retention  Description: INTERVENTIONS:  - Assess patient’s ability to void and empty bladder  - Monitor intake/output and perform bladder scan as needed  - Follow urinary retention protocol/standard of care  - Consider collaborating with pharmacy to review patient's medication profile  - Implement strategies to promote bladder emptying  Outcome: Not Progressing     Problem: METABOLIC/FLUID AND ELECTROLYTES - ADULT  Goal: Electrolytes maintained within normal limits  Description: INTERVENTIONS:  - Monitor labs and rhythm and assess patient for signs and symptoms of electrolyte imbalances  - Administer electrolyte replacement as ordered  - Monitor response to electrolyte replacements, including rhythm and repeat lab results as appropriate  - Fluid restriction as ordered  - Instruct patient on fluid and nutrition restrictions as appropriate  Outcome: Progressing  Goal: Hemodynamic stability and optimal renal function maintained  Description: INTERVENTIONS:  - Monitor labs and assess for signs and symptoms of volume excess or deficit  - Monitor intake, output and patient weight  - Monitor urine specific gravity, serum osmolarity and serum sodium as indicated or ordered  - Monitor response to interventions for patient's volume status, including labs, urine output, blood pressure (other measures as available)  - Encourage oral intake as appropriate  - Instruct patient on fluid and nutrition restrictions as appropriate  Outcome:  Progressing     Problem: SKIN/TISSUE INTEGRITY - ADULT  Goal: Skin integrity remains intact  Description: INTERVENTIONS  - Assess and document risk factors for pressure ulcer development  - Assess and document skin integrity  - Monitor for areas of redness and/or skin breakdown  - Initiate interventions, skin care algorithm/standards of care as needed  Outcome: Not Progressing  Goal: Incision(s), wounds(s) or drain site(s) healing without S/S of infection  Description: INTERVENTIONS:  - Assess and document risk factors for pressure ulcer development  - Assess and document skin integrity  - Assess and document dressing/incision, wound bed, drain sites and surrounding tissue  - Implement wound care per orders  - Initiate isolation precautions as appropriate  - Initiate Pressure Ulcer prevention bundle as indicated  Outcome: Not Progressing  Goal: Oral mucous membranes remain intact  Description: INTERVENTIONS  - Assess oral mucosa and hygiene practices  - Implement preventative oral hygiene regimen  - Implement oral medicated treatments as ordered  Outcome: Progressing     Problem: HEMATOLOGIC - ADULT  Goal: Maintains hematologic stability  Description: INTERVENTIONS  - Assess for signs and symptoms of bleeding or hemorrhage  - Monitor labs and vital signs for trends  - Administer supportive blood products/factors, fluids and medications as ordered and appropriate  - Administer supportive blood products/factors as ordered and appropriate  Outcome: Progressing  Goal: Free from bleeding injury  Description: (Example usage: patient with low platelets)  INTERVENTIONS:  - Avoid intramuscular injections, enemas and rectal medication administration  - Ensure safe mobilization of patient  - Hold pressure on venipuncture sites to achieve adequate hemostasis  - Assess for signs and symptoms of internal bleeding  - Monitor lab trends  - Patient is to report abnormal signs of bleeding to staff  - Avoid use of toothpicks and  dental floss  - Use electric shaver for shaving  - Use soft bristle tooth brush  - Limit straining and forceful nose blowing  Outcome: Progressing     Problem: MUSCULOSKELETAL - ADULT  Goal: Return mobility to safest level of function  Description: INTERVENTIONS:  - Assess patient stability and activity tolerance for standing, transferring and ambulating w/ or w/o assistive devices  - Assist with transfers and ambulation using safe patient handling equipment as needed  - Ensure adequate protection for wounds/incisions during mobilization  - Obtain PT/OT consults as needed  - Advance activity as appropriate  - Communicate ordered activity level and limitations with patient/family  Outcome: Not Progressing     Problem: Impaired Functional Mobility  Goal: Achieve highest/safest level of mobility/gait  Description: Interventions:  - Assess patient's functional ability and stability  - Promote increasing activity/tolerance for mobility and gait  - Educate and engage patient/family in tolerated activity level and precautions  - Recommend use of chair position in bed 3 times per day  Outcome: Not Progressing     Patient oriented x 3, able to follow commands though short-term memory issues, forgetful at times. NSR on tele-monitor. Initially on Oxy 5L NC, able to titrate down to 3L NC, tolerating well. Tolerating PO intake, remains on IVF at this time. 1.5L LR bolus given as ordered. Levo gtt titration as per blood pressure parameters, as documented on MAR. Urinary retention noted, bladder scan 716ml. Dr Luo notified; Orders for Intermittent straight cath protocol, straight cath 700ml output. Complaints of Left knee pain, Tylenol PO PRN given with reported relief. Episodes of loose BM, still to collect stool specimen to rule out C-Diff. Orders for MRI Left foot; MRI checklist completed, awaiting to be done. [facility]. Patient updated in plan of care, bed locked in lowest position, safety measures  maintained, call light within reach.

## 2024-05-06 ENCOUNTER — APPOINTMENT (OUTPATIENT)
Dept: WOUND CARE | Facility: HOSPITAL | Age: 79
End: 2024-05-06
Attending: FAMILY MEDICINE
Payer: MEDICARE

## 2024-05-06 PROBLEM — I95.2 HYPOTENSION DUE TO DRUGS: Status: RESOLVED | Noted: 2024-05-03 | Resolved: 2024-05-06

## 2024-05-06 PROBLEM — R65.21 SEPTIC SHOCK (HCC): Status: ACTIVE | Noted: 2024-05-06

## 2024-05-06 PROBLEM — A41.9 SEPTIC SHOCK (HCC): Status: ACTIVE | Noted: 2024-05-06

## 2024-05-06 PROBLEM — L03.90 CELLULITIS: Status: ACTIVE | Noted: 2024-05-06

## 2024-05-06 LAB
ALBUMIN SERPL-MCNC: 2.8 G/DL (ref 3.2–4.8)
ALBUMIN/GLOB SERPL: 1.2 {RATIO} (ref 1–2)
ALP LIVER SERPL-CCNC: 57 U/L
ALT SERPL-CCNC: 18 U/L
ANION GAP SERPL CALC-SCNC: 6 MMOL/L (ref 0–18)
AST SERPL-CCNC: 13 U/L (ref ?–34)
BILIRUB SERPL-MCNC: 0.8 MG/DL (ref 0.2–1.1)
BUN BLD-MCNC: 15 MG/DL (ref 9–23)
BUN/CREAT SERPL: 22.4 (ref 10–20)
CALCIUM BLD-MCNC: 8.2 MG/DL (ref 8.7–10.4)
CHLORIDE SERPL-SCNC: 112 MMOL/L (ref 98–112)
CO2 SERPL-SCNC: 24 MMOL/L (ref 21–32)
CREAT BLD-MCNC: 0.67 MG/DL
DEPRECATED RDW RBC AUTO: 51.5 FL (ref 35.1–46.3)
EGFRCR SERPLBLD CKD-EPI 2021: 90 ML/MIN/1.73M2 (ref 60–?)
ERYTHROCYTE [DISTWIDTH] IN BLOOD BY AUTOMATED COUNT: 14.6 % (ref 11–15)
GLOBULIN PLAS-MCNC: 2.4 G/DL (ref 2–3.5)
GLUCOSE BLD-MCNC: 89 MG/DL (ref 70–99)
HCT VFR BLD AUTO: 30.2 %
HGB BLD-MCNC: 9.6 G/DL
MAGNESIUM SERPL-MCNC: 1.9 MG/DL (ref 1.6–2.6)
MCH RBC QN AUTO: 30.4 PG (ref 26–34)
MCHC RBC AUTO-ENTMCNC: 31.8 G/DL (ref 31–37)
MCV RBC AUTO: 95.6 FL
OSMOLALITY SERPL CALC.SUM OF ELEC: 294 MOSM/KG (ref 275–295)
PLATELET # BLD AUTO: 197 10(3)UL (ref 150–450)
POTASSIUM SERPL-SCNC: 3.7 MMOL/L (ref 3.5–5.1)
POTASSIUM SERPL-SCNC: 3.7 MMOL/L (ref 3.5–5.1)
PROT SERPL-MCNC: 5.2 G/DL (ref 5.7–8.2)
RBC # BLD AUTO: 3.16 X10(6)UL
SODIUM SERPL-SCNC: 142 MMOL/L (ref 136–145)
WBC # BLD AUTO: 13 X10(3) UL (ref 4–11)

## 2024-05-06 PROCEDURE — 94799 UNLISTED PULMONARY SVC/PX: CPT

## 2024-05-06 PROCEDURE — 84132 ASSAY OF SERUM POTASSIUM: CPT | Performed by: INTERNAL MEDICINE

## 2024-05-06 PROCEDURE — 83735 ASSAY OF MAGNESIUM: CPT | Performed by: INTERNAL MEDICINE

## 2024-05-06 PROCEDURE — 99212 OFFICE O/P EST SF 10 MIN: CPT

## 2024-05-06 PROCEDURE — 85027 COMPLETE CBC AUTOMATED: CPT | Performed by: INTERNAL MEDICINE

## 2024-05-06 PROCEDURE — 80053 COMPREHEN METABOLIC PANEL: CPT | Performed by: INTERNAL MEDICINE

## 2024-05-06 RX ORDER — POTASSIUM CHLORIDE 20 MEQ/1
40 TABLET, EXTENDED RELEASE ORAL ONCE
Qty: 2 TABLET | Refills: 0 | Status: COMPLETED | OUTPATIENT
Start: 2024-05-06 | End: 2024-05-06

## 2024-05-06 RX ORDER — MIDODRINE HYDROCHLORIDE 2.5 MG/1
2.5 TABLET ORAL 3 TIMES DAILY
Status: DISCONTINUED | OUTPATIENT
Start: 2024-05-06 | End: 2024-05-09

## 2024-05-06 NOTE — PROGRESS NOTES
[facility]  part of [facility] Infectious Disease  Progress Note    Adore Covington Patient Status:  Inpatient    1945 MRN [id number]   Location [facility] 2W/SW Attending Norman Luo MD   Hosp Day # 2 PCP Maral Huffman MD     Subjective:  Patient seen/examined.  She is up in bed in NAD.  No F/C.  No new issues.  Clinical course reviewed.  IV antibiotics ongoing for LLE cellulitis which caused severe sepsis/septic shock.  Broad spectrum antibiotics ongoing.    Objective:  Blood pressure 97/58, pulse 76, temperature 97.3 °F (36.3 °C), temperature source Temporal, resp. rate 20, height 5' 2\" (1.575 m), weight 261 lb 3.9 oz (118.5 kg), SpO2 95%.    Intake/Output:    Intake/Output Summary (Last 24 hours) at 2024 0956  Last data filed at 2024 0800  Gross per 24 hour   Intake 2288.8 ml   Output 1665 ml   Net 623.8 ml       Physical Exam:  General: Awake, alert, non-tox, NAD.  HEENT:  Oropharynx clear, trachea ML.  Heart: RRR S1S2 no murmurs.  Lungs: Essentially CTA b/l, no rhonchi, rales, wheezes.  Abdomen: Soft, NT/ND.  BS present.  No organomegaly.  Extremity: LLE/foot with erythema and concern for deeper seated infection.  Neurological: No focal deficits.  Derm:  Warm, dry, free from rashes.    Lab Data Review:  Lab Results   Component Value Date    WBC 13.0 2024    HGB 9.6 2024    HCT 30.2 2024    .0 2024    CREATSERUM 0.67 2024    BUN 15 2024     2024    K 3.7 2024    K 3.7 2024     2024    CO2 24.0 2024    GLU 89 2024    CA 8.2 2024    ALB 2.8 2024    ALKPHO 57 2024    BILT 0.8 2024    TP 5.2 2024    AST 13 2024    ALT 18 2024    MG 1.9 2024      Cultures:  Blood cultures negative  MRSA screen +  C.diff negative    Radiology:  CONCLUSION:   1. No osteomyelitis or abscess.   2. Superficial and deep soft  tissue edema.   3. Muscular atrophy.   4. 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   5. 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   6. Advanced degenerative change of the midfoot.  Other less pronounced degenerative changes.   7. Small ankle joint effusion.       Antibiotics Reviewed:  Vancomycin  Zosyn  Amikacin    Assessment and Plan:    Severe sepsis with septic shock in this woman with LLE cellulitis/foot infection as source  - Continues to improve, off vasopressor support  - MRI without evidence of OM or abscess  - Blood cultures negative  - MRSA screen is positive  - IV vancomycin/zosyn/amikacin day #3 ongoing    2.  Multifactorial leukocytosis due to the above  - At 13K today, will trend  - c.diff is negative    3.  Acute hypoxic respiratory failure due to the above  - Now resolved and O2 has been weaned    4.  Disposition - inpatient.  Continue IV vancomycin and zosyn as Rx.  Discontinue amikacin and observe.  Supportive care ongoing and will plan to streamline antibiotics further as able.  Trending temps and WBCs.  Will follow.    Georgina Rice DO, [facility] Infectious Disease  [phone]    5/6/2024  9:56 AM

## 2024-05-06 NOTE — PROGRESS NOTES
[facility] Hospitalist Progress Note     CC: Hospital Follow up    PCP: Maral Huffman MD       Assessment/Plan:     Principal Problem:    Hypotension due to drugs    Patient is a 79 year old female with PMH sig for COPD, A- Fib on Eliquis, CHF, HTN, HLD, PAD, Neuropathy, SUSAN who presents with weakness and left leg redness.  Admitted for Sepsis Cellulitis.      Sepsis Cellulitis   Left Leg   MRSA positive   Leukocytosis   - hypotensive on admission, now resolved    - UA okay  - CXR and CT with some edema, no obvious signs of infection   - CT chest with no PE  - no fevers   - CK normal   - procal elevated   - f/u blood cx  - IV vanco and zosyn started   - ID consulted and one dose Amikacin given   - Wound care  - MRI left foot with on OM       Shock resolved   - likely 2/2 sepsis  - TSH and cortisol normal   - IVF and pressor support complete   - treat underlying cause   - Echo normal EF with increased PA pressures   - Crit Care on consult   - will add low dose midodrine      Acute Hypoxic Respiratory Failure   H/o COPD not on inhalers   SUSAN  Atelectasis   - CXR and CT with some edema, no obvious signs of infection   - CT chest with no PE  - supplemental O2 support, IS   - cpap as tolerated      MARINO resolved   Urinary Retention   - likely pre renal and sepsis component   - gentle IVF complete, pressor support as needed   - avoid hypotension   - avoid nephrotoxic agents   - straight cath x2  - Thompson placed 5/5/24  - voiding trial when more active      Chronic  A-fib  Secondary Hypercoagulable state   S/p Cardioversion   - Eliquis   - off Amio  - hold metop and dilt in sepsis hypotension   - in NSR      Chronic Diastolic HF   - CXR and CT with some edema  -   - Echo reviewed   - hold BB and CBB  - hold home bumex   - monitor fluid status closely      Lose Stools  H/o Gastroparesis   - c-diff negative     HTN - hold BP meds for hypotension   HLD - statin   PAD - Eliquis and statin   Neuropathy - gabapentin      FN:  -  IVF: complete   - Diet: Cardiac      DVT Prophy: SCDs, Eliquis   Atrophy: Ambulate   Lines: Piv     Dispo: pending clinical course     Outpatient records or previous hospital records reviewed.      Further recommendations pending patient's clinical course.  [facility] hospitalist to continue to follow patient while in house     Patient and/or patient's family given opportunity to ask questions and note understanding and agreeing with therapeutic plan as outlined     Thank You,  Norman Luo MD     [facility] Emergency Roomist  Answering Service number: [phone]     Subjective:     No CP, SOB, or N/V.  Still having lose stools.   Left knee pain from being in the same position.    OBJECTIVE:    Blood pressure 99/69, pulse 86, temperature 97.3 °F (36.3 °C), temperature source Temporal, resp. rate 23, height 5' 2\" (1.575 m), weight 261 lb 3.9 oz (118.5 kg), SpO2 94%.    Temp:  [97.1 °F (36.2 °C)-97.9 °F (36.6 °C)] 97.3 °F (36.3 °C)  Pulse:  [] 86  Resp:  [14-36] 23  BP: ()/(39-72) 99/69  SpO2:  [90 %-100 %] 94 %      Intake/Output:    Intake/Output Summary (Last 24 hours) at 5/6/2024 1008  Last data filed at 5/6/2024 0800  Gross per 24 hour   Intake 2288.8 ml   Output 1665 ml   Net 623.8 ml       Last 3 Weights   05/06/24 0500 261 lb 3.9 oz (118.5 kg)   05/05/24 0600 257 lb 8 oz (116.8 kg)   05/04/24 0324 261 lb 3.9 oz (118.5 kg)   05/03/24 1837 261 lb 9.6 oz (118.7 kg)   05/04/24 1456 261 lb 3.9 oz (118.5 kg)   04/29/24 1532 261 lb (118.4 kg)       Exam   General: Alert, no acute distress  HEENT: oral mucosa normal   Neck: non tender, no adenopathy   Lungs: clear to ausculation bilaterally  Heart: Regular rate and rhythm  Abdomen: soft, non tender, non distended   Extremities: mild edema, left leg more red than right with chronic wounds   Skin: no new rash, normal color  Neuro: 5/5 strength in bilateral extremities, normal sensations  Psych: appropriate affect       Data Review:       Labs:     Recent  Labs   Lab 05/03/24 1850 05/05/24 0457 05/06/24 0449   RBC 3.85 3.42* 3.16*   HGB 11.8* 10.5* 9.6*   HCT 36.3 32.1* 30.2*   MCV 94.3 93.9 95.6   MCH 30.6 30.7 30.4   MCHC 32.5 32.7 31.8   RDW 14.6 15.1* 14.6   NEPRELIM 10.31*  --   --    WBC 10.9 15.7* 13.0*   .0 212.0 197.0         Recent Labs   Lab 05/04/24 1743 05/05/24 0457 05/06/24 0449   GLU 99 90 89   BUN 20 18 15   CREATSERUM 0.96 0.84 0.67   EGFRCR 61 72 90   CA 8.5* 8.2* 8.2*    138 142   K 3.9 3.8 3.7  3.7    108 112   CO2 25.0 24.0 24.0       Recent Labs   Lab 05/03/24 1850 05/04/24 1743 05/05/24 0457 05/06/24 0449   ALT 10 10 7* 18   AST 17 20 17 13   ALB 3.9 3.3 2.9* 2.8*         Imaging:  MRI FOOT (W+WO), LEFT (CPT=73720)    Result Date: 5/5/2024  CONCLUSION:  1. No osteomyelitis or abscess. 2. Superficial and deep soft tissue edema. 3. Muscular atrophy. 4. 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 5. 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 6. Advanced degenerative change of the midfoot.  Other less pronounced degenerative changes. 7. Small ankle joint effusion.     Dictated by (CST): Galo Rucker MD on 5/05/2024 at 7:58 PM     Finalized by (CST): Galo Rucker MD on 5/05/2024 at 8:09 PM          CT CHEST PE AORTA (IV ONLY) (CPT=71260)    Result Date: 5/3/2024  CONCLUSION:   No evidence of acute pulmonary embolism to the level of the first order subsegmental pulmonary artery branches.  Severe enlargement of the main pulmonary artery measuring 4.5 cm, as is seen in chronic pulmonary arterial hypertension  No suspicious right hilar mass.  Finding on chest radiograph corresponds to engorged pulmonary vasculature.  Mild cardiomegaly with pulmonary edema.   Scant bibasilar opacities which may be secondary to subsegmental atelectasis, with less likely differential of alveolar edema.  Trace bilateral pleural effusions.    Dictated by (CST): Isabela Baker MD on 5/03/2024 at 9:51 PM      Finalized by (CST): Isabela Baker MD on 5/03/2024 at 9:58 PM          XR CHEST AP PORTABLE  (CPT=71045)    Result Date: 5/3/2024  CONCLUSION:   Mild cardiomegaly with interstitial pulmonary edema.  Left retrocardiac opacity, possibly combination of subsegmental atelectasis and/or alveolar edema.  Trace left pleural effusion.  Nodular appearance of right kareen, thought to represent a vascular congestion.  Recommend radiographic follow-up to resolution.  If this finding does not resolve with treatment of pulmonary edema, contrast enhanced CT chest would then be indicated to exclude underlying mass/adenopathy.     Dictated by (CST): Isabela Baker MD on 5/03/2024 at 7:13 PM     Finalized by (CST): Isabela Baker MD on 5/03/2024 at 7:15 PM             Meds:      midodrine  2.5 mg Oral TID    apixaban  5 mg Oral BID    gabapentin  200 mg Oral BID    pantoprazole  40 mg Oral QAM AC    pravastatin  20 mg Oral Nightly    vancomycin  15 mg/kg Intravenous Q24H    piperacillin-tazobactam  4.5 g Intravenous Q8H    cyanocobalamin  1,000 mcg Oral Daily    cholecalciferol  2,000 Units Oral BID    glycerin-hypromellose-  1 drop Both Eyes BID           acetaminophen    ondansetron    metoclopramide    polyethylene glycol (PEG 3350)    sennosides    bisacodyl    melatonin

## 2024-05-06 NOTE — PROGRESS NOTES
Critical Care Progress Note     Assessment / Plan:  Shock - due to combination of hypovolemia and sepsis from cellulitis  - vasopressors as needed, off since yesterday morning  - cdiff negative  - further as below  Cellulitis of the left LE in patient that is MRSA positive  - on vancomycin, amikacin, and zosyn  - MRI shows superficial and deep tissue edema without osteo or abscess  - ID following  Acute hypoxemic respiratory failure - due to above and atelectasis, CTA was negative for PE and pneumonia  - now resolved and off oxygen  Obstructive sleep apnea - tolerating CPAP   - CPAP with sleep 13 cm H2O  Acute kidney injury  - resolved   Paroxysmal afib  - apixaban  Ppx  - PPI  - apixaban  Dispo  - DNR/select  - stable to downgrade from pulmonary standpoint, will follow as needed when out of ICU    Discussed with patient    Kamlesh Joel MD  Pulmonary & Critical Care Medicine  [facility]      Subjective:  No acute events overnight  Feeling well today  Denies dyspnea and chest pain    Objective:  Vitals:    05/06/24 0200 05/06/24 0405 05/06/24 0500 05/06/24 0600   BP: (!) 88/58 (!) 89/51 97/50 93/53   BP Location: Left arm Left arm Left arm Left arm   Pulse: 82 81 76 73   Resp: 14 16 16 19   Temp:  97.3 °F (36.3 °C)     TempSrc:  Temporal     SpO2: 95% 95% 94% 94%   Weight:   261 lb 3.9 oz (118.5 kg)    Height:         Physical Exam:  General: laying in bed  Respiratory: clear bilaterally, normal effort  Cardiovascular: regular rate and rhythm, no m/r/g  Abdomen: soft, NTND  Extremities: LLE with erythema, plantar ulcer without drainage  Mental status: interactive, answering questions appropriately    Medications:  Reviewed in EMR    Lab Data:  Reviewed in EMR    Imaging:  I independently visualized all relevant chest imaging in PACS and agree with radiology interpretation except where noted.

## 2024-05-06 NOTE — PLAN OF CARE
Patient AxOX4. Vital signs as charted. MD notified of BP trend. Thompson care provided. All safety measures maintained.     Problem: Patient Centered Care  Goal: Patient preferences are identified and integrated in the patient's plan of care  Description: Interventions:  - What would you like us to know as we care for you? I have 2 sons involved in myy care  - Provide timely, complete, and accurate information to patient/family  - Incorporate patient and family knowledge, values, beliefs, and cultural backgrounds into the planning and delivery of care  - Encourage patient/family to participate in care and decision-making at the level they choose  - Honor patient and family perspectives and choices  Outcome: Progressing     Problem: CARDIOVASCULAR - ADULT  Goal: Maintains optimal cardiac output and hemodynamic stability  Description: INTERVENTIONS:  - Monitor vital signs, rhythm, and trends  - Monitor for bleeding, hypotension and signs of decreased cardiac output  - Evaluate effectiveness of vasoactive medications to optimize hemodynamic stability  - Monitor arterial and/or venous puncture sites for bleeding and/or hematoma  - Assess quality of pulses, skin color and temperature  - Assess for signs of decreased coronary artery perfusion - ex. Angina  - Evaluate fluid balance, assess for edema, trend weights  Outcome: Progressing  Goal: Absence of cardiac arrhythmias or at baseline  Description: INTERVENTIONS:  - Continuous cardiac monitoring, monitor vital signs, obtain 12 lead EKG if indicated  - Evaluate effectiveness of antiarrhythmic and heart rate control medications as ordered  - Initiate emergency measures for life threatening arrhythmias  - Monitor electrolytes and administer replacement therapy as ordered  Outcome: Progressing     Problem: RESPIRATORY - ADULT  Goal: Achieves optimal ventilation and oxygenation  Description: INTERVENTIONS:  - Assess for changes in respiratory status  - Assess for changes in  mentation and behavior  - Position to facilitate oxygenation and minimize respiratory effort  - Oxygen supplementation based on oxygen saturation or ABGs  - Provide Smoking Cessation handout, if applicable  - Encourage broncho-pulmonary hygiene including cough, deep breathe, Incentive Spirometry  - Assess the need for suctioning and perform as needed  - Assess and instruct to report SOB or any respiratory difficulty  - Respiratory Therapy support as indicated  - Manage/alleviate anxiety  - Monitor for signs/symptoms of CO2 retention  Outcome: Progressing     Problem: GENITOURINARY - ADULT  Goal: Absence of urinary retention  Description: INTERVENTIONS:  - Assess patient’s ability to void and empty bladder  - Monitor intake/output and perform bladder scan as needed  - Follow urinary retention protocol/standard of care  - Consider collaborating with pharmacy to review patient's medication profile  - Implement strategies to promote bladder emptying  Outcome: Progressing     Problem: METABOLIC/FLUID AND ELECTROLYTES - ADULT  Goal: Electrolytes maintained within normal limits  Description: INTERVENTIONS:  - Monitor labs and rhythm and assess patient for signs and symptoms of electrolyte imbalances  - Administer electrolyte replacement as ordered  - Monitor response to electrolyte replacements, including rhythm and repeat lab results as appropriate  - Fluid restriction as ordered  - Instruct patient on fluid and nutrition restrictions as appropriate  Outcome: Progressing  Goal: Hemodynamic stability and optimal renal function maintained  Description: INTERVENTIONS:  - Monitor labs and assess for signs and symptoms of volume excess or deficit  - Monitor intake, output and patient weight  - Monitor urine specific gravity, serum osmolarity and serum sodium as indicated or ordered  - Monitor response to interventions for patient's volume status, including labs, urine output, blood pressure (other measures as available)  -  Encourage oral intake as appropriate  - Instruct patient on fluid and nutrition restrictions as appropriate  Outcome: Progressing     Problem: SKIN/TISSUE INTEGRITY - ADULT  Goal: Skin integrity remains intact  Description: INTERVENTIONS  - Assess and document risk factors for pressure ulcer development  - Assess and document skin integrity  - Monitor for areas of redness and/or skin breakdown  - Initiate interventions, skin care algorithm/standards of care as needed  Outcome: Progressing  Goal: Incision(s), wounds(s) or drain site(s) healing without S/S of infection  Description: INTERVENTIONS:  - Assess and document risk factors for pressure ulcer development  - Assess and document skin integrity  - Assess and document dressing/incision, wound bed, drain sites and surrounding tissue  - Implement wound care per orders  - Initiate isolation precautions as appropriate  - Initiate Pressure Ulcer prevention bundle as indicated  Outcome: Progressing  Goal: Oral mucous membranes remain intact  Description: INTERVENTIONS  - Assess oral mucosa and hygiene practices  - Implement preventative oral hygiene regimen  - Implement oral medicated treatments as ordered  Outcome: Progressing     Problem: HEMATOLOGIC - ADULT  Goal: Maintains hematologic stability  Description: INTERVENTIONS  - Assess for signs and symptoms of bleeding or hemorrhage  - Monitor labs and vital signs for trends  - Administer supportive blood products/factors, fluids and medications as ordered and appropriate  - Administer supportive blood products/factors as ordered and appropriate  Outcome: Progressing  Goal: Free from bleeding injury  Description: (Example usage: patient with low platelets)  INTERVENTIONS:  - Avoid intramuscular injections, enemas and rectal medication administration  - Ensure safe mobilization of patient  - Hold pressure on venipuncture sites to achieve adequate hemostasis  - Assess for signs and symptoms of internal bleeding  -  Monitor lab trends  - Patient is to report abnormal signs of bleeding to staff  - Avoid use of toothpicks and dental floss  - Use electric shaver for shaving  - Use soft bristle tooth brush  - Limit straining and forceful nose blowing  Outcome: Progressing     Problem: MUSCULOSKELETAL - ADULT  Goal: Return mobility to safest level of function  Description: INTERVENTIONS:  - Assess patient stability and activity tolerance for standing, transferring and ambulating w/ or w/o assistive devices  - Assist with transfers and ambulation using safe patient handling equipment as needed  - Ensure adequate protection for wounds/incisions during mobilization  - Obtain PT/OT consults as needed  - Advance activity as appropriate  - Communicate ordered activity level and limitations with patient/family  Outcome: Progressing     Problem: Impaired Functional Mobility  Goal: Achieve highest/safest level of mobility/gait  Description: Interventions:  - Assess patient's functional ability and stability  - Promote increasing activity/tolerance for mobility and gait  - Educate and engage patient/family in tolerated activity level and precautions  Outcome: Progressing

## 2024-05-06 NOTE — PHYSICAL THERAPY NOTE
OT/PT evaluation order received, chart review completed. Per RN, pt with significant hypotension at this time and not appropriate for out of bed activity at this time. Will follow up as time allows today.       Guillermina Vazquez OT  [facility]  Inpatient Rehabilitation  Occupational Therapy  [phone]    Bárbara Patterson, PT

## 2024-05-06 NOTE — PLAN OF CARE
Pt remains alert & oriented overnight but forgetful. Cpap while asleep. Thompson and flexi remain in place. Pt updated on plan of care and all questions answered at this time.       Problem: Patient Centered Care  Goal: Patient preferences are identified and integrated in the patient's plan of care  Description: Interventions:  - What would you like us to know as we care for you? I have 2 sons involved in myy care  - Provide timely, complete, and accurate information to patient/family  - Incorporate patient and family knowledge, values, beliefs, and cultural backgrounds into the planning and delivery of care  - Encourage patient/family to participate in care and decision-making at the level they choose  - Honor patient and family perspectives and choices  Outcome: Progressing     Problem: CARDIOVASCULAR - ADULT  Goal: Maintains optimal cardiac output and hemodynamic stability  Description: INTERVENTIONS:  - Monitor vital signs, rhythm, and trends  - Monitor for bleeding, hypotension and signs of decreased cardiac output  - Evaluate effectiveness of vasoactive medications to optimize hemodynamic stability  - Monitor arterial and/or venous puncture sites for bleeding and/or hematoma  - Assess quality of pulses, skin color and temperature  - Assess for signs of decreased coronary artery perfusion - ex. Angina  - Evaluate fluid balance, assess for edema, trend weights  Outcome: Progressing  Goal: Absence of cardiac arrhythmias or at baseline  Description: INTERVENTIONS:  - Continuous cardiac monitoring, monitor vital signs, obtain 12 lead EKG if indicated  - Evaluate effectiveness of antiarrhythmic and heart rate control medications as ordered  - Initiate emergency measures for life threatening arrhythmias  - Monitor electrolytes and administer replacement therapy as ordered  Outcome: Progressing     Problem: RESPIRATORY - ADULT  Goal: Achieves optimal ventilation and oxygenation  Description: INTERVENTIONS:  - Assess for  changes in respiratory status  - Assess for changes in mentation and behavior  - Position to facilitate oxygenation and minimize respiratory effort  - Oxygen supplementation based on oxygen saturation or ABGs  - Provide Smoking Cessation handout, if applicable  - Encourage broncho-pulmonary hygiene including cough, deep breathe, Incentive Spirometry  - Assess the need for suctioning and perform as needed  - Assess and instruct to report SOB or any respiratory difficulty  - Respiratory Therapy support as indicated  - Manage/alleviate anxiety  - Monitor for signs/symptoms of CO2 retention  Outcome: Progressing     Problem: GENITOURINARY - ADULT  Goal: Absence of urinary retention  Description: INTERVENTIONS:  - Assess patient’s ability to void and empty bladder  - Monitor intake/output and perform bladder scan as needed  - Follow urinary retention protocol/standard of care  - Consider collaborating with pharmacy to review patient's medication profile  - Implement strategies to promote bladder emptying  Outcome: Progressing     Problem: METABOLIC/FLUID AND ELECTROLYTES - ADULT  Goal: Electrolytes maintained within normal limits  Description: INTERVENTIONS:  - Monitor labs and rhythm and assess patient for signs and symptoms of electrolyte imbalances  - Administer electrolyte replacement as ordered  - Monitor response to electrolyte replacements, including rhythm and repeat lab results as appropriate  - Fluid restriction as ordered  - Instruct patient on fluid and nutrition restrictions as appropriate  Outcome: Progressing  Goal: Hemodynamic stability and optimal renal function maintained  Description: INTERVENTIONS:  - Monitor labs and assess for signs and symptoms of volume excess or deficit  - Monitor intake, output and patient weight  - Monitor urine specific gravity, serum osmolarity and serum sodium as indicated or ordered  - Monitor response to interventions for patient's volume status, including labs, urine  output, blood pressure (other measures as available)  - Encourage oral intake as appropriate  - Instruct patient on fluid and nutrition restrictions as appropriate  Outcome: Progressing     Problem: SKIN/TISSUE INTEGRITY - ADULT  Goal: Skin integrity remains intact  Description: INTERVENTIONS  - Assess and document risk factors for pressure ulcer development  - Assess and document skin integrity  - Monitor for areas of redness and/or skin breakdown  - Initiate interventions, skin care algorithm/standards of care as needed  Outcome: Progressing  Goal: Incision(s), wounds(s) or drain site(s) healing without S/S of infection  Description: INTERVENTIONS:  - Assess and document risk factors for pressure ulcer development  - Assess and document skin integrity  - Assess and document dressing/incision, wound bed, drain sites and surrounding tissue  - Implement wound care per orders  - Initiate isolation precautions as appropriate  - Initiate Pressure Ulcer prevention bundle as indicated  Outcome: Progressing  Goal: Oral mucous membranes remain intact  Description: INTERVENTIONS  - Assess oral mucosa and hygiene practices  - Implement preventative oral hygiene regimen  - Implement oral medicated treatments as ordered  Outcome: Progressing     Problem: HEMATOLOGIC - ADULT  Goal: Maintains hematologic stability  Description: INTERVENTIONS  - Assess for signs and symptoms of bleeding or hemorrhage  - Monitor labs and vital signs for trends  - Administer supportive blood products/factors, fluids and medications as ordered and appropriate  - Administer supportive blood products/factors as ordered and appropriate  Outcome: Progressing  Goal: Free from bleeding injury  Description: (Example usage: patient with low platelets)  INTERVENTIONS:  - Avoid intramuscular injections, enemas and rectal medication administration  - Ensure safe mobilization of patient  - Hold pressure on venipuncture sites to achieve adequate hemostasis  -  Assess for signs and symptoms of internal bleeding  - Monitor lab trends  - Patient is to report abnormal signs of bleeding to staff  - Avoid use of toothpicks and dental floss  - Use electric shaver for shaving  - Use soft bristle tooth brush  - Limit straining and forceful nose blowing  Outcome: Progressing     Problem: MUSCULOSKELETAL - ADULT  Goal: Return mobility to safest level of function  Description: INTERVENTIONS:  - Assess patient stability and activity tolerance for standing, transferring and ambulating w/ or w/o assistive devices  - Assist with transfers and ambulation using safe patient handling equipment as needed  - Ensure adequate protection for wounds/incisions during mobilization  - Obtain PT/OT consults as needed  - Advance activity as appropriate  - Communicate ordered activity level and limitations with patient/family  Outcome: Progressing     Problem: Impaired Functional Mobility  Goal: Achieve highest/safest level of mobility/gait  Description: Interventions:  - Assess patient's functional ability and stability  - Promote increasing activity/tolerance for mobility and gait  - Educate and engage patient/family in tolerated activity level and precautions  Outcome: Progressing

## 2024-05-06 NOTE — PROGRESS NOTES
05/06/24 1058   [REMOVED] Wound 05/03/24 3 Foot Left;Plantar   Final Assessment Date: 05/06/24  Date First Assessed: 05/03/24   Present on Original Admission: Yes   Wound Number (Wound Clinic Only): 3  Location: (c) Foot  Wound Location Orientation: Left;Plantar  Wound Outcome: Healed   Wound Image     Site Assessment Dry;Brown   Drainage Amount None   Drainage Description Scabbed   Treatments Site Care   Dressing Aquacel Foam   Dressing Changed Changed   Dressing Status Dressing Changed;Removed   [REMOVED] Wound 05/04/24 Leg Left;Lower;Posterior;Lateral   Final Assessment Date: 05/06/24  Date First Assessed/Time First Assessed: 05/04/24 0330   Present on Original Admission: Yes  Location: Leg  Wound Location Orientation: Left;Lower;Posterior;Lateral  Wound Outcome: Healed   Wound Image     Site Assessment Clean;Dry;Intact;Fragile;Pink   Drainage Amount None   Dressing Open to air   Wound Follow Up   Follow up needed No     Pt seen for wound consult. See above for LLE healed, pink skin. Scab to the left plantar foot, lotion and foam applied for protection. Wound care signing off.

## 2024-05-07 LAB
ALBUMIN SERPL-MCNC: 2.9 G/DL (ref 3.2–4.8)
ALBUMIN/GLOB SERPL: 1.2 {RATIO} (ref 1–2)
ALP LIVER SERPL-CCNC: 60 U/L
ALT SERPL-CCNC: 19 U/L
ANION GAP SERPL CALC-SCNC: 7 MMOL/L (ref 0–18)
AST SERPL-CCNC: 20 U/L (ref ?–34)
BILIRUB SERPL-MCNC: 0.8 MG/DL (ref 0.2–1.1)
BUN BLD-MCNC: 11 MG/DL (ref 9–23)
BUN/CREAT SERPL: 16.7 (ref 10–20)
CALCIUM BLD-MCNC: 8.4 MG/DL (ref 8.7–10.4)
CHLORIDE SERPL-SCNC: 110 MMOL/L (ref 98–112)
CO2 SERPL-SCNC: 23 MMOL/L (ref 21–32)
CREAT BLD-MCNC: 0.66 MG/DL
DEPRECATED RDW RBC AUTO: 49.7 FL (ref 35.1–46.3)
EGFRCR SERPLBLD CKD-EPI 2021: 90 ML/MIN/1.73M2 (ref 60–?)
ERYTHROCYTE [DISTWIDTH] IN BLOOD BY AUTOMATED COUNT: 14.4 % (ref 11–15)
GLOBULIN PLAS-MCNC: 2.5 G/DL (ref 2–3.5)
GLUCOSE BLD-MCNC: 83 MG/DL (ref 70–99)
HCT VFR BLD AUTO: 31.2 %
HGB BLD-MCNC: 10.1 G/DL
MAGNESIUM SERPL-MCNC: 2 MG/DL (ref 1.6–2.6)
MCH RBC QN AUTO: 30.3 PG (ref 26–34)
MCHC RBC AUTO-ENTMCNC: 32.4 G/DL (ref 31–37)
MCV RBC AUTO: 93.7 FL
OSMOLALITY SERPL CALC.SUM OF ELEC: 289 MOSM/KG (ref 275–295)
PLATELET # BLD AUTO: 227 10(3)UL (ref 150–450)
POTASSIUM SERPL-SCNC: 4 MMOL/L (ref 3.5–5.1)
POTASSIUM SERPL-SCNC: 4 MMOL/L (ref 3.5–5.1)
PROT SERPL-MCNC: 5.4 G/DL (ref 5.7–8.2)
RBC # BLD AUTO: 3.33 X10(6)UL
SODIUM SERPL-SCNC: 140 MMOL/L (ref 136–145)
WBC # BLD AUTO: 9.2 X10(3) UL (ref 4–11)

## 2024-05-07 PROCEDURE — 85027 COMPLETE CBC AUTOMATED: CPT | Performed by: INTERNAL MEDICINE

## 2024-05-07 PROCEDURE — 97166 OT EVAL MOD COMPLEX 45 MIN: CPT

## 2024-05-07 PROCEDURE — 84132 ASSAY OF SERUM POTASSIUM: CPT | Performed by: INTERNAL MEDICINE

## 2024-05-07 PROCEDURE — 80053 COMPREHEN METABOLIC PANEL: CPT | Performed by: INTERNAL MEDICINE

## 2024-05-07 PROCEDURE — 83735 ASSAY OF MAGNESIUM: CPT | Performed by: INTERNAL MEDICINE

## 2024-05-07 PROCEDURE — 97530 THERAPEUTIC ACTIVITIES: CPT

## 2024-05-07 PROCEDURE — 97535 SELF CARE MNGMENT TRAINING: CPT

## 2024-05-07 PROCEDURE — 97162 PT EVAL MOD COMPLEX 30 MIN: CPT

## 2024-05-07 RX ORDER — CYCLOSPORINE 0.5 MG/ML
1 EMULSION OPHTHALMIC 2 TIMES DAILY
Status: DISCONTINUED | OUTPATIENT
Start: 2024-05-07 | End: 2024-05-11

## 2024-05-07 NOTE — OCCUPATIONAL THERAPY NOTE
OCCUPATIONAL THERAPY EVALUATION - INPATIENT     Room Number: 521/521-A  Evaluation Date: 5/7/2024  Type of Evaluation: Initial       Physician Order: IP Consult to Occupational Therapy  Reason for Therapy: ADL/IADL Dysfunction and Discharge Planning    OCCUPATIONAL THERAPY ASSESSMENT     Patient is a 79 year old female admitted 5/3/2024 for septic shock from LLE cellulitis; MRSA.  Prior to admission, pt was at [facility] and receiving assist PRN for dressing, bathing and IADLs.  Patient is currently requiring up to total A for ADLs overall along with SBA for sitting at EOB, max A for STS, and max A for sit to supine. Pt tolerated about <1 minute of standing in supported standing due to increased LLE pain (in knee and lower leg).  Pt has the following impairments: decreased functional strength, decreased functional reach, decreased endurance, pain, impaired standing balance, and medical status.    RN cleared pt for participation in OT session, which was completed in collaboration with PT. Upon arrival, pt was sitting at EOB and agreeable to activity. No visitors present during session. Gait belt used. Pt was left in bed and alarm was activated. Call light and all needs left in reach. Handoff given to RN.    Education provided  Educated pt about role of OT and hospital therapy process as well as proper safety techniques. Pt verbalized/demonstrated fair carryover.    Patient will benefit from continued skilled OT Services to promote return to prior level of function and safety with continuous assistance and gradual rehabilitative therapy     PLAN  OT Treatment Plan: Balance activities;Energy conservation/work simplification techniques;Continued evaluation;Compensatory technique education;Fine motor coordination activities;Neuromuscluar reeducation;Equipment eval/education;Patient/Family training;Patient/Family education;Cognitive reorientation;Endurance training;UE strengthening/ROM;Functional transfer training;Visual  perceptual training;IADL training;ADL training      OCCUPATIONAL THERAPY MEDICAL/SOCIAL HISTORY     Problem List  Principal Problem:    Septic shock (HCC)  Active Problems:    Cellulitis      Past Medical History  Past Medical History:    Arrhythmia    Atrial fibrillation (HCC)    Congestive heart disease (HCC)    COPD (chronic obstructive pulmonary disease) (HCC)    Essential hypertension    High blood pressure    High cholesterol    Morbid obesity with BMI of 50.0-59.9, adult (HCC)    Muscle weakness    Neuropathy    Peripheral vascular disease (HCC)    Sleep apnea    Visual impairment       Past Surgical History  Past Surgical History:   Procedure Laterality Date    Knee surgery Bilateral        HOME SITUATION  Type of Home: Assisted living facility  Home Layout: One level  Lives With: Alone                              SUBJECTIVE  \"I can't go home like this.\"    OCCUPATIONAL THERAPY EXAMINATION      OBJECTIVE     Fall Risk: High fall risk    PAIN ASSESSMENT  Rating: Unable to rate  Location: LLE       O2 SATURATIONS  Oxygen Therapy  SPO2% on Room Air at Rest: 95  SPO2% Ambulation on Room Air: 94     80/40 after standing; asymptomatic    RANGE OF MOTION   Upper extremity ROM is within functional limits     STRENGTH ASSESSMENT  Upper extremity strength is within functional limits     COORDINATION  Gross Motor: WFL   Fine Motor: WFL     ACTIVITIES OF DAILY LIVING ASSESSMENT  AM-PAC ‘6-Clicks’ Inpatient Daily Activity Short Form  How much help from another person does the patient currently need…  -   Putting on and taking off regular lower body clothing?: A Lot  -   Bathing (including washing, rinsing, drying)?: A Lot  -   Toileting, which includes using toilet, bedpan or urinal? : A Lot  -   Putting on and taking off regular upper body clothing?: A Little  -   Taking care of personal grooming such as brushing teeth?: A Little  -   Eating meals?: None    AM-PAC Score:  Score: 16  Approx Degree of Impairment:  53.32%  Standardized Score (AM-PAC Scale): 35.96  CMS Modifier (G-Code): CK      BED MOBILITY  Sit to Supine (OT): Maximum Assist      FUNCTIONAL TRANSFER ASSESSMENT        Sit to stand: max A        FUNCTIONAL ADL ASSESSMENT  Overall max-total A    The patient's Approx Degree of Impairment: 53.32% has been calculated based on documentation in the [facility] '6 clicks' Inpatient Daily Activity Short Form.  Research supports that patients with this level of impairment may benefit from SHARYN. Final disposition will be made by interdisciplinary medical team.    OT Goals  Patients self stated goal is: to get stronger     Patient will complete functional transfer with min A  Comment:     Patient will complete toileting with min A  Comment:     Patient will tolerate standing for 3 minutes in prep for adls with min A   Comment:               Goals  on:   Frequency: 3-5x/wk    Patient Evaluation Complexity Level:   Occupational Profile/Medical History MODERATE - Expanded review of history including review of medical or therapy record   Specific performance deficits impacting engagement in ADL/IADL MODERATE  3 - 5 performance deficits   Client Assessment/Performance Deficits MODERATE - Comorbidities and min to mod modifications of tasks    Clinical Decision Making MODERATE - Analysis of occupational profile, detailed assessments, several treatment options    Overall Complexity MODERATE     OT Session Time: 30 minutes  Self-Care Home Management: 15 minutes           Guillermina Vazquez OT  [facility]  Inpatient Rehabilitation  Occupational Therapy  [phone]

## 2024-05-07 NOTE — PLAN OF CARE
Patient is A&O x4, on room air, CPAP at night, patient's own eye drops added to MAR. No complaints of pain. Has a ivey and flexiseal. CHG bath done. Bed is locked and in lowest position. Call light is within reach.  Problem: Patient Centered Care  Goal: Patient preferences are identified and integrated in the patient's plan of care  Description: Interventions:  - What would you like us to know as we care for you? I have 2 sons involved in myy care  - Provide timely, complete, and accurate information to patient/family  - Incorporate patient and family knowledge, values, beliefs, and cultural backgrounds into the planning and delivery of care  - Encourage patient/family to participate in care and decision-making at the level they choose  - Honor patient and family perspectives and choices  Outcome: Progressing     Problem: CARDIOVASCULAR - ADULT  Goal: Maintains optimal cardiac output and hemodynamic stability  Description: INTERVENTIONS:  - Monitor vital signs, rhythm, and trends  - Monitor for bleeding, hypotension and signs of decreased cardiac output  - Evaluate effectiveness of vasoactive medications to optimize hemodynamic stability  - Monitor arterial and/or venous puncture sites for bleeding and/or hematoma  - Assess quality of pulses, skin color and temperature  - Assess for signs of decreased coronary artery perfusion - ex. Angina  - Evaluate fluid balance, assess for edema, trend weights  Outcome: Progressing  Goal: Absence of cardiac arrhythmias or at baseline  Description: INTERVENTIONS:  - Continuous cardiac monitoring, monitor vital signs, obtain 12 lead EKG if indicated  - Evaluate effectiveness of antiarrhythmic and heart rate control medications as ordered  - Initiate emergency measures for life threatening arrhythmias  - Monitor electrolytes and administer replacement therapy as ordered  Outcome: Progressing     Problem: RESPIRATORY - ADULT  Goal: Achieves optimal ventilation and  oxygenation  Description: INTERVENTIONS:  - Assess for changes in respiratory status  - Assess for changes in mentation and behavior  - Position to facilitate oxygenation and minimize respiratory effort  - Oxygen supplementation based on oxygen saturation or ABGs  - Provide Smoking Cessation handout, if applicable  - Encourage broncho-pulmonary hygiene including cough, deep breathe, Incentive Spirometry  - Assess the need for suctioning and perform as needed  - Assess and instruct to report SOB or any respiratory difficulty  - Respiratory Therapy support as indicated  - Manage/alleviate anxiety  - Monitor for signs/symptoms of CO2 retention  Outcome: Progressing     Problem: GENITOURINARY - ADULT  Goal: Absence of urinary retention  Description: INTERVENTIONS:  - Assess patient’s ability to void and empty bladder  - Monitor intake/output and perform bladder scan as needed  - Follow urinary retention protocol/standard of care  - Consider collaborating with pharmacy to review patient's medication profile  - Implement strategies to promote bladder emptying  Outcome: Progressing     Problem: METABOLIC/FLUID AND ELECTROLYTES - ADULT  Goal: Electrolytes maintained within normal limits  Description: INTERVENTIONS:  - Monitor labs and rhythm and assess patient for signs and symptoms of electrolyte imbalances  - Administer electrolyte replacement as ordered  - Monitor response to electrolyte replacements, including rhythm and repeat lab results as appropriate  - Fluid restriction as ordered  - Instruct patient on fluid and nutrition restrictions as appropriate  Outcome: Progressing  Goal: Hemodynamic stability and optimal renal function maintained  Description: INTERVENTIONS:  - Monitor labs and assess for signs and symptoms of volume excess or deficit  - Monitor intake, output and patient weight  - Monitor urine specific gravity, serum osmolarity and serum sodium as indicated or ordered  - Monitor response to interventions  for patient's volume status, including labs, urine output, blood pressure (other measures as available)  - Encourage oral intake as appropriate  - Instruct patient on fluid and nutrition restrictions as appropriate  Outcome: Progressing     Problem: SKIN/TISSUE INTEGRITY - ADULT  Goal: Skin integrity remains intact  Description: INTERVENTIONS  - Assess and document risk factors for pressure ulcer development  - Assess and document skin integrity  - Monitor for areas of redness and/or skin breakdown  - Initiate interventions, skin care algorithm/standards of care as needed  Outcome: Progressing  Goal: Incision(s), wounds(s) or drain site(s) healing without S/S of infection  Description: INTERVENTIONS:  - Assess and document risk factors for pressure ulcer development  - Assess and document skin integrity  - Assess and document dressing/incision, wound bed, drain sites and surrounding tissue  - Implement wound care per orders  - Initiate isolation precautions as appropriate  - Initiate Pressure Ulcer prevention bundle as indicated  Outcome: Progressing  Goal: Oral mucous membranes remain intact  Description: INTERVENTIONS  - Assess oral mucosa and hygiene practices  - Implement preventative oral hygiene regimen  - Implement oral medicated treatments as ordered  Outcome: Progressing     Problem: HEMATOLOGIC - ADULT  Goal: Maintains hematologic stability  Description: INTERVENTIONS  - Assess for signs and symptoms of bleeding or hemorrhage  - Monitor labs and vital signs for trends  - Administer supportive blood products/factors, fluids and medications as ordered and appropriate  - Administer supportive blood products/factors as ordered and appropriate  Outcome: Progressing  Goal: Free from bleeding injury  Description: (Example usage: patient with low platelets)  INTERVENTIONS:  - Avoid intramuscular injections, enemas and rectal medication administration  - Ensure safe mobilization of patient  - Hold pressure on  venipuncture sites to achieve adequate hemostasis  - Assess for signs and symptoms of internal bleeding  - Monitor lab trends  - Patient is to report abnormal signs of bleeding to staff  - Avoid use of toothpicks and dental floss  - Use electric shaver for shaving  - Use soft bristle tooth brush  - Limit straining and forceful nose blowing  Outcome: Progressing     Problem: MUSCULOSKELETAL - ADULT  Goal: Return mobility to safest level of function  Description: INTERVENTIONS:  - Assess patient stability and activity tolerance for standing, transferring and ambulating w/ or w/o assistive devices  - Assist with transfers and ambulation using safe patient handling equipment as needed  - Ensure adequate protection for wounds/incisions during mobilization  - Obtain PT/OT consults as needed  - Advance activity as appropriate  - Communicate ordered activity level and limitations with patient/family  Outcome: Progressing     Problem: Impaired Functional Mobility  Goal: Achieve highest/safest level of mobility/gait  Description: Interventions:  - Assess patient's functional ability and stability  - Promote increasing activity/tolerance for mobility and gait  - Educate and engage patient/family in tolerated activity level and precautions    Outcome: Progressing

## 2024-05-07 NOTE — PROGRESS NOTES
[facility] Hospitalist Progress Note     CC: Hospital Follow up    PCP: Maral Huffman MD       Assessment/Plan:     Principal Problem:    Septic shock (HCC)  Active Problems:    Cellulitis    Patient is a 79 year old female with PMH sig for COPD, A- Fib on Eliquis, CHF, HTN, HLD, PAD, Neuropathy, SUSAN who presents with weakness and left leg redness.  Admitted for Sepsis Cellulitis and hypotension.      Sepsis Cellulitis   Left Leg   MRSA positive   Leukocytosis   - hypotensive on admission, now resolved    - UA okay  - CXR and CT with some edema, no obvious signs of infection   - CT chest with no PE  - no fevers   - CK normal   - procal elevated   - f/u blood cx  - IV vanco and zosyn started   - ID consulted and Amikacin complete    - Wound care  - MRI left foot with no OM       Shock resolved   - likely 2/2 sepsis  - TSH and cortisol normal   - IVF and pressor support complete   - treat underlying cause   - Echo normal EF with increased PA pressures   - Crit Care on consult   - low dose midodrine added and tolerating well       Acute Hypoxic Respiratory Failure   H/o COPD not on inhalers   SUSAN  Atelectasis   - CXR and CT with some edema, no obvious signs of infection   - CT chest with no PE  - supplemental O2 support, IS   - cpap as tolerated      MARINO resolved   Urinary Retention   - likely pre renal and sepsis component   - gentle IVF complete, pressor support as needed   - avoid hypotension   - avoid nephrotoxic agents   - straight cath x2  - Thompson placed 5/5/24  - voiding trial when more active, hopeful for tomorrow      Chronic  A-fib  Secondary Hypercoagulable state   S/p Cardioversion   - Eliquis   - off Amio  - hold metop and dilt in sepsis hypotension   - in NSR      Chronic Diastolic HF   - CXR and CT with some edema  -   - Echo reviewed   - hold BB and CBB  - hold home bumex   - monitor fluid status closely      Lose Stools  H/o Gastroparesis   - c-diff negative     HTN - hold BP meds for hypotension    HLD - statin   PAD - Eliquis and statin   Neuropathy - gabapentin      FN:  - IVF: complete   - Diet: Cardiac      DVT Prophy: SCDs, Eliquis   Atrophy: Ambulate   Lines: Piv     Dispo: pending clinical course  Transfer to [facility]     Outpatient records or previous hospital records reviewed.      Further recommendations pending patient's clinical course.  Critical access hospital hospitalist to continue to follow patient while in house     Patient and/or patient's family given opportunity to ask questions and note understanding and agreeing with therapeutic plan as outlined     Thank You,  Norman Luo MD     [facility] Hospitalist  Answering Service number: [phone]     Subjective:     No CP, SOB, or N/V.  Still having lose stools.   Left knee pain from being in the same position.  Encouraged PT but refused yesterday.     OBJECTIVE:    Blood pressure 115/67, pulse 78, temperature 97.2 °F (36.2 °C), temperature source Temporal, resp. rate 20, height 5' 2\" (1.575 m), weight 261 lb 3.9 oz (118.5 kg), SpO2 95%.    Temp:  [96.8 °F (36 °C)-98.2 °F (36.8 °C)] 97.2 °F (36.2 °C)  Pulse:  [69-92] 78  Resp:  [15-34] 20  BP: ()/(40-92) 115/67  SpO2:  [90 %-97 %] 95 %      Intake/Output:    Intake/Output Summary (Last 24 hours) at 5/7/2024 1046  Last data filed at 5/7/2024 1000  Gross per 24 hour   Intake 846.6 ml   Output 1125 ml   Net -278.4 ml       Last 3 Weights   05/06/24 0500 261 lb 3.9 oz (118.5 kg)   05/05/24 0600 257 lb 8 oz (116.8 kg)   05/04/24 0324 261 lb 3.9 oz (118.5 kg)   05/03/24 1837 261 lb 9.6 oz (118.7 kg)   05/04/24 1456 261 lb 3.9 oz (118.5 kg)   04/29/24 1532 261 lb (118.4 kg)       Exam   General: Alert, no acute distress  HEENT: oral mucosa normal   Neck: non tender, no adenopathy   Lungs: clear to ausculation bilaterally  Heart: Regular rate and rhythm  Abdomen: soft, non tender, non distended   Extremities: mild edema, left leg more red than right with chronic wounds, improving   Skin: no new  rash, normal color  Neuro: 5/5 strength in bilateral extremities, normal sensations  Psych: appropriate affect       Data Review:       Labs:     Recent Labs   Lab 05/03/24 1850 05/05/24 0457 05/06/24 0449 05/07/24  0359   RBC 3.85 3.42* 3.16* 3.33*   HGB 11.8* 10.5* 9.6* 10.1*   HCT 36.3 32.1* 30.2* 31.2*   MCV 94.3 93.9 95.6 93.7   MCH 30.6 30.7 30.4 30.3   MCHC 32.5 32.7 31.8 32.4   RDW 14.6 15.1* 14.6 14.4   NEPRELIM 10.31*  --   --   --    WBC 10.9 15.7* 13.0* 9.2   .0 212.0 197.0 227.0         Recent Labs   Lab 05/05/24 0457 05/06/24 0449 05/07/24  0359   GLU 90 89 83   BUN 18 15 11   CREATSERUM 0.84 0.67 0.66   EGFRCR 72 90 90   CA 8.2* 8.2* 8.4*    142 140   K 3.8 3.7  3.7 4.0  4.0    112 110   CO2 24.0 24.0 23.0       Recent Labs   Lab 05/03/24 1850 05/04/24 1743 05/05/24 0457 05/06/24 0449 05/07/24  0359   ALT 10 10 7* 18 19   AST 17 20 17 13 20   ALB 3.9 3.3 2.9* 2.8* 2.9*         Imaging:  MRI FOOT (W+WO), LEFT (CPT=73720)    Result Date: 5/5/2024  CONCLUSION:  1. No osteomyelitis or abscess. 2. Superficial and deep soft tissue edema. 3. Muscular atrophy. 4. 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 5. 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 6. Advanced degenerative change of the midfoot.  Other less pronounced degenerative changes. 7. Small ankle joint effusion.     Dictated by (CST): Galo Rucker MD on 5/05/2024 at 7:58 PM     Finalized by (CST): Galo Rucker MD on 5/05/2024 at 8:09 PM             Meds:      cycloSPORINE  1 drop Both Eyes BID    midodrine  2.5 mg Oral TID    apixaban  5 mg Oral BID    gabapentin  200 mg Oral BID    pantoprazole  40 mg Oral QAM AC    pravastatin  20 mg Oral Nightly    vancomycin  15 mg/kg Intravenous Q24H    piperacillin-tazobactam  4.5 g Intravenous Q8H    cyanocobalamin  1,000 mcg Oral Daily    cholecalciferol  2,000 Units Oral BID    glycerin-hypromellose-  1 drop Both Eyes BID            acetaminophen    ondansetron    metoclopramide    polyethylene glycol (PEG 3350)    sennosides    bisacodyl    melatonin

## 2024-05-07 NOTE — PLAN OF CARE
Problem: Patient Centered Care  Goal: Patient preferences are identified and integrated in the patient's plan of care  Description: Interventions:  - What would you like us to know as we care for you? I have 2 sons involved in myy care  - Provide timely, complete, and accurate information to patient/family  - Incorporate patient and family knowledge, values, beliefs, and cultural backgrounds into the planning and delivery of care  - Encourage patient/family to participate in care and decision-making at the level they choose  - Honor patient and family perspectives and choices  Outcome: Progressing     Problem: CARDIOVASCULAR - ADULT  Goal: Maintains optimal cardiac output and hemodynamic stability  Description: INTERVENTIONS:  - Monitor vital signs, rhythm, and trends  - Monitor for bleeding, hypotension and signs of decreased cardiac output  - Evaluate effectiveness of vasoactive medications to optimize hemodynamic stability  - Monitor arterial and/or venous puncture sites for bleeding and/or hematoma  - Assess quality of pulses, skin color and temperature  - Assess for signs of decreased coronary artery perfusion - ex. Angina  - Evaluate fluid balance, assess for edema, trend weights  Outcome: Progressing  Goal: Absence of cardiac arrhythmias or at baseline  Description: INTERVENTIONS:  - Continuous cardiac monitoring, monitor vital signs, obtain 12 lead EKG if indicated  - Evaluate effectiveness of antiarrhythmic and heart rate control medications as ordered  - Initiate emergency measures for life threatening arrhythmias  - Monitor electrolytes and administer replacement therapy as ordered  Outcome: Progressing     Problem: RESPIRATORY - ADULT  Goal: Achieves optimal ventilation and oxygenation  Description: INTERVENTIONS:  - Assess for changes in respiratory status  - Assess for changes in mentation and behavior  - Position to facilitate oxygenation and minimize respiratory effort  - Oxygen supplementation  based on oxygen saturation or ABGs  - Provide Smoking Cessation handout, if applicable  - Encourage broncho-pulmonary hygiene including cough, deep breathe, Incentive Spirometry  - Assess the need for suctioning and perform as needed  - Assess and instruct to report SOB or any respiratory difficulty  - Respiratory Therapy support as indicated  - Manage/alleviate anxiety  - Monitor for signs/symptoms of CO2 retention  Outcome: Progressing     Problem: GENITOURINARY - ADULT  Goal: Absence of urinary retention  Description: INTERVENTIONS:  - Assess patient’s ability to void and empty bladder  - Monitor intake/output and perform bladder scan as needed  - Follow urinary retention protocol/standard of care  - Consider collaborating with pharmacy to review patient's medication profile  - Implement strategies to promote bladder emptying  Outcome: Progressing     Problem: METABOLIC/FLUID AND ELECTROLYTES - ADULT  Goal: Electrolytes maintained within normal limits  Description: INTERVENTIONS:  - Monitor labs and rhythm and assess patient for signs and symptoms of electrolyte imbalances  - Administer electrolyte replacement as ordered  - Monitor response to electrolyte replacements, including rhythm and repeat lab results as appropriate  - Fluid restriction as ordered  - Instruct patient on fluid and nutrition restrictions as appropriate  Outcome: Progressing  Goal: Hemodynamic stability and optimal renal function maintained  Description: INTERVENTIONS:  - Monitor labs and assess for signs and symptoms of volume excess or deficit  - Monitor intake, output and patient weight  - Monitor urine specific gravity, serum osmolarity and serum sodium as indicated or ordered  - Monitor response to interventions for patient's volume status, including labs, urine output, blood pressure (other measures as available)  - Encourage oral intake as appropriate  - Instruct patient on fluid and nutrition restrictions as appropriate  Outcome:  Progressing     Problem: SKIN/TISSUE INTEGRITY - ADULT  Goal: Skin integrity remains intact  Description: INTERVENTIONS  - Assess and document risk factors for pressure ulcer development  - Assess and document skin integrity  - Monitor for areas of redness and/or skin breakdown  - Initiate interventions, skin care algorithm/standards of care as needed  Outcome: Progressing  Goal: Incision(s), wounds(s) or drain site(s) healing without S/S of infection  Description: INTERVENTIONS:  - Assess and document risk factors for pressure ulcer development  - Assess and document skin integrity  - Assess and document dressing/incision, wound bed, drain sites and surrounding tissue  - Implement wound care per orders  - Initiate isolation precautions as appropriate  - Initiate Pressure Ulcer prevention bundle as indicated  Outcome: Progressing  Goal: Oral mucous membranes remain intact  Description: INTERVENTIONS  - Assess oral mucosa and hygiene practices  - Implement preventative oral hygiene regimen  - Implement oral medicated treatments as ordered  Outcome: Progressing     Problem: HEMATOLOGIC - ADULT  Goal: Maintains hematologic stability  Description: INTERVENTIONS  - Assess for signs and symptoms of bleeding or hemorrhage  - Monitor labs and vital signs for trends  - Administer supportive blood products/factors, fluids and medications as ordered and appropriate  - Administer supportive blood products/factors as ordered and appropriate  Outcome: Progressing  Goal: Free from bleeding injury  Description: (Example usage: patient with low platelets)  INTERVENTIONS:  - Avoid intramuscular injections, enemas and rectal medication administration  - Ensure safe mobilization of patient  - Hold pressure on venipuncture sites to achieve adequate hemostasis  - Assess for signs and symptoms of internal bleeding  - Monitor lab trends  - Patient is to report abnormal signs of bleeding to staff  - Avoid use of toothpicks and dental  floss  - Use electric shaver for shaving  - Use soft bristle tooth brush  - Limit straining and forceful nose blowing  Outcome: Progressing     Problem: MUSCULOSKELETAL - ADULT  Goal: Return mobility to safest level of function  Description: INTERVENTIONS:  - Assess patient stability and activity tolerance for standing, transferring and ambulating w/ or w/o assistive devices  - Assist with transfers and ambulation using safe patient handling equipment as needed  - Ensure adequate protection for wounds/incisions during mobilization  - Obtain PT/OT consults as needed  - Advance activity as appropriate  - Communicate ordered activity level and limitations with patient/family  Outcome: Progressing     Problem: Impaired Functional Mobility  Goal: Achieve highest/safest level of mobility/gait  Description: Interventions:  - Assess patient's functional ability and stability  - Promote increasing activity/tolerance for mobility and gait  - Educate and engage patient/family in tolerated activity level and precautions  Outcome: Progressing     Received patient from CCU.  Alert and oriented x4. Vital signs are stable. Denies pain at this time. Wound dressing changed.  Safety precautions in place.

## 2024-05-07 NOTE — PROGRESS NOTES
[facility]  part of [facility] Infectious Disease Progress Note    Adore Covington Patient Status:  Inpatient    1945 MRN [id number]   Location [facility] 2W/SW Attending Norman Luo MD   Hosp Day # 3 PCP Maral Huffman MD     Subjective:  Chart reviewed, no acute events. Pt seen bedside.  Reports she is feeling better overall.  Feels her L leg is a bit better but still swollen.  She continues to report L knee pain that came on suddenly PTA and still not really able to bear weight on this knee.  Was able to walk/bear weight last week.  Afebrile      Objective:    Allergies:  Allergies   Allergen Reactions    Lactose OTHER (SEE COMMENTS)     Patient denies allergy       Medications:    Current Facility-Administered Medications:     cycloSPORINE (RESTASIS) 0.05 % ophthalmic emulsion 1 drop - pt's own medication, 1 drop, Both Eyes, BID    sodium chloride 0.9 % IV bolus 500 mL, 500 mL, Intravenous, Once    midodrine (ProAmatine) tab 2.5 mg, 2.5 mg, Oral, TID    acetaminophen (Tylenol Extra Strength) tab 500 mg, 500 mg, Oral, Q4H PRN    ondansetron (Zofran) 4 MG/2ML injection 4 mg, 4 mg, Intravenous, Q6H PRN    metoclopramide (Reglan) 5 mg/mL injection 5 mg, 5 mg, Intravenous, Q8H PRN    polyethylene glycol (PEG 3350) (Miralax) 17 g oral packet 17 g, 17 g, Oral, Daily PRN    sennosides (Senokot) tab 17.2 mg, 17.2 mg, Oral, Nightly PRN    bisacodyl (Dulcolax) 10 MG rectal suppository 10 mg, 10 mg, Rectal, Daily PRN    melatonin tab 3 mg, 3 mg, Oral, Nightly PRN    apixaban (Eliquis) tab 5 mg, 5 mg, Oral, BID    gabapentin (Neurontin) cap 200 mg, 200 mg, Oral, BID    pantoprazole (Protonix) DR tab 40 mg, 40 mg, Oral, QAM AC    pravastatin (Pravachol) tab 20 mg, 20 mg, Oral, Nightly    vancomycin (Vancocin) 1.75 g in sodium chloride 0.9% 500mL IVPB premix, 15 mg/kg, Intravenous, Q24H    piperacillin-tazobactam (Zosyn) 4.5 g in dextrose 5% 100 mL IVPB-ADDV, 4.5 g,  Intravenous, Q8H    cyanocobalamin (Vitamin B12) tab 1,000 mcg, 1,000 mcg, Oral, Daily    cholecalciferol (Vitamin D3) tab 2,000 Units, 2,000 Units, Oral, BID    glycerin-hypromellose- (Artificial Tears) 0.2-0.2-1 % ophthalmic solution 1 drop, 1 drop, Both Eyes, BID    Physical Exam:  General: Alert, orientated x3.  Cooperative.  No apparent distress.  Vital Signs:  Blood pressure 103/60, pulse 76, temperature 97.2 °F (36.2 °C), temperature source Temporal, resp. rate (!) 28, height 5' 2\" (1.575 m), weight 261 lb 3.9 oz (118.5 kg), SpO2 95%.   Temp (24hrs), Av.3 °F (36.3 °C), Min:96.8 °F (36 °C), Max:98 °F (36.7 °C)      HEENT: Exam is unremarkable.  Without scleral icterus.  Mucous membranes are moist. PERRLA.  Oropharynx is clear.  Lungs: Clear to auscultation bilaterally.  Cardiac: Regular rate   Abdomen:  Soft, non-distended, non-tender, with no rebound or guarding.   Extremities:  LLE with edema, mild blistering noted.    Skin: LLE with erythema, mild warmth, no fluctuance, mild induration. One small open wound noted on lateral calf.   Knee with well healed incision, no surrounding erythema, induration, warmth   Neurologic: Cranial nerves are grossly intact.      Labs:  Lab Results   Component Value Date    WBC 9.2 2024    HGB 10.1 2024    HCT 31.2 2024    .0 2024    CREATSERUM 0.66 2024    BUN 11 2024     2024    K 4.0 2024    K 4.0 2024     2024    CO2 23.0 2024    GLU 83 2024    CA 8.4 2024    ALB 2.9 2024    ALKPHO 60 2024    BILT 0.8 2024    TP 5.4 2024    AST 20 2024    ALT 19 2024    MG 2.0 2024       Radiology:  MRI L foot:  Impression   CONCLUSION:  1. No osteomyelitis or abscess.  2. Superficial and deep soft tissue edema.  3. Muscular atrophy.  4. 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  5. 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  6. Advanced degenerative change of the midfoot.  Other less pronounced degenerative changes.  7. Small ankle joint effusion.        Assessment/Plan:    1.  Sepsis with septic shock due to LLE cellulitis  -now normotensive, off pressors  -MRI of L foot without OM or source  -PCT elevated on admission  -hx of LE wounds and edema, possible nidus.  Currently on small wound noted  -blood cultures NGTD  -MRSA screen positive  -with ongoing L knee pain, hx of L TKA x 2 (revision done ~ 4 years ago)   -on IV Vancomycin and Zosyn, s/p Amikacin    2. Leukocytosis  -now resolved  -C Diff negative    3. Acute hypoxic respiratory failure  -now on RA    DISPO:  Continue IV Vancomycin and Zosyn, continue to trend temps and WBC.  If no improvement with knee pain would consider dedicated imaging as she reports she was able to bear weight on it PTA and has hx of TKA x 2 on left.   D/w Dr Rice.     If you have any questions or concerns please call [facility] at [phone].     VIVIENNE Rouse  5/7/2024  1:18 PM

## 2024-05-07 NOTE — PROGRESS NOTES
Critical Care Progress Note     Assessment / Plan:  Shock - due to combination of hypovolemia and sepsis from cellulitis  - remains off vasopressors  - cdiff negative  - further as below  Cellulitis of the left LE in patient that is MRSA positive  - on vancomycin, and zosyn  - amikacin discontinued  - MRI shows superficial and deep tissue edema without osteo or abscess  - ID following  Acute hypoxemic respiratory failure - due to above and atelectasis, CTA was negative for PE and pneumonia  - resolved and remains off oxygen  Obstructive sleep apnea - tolerating CPAP   - CPAP with sleep 13 cm H2O  Acute kidney injury  - resolved   Paroxysmal afib  - apixaban  Ppx  - PPI  - apixaban  Dispo  - DNR/select  - stable to downgrade from pulmonary standpoint  - will follow as needed, please call with any additional questions or concerns    Discussed with patient, RN, and Dr. Tristan Joel MD  Pulmonary & Critical Care Medicine  [facility]      Subjective:  No acute events overnight  Feeling fine this morning  Asking about getting PT      Objective:  Vitals:    05/07/24 0200 05/07/24 0400 05/07/24 0600 05/07/24 0700   BP: 100/60 92/57 104/69 (!) 115/92   BP Location: Left arm Left arm Left arm Left arm   Pulse: 85 81 71 70   Resp: 22 23 17 22   Temp:  96.9 °F (36.1 °C)     TempSrc:  Temporal     SpO2: 93% 95% 97% 94%   Weight:       Height:         Physical Exam:  General: laying in bed  Respiratory: clear bilaterally, normal effort  Cardiovascular: regular rate and rhythm, no m/r/g  Abdomen: soft, NTND  Extremities: LLE with erythema, plantar ulcer without drainage  Mental status: interactive, answering questions appropriately    Medications:  Reviewed in EMR    Lab Data:  Reviewed in EMR    Imaging:  I independently visualized all relevant chest imaging in PACS and agree with radiology interpretation except where noted.

## 2024-05-07 NOTE — PLAN OF CARE
Pt is aox4, resting in bed, bed is low and locked in place, call light within reach. Pt cleared for tx to medical floor w/ remote tele.   BP dropped with PT when standing. BP recovered. Dr Luo notified, 500cc bolus ordered   Problem: Patient Centered Care  Goal: Patient preferences are identified and integrated in the patient's plan of care  Description: Interventions:  - What would you like us to know as we care for you? I have 2 sons involved in myy care  - Provide timely, complete, and accurate information to patient/family  - Incorporate patient and family knowledge, values, beliefs, and cultural backgrounds into the planning and delivery of care  - Encourage patient/family to participate in care and decision-making at the level they choose  - Honor patient and family perspectives and choices  Outcome: Progressing     Problem: CARDIOVASCULAR - ADULT  Goal: Maintains optimal cardiac output and hemodynamic stability  Description: INTERVENTIONS:  - Monitor vital signs, rhythm, and trends  - Monitor for bleeding, hypotension and signs of decreased cardiac output  - Evaluate effectiveness of vasoactive medications to optimize hemodynamic stability  - Monitor arterial and/or venous puncture sites for bleeding and/or hematoma  - Assess quality of pulses, skin color and temperature  - Assess for signs of decreased coronary artery perfusion - ex. Angina  - Evaluate fluid balance, assess for edema, trend weights  Outcome: Progressing  Goal: Absence of cardiac arrhythmias or at baseline  Description: INTERVENTIONS:  - Continuous cardiac monitoring, monitor vital signs, obtain 12 lead EKG if indicated  - Evaluate effectiveness of antiarrhythmic and heart rate control medications as ordered  - Initiate emergency measures for life threatening arrhythmias  - Monitor electrolytes and administer replacement therapy as ordered  Outcome: Progressing     Problem: RESPIRATORY - ADULT  Goal: Achieves optimal ventilation and  oxygenation  Description: INTERVENTIONS:  - Assess for changes in respiratory status  - Assess for changes in mentation and behavior  - Position to facilitate oxygenation and minimize respiratory effort  - Oxygen supplementation based on oxygen saturation or ABGs  - Provide Smoking Cessation handout, if applicable  - Encourage broncho-pulmonary hygiene including cough, deep breathe, Incentive Spirometry  - Assess the need for suctioning and perform as needed  - Assess and instruct to report SOB or any respiratory difficulty  - Respiratory Therapy support as indicated  - Manage/alleviate anxiety  - Monitor for signs/symptoms of CO2 retention  Outcome: Progressing     Problem: GENITOURINARY - ADULT  Goal: Absence of urinary retention  Description: INTERVENTIONS:  - Assess patient’s ability to void and empty bladder  - Monitor intake/output and perform bladder scan as needed  - Follow urinary retention protocol/standard of care  - Consider collaborating with pharmacy to review patient's medication profile  - Implement strategies to promote bladder emptying  Outcome: Progressing     Problem: METABOLIC/FLUID AND ELECTROLYTES - ADULT  Goal: Electrolytes maintained within normal limits  Description: INTERVENTIONS:  - Monitor labs and rhythm and assess patient for signs and symptoms of electrolyte imbalances  - Administer electrolyte replacement as ordered  - Monitor response to electrolyte replacements, including rhythm and repeat lab results as appropriate  - Fluid restriction as ordered  - Instruct patient on fluid and nutrition restrictions as appropriate  Outcome: Progressing  Goal: Hemodynamic stability and optimal renal function maintained  Description: INTERVENTIONS:  - Monitor labs and assess for signs and symptoms of volume excess or deficit  - Monitor intake, output and patient weight  - Monitor urine specific gravity, serum osmolarity and serum sodium as indicated or ordered  - Monitor response to interventions  for patient's volume status, including labs, urine output, blood pressure (other measures as available)  - Encourage oral intake as appropriate  - Instruct patient on fluid and nutrition restrictions as appropriate  Outcome: Progressing     Problem: SKIN/TISSUE INTEGRITY - ADULT  Goal: Skin integrity remains intact  Description: INTERVENTIONS  - Assess and document risk factors for pressure ulcer development  - Assess and document skin integrity  - Monitor for areas of redness and/or skin breakdown  - Initiate interventions, skin care algorithm/standards of care as needed  Outcome: Progressing  Goal: Incision(s), wounds(s) or drain site(s) healing without S/S of infection  Description: INTERVENTIONS:  - Assess and document risk factors for pressure ulcer development  - Assess and document skin integrity  - Assess and document dressing/incision, wound bed, drain sites and surrounding tissue  - Implement wound care per orders  - Initiate isolation precautions as appropriate  - Initiate Pressure Ulcer prevention bundle as indicated  Outcome: Progressing  Goal: Oral mucous membranes remain intact  Description: INTERVENTIONS  - Assess oral mucosa and hygiene practices  - Implement preventative oral hygiene regimen  - Implement oral medicated treatments as ordered  Outcome: Progressing     Problem: HEMATOLOGIC - ADULT  Goal: Maintains hematologic stability  Description: INTERVENTIONS  - Assess for signs and symptoms of bleeding or hemorrhage  - Monitor labs and vital signs for trends  - Administer supportive blood products/factors, fluids and medications as ordered and appropriate  - Administer supportive blood products/factors as ordered and appropriate  Outcome: Progressing  Goal: Free from bleeding injury  Description: (Example usage: patient with low platelets)  INTERVENTIONS:  - Avoid intramuscular injections, enemas and rectal medication administration  - Ensure safe mobilization of patient  - Hold pressure on  venipuncture sites to achieve adequate hemostasis  - Assess for signs and symptoms of internal bleeding  - Monitor lab trends  - Patient is to report abnormal signs of bleeding to staff  - Avoid use of toothpicks and dental floss  - Use electric shaver for shaving  - Use soft bristle tooth brush  - Limit straining and forceful nose blowing  Outcome: Progressing     Problem: MUSCULOSKELETAL - ADULT  Goal: Return mobility to safest level of function  Description: INTERVENTIONS:  - Assess patient stability and activity tolerance for standing, transferring and ambulating w/ or w/o assistive devices  - Assist with transfers and ambulation using safe patient handling equipment as needed  - Ensure adequate protection for wounds/incisions during mobilization  - Obtain PT/OT consults as needed  - Advance activity as appropriate  - Communicate ordered activity level and limitations with patient/family  Outcome: Progressing     Problem: Impaired Functional Mobility  Goal: Achieve highest/safest level of mobility/gait  Description: Interventions:  - Assess patient's functional ability and stability  - Promote increasing activity/tolerance for mobility and gait  - Educate and engage patient/family in tolerated activity level and precautions    Outcome: Progressing

## 2024-05-08 LAB
ALBUMIN SERPL-MCNC: 3.1 G/DL (ref 3.2–4.8)
ALBUMIN/GLOB SERPL: 1.1 {RATIO} (ref 1–2)
ALP LIVER SERPL-CCNC: 58 U/L
ALT SERPL-CCNC: 15 U/L
ANION GAP SERPL CALC-SCNC: 8 MMOL/L (ref 0–18)
AST SERPL-CCNC: 19 U/L (ref ?–34)
BILIRUB SERPL-MCNC: 0.6 MG/DL (ref 0.2–1.1)
BUN BLD-MCNC: 6 MG/DL (ref 9–23)
BUN/CREAT SERPL: 9.4 (ref 10–20)
CALCIUM BLD-MCNC: 8.6 MG/DL (ref 8.7–10.4)
CHLORIDE SERPL-SCNC: 110 MMOL/L (ref 98–112)
CO2 SERPL-SCNC: 22 MMOL/L (ref 21–32)
CREAT BLD-MCNC: 0.64 MG/DL
DEPRECATED RDW RBC AUTO: 50.6 FL (ref 35.1–46.3)
EGFRCR SERPLBLD CKD-EPI 2021: 91 ML/MIN/1.73M2 (ref 60–?)
ERYTHROCYTE [DISTWIDTH] IN BLOOD BY AUTOMATED COUNT: 14 % (ref 11–15)
GLOBULIN PLAS-MCNC: 2.7 G/DL (ref 2–3.5)
GLUCOSE BLD-MCNC: 82 MG/DL (ref 70–99)
HCT VFR BLD AUTO: 34.2 %
HGB BLD-MCNC: 10.9 G/DL
MAGNESIUM SERPL-MCNC: 1.9 MG/DL (ref 1.6–2.6)
MCH RBC QN AUTO: 30.8 PG (ref 26–34)
MCHC RBC AUTO-ENTMCNC: 31.9 G/DL (ref 31–37)
MCV RBC AUTO: 96.6 FL
OSMOLALITY SERPL CALC.SUM OF ELEC: 287 MOSM/KG (ref 275–295)
PLATELET # BLD AUTO: 237 10(3)UL (ref 150–450)
POTASSIUM SERPL-SCNC: 3.6 MMOL/L (ref 3.5–5.1)
POTASSIUM SERPL-SCNC: 4.4 MMOL/L (ref 3.5–5.1)
PROT SERPL-MCNC: 5.8 G/DL (ref 5.7–8.2)
RBC # BLD AUTO: 3.54 X10(6)UL
SODIUM SERPL-SCNC: 140 MMOL/L (ref 136–145)
WBC # BLD AUTO: 6.8 X10(3) UL (ref 4–11)

## 2024-05-08 PROCEDURE — 97530 THERAPEUTIC ACTIVITIES: CPT

## 2024-05-08 PROCEDURE — 83735 ASSAY OF MAGNESIUM: CPT | Performed by: INTERNAL MEDICINE

## 2024-05-08 PROCEDURE — 97110 THERAPEUTIC EXERCISES: CPT

## 2024-05-08 PROCEDURE — 80053 COMPREHEN METABOLIC PANEL: CPT | Performed by: INTERNAL MEDICINE

## 2024-05-08 PROCEDURE — 97116 GAIT TRAINING THERAPY: CPT

## 2024-05-08 PROCEDURE — 84132 ASSAY OF SERUM POTASSIUM: CPT | Performed by: INTERNAL MEDICINE

## 2024-05-08 PROCEDURE — 85027 COMPLETE CBC AUTOMATED: CPT | Performed by: INTERNAL MEDICINE

## 2024-05-08 RX ORDER — CLOTRIMAZOLE AND BETAMETHASONE DIPROPIONATE 10; .64 MG/G; MG/G
CREAM TOPICAL 2 TIMES DAILY
Status: DISCONTINUED | OUTPATIENT
Start: 2024-05-08 | End: 2024-05-11

## 2024-05-08 RX ORDER — BUMETANIDE 1 MG/1
1 TABLET ORAL DAILY
Status: DISCONTINUED | OUTPATIENT
Start: 2024-05-09 | End: 2024-05-11

## 2024-05-08 RX ORDER — POTASSIUM CHLORIDE 1.5 G/1.58G
40 POWDER, FOR SOLUTION ORAL EVERY 4 HOURS
Status: COMPLETED | OUTPATIENT
Start: 2024-05-08 | End: 2024-05-08

## 2024-05-08 RX ORDER — AMIODARONE HYDROCHLORIDE 200 MG/1
200 TABLET ORAL DAILY
Status: DISCONTINUED | OUTPATIENT
Start: 2024-05-08 | End: 2024-05-08

## 2024-05-08 NOTE — CONGREGATE LIVING REVIEW
[facility] Living Authorization    The [facility] Review Committee has reviewed this case and the patient IS APPROVED for discharge to a facility for Short Term Skilled once the following procedure is followed:     - The physician discharge instructions (contained within the [facility] note for SNF) must inlcude the below appropriate and approved COVID instructions to the facility    For questions regarding CLRC approval process, please contact the CM assigned to the case.  For questions regarding RN discharge workflow, please contact the unit Clinical Leader.

## 2024-05-08 NOTE — PROGRESS NOTES
[facility]  part of [facility] Infectious Disease  Progress Note    Adore Covington Patient Status:  Inpatient    1945 MRN [id number]   Location [facility]5W Attending Yolanda Judd, DO   Hosp Day # 4 PCP Maral Huffman MD     Subjective:  Patient seen/examined.  She is up in bed in NAD.  No F/C.  No new issues.  Some L knee pain ongoing but no swelling, erythema, warmth.      Objective:  Blood pressure 104/66, pulse 80, temperature 98.2 °F (36.8 °C), resp. rate 18, height 5' 2\" (1.575 m), weight 261 lb 3.9 oz (118.5 kg), SpO2 94%.    Intake/Output:    Intake/Output Summary (Last 24 hours) at 2024 1235  Last data filed at 2024 1132  Gross per 24 hour   Intake 538 ml   Output 1400 ml   Net -862 ml       Physical Exam:  General: Awake, alert, non-tox, NAD.  HEENT:  Oropharynx clear, trachea ML.  Heart: RRR S1S2 no murmurs.  Lungs: Essentially CTA b/l, no rhonchi, rales, wheezes.  Abdomen: Soft, NT/ND.  BS present.  No organomegaly.  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  LLE cellulitis stable.  Foot wound at the sole of L foot stable.  Neurological: No focal deficits.  Derm:  Warm, dry, free from rashes.    Lab Data Review:  Lab Results   Component Value Date    WBC 6.8 2024    HGB 10.9 2024    HCT 34.2 2024    .0 2024    CREATSERUM 0.64 2024    BUN 6 2024     2024    K 3.6 2024     2024    CO2 22.0 2024    GLU 82 2024    CA 8.6 2024    ALB 3.1 2024    ALKPHO 58 2024    BILT 0.6 2024    TP 5.8 2024    AST 19 2024    ALT 15 2024    MG 1.9 2024      Cultures:  Blood cultures negative  MRSA screen +  C.diff negative     Radiology:  CONCLUSION:   1. No osteomyelitis or abscess.   2. Superficial and deep soft tissue edema.   3. Muscular atrophy.   4. 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   5. 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   6. Advanced degenerative change of the midfoot.  Other less pronounced degenerative changes.   7. Small ankle joint effusion.        Antibiotics Reviewed:  Vancomycin  Zosyn     Assessment and Plan:     Severe sepsis with septic shock in this woman with LLE cellulitis/foot infection as source  - Continues to improve, off vasopressor support  - MRI without evidence of OM or abscess  - Blood cultures negative  - MRSA screen is positive  - IV vancomycin/zosyn day #5 ongoing     2.  Multifactorial leukocytosis due to the above  - At 6.8K today, will trend  - c.diff is negative     3.  Acute hypoxic respiratory failure due to the above  - Now resolved and O2 has been weaned     4.  Disposition - inpatient.  Continue IV vancomycin and zosyn as Rx.  Supportive care ongoing and will plan to streamline antibiotics further as able.  Hoping for possible p.o. Rx when ready for discharge to [facility] if she continues to improve.  Add compression as able.  Add topical antifungals.  Trending temps and WBCs.  Will follow.    Georgina Rice DO, [facility] Infectious Disease  [phone]    5/8/2024  12:35 PM

## 2024-05-08 NOTE — CM/SW NOTE
Anticipated therapy need: Gradual Rehabilitative Therapy.    [facility] requested.    Referrals sent in [place].  PASRR needs completion.    Romina Vivar MBA BSN RN CRRN   RN Case Manager  [phone]

## 2024-05-08 NOTE — PHYSICAL THERAPY NOTE
PHYSICAL THERAPY TREATMENT NOTE - INPATIENT     Room Number: 521/521-A       Presenting Problem: weakness w/BLE edema, LLE redness and cellulitis  Co-Morbidities : HTN, HLD, CHF, COPD, a--fib    Problem List  Principal Problem:    Septic shock (HCC)  Active Problems:    Cellulitis      PHYSICAL THERAPY ASSESSMENT   Patient demonstrates fair progress this session, goals  remain in progress.    Patient continues to function below baseline with bed mobility, transfers, and gait.  Contributing factors to remaining limitations include decreased functional strength, decreased endurance/aerobic capacity, pain, and skin integrity-bleeding and weeping noted and RN notified. Next session anticipate patient to progress bed mobility, transfers, and gait.  Physical Therapy will continue to follow patient for duration of hospitalization. Patient continues to benefit from continued skilled PT services: to promote return to prior level of function and safety with continuous assistance and gradual rehabilitative therapy .    PLAN  PT Treatment Plan: Bed mobility;Transfer training  Frequency (Obs): 3-5x/week    OBJECTIVE  Precautions: Bed/chair alarm;Limb alert - left;Limb alert - right (BLE edema)    PAIN ASSESSMENT   Ratin  Location: left leg, knee front side  Management Techniques: Activity promotion;Repositioning;Relaxation    BALANCE  Static Sitting: Fair -  Dynamic Sitting: Fair -  Static Standing: Poor +  Dynamic Standing: Poor    ACTIVITY TOLERANCE  BP: 122/64      AM-PAC '6-Clicks' INPATIENT SHORT FORM - BASIC MOBILITY  How much difficulty does the patient currently have...  Patient Difficulty: Turning over in bed (including adjusting bedclothes, sheets and blankets)?: A Lot   Patient Difficulty: Sitting down on and standing up from a chair with arms (e.g., wheelchair, bedside commode, etc.): A Lot   Patient Difficulty: Moving from lying on back to sitting on the side of the bed?: A Lot   How much help from another  person does the patient currently need...   Help from Another: Moving to and from a bed to a chair (including a wheelchair)?: A Lot   Help from Another: Need to walk in hospital room?: Total   Help from Another: Climbing 3-5 steps with a railing?: Total     AM-PAC Score:  Raw Score: 10   Approx Degree of Impairment: 76.75%   Standardized Score (AM-PAC Scale): 32.29   CMS Modifier (G-Code): CL    FUNCTIONAL ABILITY STATUS  Functional Mobility/Gait Assessment  Gait Assistance: Not tested  Distance (ft):  (8)  Assistive Device: Rolling walker  Rolling: contact guard assist  Supine to Sit: mod A  Sit to Supine: NT  Sit to Stand: mod Ax2    The patient's Approx Degree of Impairment: 76.75% has been calculated based on documentation in the [facility] '6 clicks' Inpatient Daily Activity Short Form.  Research supports that patients with this level of impairment may benefit from home. Final disposition will be made by interdisciplinary medical team.    THERAPEUTIC EXERCISES  Lower Extremity Knee extension  LAQ  Quad sets  Marches  Adduction squeezes  1   Position Sitting & supine  10xea with 3 second hold     Patient End of Session: In bed    CURRENT GOALS   Goals to be met by: 5/24/24  Patient Goal Patient's self-stated goal is: to get better and be able to walk   Goal #1 Patient is able to demonstrate supine - sit EOB @ level: minimum assistance     Goal #1   Current Status Not met, Mod A   Goal #2 Patient is able to demonstrate transfers Sit to/from Stand at assistance level: minimum assistance with walker - rolling     Goal #2  Current Status Not met, min A   Goal #3 Patient is able to ambulate 10 feet with assist device: walker - rolling at assistance level: moderate assistance   Goal #3   Current Status Not met, min A   Goal #4 Patient will negotiate bed to/from chair transfers with RW with minimal assistance   Goal #4   Current Status Not met, min A   Goal #5 Patient to demonstrate independence with home activity/exercise  instructions provided to patient in preparation for discharge.   Goal #5   Current Status    Goal #6    Goal #6  Current Status      Patient Evaluation Complexity Level:  History High - 3 or more personal factors and/or co-morbidities   Examination of body systems Moderate - addressing a total of 3 or more elements   Clinical Presentation  Moderate - Evolving   Clinical Decision Making  Moderate Complexity     Therapeutic Activity:  23 minutes  Gait 8 minutes  Therex: 15 minutes

## 2024-05-08 NOTE — CM/SW NOTE
05/08/24 1500   CM/SW Referral Data   Referral Source    Reason for Referral Discharge planning   Informant Patient   Medical Hx   Does patient have an established PCP? Yes   Patient Info   Patient lives with Other  (intermediate)   Discharge Needs   Anticipated D/C needs Subacute rehab   Services Requested   Submitted to [facility] Yes   PASRR Level 1 Submitted Yes   Choice of Post-Acute Provider   Informed patient of right to choose their preferred provider Yes   List of appropriate post-acute services provided to patient/family with quality data Yes   Information given to Patient       Will follow-up with Adore on 5/9/24 for choice for [facility] services.  Patient is agreeable to [facility].    Romina PARDOA BSN RN CRRN   RN Case Manager  [phone]

## 2024-05-08 NOTE — PHYSICAL THERAPY NOTE
PHYSICAL THERAPY EVALUATION - INPATIENT     Room Number: 521/521-A  Evaluation Date: 5/7/2024  Type of Evaluation: Initial   Physician Order: PT Eval and Treat    Presenting Problem: weakness w/BLE edema, LLE redness and cellulitis  Co-Morbidities : HTN, HLD, CHF, COPD, a--fib  Reason for Therapy: Mobility Dysfunction and Discharge Planning    PHYSICAL THERAPY ASSESSMENT   Patient is a 79 year old female admitted 5/3/2024 for weakness, BLE edema and LLE cellulitis, septic shock.  Prior to admission, patient's baseline is living in an [facility] and having assist for all ADL and when walking recently but prior to being ill was independently walking with RW.  Patient is currently functioning below baseline with bed mobility, transfers, gait, maintaining seated position, standing prolonged periods, and performing household tasks.  Patient is requiring moderate assist and maximum assist as a result of the following impairments: decreased functional strength, decreased endurance/aerobic capacity, pain, impaired standing and sitting  balance, decreased muscular endurance, cognitive deficits (decreased attention, memory), and medical status.  Physical Therapy will continue to follow for duration of hospitalization.    Patient will benefit from continued skilled PT Services to promote return to prior level of function and safety with continuous assistance and gradual rehabilitative therapy .    PLAN  PT Treatment Plan: Bed mobility;Endurance;Patient education;Gait training;Neuromuscular re-educate;Range of motion;Strengthening;Transfer training;Balance training  Rehab Potential : Fair  Frequency (Obs): 3-5x/week    PHYSICAL THERAPY MEDICAL/SOCIAL HISTORY   History related to current admission:  Patient is a 79 year old female with PMH sig for COPD, A- Fib on Eliquis, CHF, HTN, HLD, PAD, Neuropathy, SUSAN who presents with weakness and left leg redness. Patient started having chills 2 days ago and was not feeling herself.  She denies  fevers, abd pain, n/v/d.  No CP and did not notice any SOB.  She has been getting wound care twice a week for her left leg for several months and did not think they were getting worse. Her bumex does was decreased at her last cardiology visit and amio was stopped      In the ER afebrile and hypotensive, tachypnea on O2  No leukocytosis with mild miranda       Problem List  Principal Problem:    Septic shock (HCC)  Active Problems:    Cellulitis      HOME SITUATION  Home Situation  Type of Home: Assisted living facility  Home Layout: One level  Lives With: Alone;Staff 24 hours  Patient Owned Equipment: Rolling walker     Prior Level of [place]: Patient lives in [facility] and has assist for all ADL. She reports walking with RW independently before she was so sick    SUBJECTIVE  \"I just feel so weak. And my left knee is hurting a lot\"    PHYSICAL THERAPY EXAMINATION   OBJECTIVE  Precautions: Bed/chair alarm;Limb alert - left;Limb alert - right (BLE edema)  Fall Risk: High fall risk    WEIGHT BEARING RESTRICTION  Weight Bearing Restriction: None                PAIN ASSESSMENT  Ratin  Location: left leg, knee front side  Management Techniques: Activity promotion;Repositioning;Relaxation    COGNITION  Overall Cognitive Status:  A&Ox3    RANGE OF MOTION AND STRENGTH ASSESSMENT  Upper extremity ROM and strength are within functional limits   Lower extremity ROM is within functional limits except left knee limited flexion to 80* and painful  Lower extremity strength is limited, 3/5 but muscles fatigue easily    BALANCE  Static Sitting: Fair -  Dynamic Sitting: Poor +  Static Standing: Poor  Dynamic Standing: Not tested    ACTIVITY TOLERANCE  Pulse: 119  Heart Rate Source: Monitor     BP: (!) 80/40 (asymptomatic after standing 3 min; iniitial /67)  BP Location: Right arm  BP Method: Automatic  Patient Position: Semi-[place]    O2 WALK  Oxygen Therapy  SPO2% on Room Air at Rest: 95  SPO2% Ambulation on Room Air:  94    -PAC '6-Clicks' INPATIENT SHORT FORM - BASIC MOBILITY  How much difficulty does the patient currently have...  Patient Difficulty: Turning over in bed (including adjusting bedclothes, sheets and blankets)?: A Lot   Patient Difficulty: Sitting down on and standing up from a chair with arms (e.g., wheelchair, bedside commode, etc.): A Lot   Patient Difficulty: Moving from lying on back to sitting on the side of the bed?: A Lot   How much help from another person does the patient currently need...   Help from Another: Moving to and from a bed to a chair (including a wheelchair)?: A Lot   Help from Another: Need to walk in hospital room?: Total   Help from Another: Climbing 3-5 steps with a railing?: Total     AM-PAC Score:  Raw Score: 10   Approx Degree of Impairment: 76.75%   Standardized Score (AM-PAC Scale): 32.29   CMS Modifier (G-Code): CL    FUNCTIONAL ABILITY STATUS  Functional Mobility/Gait Assessment  Gait Assistance: Not tested (pt limited by sharp pain in left knee/lower leg)  Rolling: moderate assist  Supine to Sit: maximum assist  Sit to Supine: maximum assist  Sit to Stand: maximum assist    Exercise/Education Provided:  Bed mobility  Energy conservation  Functional activity tolerated  Posture  ROM  Strengthening  Transfer training    The patient's Approx Degree of Impairment: 76.75% has been calculated based on documentation in the [facility] '6 clicks' Inpatient Basic Mobility Short Form.  Research supports that patients with this level of impairment may benefit from [facility]ARYN/gradual rehab. Final disposition will be made by interdisciplinary medical team.    Patient End of Session: In bed;With  staff;Needs met;Call light within reach;RN aware of session/findings;All patient questions and concerns addressed;Alarm set;Discussed recommendations with /    CURRENT GOALS  Goals to be met by: 5/24/24  Patient Goal Patient's self-stated goal is: to get better and be able to walk   Goal  #1 Patient is able to demonstrate supine - sit EOB @ level: minimum assistance     Goal #1   Current Status    Goal #2 Patient is able to demonstrate transfers Sit to/from Stand at assistance level: minimum assistance with walker - rolling     Goal #2  Current Status    Goal #3 Patient is able to ambulate 10 feet with assist device: walker - rolling at assistance level: moderate assistance   Goal #3   Current Status    Goal #4 Patient will negotiate bed to/from chair transfers with RW with minimal assistance   Goal #4   Current Status    Goal #5 Patient to demonstrate independence with home activity/exercise instructions provided to patient in preparation for discharge.   Goal #5   Current Status    Goal #6    Goal #6  Current Status      Patient Evaluation Complexity Level:  History High - 3 or more personal factors and/or co-morbidities   Examination of body systems Moderate - addressing a total of 3 or more elements   Clinical Presentation  Moderate - Evolving   Clinical Decision Making  Moderate Complexity     Therapeutic Activity:  23 minutes

## 2024-05-08 NOTE — CM/SW NOTE
JASON met with Adore at bedside and provided list of accepting [facility]N agencies.  Requested that Adore consider a first and second choice for [facility].  Adore will review with her son.  JASON/AMMON to follow-up on 5/9/24 for choice.    / to remain available for support and/or discharge planning.      Romina Vivar MBA BSN RN CRRN   RN Case Manager  [phone]

## 2024-05-08 NOTE — PLAN OF CARE
Problem: Patient Centered Care  Goal: Patient preferences are identified and integrated in the patient's plan of care  Description: Interventions:  - What would you like us to know as we care for you? I have 2 sons involved in myy care  - Provide timely, complete, and accurate information to patient/family  - Incorporate patient and family knowledge, values, beliefs, and cultural backgrounds into the planning and delivery of care  - Encourage patient/family to participate in care and decision-making at the level they choose  - Honor patient and family perspectives and choices  Outcome: Progressing     Problem: CARDIOVASCULAR - ADULT  Goal: Maintains optimal cardiac output and hemodynamic stability  Description: INTERVENTIONS:  - Monitor vital signs, rhythm, and trends  - Monitor for bleeding, hypotension and signs of decreased cardiac output  - Evaluate effectiveness of vasoactive medications to optimize hemodynamic stability  - Monitor arterial and/or venous puncture sites for bleeding and/or hematoma  - Assess quality of pulses, skin color and temperature  - Assess for signs of decreased coronary artery perfusion - ex. Angina  - Evaluate fluid balance, assess for edema, trend weights  Outcome: Progressing  Goal: Absence of cardiac arrhythmias or at baseline  Description: INTERVENTIONS:  - Continuous cardiac monitoring, monitor vital signs, obtain 12 lead EKG if indicated  - Evaluate effectiveness of antiarrhythmic and heart rate control medications as ordered  - Initiate emergency measures for life threatening arrhythmias  - Monitor electrolytes and administer replacement therapy as ordered  Outcome: Progressing     Problem: RESPIRATORY - ADULT  Goal: Achieves optimal ventilation and oxygenation  Description: INTERVENTIONS:  - Assess for changes in respiratory status  - Assess for changes in mentation and behavior  - Position to facilitate oxygenation and minimize respiratory effort  - Oxygen supplementation  based on oxygen saturation or ABGs  - Provide Smoking Cessation handout, if applicable  - Encourage broncho-pulmonary hygiene including cough, deep breathe, Incentive Spirometry  - Assess the need for suctioning and perform as needed  - Assess and instruct to report SOB or any respiratory difficulty  - Respiratory Therapy support as indicated  - Manage/alleviate anxiety  - Monitor for signs/symptoms of CO2 retention  Outcome: Progressing     Problem: GENITOURINARY - ADULT  Goal: Absence of urinary retention  Description: INTERVENTIONS:  - Assess patient’s ability to void and empty bladder  - Monitor intake/output and perform bladder scan as needed  - Follow urinary retention protocol/standard of care  - Consider collaborating with pharmacy to review patient's medication profile  - Implement strategies to promote bladder emptying  Outcome: Progressing     Problem: METABOLIC/FLUID AND ELECTROLYTES - ADULT  Goal: Electrolytes maintained within normal limits  Description: INTERVENTIONS:  - Monitor labs and rhythm and assess patient for signs and symptoms of electrolyte imbalances  - Administer electrolyte replacement as ordered  - Monitor response to electrolyte replacements, including rhythm and repeat lab results as appropriate  - Fluid restriction as ordered  - Instruct patient on fluid and nutrition restrictions as appropriate  Outcome: Progressing  Goal: Hemodynamic stability and optimal renal function maintained  Description: INTERVENTIONS:  - Monitor labs and assess for signs and symptoms of volume excess or deficit  - Monitor intake, output and patient weight  - Monitor urine specific gravity, serum osmolarity and serum sodium as indicated or ordered  - Monitor response to interventions for patient's volume status, including labs, urine output, blood pressure (other measures as available)  - Encourage oral intake as appropriate  - Instruct patient on fluid and nutrition restrictions as appropriate  Outcome:  Progressing     Problem: SKIN/TISSUE INTEGRITY - ADULT  Goal: Skin integrity remains intact  Description: INTERVENTIONS  - Assess and document risk factors for pressure ulcer development  - Assess and document skin integrity  - Monitor for areas of redness and/or skin breakdown  - Initiate interventions, skin care algorithm/standards of care as needed  Outcome: Progressing  Goal: Incision(s), wounds(s) or drain site(s) healing without S/S of infection  Description: INTERVENTIONS:  - Assess and document risk factors for pressure ulcer development  - Assess and document skin integrity  - Assess and document dressing/incision, wound bed, drain sites and surrounding tissue  - Implement wound care per orders  - Initiate isolation precautions as appropriate  - Initiate Pressure Ulcer prevention bundle as indicated  Outcome: Progressing  Goal: Oral mucous membranes remain intact  Description: INTERVENTIONS  - Assess oral mucosa and hygiene practices  - Implement preventative oral hygiene regimen  - Implement oral medicated treatments as ordered  Outcome: Progressing     Problem: HEMATOLOGIC - ADULT  Goal: Maintains hematologic stability  Description: INTERVENTIONS  - Assess for signs and symptoms of bleeding or hemorrhage  - Monitor labs and vital signs for trends  - Administer supportive blood products/factors, fluids and medications as ordered and appropriate  - Administer supportive blood products/factors as ordered and appropriate  Outcome: Progressing  Goal: Free from bleeding injury  Description: (Example usage: patient with low platelets)  INTERVENTIONS:  - Avoid intramuscular injections, enemas and rectal medication administration  - Ensure safe mobilization of patient  - Hold pressure on venipuncture sites to achieve adequate hemostasis  - Assess for signs and symptoms of internal bleeding  - Monitor lab trends  - Patient is to report abnormal signs of bleeding to staff  - Avoid use of toothpicks and dental  floss  - Use electric shaver for shaving  - Use soft bristle tooth brush  - Limit straining and forceful nose blowing  Outcome: Progressing     Problem: MUSCULOSKELETAL - ADULT  Goal: Return mobility to safest level of function  Description: INTERVENTIONS:  - Assess patient stability and activity tolerance for standing, transferring and ambulating w/ or w/o assistive devices  - Assist with transfers and ambulation using safe patient handling equipment as needed  - Ensure adequate protection for wounds/incisions during mobilization  - Obtain PT/OT consults as needed  - Advance activity as appropriate  - Communicate ordered activity level and limitations with patient/family  Outcome: Progressing     Problem: Impaired Functional Mobility  Goal: Achieve highest/safest level of mobility/gait  Description: Interventions:  - Assess patient's functional ability and stability  - Promote increasing activity/tolerance for mobility and gait  - Educate and engage patient/family in tolerated activity level and precautions  - Recommend use of chair position in bed 3 times per day  Outcome: Progressing     Monitoring vital signs, stable at this moment. Pain medication provided as needed. Call light within reach, bed locked in lowest position, all fall precautions in place. All needs addressed. Frequent rounding maintained by nursing staff. Patient completing IV antibiotics per MD order, CPAP overnight, no acute changes at this time.

## 2024-05-08 NOTE — PROGRESS NOTES
[facility] Hospitalist Progress Note     CC: Hospital Follow up    PCP: Maral Huffman MD       Assessment/Plan:   79 year old female with PMH sig for COPD, A- Fib on Eliquis, CHF, HTN, HLD, PAD, Neuropathy, SUSAN who presents with weakness and left leg redness.  Admitted for Sepsis Cellulitis and hypotension.      Sepsis Cellulitis, Left Leg   MRSA positive   - hypotensive on admission, now resolved    - CT chest with no PE  - procal elevated   - f/u blood cx  - IV vanco and zosyn   - ID consulted and Amikacin complete    - Wound care  - MRI left foot with no OM    - further antibiotic plans per ID  - discharge planning     Shock resolved   - likely 2/2 sepsis  - TSH and cortisol normal   - required pressors  - treat underlying cause   - Echo normal EF with increased PA pressures   - low dose midodrine was added. Can consider stopping prior to discharge   - remove ivey tomorrow AM     Acute Hypoxic Respiratory Failure   H/o COPD not on inhalers   SUSAN  Atelectasis   - CXR and CT with some edema, no obvious signs of infection   - CT chest with no PE  - supplemental O2 support, IS   - cpap as tolerated      MARINO resolved   Urinary Retention   - likely pre renal and sepsis component   - avoid hypotension   - avoid nephrotoxic agents   - straight cath x2  - Ivey placed 5/5/24  - voiding trial tomorrow AM     Chronic  A-fib  Secondary Hypercoagulable state   S/p Cardioversion   - Eliquis   - off Amio  - will need rate control added, will review vitals in AM, and likely add beta blocker back   - in NSR      Chronic Diastolic HF   - CXR and CT with some edema  -   - Echo reviewed   - resume bumex 1mg daily tomorrow  - she is also normally on aldactone, will need, but wont dose tomorrow     Lose Stools  H/o Gastroparesis   - c-diff negative     HTN - will slowly start resuming meds. Add bumex tomorrow AM.   HLD - statin   PAD - Eliquis and statin   Neuropathy - gabapentin      FN:  - IVF: complete   - Diet: Cardiac      DVT  Prophy: SCDs, Eliquis   Atrophy: Ambulate   Lines: Piv     Dispo:  SHARYN I suspect end of the week    Yolanda Galeano [facility] Hospitalist      Subjective:     Many questions, but answered  Symptom wise ok      OBJECTIVE:    Blood pressure 104/66, pulse 80, temperature 98.2 °F (36.8 °C), resp. rate 18, height 5' 2\" (1.575 m), weight 261 lb 3.9 oz (118.5 kg), SpO2 94%.    Temp:  [97.6 °F (36.4 °C)-98.9 °F (37.2 °C)] 98.2 °F (36.8 °C)  Pulse:  [76-84] 80  Resp:  [18-28] 18  BP: ()/(57-74) 104/66  SpO2:  [93 %-96 %] 94 %      Intake/Output:    Intake/Output Summary (Last 24 hours) at 5/8/2024 1154  Last data filed at 5/8/2024 1132  Gross per 24 hour   Intake 538 ml   Output 1400 ml   Net -862 ml       Last 3 Weights   05/06/24 0500 261 lb 3.9 oz (118.5 kg)   05/05/24 0600 257 lb 8 oz (116.8 kg)   05/04/24 0324 261 lb 3.9 oz (118.5 kg)   05/03/24 1837 261 lb 9.6 oz (118.7 kg)   05/04/24 1456 261 lb 3.9 oz (118.5 kg)   04/29/24 1532 261 lb (118.4 kg)       Exam   General: Alert, no acute distress  Lungs: clear to ausculation bilaterally  Heart: Regular rate and rhythm  Abdomen: soft, non tender  Extremities: left leg erythema and swelling  Skin: no new rash, normal color    Data Review:       Labs:     Recent Labs   Lab 05/03/24  1850 05/05/24  0457 05/06/24  0449 05/07/24  0359 05/08/24  0500   RBC 3.85   < > 3.16* 3.33* 3.54*   HGB 11.8*   < > 9.6* 10.1* 10.9*   HCT 36.3   < > 30.2* 31.2* 34.2*   MCV 94.3   < > 95.6 93.7 96.6   MCH 30.6   < > 30.4 30.3 30.8   MCHC 32.5   < > 31.8 32.4 31.9   RDW 14.6   < > 14.6 14.4 14.0   NEPRELIM 10.31*  --   --   --   --    WBC 10.9   < > 13.0* 9.2 6.8   .0   < > 197.0 227.0 237.0    < > = values in this interval not displayed.         Recent Labs   Lab 05/06/24  0449 05/07/24  0359 05/08/24  0500   GLU 89 83 82   BUN 15 11 6*   CREATSERUM 0.67 0.66 0.64   EGFRCR 90 90 91   CA 8.2* 8.4* 8.6*    140 140   K 3.7  3.7 4.0  4.0 3.6    110 110   CO2  24.0 23.0 22.0       Recent Labs   Lab 05/04/24  1743 05/05/24  0457 05/06/24  0449 05/07/24  0359 05/08/24  0500   ALT 10 7* 18 19 15   AST 20 17 13 20 19   ALB 3.3 2.9* 2.8* 2.9* 3.1*         Imaging:  MRI FOOT (W+WO), LEFT (CPT=73720)    Result Date: 5/5/2024  CONCLUSION:  1. No osteomyelitis or abscess. 2. Superficial and deep soft tissue edema. 3. Muscular atrophy. 4. 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 5. 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 6. Advanced degenerative change of the midfoot.  Other less pronounced degenerative changes. 7. Small ankle joint effusion.     Dictated by (CST): Galo Rucker MD on 5/05/2024 at 7:58 PM     Finalized by (CST): Galo Rucker MD on 5/05/2024 at 8:09 PM             Meds:      [START ON 5/9/2024] bumetanide  1 mg Oral Daily    cycloSPORINE  1 drop Both Eyes BID    midodrine  2.5 mg Oral TID    apixaban  5 mg Oral BID    gabapentin  200 mg Oral BID    pantoprazole  40 mg Oral QAM AC    pravastatin  20 mg Oral Nightly    vancomycin  15 mg/kg Intravenous Q24H    piperacillin-tazobactam  4.5 g Intravenous Q8H    cyanocobalamin  1,000 mcg Oral Daily    cholecalciferol  2,000 Units Oral BID    glycerin-hypromellose-  1 drop Both Eyes BID           acetaminophen    ondansetron    metoclopramide    polyethylene glycol (PEG 3350)    sennosides    bisacodyl    melatonin

## 2024-05-08 NOTE — PLAN OF CARE
Pt a/o x 4, vss. Pt c/o discomfort to LLE, pt sat in chair for a few hours, stand pivot back to bed x 2 assist. Discussed POC, offered emotional support, instructed pt to use call light for assistance, call light and belongings with in reach, all needs met @ this time.   Problem: Patient Centered Care  Goal: Patient preferences are identified and integrated in the patient's plan of care  Description: Interventions:  - What would you like us to know as we care for you? I have 2 sons involved in myy care  - Provide timely, complete, and accurate information to patient/family  - Incorporate patient and family knowledge, values, beliefs, and cultural backgrounds into the planning and delivery of care  - Encourage patient/family to participate in care and decision-making at the level they choose  - Honor patient and family perspectives and choices  Outcome: Progressing     Problem: CARDIOVASCULAR - ADULT  Goal: Maintains optimal cardiac output and hemodynamic stability  Description: INTERVENTIONS:  - Monitor vital signs, rhythm, and trends  - Monitor for bleeding, hypotension and signs of decreased cardiac output  - Evaluate effectiveness of vasoactive medications to optimize hemodynamic stability  - Monitor arterial and/or venous puncture sites for bleeding and/or hematoma  - Assess quality of pulses, skin color and temperature  - Assess for signs of decreased coronary artery perfusion - ex. Angina  - Evaluate fluid balance, assess for edema, trend weights  Outcome: Progressing  Goal: Absence of cardiac arrhythmias or at baseline  Description: INTERVENTIONS:  - Continuous cardiac monitoring, monitor vital signs, obtain 12 lead EKG if indicated  - Evaluate effectiveness of antiarrhythmic and heart rate control medications as ordered  - Initiate emergency measures for life threatening arrhythmias  - Monitor electrolytes and administer replacement therapy as ordered  Outcome: Progressing     Problem: RESPIRATORY -  ADULT  Goal: Achieves optimal ventilation and oxygenation  Description: INTERVENTIONS:  - Assess for changes in respiratory status  - Assess for changes in mentation and behavior  - Position to facilitate oxygenation and minimize respiratory effort  - Oxygen supplementation based on oxygen saturation or ABGs  - Provide Smoking Cessation handout, if applicable  - Encourage broncho-pulmonary hygiene including cough, deep breathe, Incentive Spirometry  - Assess the need for suctioning and perform as needed  - Assess and instruct to report SOB or any respiratory difficulty  - Respiratory Therapy support as indicated  - Manage/alleviate anxiety  - Monitor for signs/symptoms of CO2 retention  Outcome: Progressing     Problem: GENITOURINARY - ADULT  Goal: Absence of urinary retention  Description: INTERVENTIONS:  - Assess patient’s ability to void and empty bladder  - Monitor intake/output and perform bladder scan as needed  - Follow urinary retention protocol/standard of care  - Consider collaborating with pharmacy to review patient's medication profile  - Implement strategies to promote bladder emptying  Outcome: Progressing     Problem: METABOLIC/FLUID AND ELECTROLYTES - ADULT  Goal: Electrolytes maintained within normal limits  Description: INTERVENTIONS:  - Monitor labs and rhythm and assess patient for signs and symptoms of electrolyte imbalances  - Administer electrolyte replacement as ordered  - Monitor response to electrolyte replacements, including rhythm and repeat lab results as appropriate  - Fluid restriction as ordered  - Instruct patient on fluid and nutrition restrictions as appropriate  Outcome: Progressing  Goal: Hemodynamic stability and optimal renal function maintained  Description: INTERVENTIONS:  - Monitor labs and assess for signs and symptoms of volume excess or deficit  - Monitor intake, output and patient weight  - Monitor urine specific gravity, serum osmolarity and serum sodium as indicated or  ordered  - Monitor response to interventions for patient's volume status, including labs, urine output, blood pressure (other measures as available)  - Encourage oral intake as appropriate  - Instruct patient on fluid and nutrition restrictions as appropriate  Outcome: Progressing     Problem: SKIN/TISSUE INTEGRITY - ADULT  Goal: Skin integrity remains intact  Description: INTERVENTIONS  - Assess and document risk factors for pressure ulcer development  - Assess and document skin integrity  - Monitor for areas of redness and/or skin breakdown  - Initiate interventions, skin care algorithm/standards of care as needed  Outcome: Progressing  Goal: Incision(s), wounds(s) or drain site(s) healing without S/S of infection  Description: INTERVENTIONS:  - Assess and document risk factors for pressure ulcer development  - Assess and document skin integrity  - Assess and document dressing/incision, wound bed, drain sites and surrounding tissue  - Implement wound care per orders  - Initiate isolation precautions as appropriate  - Initiate Pressure Ulcer prevention bundle as indicated  Outcome: Progressing  Goal: Oral mucous membranes remain intact  Description: INTERVENTIONS  - Assess oral mucosa and hygiene practices  - Implement preventative oral hygiene regimen  - Implement oral medicated treatments as ordered  Outcome: Progressing     Problem: HEMATOLOGIC - ADULT  Goal: Maintains hematologic stability  Description: INTERVENTIONS  - Assess for signs and symptoms of bleeding or hemorrhage  - Monitor labs and vital signs for trends  - Administer supportive blood products/factors, fluids and medications as ordered and appropriate  - Administer supportive blood products/factors as ordered and appropriate  Outcome: Progressing  Goal: Free from bleeding injury  Description: (Example usage: patient with low platelets)  INTERVENTIONS:  - Avoid intramuscular injections, enemas and rectal medication administration  - Ensure safe  mobilization of patient  - Hold pressure on venipuncture sites to achieve adequate hemostasis  - Assess for signs and symptoms of internal bleeding  - Monitor lab trends  - Patient is to report abnormal signs of bleeding to staff  - Avoid use of toothpicks and dental floss  - Use electric shaver for shaving  - Use soft bristle tooth brush  - Limit straining and forceful nose blowing  Outcome: Progressing     Problem: MUSCULOSKELETAL - ADULT  Goal: Return mobility to safest level of function  Description: INTERVENTIONS:  - Assess patient stability and activity tolerance for standing, transferring and ambulating w/ or w/o assistive devices  - Assist with transfers and ambulation using safe patient handling equipment as needed  - Ensure adequate protection for wounds/incisions during mobilization  - Obtain PT/OT consults as needed  - Advance activity as appropriate  - Communicate ordered activity level and limitations with patient/family  Outcome: Progressing     Problem: Impaired Functional Mobility  Goal: Achieve highest/safest level of mobility/gait  Description: Interventions:  - Assess patient's functional ability and stability  - Promote increasing activity/tolerance for mobility and gait  - Educate and engage patient/family in tolerated activity level and precautions    Outcome: Progressing

## 2024-05-09 ENCOUNTER — APPOINTMENT (OUTPATIENT)
Dept: PICC SERVICES | Facility: HOSPITAL | Age: 79
DRG: 871 | End: 2024-05-09
Attending: INTERNAL MEDICINE

## 2024-05-09 LAB — VANCOMYCIN TROUGH SERPL-MCNC: 10.1 UG/ML (ref 10–20)

## 2024-05-09 PROCEDURE — 80202 ASSAY OF VANCOMYCIN: CPT | Performed by: INTERNAL MEDICINE

## 2024-05-09 RX ORDER — CEFADROXIL 500 MG/1
500 CAPSULE ORAL 2 TIMES DAILY
Qty: 14 CAPSULE | Refills: 0 | Status: SHIPPED | OUTPATIENT
Start: 2024-05-10 | End: 2024-05-17

## 2024-05-09 RX ORDER — METOPROLOL SUCCINATE 50 MG/1
50 TABLET, EXTENDED RELEASE ORAL
Status: DISCONTINUED | OUTPATIENT
Start: 2024-05-10 | End: 2024-05-11

## 2024-05-09 NOTE — PROGRESS NOTES
[facility]  part of [facility] Infectious Disease  Progress Note    Adore Covington Patient Status:  Inpatient    1945 MRN [id number]   Location [facility]5W Attending Yolanda Judd, DO   Hosp Day # 5 PCP Maral Huffman MD     Subjective:  Patient seen and examined in bed.  Reports feeling better today.  No acute overnight events.  Remains afebrile.  Tolerating course of IV antibiotics.  Denies any LLE pain.  Otherwise no complaints.    Objective:  Blood pressure 113/53, pulse 71, temperature 97.6 °F (36.4 °C), temperature source Axillary, resp. rate 16, height 5' 2\" (1.575 m), weight 261 lb 3.9 oz (118.5 kg), SpO2 94%.    Intake/Output:    Intake/Output Summary (Last 24 hours) at 2024 0925  Last data filed at 2024 0700  Gross per 24 hour   Intake 518 ml   Output 2200 ml   Net -1682 ml       Physical Exam:  General: Awake, alert, non-tox, NAD.  HEENT:  Oropharynx clear, trachea ML.  Heart: RRR S1S2 no murmurs.  Lungs: Essentially CTA b/l, no rhonchi, rales, wheezes.  Abdomen: Soft, NT/ND.  BS present.  No organomegaly.  Extremity: L knee without TTP, swelling or erythema. +scar from TKA. LLE cellulitis stable with skin temp consistent throughout and no TTP.  L sole foot wound stable without discharge/drainage.  Neurological: No focal deficits.  Derm:  Warm and dry.    Lab Data Review:  Lab Results   Component Value Date    K 4.4 2024        Cultures:  Hospital Encounter on 24   1. Blood Culture     Status: None (Preliminary result)    Collection Time: 24  9:22 AM    Specimen: Blood,peripheral   Result Value Ref Range    Blood Culture Result No Growth 4 Days N/A       Radiology:  No results found.      Assessment and Plan:  1.  Severe sepsis with septic shock in this woman with LLE cellulitis/foot infection as source  - Continues to improve.  - Off vasopressor support.  - Blood cultures NGTD.  - MRSA PCR positive.  - MRI without  evidence of OM or abscess.  - CT without evidence of acute PE.  - Topical Lotrisone added on 5/8.  - IV vancomycin + Zosyn, ongoing, day #6.    2.  Multifactorial leukocytosis secondary to the above  - WBC at 6.8K <- 9.2 K <- 13 K <- 15.7 K  - C.diff PCR negative.  - Afebrile.  - Will trend.    3.  Acute hypoxic respiratory failure secondary to the above  - Now resolved -- stable on RA.  - Pulmonology on consult.    4.  Recs  - Will discontinue IV vancomycin + Zosyn at this time and transition to IV Rocephin 2 g daily while inpatient.  Plan to transition to PO cefadroxil 500 mg twice daily x7 days on discharge when ready.  Order in med rec.  - Continue topical Lotrisone.  - F/u WBC and fever curve.  - LE compression as able.  - Supportive care as per the primary team.  - Plans for discharge to [facility].  - Discussed plan of care with nursing.  - Discussed plan of care with patient.  All questions addressed understanding verbalized.  - Further recommendations pending clinical course.    Discussed case with ID attending/collaborating physician, Dr. Georgina Rice, who is in agreement with the above plan of care.      Please note that this report has been produced using speech recognition software and may contain errors related to that system including, but not limited to, errors in grammar, punctuation, and spelling, as well as words and phrases that possibly may have been recognized inappropriately.  If there are any questions or concerns, contact the dictating provider for clarification.     The 21st Century Cures Act makes medical notes like these available to patients in the interest of transparency. Please be advised this is a medical document. Medical documents are intended to carry relevant information, facts as evident, and the clinical opinion of the practitioner. The medical note is intended as peer to peer communication and may appear blunt or direct. It is written in medical language and may contain abbreviations  or verbiage that are unfamiliar.     If you have any questions or concerns please call [facility] and [facility] Infectious Disease at [phone].     Barrera Oliveira, ANABEL    5/9/2024  9:25 AM

## 2024-05-09 NOTE — CM/SW NOTE
05/09/24 1400   Choice of Post-Acute Provider   Patient/family choice Norma Gonzalez is choice   Information given to Patient     CM met with Adore at bedside.  Adore had reviewed the list and has chosen [facility].  Carine PRESSLEY will be following Adore during her stay.    [address] reserved in [place].  Anticipating Adore will be ready for dc on Saturday- [facility] Gonzalez is aware.    Romina PARDOA BSN RN CRRN   RN Case Manager  [phone]

## 2024-05-09 NOTE — PLAN OF CARE
Problem: Patient Centered Care  Goal: Patient preferences are identified and integrated in the patient's plan of care  Description: Interventions:  - What would you like us to know as we care for you? I have 2 sons involved in myy care  - Provide timely, complete, and accurate information to patient/family  - Incorporate patient and family knowledge, values, beliefs, and cultural backgrounds into the planning and delivery of care  - Encourage patient/family to participate in care and decision-making at the level they choose  - Honor patient and family perspectives and choices  Outcome: Progressing     Problem: CARDIOVASCULAR - ADULT  Goal: Maintains optimal cardiac output and hemodynamic stability  Description: INTERVENTIONS:  - Monitor vital signs, rhythm, and trends  - Monitor for bleeding, hypotension and signs of decreased cardiac output  - Evaluate effectiveness of vasoactive medications to optimize hemodynamic stability  - Monitor arterial and/or venous puncture sites for bleeding and/or hematoma  - Assess quality of pulses, skin color and temperature  - Assess for signs of decreased coronary artery perfusion - ex. Angina  - Evaluate fluid balance, assess for edema, trend weights  Outcome: Progressing  Goal: Absence of cardiac arrhythmias or at baseline  Description: INTERVENTIONS:  - Continuous cardiac monitoring, monitor vital signs, obtain 12 lead EKG if indicated  - Evaluate effectiveness of antiarrhythmic and heart rate control medications as ordered  - Initiate emergency measures for life threatening arrhythmias  - Monitor electrolytes and administer replacement therapy as ordered  Outcome: Progressing     Problem: RESPIRATORY - ADULT  Goal: Achieves optimal ventilation and oxygenation  Description: INTERVENTIONS:  - Assess for changes in respiratory status  - Assess for changes in mentation and behavior  - Position to facilitate oxygenation and minimize respiratory effort  - Oxygen supplementation  based on oxygen saturation or ABGs  - Provide Smoking Cessation handout, if applicable  - Encourage broncho-pulmonary hygiene including cough, deep breathe, Incentive Spirometry  - Assess the need for suctioning and perform as needed  - Assess and instruct to report SOB or any respiratory difficulty  - Respiratory Therapy support as indicated  - Manage/alleviate anxiety  - Monitor for signs/symptoms of CO2 retention  Outcome: Progressing     Problem: GENITOURINARY - ADULT  Goal: Absence of urinary retention  Description: INTERVENTIONS:  - Assess patient’s ability to void and empty bladder  - Monitor intake/output and perform bladder scan as needed  - Follow urinary retention protocol/standard of care  - Consider collaborating with pharmacy to review patient's medication profile  - Implement strategies to promote bladder emptying  Outcome: Progressing     Problem: METABOLIC/FLUID AND ELECTROLYTES - ADULT  Goal: Electrolytes maintained within normal limits  Description: INTERVENTIONS:  - Monitor labs and rhythm and assess patient for signs and symptoms of electrolyte imbalances  - Administer electrolyte replacement as ordered  - Monitor response to electrolyte replacements, including rhythm and repeat lab results as appropriate  - Fluid restriction as ordered  - Instruct patient on fluid and nutrition restrictions as appropriate  Outcome: Progressing  Goal: Hemodynamic stability and optimal renal function maintained  Description: INTERVENTIONS:  - Monitor labs and assess for signs and symptoms of volume excess or deficit  - Monitor intake, output and patient weight  - Monitor urine specific gravity, serum osmolarity and serum sodium as indicated or ordered  - Monitor response to interventions for patient's volume status, including labs, urine output, blood pressure (other measures as available)  - Encourage oral intake as appropriate  - Instruct patient on fluid and nutrition restrictions as appropriate  Outcome:  Progressing     Problem: SKIN/TISSUE INTEGRITY - ADULT  Goal: Skin integrity remains intact  Description: INTERVENTIONS  - Assess and document risk factors for pressure ulcer development  - Assess and document skin integrity  - Monitor for areas of redness and/or skin breakdown  - Initiate interventions, skin care algorithm/standards of care as needed  Outcome: Progressing  Goal: Incision(s), wounds(s) or drain site(s) healing without S/S of infection  Description: INTERVENTIONS:  - Assess and document risk factors for pressure ulcer development  - Assess and document skin integrity  - Assess and document dressing/incision, wound bed, drain sites and surrounding tissue  - Implement wound care per orders  - Initiate isolation precautions as appropriate  - Initiate Pressure Ulcer prevention bundle as indicated  Outcome: Progressing  Goal: Oral mucous membranes remain intact  Description: INTERVENTIONS  - Assess oral mucosa and hygiene practices  - Implement preventative oral hygiene regimen  - Implement oral medicated treatments as ordered  Outcome: Progressing     Problem: HEMATOLOGIC - ADULT  Goal: Maintains hematologic stability  Description: INTERVENTIONS  - Assess for signs and symptoms of bleeding or hemorrhage  - Monitor labs and vital signs for trends  - Administer supportive blood products/factors, fluids and medications as ordered and appropriate  - Administer supportive blood products/factors as ordered and appropriate  Outcome: Progressing  Goal: Free from bleeding injury  Description: (Example usage: patient with low platelets)  INTERVENTIONS:  - Avoid intramuscular injections, enemas and rectal medication administration  - Ensure safe mobilization of patient  - Hold pressure on venipuncture sites to achieve adequate hemostasis  - Assess for signs and symptoms of internal bleeding  - Monitor lab trends  - Patient is to report abnormal signs of bleeding to staff  - Avoid use of toothpicks and dental  floss  - Use electric shaver for shaving  - Use soft bristle tooth brush  - Limit straining and forceful nose blowing  Outcome: Progressing     Problem: MUSCULOSKELETAL - ADULT  Goal: Return mobility to safest level of function  Description: INTERVENTIONS:  - Assess patient stability and activity tolerance for standing, transferring and ambulating w/ or w/o assistive devices  - Assist with transfers and ambulation using safe patient handling equipment as needed  - Ensure adequate protection for wounds/incisions during mobilization  - Obtain PT/OT consults as needed  - Advance activity as appropriate  - Communicate ordered activity level and limitations with patient/family  Outcome: Progressing     Problem: Impaired Functional Mobility  Goal: Achieve highest/safest level of mobility/gait  Description: Interventions:  - Assess patient's functional ability and stability  - Promote increasing activity/tolerance for mobility and gait  - Educate and engage patient/family in tolerated activity level and precautions  - Recommend use of chair position in bed 3 times per day  Outcome: Progressing     Monitoring vital signs, stable at this moment. Wound care provided. Call light within reach, bed locked in lowest position, all fall precautions in place. All needs addressed. Frequent rounding maintained by nursing staff. Patient completing IV antibiotics per MD order, no acute changes at this time.

## 2024-05-09 NOTE — PLAN OF CARE
Pt alert and oriented, bed in locked and low position, call light within reach. IV site changed to L forearm.   Problem: Patient Centered Care  Goal: Patient preferences are identified and integrated in the patient's plan of care  Description: Interventions:  - What would you like us to know as we care for you? I have 2 sons involved in myy care  - Provide timely, complete, and accurate information to patient/family  - Incorporate patient and family knowledge, values, beliefs, and cultural backgrounds into the planning and delivery of care  - Encourage patient/family to participate in care and decision-making at the level they choose  - Honor patient and family perspectives and choices  Outcome: Progressing     Problem: CARDIOVASCULAR - ADULT  Goal: Maintains optimal cardiac output and hemodynamic stability  Description: INTERVENTIONS:  - Monitor vital signs, rhythm, and trends  - Monitor for bleeding, hypotension and signs of decreased cardiac output  - Evaluate effectiveness of vasoactive medications to optimize hemodynamic stability  - Monitor arterial and/or venous puncture sites for bleeding and/or hematoma  - Assess quality of pulses, skin color and temperature  - Assess for signs of decreased coronary artery perfusion - ex. Angina  - Evaluate fluid balance, assess for edema, trend weights  Outcome: Progressing  Goal: Absence of cardiac arrhythmias or at baseline  Description: INTERVENTIONS:  - Continuous cardiac monitoring, monitor vital signs, obtain 12 lead EKG if indicated  - Evaluate effectiveness of antiarrhythmic and heart rate control medications as ordered  - Initiate emergency measures for life threatening arrhythmias  - Monitor electrolytes and administer replacement therapy as ordered  Outcome: Progressing     Problem: RESPIRATORY - ADULT  Goal: Achieves optimal ventilation and oxygenation  Description: INTERVENTIONS:  - Assess for changes in respiratory status  - Assess for changes in mentation  and behavior  - Position to facilitate oxygenation and minimize respiratory effort  - Oxygen supplementation based on oxygen saturation or ABGs  - Provide Smoking Cessation handout, if applicable  - Encourage broncho-pulmonary hygiene including cough, deep breathe, Incentive Spirometry  - Assess the need for suctioning and perform as needed  - Assess and instruct to report SOB or any respiratory difficulty  - Respiratory Therapy support as indicated  - Manage/alleviate anxiety  - Monitor for signs/symptoms of CO2 retention  Outcome: Progressing     Problem: GENITOURINARY - ADULT  Goal: Absence of urinary retention  Description: INTERVENTIONS:  - Assess patient’s ability to void and empty bladder  - Monitor intake/output and perform bladder scan as needed  - Follow urinary retention protocol/standard of care  - Consider collaborating with pharmacy to review patient's medication profile  - Implement strategies to promote bladder emptying  Outcome: Progressing     Problem: METABOLIC/FLUID AND ELECTROLYTES - ADULT  Goal: Electrolytes maintained within normal limits  Description: INTERVENTIONS:  - Monitor labs and rhythm and assess patient for signs and symptoms of electrolyte imbalances  - Administer electrolyte replacement as ordered  - Monitor response to electrolyte replacements, including rhythm and repeat lab results as appropriate  - Fluid restriction as ordered  - Instruct patient on fluid and nutrition restrictions as appropriate  Outcome: Progressing  Goal: Hemodynamic stability and optimal renal function maintained  Description: INTERVENTIONS:  - Monitor labs and assess for signs and symptoms of volume excess or deficit  - Monitor intake, output and patient weight  - Monitor urine specific gravity, serum osmolarity and serum sodium as indicated or ordered  - Monitor response to interventions for patient's volume status, including labs, urine output, blood pressure (other measures as available)  - Encourage  oral intake as appropriate  - Instruct patient on fluid and nutrition restrictions as appropriate  Outcome: Progressing     Problem: SKIN/TISSUE INTEGRITY - ADULT  Goal: Skin integrity remains intact  Description: INTERVENTIONS  - Assess and document risk factors for pressure ulcer development  - Assess and document skin integrity  - Monitor for areas of redness and/or skin breakdown  - Initiate interventions, skin care algorithm/standards of care as needed  Outcome: Progressing  Goal: Incision(s), wounds(s) or drain site(s) healing without S/S of infection  Description: INTERVENTIONS:  - Assess and document risk factors for pressure ulcer development  - Assess and document skin integrity  - Assess and document dressing/incision, wound bed, drain sites and surrounding tissue  - Implement wound care per orders  - Initiate isolation precautions as appropriate  - Initiate Pressure Ulcer prevention bundle as indicated  Outcome: Progressing  Goal: Oral mucous membranes remain intact  Description: INTERVENTIONS  - Assess oral mucosa and hygiene practices  - Implement preventative oral hygiene regimen  - Implement oral medicated treatments as ordered  Outcome: Progressing     Problem: HEMATOLOGIC - ADULT  Goal: Maintains hematologic stability  Description: INTERVENTIONS  - Assess for signs and symptoms of bleeding or hemorrhage  - Monitor labs and vital signs for trends  - Administer supportive blood products/factors, fluids and medications as ordered and appropriate  - Administer supportive blood products/factors as ordered and appropriate  Outcome: Progressing  Goal: Free from bleeding injury  Description: (Example usage: patient with low platelets)  INTERVENTIONS:  - Avoid intramuscular injections, enemas and rectal medication administration  - Ensure safe mobilization of patient  - Hold pressure on venipuncture sites to achieve adequate hemostasis  - Assess for signs and symptoms of internal bleeding  - Monitor lab  trends  - Patient is to report abnormal signs of bleeding to staff  - Avoid use of toothpicks and dental floss  - Use electric shaver for shaving  - Use soft bristle tooth brush  - Limit straining and forceful nose blowing  Outcome: Progressing     Problem: MUSCULOSKELETAL - ADULT  Goal: Return mobility to safest level of function  Description: INTERVENTIONS:  - Assess patient stability and activity tolerance for standing, transferring and ambulating w/ or w/o assistive devices  - Assist with transfers and ambulation using safe patient handling equipment as needed  - Ensure adequate protection for wounds/incisions during mobilization  - Obtain PT/OT consults as needed  - Advance activity as appropriate  - Communicate ordered activity level and limitations with patient/family  Outcome: Progressing     Problem: Impaired Functional Mobility  Goal: Achieve highest/safest level of mobility/gait  Description: Interventions:  - Assess patient's functional ability and stability  - Promote increasing activity/tolerance for mobility and gait  - Educate and engage patient/family in tolerated activity level and precautions  - Recommend patient transfer to bedside chair toward strongest side  Outcome: Progressing

## 2024-05-09 NOTE — SPIRITUAL CARE NOTE
Spiritual Care Visit Note    Patient Name: Adore Covington Date of Spiritual Care Visit: 24   : 1945 Primary Dx: Septic shock (HCC)       Referred By: Referral From: Care Coordination    Spiritual Care Taxonomy:    Intended Effects: Demonstrate caring and concern    Methods: Collaborate with care team member;Offer support         Visit Type/Summary:     - Spiritual Care: Consulted with RN prior to visit. Offered empathic listening and emotional support. Patient and family expressed appreciation for  visit. Provided information regarding how to contact Spiritual Care and left a Spiritual Care information card. Patient expressed not being please with her wound care doctors from another healthcare provider. Writer offered words of comfort. No other need at this time.    Spiritual Care support can be requested via an Epic consult. For urgent/immediate needs, please contact the On Call  at: [place]: ext [phone]    JOSEPH Loaiza CAMII   [id number]

## 2024-05-09 NOTE — PROGRESS NOTES
[facility] Hospitalist Progress Note     CC: Hospital Follow up    PCP: Maral Huffman MD       Assessment/Plan:   79 year old female with PMH sig for COPD, A- Fib on Eliquis, CHF, HTN, HLD, PAD, Neuropathy, SUSAN who presents with weakness and left leg redness.  Admitted for Sepsis Cellulitis and hypotension.      Sepsis Cellulitis, Left Leg   MRSA positive   - hypotensive on admission, now resolved    - CT chest with no PE  - IV vanco and zosyn---transition to IV rocephin today   - ID on consult  - Wound care  - MRI left foot with no OM    - discharge planning     Shock resolved   - 2/2 sepsis  - TSH and cortisol normal   - required pressors  - Echo normal EF with increased PA pressures   - low dose midodrine was added. Can stop today  - remove ivey today     Acute Hypoxic Respiratory Failure   H/o COPD not on inhalers   SUSAN  Atelectasis   - CXR and CT with some edema, no obvious signs of infection   - CT chest with no PE  - cpap as tolerated      MARINO resolved   Urinary Retention   - likely pre renal and sepsis component   - avoid hypotension   - avoid nephrotoxic agents   - straight cath x2  - Ivey placed 5/5/24--removed today 5/9     Chronic  A-fib  Secondary Hypercoagulable state   S/p Cardioversion   - Eliquis   - off Amio  - in NSR   - add back her toprol tomorrow AM     Chronic Diastolic HF   - CXR and CT with some edema  -   - Echo reviewed   - resume bumex 1mg daily today  - she is also normally on aldactone, will need but still hold for now      Lose Stools  H/o Gastroparesis   - c-diff negative     HTN - will slowly start resuming meds. Add bumex today, add toprol tomorro w  HLD - statin   PAD - Eliquis and statin   Neuropathy - gabapentin      Diet: Cardiac      DVT Prophy:  Eliquis     Dispo:  SHARYN I suspect Friday/Saturday, possibly Saturday seems more realistic given her complexity     [facility]      Subjective:     Doing ok today. No new symptoms.      OBJECTIVE:    Blood pressure 109/55, pulse 89, temperature 98.1 °F (36.7 °C), temperature source Oral, resp. rate 20, height 5' 2\" (1.575 m), weight 261 lb 3.9 oz (118.5 kg), SpO2 94%.    Temp:  [97.6 °F (36.4 °C)-98.6 °F (37 °C)] 98.1 °F (36.7 °C)  Pulse:  [71-89] 89  Resp:  [16-20] 20  BP: ()/(53-83) 109/55  SpO2:  [94 %-95 %] 94 %      Intake/Output:    Intake/Output Summary (Last 24 hours) at 5/9/2024 1208  Last data filed at 5/9/2024 1100  Gross per 24 hour   Intake 400 ml   Output 3950 ml   Net -3550 ml       Last 3 Weights   05/06/24 0500 261 lb 3.9 oz (118.5 kg)   05/05/24 0600 257 lb 8 oz (116.8 kg)   05/04/24 0324 261 lb 3.9 oz (118.5 kg)   05/03/24 1837 261 lb 9.6 oz (118.7 kg)   05/04/24 1456 261 lb 3.9 oz (118.5 kg)   04/29/24 1532 261 lb (118.4 kg)       Exam   General: Alert, no acute distress  Lungs: clear to ausculation bilaterally  Heart: Regular rate and rhythm  Abdomen: soft, non tender  Extremities: left leg erythema and swelling  Skin: no new rash, normal color    Data Review:       Labs:     Recent Labs   Lab 05/03/24  1850 05/05/24  0457 05/06/24  0449 05/07/24  0359 05/08/24  0500   RBC 3.85   < > 3.16* 3.33* 3.54*   HGB 11.8*   < > 9.6* 10.1* 10.9*   HCT 36.3   < > 30.2* 31.2* 34.2*   MCV 94.3   < > 95.6 93.7 96.6   MCH 30.6   < > 30.4 30.3 30.8   MCHC 32.5   < > 31.8 32.4 31.9   RDW 14.6   < > 14.6 14.4 14.0   NEPRELIM 10.31*  --   --   --   --    WBC 10.9   < > 13.0* 9.2 6.8   .0   < > 197.0 227.0 237.0    < > = values in this interval not displayed.         Recent Labs   Lab 05/06/24 0449 05/07/24 0359 05/08/24  0500 05/08/24 2008   GLU 89 83 82  --    BUN 15 11 6*  --    CREATSERUM 0.67 0.66 0.64  --    EGFRCR 90 90 91  --    CA 8.2* 8.4* 8.6*  --     140 140  --    K 3.7  3.7 4.0  4.0 3.6 4.4    110 110  --    CO2 24.0 23.0 22.0  --        Recent Labs   Lab 05/04/24  1743 05/05/24 0457 05/06/24 0449 05/07/24  0359 05/08/24  0500   ALT 10 7* 18 19 15    AST 20 17 13 20 19   ALB 3.3 2.9* 2.8* 2.9* 3.1*         Imaging:  No results found.      Meds:      cefTRIAXone  2 g Intravenous Q24H    bumetanide  1 mg Oral Daily    clotrimazole-betamethasone   Topical BID    cycloSPORINE  1 drop Both Eyes BID    apixaban  5 mg Oral BID    gabapentin  200 mg Oral BID    pantoprazole  40 mg Oral QAM AC    pravastatin  20 mg Oral Nightly    cyanocobalamin  1,000 mcg Oral Daily    cholecalciferol  2,000 Units Oral BID    glycerin-hypromellose-  1 drop Both Eyes BID           acetaminophen    ondansetron    metoclopramide    polyethylene glycol (PEG 3350)    sennosides    bisacodyl    melatonin

## 2024-05-10 LAB
ANION GAP SERPL CALC-SCNC: 6 MMOL/L (ref 0–18)
BASOPHILS # BLD AUTO: 0.05 X10(3) UL (ref 0–0.2)
BASOPHILS NFR BLD AUTO: 0.7 %
BUN BLD-MCNC: 7 MG/DL (ref 9–23)
BUN/CREAT SERPL: 11.7 (ref 10–20)
CALCIUM BLD-MCNC: 8.7 MG/DL (ref 8.7–10.4)
CHLORIDE SERPL-SCNC: 108 MMOL/L (ref 98–112)
CO2 SERPL-SCNC: 27 MMOL/L (ref 21–32)
CREAT BLD-MCNC: 0.6 MG/DL
DEPRECATED RDW RBC AUTO: 46.8 FL (ref 35.1–46.3)
EGFRCR SERPLBLD CKD-EPI 2021: 92 ML/MIN/1.73M2 (ref 60–?)
EOSINOPHIL # BLD AUTO: 0.41 X10(3) UL (ref 0–0.7)
EOSINOPHIL NFR BLD AUTO: 5.8 %
ERYTHROCYTE [DISTWIDTH] IN BLOOD BY AUTOMATED COUNT: 13.7 % (ref 11–15)
GLUCOSE BLD-MCNC: 91 MG/DL (ref 70–99)
HCT VFR BLD AUTO: 34.6 %
HGB BLD-MCNC: 11.1 G/DL
IMM GRANULOCYTES # BLD AUTO: 0.16 X10(3) UL (ref 0–1)
IMM GRANULOCYTES NFR BLD: 2.3 %
LYMPHOCYTES # BLD AUTO: 1.04 X10(3) UL (ref 1–4)
LYMPHOCYTES NFR BLD AUTO: 14.6 %
MCH RBC QN AUTO: 30.1 PG (ref 26–34)
MCHC RBC AUTO-ENTMCNC: 32.1 G/DL (ref 31–37)
MCV RBC AUTO: 93.8 FL
MONOCYTES # BLD AUTO: 0.85 X10(3) UL (ref 0.1–1)
MONOCYTES NFR BLD AUTO: 12 %
NEUTROPHILS # BLD AUTO: 4.59 X10 (3) UL (ref 1.5–7.7)
NEUTROPHILS # BLD AUTO: 4.59 X10(3) UL (ref 1.5–7.7)
NEUTROPHILS NFR BLD AUTO: 64.6 %
OSMOLALITY SERPL CALC.SUM OF ELEC: 290 MOSM/KG (ref 275–295)
PLATELET # BLD AUTO: 319 10(3)UL (ref 150–450)
POTASSIUM SERPL-SCNC: 3.7 MMOL/L (ref 3.5–5.1)
RBC # BLD AUTO: 3.69 X10(6)UL
SODIUM SERPL-SCNC: 141 MMOL/L (ref 136–145)
WBC # BLD AUTO: 7.1 X10(3) UL (ref 4–11)

## 2024-05-10 PROCEDURE — 97530 THERAPEUTIC ACTIVITIES: CPT

## 2024-05-10 PROCEDURE — 85025 COMPLETE CBC W/AUTO DIFF WBC: CPT | Performed by: INTERNAL MEDICINE

## 2024-05-10 PROCEDURE — 97116 GAIT TRAINING THERAPY: CPT

## 2024-05-10 PROCEDURE — 80048 BASIC METABOLIC PNL TOTAL CA: CPT | Performed by: INTERNAL MEDICINE

## 2024-05-10 RX ORDER — POTASSIUM CHLORIDE 20 MEQ/1
40 TABLET, EXTENDED RELEASE ORAL ONCE
Status: COMPLETED | OUTPATIENT
Start: 2024-05-10 | End: 2024-05-10

## 2024-05-10 RX ORDER — SPIRONOLACTONE 25 MG/1
25 TABLET ORAL DAILY
Status: DISCONTINUED | OUTPATIENT
Start: 2024-05-10 | End: 2024-05-11

## 2024-05-10 RX ORDER — SPIRONOLACTONE 25 MG/1
25 TABLET ORAL DAILY
COMMUNITY

## 2024-05-10 RX ORDER — BUMETANIDE 1 MG/1
1 TABLET ORAL DAILY
COMMUNITY

## 2024-05-10 RX ORDER — DILTIAZEM HYDROCHLORIDE 120 MG/1
240 CAPSULE, EXTENDED RELEASE ORAL DAILY
Status: DISCONTINUED | OUTPATIENT
Start: 2024-05-10 | End: 2024-05-11

## 2024-05-10 NOTE — PLAN OF CARE
Pt AxOx4. Administered IV abx as ordered. Pt cleared to discharge to [facility] tomorrow. Pt aware of plan, but is concerned about facility and readiness to leave.  came to see pt. Call light in reach, safety measures in place.    Problem: Patient Centered Care  Goal: Patient preferences are identified and integrated in the patient's plan of care  Description: Interventions:  - What would you like us to know as we care for you? I have 2 sons involved in myy care  - Provide timely, complete, and accurate information to patient/family  - Incorporate patient and family knowledge, values, beliefs, and cultural backgrounds into the planning and delivery of care  - Encourage patient/family to participate in care and decision-making at the level they choose  - Honor patient and family perspectives and choices  Outcome: Progressing     Problem: CARDIOVASCULAR - ADULT  Goal: Maintains optimal cardiac output and hemodynamic stability  Description: INTERVENTIONS:  - Monitor vital signs, rhythm, and trends  - Monitor for bleeding, hypotension and signs of decreased cardiac output  - Evaluate effectiveness of vasoactive medications to optimize hemodynamic stability  - Monitor arterial and/or venous puncture sites for bleeding and/or hematoma  - Assess quality of pulses, skin color and temperature  - Assess for signs of decreased coronary artery perfusion - ex. Angina  - Evaluate fluid balance, assess for edema, trend weights  Outcome: Progressing  Goal: Absence of cardiac arrhythmias or at baseline  Description: INTERVENTIONS:  - Continuous cardiac monitoring, monitor vital signs, obtain 12 lead EKG if indicated  - Evaluate effectiveness of antiarrhythmic and heart rate control medications as ordered  - Initiate emergency measures for life threatening arrhythmias  - Monitor electrolytes and administer replacement therapy as ordered  Outcome: Progressing     Problem: RESPIRATORY - ADULT  Goal: Achieves optimal ventilation and  oxygenation  Description: INTERVENTIONS:  - Assess for changes in respiratory status  - Assess for changes in mentation and behavior  - Position to facilitate oxygenation and minimize respiratory effort  - Oxygen supplementation based on oxygen saturation or ABGs  - Provide Smoking Cessation handout, if applicable  - Encourage broncho-pulmonary hygiene including cough, deep breathe, Incentive Spirometry  - Assess the need for suctioning and perform as needed  - Assess and instruct to report SOB or any respiratory difficulty  - Respiratory Therapy support as indicated  - Manage/alleviate anxiety  - Monitor for signs/symptoms of CO2 retention  Outcome: Progressing     Problem: GENITOURINARY - ADULT  Goal: Absence of urinary retention  Description: INTERVENTIONS:  - Assess patient’s ability to void and empty bladder  - Monitor intake/output and perform bladder scan as needed  - Follow urinary retention protocol/standard of care  - Consider collaborating with pharmacy to review patient's medication profile  - Implement strategies to promote bladder emptying  Outcome: Progressing     Problem: METABOLIC/FLUID AND ELECTROLYTES - ADULT  Goal: Electrolytes maintained within normal limits  Description: INTERVENTIONS:  - Monitor labs and rhythm and assess patient for signs and symptoms of electrolyte imbalances  - Administer electrolyte replacement as ordered  - Monitor response to electrolyte replacements, including rhythm and repeat lab results as appropriate  - Fluid restriction as ordered  - Instruct patient on fluid and nutrition restrictions as appropriate  Outcome: Progressing  Goal: Hemodynamic stability and optimal renal function maintained  Description: INTERVENTIONS:  - Monitor labs and assess for signs and symptoms of volume excess or deficit  - Monitor intake, output and patient weight  - Monitor urine specific gravity, serum osmolarity and serum sodium as indicated or ordered  - Monitor response to interventions  for patient's volume status, including labs, urine output, blood pressure (other measures as available)  - Encourage oral intake as appropriate  - Instruct patient on fluid and nutrition restrictions as appropriate  Outcome: Progressing     Problem: SKIN/TISSUE INTEGRITY - ADULT  Goal: Skin integrity remains intact  Description: INTERVENTIONS  - Assess and document risk factors for pressure ulcer development  - Assess and document skin integrity  - Monitor for areas of redness and/or skin breakdown  - Initiate interventions, skin care algorithm/standards of care as needed  Outcome: Progressing  Goal: Incision(s), wounds(s) or drain site(s) healing without S/S of infection  Description: INTERVENTIONS:  - Assess and document risk factors for pressure ulcer development  - Assess and document skin integrity  - Assess and document dressing/incision, wound bed, drain sites and surrounding tissue  - Implement wound care per orders  - Initiate isolation precautions as appropriate  - Initiate Pressure Ulcer prevention bundle as indicated  Outcome: Progressing  Goal: Oral mucous membranes remain intact  Description: INTERVENTIONS  - Assess oral mucosa and hygiene practices  - Implement preventative oral hygiene regimen  - Implement oral medicated treatments as ordered  Outcome: Progressing     Problem: HEMATOLOGIC - ADULT  Goal: Maintains hematologic stability  Description: INTERVENTIONS  - Assess for signs and symptoms of bleeding or hemorrhage  - Monitor labs and vital signs for trends  - Administer supportive blood products/factors, fluids and medications as ordered and appropriate  - Administer supportive blood products/factors as ordered and appropriate  Outcome: Progressing  Goal: Free from bleeding injury  Description: (Example usage: patient with low platelets)  INTERVENTIONS:  - Avoid intramuscular injections, enemas and rectal medication administration  - Ensure safe mobilization of patient  - Hold pressure on  venipuncture sites to achieve adequate hemostasis  - Assess for signs and symptoms of internal bleeding  - Monitor lab trends  - Patient is to report abnormal signs of bleeding to staff  - Avoid use of toothpicks and dental floss  - Use electric shaver for shaving  - Use soft bristle tooth brush  - Limit straining and forceful nose blowing  Outcome: Progressing     Problem: MUSCULOSKELETAL - ADULT  Goal: Return mobility to safest level of function  Description: INTERVENTIONS:  - Assess patient stability and activity tolerance for standing, transferring and ambulating w/ or w/o assistive devices  - Assist with transfers and ambulation using safe patient handling equipment as needed  - Ensure adequate protection for wounds/incisions during mobilization  - Obtain PT/OT consults as needed  - Advance activity as appropriate  - Communicate ordered activity level and limitations with patient/family  Outcome: Progressing     Problem: Impaired Functional Mobility  Goal: Achieve highest/safest level of mobility/gait  Description: Interventions:  - Assess patient's functional ability and stability  - Promote increasing activity/tolerance for mobility and gait  - Educate and engage patient/family in tolerated activity level and precautions    Outcome: Progressing

## 2024-05-10 NOTE — PLAN OF CARE
Problem: Patient Centered Care  Goal: Patient preferences are identified and integrated in the patient's plan of care  Description: Interventions:  - What would you like us to know as we care for you? I have 2 sons involved in myy care  - Provide timely, complete, and accurate information to patient/family  - Incorporate patient and family knowledge, values, beliefs, and cultural backgrounds into the planning and delivery of care  - Encourage patient/family to participate in care and decision-making at the level they choose  - Honor patient and family perspectives and choices  Outcome: Progressing     Problem: CARDIOVASCULAR - ADULT  Goal: Maintains optimal cardiac output and hemodynamic stability  Description: INTERVENTIONS:  - Monitor vital signs, rhythm, and trends  - Monitor for bleeding, hypotension and signs of decreased cardiac output  - Evaluate effectiveness of vasoactive medications to optimize hemodynamic stability  - Monitor arterial and/or venous puncture sites for bleeding and/or hematoma  - Assess quality of pulses, skin color and temperature  - Assess for signs of decreased coronary artery perfusion - ex. Angina  - Evaluate fluid balance, assess for edema, trend weights  Outcome: Progressing  Goal: Absence of cardiac arrhythmias or at baseline  Description: INTERVENTIONS:  - Continuous cardiac monitoring, monitor vital signs, obtain 12 lead EKG if indicated  - Evaluate effectiveness of antiarrhythmic and heart rate control medications as ordered  - Initiate emergency measures for life threatening arrhythmias  - Monitor electrolytes and administer replacement therapy as ordered  Outcome: Progressing     Problem: RESPIRATORY - ADULT  Goal: Achieves optimal ventilation and oxygenation  Description: INTERVENTIONS:  - Assess for changes in respiratory status  - Assess for changes in mentation and behavior  - Position to facilitate oxygenation and minimize respiratory effort  - Oxygen supplementation  based on oxygen saturation or ABGs  - Provide Smoking Cessation handout, if applicable  - Encourage broncho-pulmonary hygiene including cough, deep breathe, Incentive Spirometry  - Assess the need for suctioning and perform as needed  - Assess and instruct to report SOB or any respiratory difficulty  - Respiratory Therapy support as indicated  - Manage/alleviate anxiety  - Monitor for signs/symptoms of CO2 retention  Outcome: Progressing     Problem: GENITOURINARY - ADULT  Goal: Absence of urinary retention  Description: INTERVENTIONS:  - Assess patient’s ability to void and empty bladder  - Monitor intake/output and perform bladder scan as needed  - Follow urinary retention protocol/standard of care  - Consider collaborating with pharmacy to review patient's medication profile  - Implement strategies to promote bladder emptying  Outcome: Progressing     Problem: METABOLIC/FLUID AND ELECTROLYTES - ADULT  Goal: Electrolytes maintained within normal limits  Description: INTERVENTIONS:  - Monitor labs and rhythm and assess patient for signs and symptoms of electrolyte imbalances  - Administer electrolyte replacement as ordered  - Monitor response to electrolyte replacements, including rhythm and repeat lab results as appropriate  - Fluid restriction as ordered  - Instruct patient on fluid and nutrition restrictions as appropriate  Outcome: Progressing  Goal: Hemodynamic stability and optimal renal function maintained  Description: INTERVENTIONS:  - Monitor labs and assess for signs and symptoms of volume excess or deficit  - Monitor intake, output and patient weight  - Monitor urine specific gravity, serum osmolarity and serum sodium as indicated or ordered  - Monitor response to interventions for patient's volume status, including labs, urine output, blood pressure (other measures as available)  - Encourage oral intake as appropriate  - Instruct patient on fluid and nutrition restrictions as appropriate  Outcome:  Progressing     Problem: SKIN/TISSUE INTEGRITY - ADULT  Goal: Skin integrity remains intact  Description: INTERVENTIONS  - Assess and document risk factors for pressure ulcer development  - Assess and document skin integrity  - Monitor for areas of redness and/or skin breakdown  - Initiate interventions, skin care algorithm/standards of care as needed  Outcome: Progressing  Goal: Incision(s), wounds(s) or drain site(s) healing without S/S of infection  Description: INTERVENTIONS:  - Assess and document risk factors for pressure ulcer development  - Assess and document skin integrity  - Assess and document dressing/incision, wound bed, drain sites and surrounding tissue  - Implement wound care per orders  - Initiate isolation precautions as appropriate  - Initiate Pressure Ulcer prevention bundle as indicated  Outcome: Progressing  Goal: Oral mucous membranes remain intact  Description: INTERVENTIONS  - Assess oral mucosa and hygiene practices  - Implement preventative oral hygiene regimen  - Implement oral medicated treatments as ordered  Outcome: Progressing     Problem: HEMATOLOGIC - ADULT  Goal: Maintains hematologic stability  Description: INTERVENTIONS  - Assess for signs and symptoms of bleeding or hemorrhage  - Monitor labs and vital signs for trends  - Administer supportive blood products/factors, fluids and medications as ordered and appropriate  - Administer supportive blood products/factors as ordered and appropriate  Outcome: Progressing  Goal: Free from bleeding injury  Description: (Example usage: patient with low platelets)  INTERVENTIONS:  - Avoid intramuscular injections, enemas and rectal medication administration  - Ensure safe mobilization of patient  - Hold pressure on venipuncture sites to achieve adequate hemostasis  - Assess for signs and symptoms of internal bleeding  - Monitor lab trends  - Patient is to report abnormal signs of bleeding to staff  - Avoid use of toothpicks and dental  floss  - Use electric shaver for shaving  - Use soft bristle tooth brush  - Limit straining and forceful nose blowing  Outcome: Progressing     Problem: MUSCULOSKELETAL - ADULT  Goal: Return mobility to safest level of function  Description: INTERVENTIONS:  - Assess patient stability and activity tolerance for standing, transferring and ambulating w/ or w/o assistive devices  - Assist with transfers and ambulation using safe patient handling equipment as needed  - Ensure adequate protection for wounds/incisions during mobilization  - Obtain PT/OT consults as needed  - Advance activity as appropriate  - Communicate ordered activity level and limitations with patient/family  Outcome: Progressing     Problem: Impaired Functional Mobility  Goal: Achieve highest/safest level of mobility/gait  Description: Interventions:  - Assess patient's functional ability and stability  - Promote increasing activity/tolerance for mobility and gait  - Educate and engage patient/family in tolerated activity level and precautions  Outcome: Progressing     Vitals stable. Prn given for constipation. Purwick in place.

## 2024-05-10 NOTE — PROGRESS NOTES
[facility] Hospitalist Progress Note     CC: Hospital Follow up    PCP: Maral Huffman MD       Assessment/Plan:   79 year old female with PMH sig for COPD, A- Fib on Eliquis, CHF, HTN, HLD, PAD, Neuropathy, SUSAN who presents with weakness and left leg redness.  Admitted for Sepsis Cellulitis and hypotension.      Sepsis Cellulitis, Left Leg   MRSA positive   - hypotensive on admission, now resolved    - CT chest with no PE  - IV vanco and zosyn---transition to IV rocephin on 5/9----then oral at discharge per ID  - ID on consult  - Wound care  - MRI left foot with no OM       Shock resolved   - 2/2 sepsis  - TSH and cortisol normal   - required pressors  - Echo normal EF with increased PA pressures   - low dose midodrine was added. I stopped this on 5/9  - removed ivey on 5/9     Acute Hypoxic Respiratory Failure---resolved  H/o COPD not on inhalers   SUSAN  Atelectasis   - CXR and CT with some edema, no obvious signs of infection   - CT chest with no PE  - cpap as tolerated      MARINO resolved   Urinary Retention   - likely pre renal and sepsis component   - straight cath x2  - Ivey placed 5/5/24--removed 5/9     Chronic  A-fib  Secondary Hypercoagulable state   S/p Cardioversion   - Eliquis   - she is no longer on amidarone  - she is on diltiazem 240mg daily and metoprolol 50mg for rate control  - added metoprolol and diltiazem today   - in NSR      Chronic Diastolic HF   - CXR and CT with some edema  -   - Echo reviewed   - resumed bumex 1mg daily 5/9  - she is also normally on aldactone, resumed today      Lose Stools  H/o Gastroparesis   - c-diff negative     HTN - slowly resuming meds as above, since she was here with septic shock  HLD - statin   PAD - Eliquis and statin   Neuropathy - gabapentin      Diet: Cardiac      DVT Prophy:  Eliquis     Dispo:  SHARYN I suspect Saturday given her complexity     [facility] Hospitalist      Subjective:     Doing ok today. No new symptoms. Asked if I  called a cardiologist to add her meds and I reasssured her no that it's ok for me to slowly resume things..    OBJECTIVE:    Blood pressure 123/67, pulse 81, temperature 98.8 °F (37.1 °C), temperature source Oral, resp. rate 16, height 5' 2\" (1.575 m), weight 261 lb 3.9 oz (118.5 kg), SpO2 94%.    Temp:  [97.7 °F (36.5 °C)-98.8 °F (37.1 °C)] 98.8 °F (37.1 °C)  Pulse:  [81-89] 81  Resp:  [16-20] 16  BP: ()/(55-67) 123/67  SpO2:  [94 %-95 %] 94 %      Intake/Output:    Intake/Output Summary (Last 24 hours) at 5/10/2024 1019  Last data filed at 5/10/2024 1014  Gross per 24 hour   Intake 722 ml   Output 4800 ml   Net -4078 ml       Last 3 Weights   05/06/24 0500 261 lb 3.9 oz (118.5 kg)   05/05/24 0600 257 lb 8 oz (116.8 kg)   05/04/24 0324 261 lb 3.9 oz (118.5 kg)   05/03/24 1837 261 lb 9.6 oz (118.7 kg)   05/04/24 1456 261 lb 3.9 oz (118.5 kg)   04/29/24 1532 261 lb (118.4 kg)       Exam   General: Alert, no acute distress  Lungs: clear to ausculation bilaterally  Heart: Regular rate and rhythm  Abdomen: soft, non tender  Extremities: left leg erythema and swelling  Skin: no new rash, normal color    Data Review:       Labs:     Recent Labs   Lab 05/03/24  1850 05/05/24  0457 05/07/24  0359 05/08/24  0500 05/10/24  0536   RBC 3.85   < > 3.33* 3.54* 3.69*   HGB 11.8*   < > 10.1* 10.9* 11.1*   HCT 36.3   < > 31.2* 34.2* 34.6*   MCV 94.3   < > 93.7 96.6 93.8   MCH 30.6   < > 30.3 30.8 30.1   MCHC 32.5   < > 32.4 31.9 32.1   RDW 14.6   < > 14.4 14.0 13.7   NEPRELIM 10.31*  --   --   --  4.59   WBC 10.9   < > 9.2 6.8 7.1   .0   < > 227.0 237.0 319.0    < > = values in this interval not displayed.         Recent Labs   Lab 05/07/24  0359 05/08/24  0500 05/08/24  2008 05/10/24  0536   GLU 83 82  --  91   BUN 11 6*  --  7*   CREATSERUM 0.66 0.64  --  0.60   EGFRCR 90 91  --  92   CA 8.4* 8.6*  --  8.7    140  --  141   K 4.0  4.0 3.6 4.4 3.7    110  --  108   CO2 23.0 22.0  --  27.0       Recent Labs    Lab 05/04/24  1743 05/05/24  0457 05/06/24  0449 05/07/24  0359 05/08/24  0500   ALT 10 7* 18 19 15   AST 20 17 13 20 19   ALB 3.3 2.9* 2.8* 2.9* 3.1*         Imaging:  No results found.      Meds:      dilTIAZem ER  240 mg Oral Daily    spironolactone  25 mg Oral Daily    cefTRIAXone  2 g Intravenous Q24H    metoprolol succinate ER  50 mg Oral Daily Beta Blocker    bumetanide  1 mg Oral Daily    clotrimazole-betamethasone   Topical BID    cycloSPORINE  1 drop Both Eyes BID    apixaban  5 mg Oral BID    gabapentin  200 mg Oral BID    pantoprazole  40 mg Oral QAM AC    pravastatin  20 mg Oral Nightly    cyanocobalamin  1,000 mcg Oral Daily    cholecalciferol  2,000 Units Oral BID    glycerin-hypromellose-  1 drop Both Eyes BID           acetaminophen    ondansetron    metoclopramide    polyethylene glycol (PEG 3350)    sennosides    bisacodyl    melatonin

## 2024-05-10 NOTE — PHYSICAL THERAPY NOTE
PHYSICAL THERAPY TREATMENT NOTE - INPATIENT     Room Number: 521/521-A       Presenting Problem: weakness w/BLE edema, LLE redness and cellulitis  Co-Morbidities : HTN, HLD, CHF, COPD, a--fib    Problem List  Principal Problem:    Septic shock (HCC)  Active Problems:    Cellulitis      PHYSICAL THERAPY ASSESSMENT   Patient demonstrates good  progress this session, goals  updated to reflect patient performance.    Patient continues to function below baseline with bed mobility, transfers, gait, standing prolonged periods, and performing household tasks.  Contributing factors to remaining limitations include decreased functional strength, decreased endurance/aerobic capacity, pain, impaired standing balance, decreased muscular endurance, and limited L knee ROM.  Next session anticipate patient to progress bed mobility, transfers, and gait.  Physical Therapy will continue to follow patient for duration of hospitalization.    Patient continues to benefit from continued skilled PT services: to promote return to prior level of function and safety with continuous assistance and gradual rehabilitative therapy .    PLAN  PT Treatment Plan: Bed mobility;Transfer training  Frequency (Obs): 3-5x/week    SUBJECTIVE  \"I want to walk more\"    OBJECTIVE  Precautions: Bed/chair alarm;Limb alert - left;Limb alert - right (BLE edema)    PAIN ASSESSMENT   Rating: Unable to rate  Location: LLE  Management Techniques: Activity promotion;Body mechanics;Repositioning    BALANCE  Static Sitting: Fair +  Dynamic Sitting: Fair  Static Standing: Fair -  Dynamic Standing: Poor +    AM-PAC '6-Clicks' INPATIENT SHORT FORM - BASIC MOBILITY  How much difficulty does the patient currently have...  Patient Difficulty: Turning over in bed (including adjusting bedclothes, sheets and blankets)?: A Lot   Patient Difficulty: Sitting down on and standing up from a chair with arms (e.g., wheelchair, bedside commode, etc.): A Lot   Patient Difficulty: Moving  from lying on back to sitting on the side of the bed?: A Lot   How much help from another person does the patient currently need...   Help from Another: Moving to and from a bed to a chair (including a wheelchair)?: A Little   Help from Another: Need to walk in hospital room?: A Little   Help from Another: Climbing 3-5 steps with a railing?: Total     AM-PAC Score:  Raw Score: 13   Approx Degree of Impairment: 64.91%   Standardized Score (AM-PAC Scale): 36.74   CMS Modifier (G-Code): CL    FUNCTIONAL ABILITY STATUS  Functional Mobility/Gait Assessment  Gait Assistance: Minimum assistance  Distance (ft): 5 ft and 30 ft  Assistive Device: Rolling walker  Pattern: L Decreased stance time;Shuffle (decreased alana speed, decreased step length, slighty wide DWAYNE, slightly flexed posture. Chair follow utilized throughout.)  Supine to Sit: maximum assist  Sit to Stand: moderate assist x 2    Additional information: Patient tolerating increased activity and ambulation this session. Patient continues to benefit from increased time, cues, and physical assistance in order to complete functional mobility tasks. Encouraged patient to continue sitting in bedside chair for all meals, ambulating to bathroom as tolerated, and performing BLE therex.     The patient's Approx Degree of Impairment: 64.91% has been calculated based on documentation in the [facility] '6 clicks' Inpatient Daily Activity Short Form.  Research supports that patients with this level of impairment may benefit from rehab.  Final disposition will be made by interdisciplinary medical team.    THERAPEUTIC EXERCISES  Lower Extremity Heel raises  LAQ  Toe raises  Seated knee flexion     Position Sitting       Patient in bed upon arrival. RN approved activity. Educated patient on POC and benefits of mobilization. Agreeable to participate. Patient reporting LLE pain, not quantified per the pain scale. Rehab Tech Toyin present throughout session to assist as  needed.  Patient End of Session: Up in chair;Needs met;Call light within reach;RN aware of session/findings;All patient questions and concerns addressed    CURRENT GOALS   Goals to be met by: 5/24/24  Patient Goal Patient's self-stated goal is: to get better and be able to walk   Goal #1 Patient is able to demonstrate supine - sit EOB @ level: minimum assistance      Goal #1   Current Status  Max A   Goal #2 Patient is able to demonstrate transfers Sit to/from Stand at assistance level: minimum assistance with walker - rolling      Goal #2  Current Status  Mod A x 2   Goal #3 Patient is able to ambulate 50 feet with assist device: walker - rolling at assistance level: SBA   Goal #3   Current Status  Goal met and updated 5/10/24: Min A 5 ft and 30 ft with RW   Goal #4 Patient will negotiate bed to/from chair transfers with RW with minimal assistance   Goal #4   Current Status  NT   Goal #5 Patient to demonstrate independence with home activity/exercise instructions provided to patient in preparation for discharge.   Goal #5   Current Status  Ongoing      Gait Training: 15 minutes  Therapeutic Activity: 15 minutes

## 2024-05-10 NOTE — PROGRESS NOTES
[facility]  part of [facility] Infectious Disease  Progress Note    Adore Covington Patient Status:  Inpatient    1945 MRN [id number]   Location [facility]5W Attending Yolanda uJdd, DO   Hosp Day # 6 PCP Maral Huffman MD     Subjective:  Patient seen/examined.  Up in bed in NAD.  No F/C.  No new issues.  Plans for [facility] at discharge.    Objective:  Blood pressure 123/67, pulse 81, temperature 98.8 °F (37.1 °C), temperature source Oral, resp. rate 16, height 5' 2\" (1.575 m), weight 261 lb 3.9 oz (118.5 kg), SpO2 94%.    Intake/Output:    Intake/Output Summary (Last 24 hours) at 5/10/2024 1244  Last data filed at 5/10/2024 1014  Gross per 24 hour   Intake 602 ml   Output 3050 ml   Net -2448 ml       Physical Exam:  General: Awake, alert, non-tox, NAD.  HEENT:  Oropharynx clear, trachea ML.  Heart: RRR S1S2 no murmurs.  Lungs: Essentially CTA b/l, no rhonchi, rales, wheezes.  Abdomen: Soft, NT/ND.  BS present.  No organomegaly.  Extremity: LLE erythema fading, some residual stasis changes.  Neurological: No focal deficits.  Derm:  Warm, dry, free from rashes.    Lab Data Review:  Lab Results   Component Value Date    WBC 7.1 05/10/2024    HGB 11.1 05/10/2024    HCT 34.6 05/10/2024    .0 05/10/2024    CREATSERUM 0.60 05/10/2024    BUN 7 05/10/2024     05/10/2024    K 3.7 05/10/2024     05/10/2024    CO2 27.0 05/10/2024    GLU 91 05/10/2024    CA 8.7 05/10/2024     Cultures:  Blood cultures negative  MRSA screen +  C.diff negative     Radiology:  CONCLUSION:   1. No osteomyelitis or abscess.   2. Superficial and deep soft tissue edema.   3. Muscular atrophy.   4. 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   5. 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   6. Advanced degenerative change of the midfoot.  Other less pronounced degenerative changes.   7. Small ankle joint effusion.        Antibiotics  Reviewed:  Vancomycin  Zosyn     Assessment and Plan:     Severe sepsis with septic shock in this woman with LLE cellulitis/foot infection as source  - Continues to improve, off vasopressor support  - MRI without evidence of OM or abscess  - Blood cultures negative  - MRSA screen is positive  - IV vancomycin/zosyn->ceftriaxone     2.  Multifactorial leukocytosis due to the above  - At 6.8K today, will trend  - c.diff is negative     3.  Acute hypoxic respiratory failure due to the above  - Now resolved and O2 has been weaned     4.  Disposition - stable for discharge from ID. Now on streamlined IV ceftriaxone only with plans to step down to p.o. cefadroxil 500mg p.o. BID x 7 days at [facility].  Compression and elevation continued.  We will see and follow at [facility] after discharge.  Please call if any further questions.     Georgina Rice DO, [facility] Infectious Disease  [phone]    5/10/2024  12:44 PM

## 2024-05-10 NOTE — CM/SW NOTE
Pt discussed in nursing rounds.    Pt asking for private room at [facility].    SW confirmed with liamaciel Wright that pt will have a private room tomorrow.    RN Katlin and Siobhan notified as pt was anxious about this.    Update 1:45 PM    Pt requested to meet with SW bedside.  Pt very upset stating she had never chosen [facility] for rehab.  SW stated it was noted by JASON Egan 5/9.  Pt denies this.  Pt asking about [facility]- requesting a private room.    SW asked liamaciel Vaughn from [facility] about private rooms tomorrow-she confirmed one is available.  Care team notified of change in [facility].  AMMON confirmed Duly MD is Dr. Geetha Perez.    AMMON reserved [facility] in [place].  Camryn Vaughn to ask admissions director what time works for pt to come.  Pt stated her son will drive her as she wants to get her things from [facility].  Pt able to transfer to car.  Johana confirmed 12 PM works on their end for pt discharge from [facility].      PLAN: [facility] pending med clear (12 PM discharge if cleared by physician team)    / to remain available for support and/or discharge planning.     Emily Hernandez MSW, LSW [id number]

## 2024-05-11 VITALS
HEART RATE: 71 BPM | WEIGHT: 261.25 LBS | TEMPERATURE: 97 F | HEIGHT: 62 IN | SYSTOLIC BLOOD PRESSURE: 115 MMHG | BODY MASS INDEX: 48.07 KG/M2 | DIASTOLIC BLOOD PRESSURE: 58 MMHG | RESPIRATION RATE: 14 BRPM | OXYGEN SATURATION: 95 %

## 2024-05-11 LAB — POTASSIUM SERPL-SCNC: 3.9 MMOL/L (ref 3.5–5.1)

## 2024-05-11 PROCEDURE — 84132 ASSAY OF SERUM POTASSIUM: CPT | Performed by: INTERNAL MEDICINE

## 2024-05-11 RX ORDER — BISACODYL 10 MG
10 SUPPOSITORY, RECTAL RECTAL
Qty: 10 SUPPOSITORY | Refills: 0 | Status: SHIPPED | OUTPATIENT
Start: 2024-05-11

## 2024-05-11 RX ORDER — DOCUSATE SODIUM 100 MG/1
100 CAPSULE, LIQUID FILLED ORAL 2 TIMES DAILY PRN
Qty: 30 CAPSULE | Refills: 0 | Status: SHIPPED | OUTPATIENT
Start: 2024-05-11

## 2024-05-11 RX ORDER — POLYETHYLENE GLYCOL 3350 17 G/17G
17 POWDER, FOR SOLUTION ORAL DAILY PRN
Qty: 10 EACH | Refills: 0 | Status: SHIPPED | OUTPATIENT
Start: 2024-05-11

## 2024-05-11 NOTE — DISCHARGE SUMMARY
[facility]  part of [facility] Internal Medicine Discharge Summary   Patient ID:  Adore Covington  [id number]  79 year old  1/30/1945    Admit date: 5/3/2024    Discharge date and time: 5/11/2024    Attending Physician: Gabby Boudreaux MD     Primary Care Physician: Maral Huffman MD     Reason for admission septic shock due to cellulitis of the leg (see HPI on HP for further detail)    Discharged Condition: stable    Disposition: home    Risk of Readmission Lace+ Score: 65  59-90 High Risk  29-58 Medium Risk  0-28   Low Risk    Important follow up:  Repeat CBC and BMP at rehab.  Follow-up PCP within 1 week of leaving rehab.  Per patient son is driving her to rehab, social work and nursing message.  Discharge meds  I reviewed and reconciled current and discharge medications on the day of discharge with the changes reflected below.       Medication List        START taking these medications      bisacodyl 10 MG Supp  Commonly known as: Dulcolax  Place 1 suppository (10 mg total) rectally daily as needed.     cefadroxil 500 MG Caps  Commonly known as: DURICEF  Take 1 capsule (500 mg total) by mouth 2 (two) times daily for 7 days.     docusate sodium 100 MG Caps  Commonly known as: Colace  Take 1 capsule (100 mg total) by mouth 2 (two) times daily as needed for constipation.     Polyethylene Glycol 3350 17 g Pack  Commonly known as: MIRALAX  Take 17 g by mouth daily as needed.     Sennosides 17.2 MG Tabs  Take 1 tablet (17.2 mg total) by mouth 2 (two) times daily as needed (constipation, as needed if no bowel movement that day).            CONTINUE taking these medications      apixaban 5 MG Tabs  Commonly known as: Eliquis     bumetanide 1 MG Tabs  Commonly known as: Bumex     cholecalciferol 50 MCG (2000 UT) Caps  Commonly known as: Vitamin D3     cyanocobalamin 1000 MCG Tabs  Commonly known as: Vitamin B12     cycloSPORINE 0.05 % Emul  Commonly known as: RESTASIS     dilTIAZem HCl ER  240 MG Cp24  Commonly known as: DILACOR XR     gabapentin 100 MG Caps  Commonly known as: Neurontin     metoprolol succinate  MG Tb24  Commonly known as: Toprol XL     pantoprazole 40 MG Tbec  Commonly known as: Protonix  Take 1 tablet (40 mg total) by mouth every morning before breakfast.     potassium chloride 20 MEQ Tbcr  Commonly known as: Klor-Con M20     simvastatin 10 MG Tabs  Commonly known as: Zocor     spironolactone 25 MG Tabs  Commonly known as: Aldactone     triamcinolone 0.1 % Crea  Commonly known as: Kenalog            STOP taking these medications      Myrbetriq 25 MG Tb24  Generic drug: Mirabegron ER               Where to Get Your Medications        These medications were sent to [id number] [place] [place] - [address] AT [facility], [phone], [phone]  [address] [facility] [address] [facility] [address] [address]      Hours: 24-hours Phone: [phone]   cefadroxil 500 MG Caps  docusate sodium 100 MG Caps       You can get these medications from any pharmacy    Bring a paper prescription for each of these medications  bisacodyl 10 MG Supp  Polyethylene Glycol 3350 17 g Pack  Sennosides 17.2 MG Tabs       HPI per chart  Patient is a 79 year old female with PMH sig for COPD, A- Fib on Eliquis, CHF, HTN, HLD, PAD, Neuropathy, SUSAN who presents with weakness and left leg redness. Patient started having chills 2 days ago and was not feeling herself.  She denies fevers, abd pain, n/v/d.  No CP and did not notice any SOB.  She has been getting wound care twice a week for her left leg for several months and did not think they were getting worse. Her bumex does was decreased at her last cardiology visit and amio was stopped      In the ER afebrile and hypotensive, tachypnea on O2  No leukocytosis with mild miranda    Hospital Course  79 year old female with PMH sig for COPD, A- Fib on Eliquis, CHF, HTN, HLD, PAD, Neuropathy, SUSAN who presents with weakness and left leg redness. Admitted for Sepsis  Cellulitis and hypotension.  Septic shock now resolved.  CT was negative for PE.  Off of pressors and transition from vancomycin and Zosyn to ceftriaxone on 5/9/2024.  Per ID okay to switch to p.o. antibiotics at discharge.  MRI of left foot with no osteomyelitis.  Was briefly on p.o. midodrine but that was stopped on 5/9/2024 and Ivey removed on 5/9/2024 without issue.  She was on oxygen but now on room air.  CT chest negative for PE.  Now back on home diuretics as MARINO is resolved.  Ivey removed in 5/9/2024.  Patient with known chronic A-fib, per patient no longer on amiodarone, continue Eliquis, patient discharged on metoprolol and diltiazem.  Home Bumex and Aldactone resumed.  Earlier in course did have loose stool C. difficile was negative.  Discharge Diagnoses:   Sepsis Cellulitis, Left Leg   MRSA positive   - hypotensive on admission, now resolved    - CT chest with no PE  - IV vanco and zosyn---transition to IV rocephin on 5/9----then oral at discharge per ID  - ID on consult  - Wound care  - MRI left foot with no OM       Shock resolved   - 2/2 sepsis  - TSH and cortisol normal   - required pressors  - Echo normal EF with increased PA pressures   - low dose midodrine was added. I stopped this on 5/9  - removed ivey on 5/9     Acute Hypoxic Respiratory Failure---resolved  H/o COPD not on inhalers   SUSAN  Atelectasis   - CXR and CT with some edema, no obvious signs of infection   - CT chest with no PE  - cpap as tolerated      MARINO resolved   Urinary Retention   - likely pre renal and sepsis component   - straight cath x2  - Ivey placed 5/5/24--removed 5/9     Chronic  A-fib  Secondary Hypercoagulable state   S/p Cardioversion   - Eliquis   - she is no longer on amidarone  - she is on diltiazem 240mg daily and metoprolol 50mg for rate control  - added metoprolol and diltiazem today   - in NSR      Chronic Diastolic HF   - CXR and CT with some edema  -   - Echo reviewed   - resumed bumex 1mg daily  5/9  - she is also normally on aldactone, resumed today      Lose Stools  H/o Gastroparesis   - c-diff negative   As needed bowel regimen as patient tends to be constipated     HTN - slowly resuming meds as above, since she was here with septic shock  HLD - statin   PAD - Eliquis and statin   Neuropathy - gabapentin     Consults: IP CONSULT TO PULMONOLOGY  IP CONSULT TO SOCIAL WORK  IP CONSULT TO INFECTIOUS DISEASE  IP CONSULT TO PHARMACY  CONSULT TO WOUND OSTOMY  IP CONSULT TO PHARMACY  IP CONSULT TO SOCIAL WORK  IP CONSULT TO VASCULAR ACCESS TEAM    Radiology: MRI FOOT (W+WO), LEFT (CPT=73720)    Result Date: 5/5/2024  PROCEDURE: MRI FOOT (W+WO), LEFT (CPT=73720)  COMPARISON: None.  INDICATIONS: Right foot pain and swelling.  Sepsis, foot cellulitis, rule our OM and abscess.  TECHNIQUE: A complete multi-planar MRI of the foot was performed with and without intravenous gadolinium contrast.  Images included the hindfoot, midfoot and proximal forefoot.  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  Advanced degenerative changes of the tarsal metatarsal joints, and moderate to advanced degenerative change of the talonavicular and navicular cuneiform joints.  Mild-to-moderate degenerative change of the calcaneocuboid joint.  Mild degenerative change of the ankle with small ankle joint effusion. SOFT TISSUES: Atrophy of the intrinsic foot musculature.  Mild chronic thickening of the plantar aponeurosis measuring slightly greater than 5 mm.  Generalized superficial soft tissue edema most pronounced along the dorsum of the foot.  No focal fluid collection. OTHER: Lisfranc ligament is intact Old anterior talofibular ligament sprain with scar in its course.         CONCLUSION:  1. No osteomyelitis or abscess. 2. Superficial and deep soft  tissue edema. 3. Muscular atrophy. 4. 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 5. 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 6. Advanced degenerative change of the midfoot.  Other less pronounced degenerative changes. 7. Small ankle joint effusion.     Dictated by (CST): Galo Rucker MD on 2024 at 7:58 PM     Finalized by (CST): Galo Rucker MD on 2024 at 8:09 PM          CARD ECHO 2D DOPPLER (CPT=93306)    Result Date: 2024  Transthoracic Echocardiogram Name:Adore Covington Date: 2024 :  1945 Ht:  (62in)  BP: 91 / 67 MRN:  [id number]    Age:  79years    Wt:  (261lb) HR: Loc:  [facility]       Gndr: F          BSA: 2.14m^2 Sonographer: Maricel Bates Ordering:    Norman Luo Consulting:  Georgina Rice ---------------------------------------------------------------------------- History/Indications:  Low BP. ---------------------------------------------------------------------------- Procedure information:  A transthoracic complete 2D study was performed. Additional evaluation included M-mode, complete spectral Doppler, and color Doppler.  Patient status:  Inpatient.  Location:  Bedside.    This was a routine study.  Image quality was adequate. Intravenous contrast (Definity) was administered. ECG rhythm:   Normal sinus ---------------------------------------------------------------------------- Conclusions: 1. Left ventricle: The cavity size was normal. Wall thickness was normal.    Systolic function was mildly reduced. The estimated ejection fraction was    55-60%. Left ventricular diastolic function parameters were normal. 2. Right ventricle: The cavity size was normal. Systolic function was    normal. Systolic pressure was mildly increased. 3. Left atrium: The atrium was mildly enlarged. 4. Aortic valve: The valve was trileaflet. The leaflets were mildly    calcified. The findings were consistent with mild stenosis. There was    mild  regurgitation. 5. Tricuspid valve: There was mild regurgitation. 6. Pulmonary arteries: Systolic pressure was mildly increased, estimated to    be 40mm Hg. 7. Ascending aorta: The ascending aorta was mildly dilated. * ---------------------------------------------------------------------------- * Findings: Left ventricle:  The cavity size was normal. Wall thickness was normal. Systolic function was mildly reduced. The estimated ejection fraction was 55-60%. No diagnostic evidence for diffuse regional wall motion abnormalities. Left ventricular diastolic function parameters were normal. Left atrium:  The atrium was mildly enlarged. Right ventricle:  The cavity size was normal. Systolic function was normal. Systolic pressure was mildly increased. Right atrium:  The atrium was normal in size. Mitral valve:  The annulus was mildly calcified. Leaflet separation was normal.  Doppler:  Transvalvular velocity was within the normal range. There was no evidence for stenosis. There was trivial regurgitation.    The valve area by pressure half-time was 4.68cm^2. The valve area index by pressure half-time was 2.19cm^2/m^2. Aortic valve:   The valve was trileaflet. The leaflets were mildly calcified. Cusp separation was normal.  Doppler:   The findings were consistent with mild stenosis.   There was mild regurgitation.    The valve area (VTI) was 2.73cm^2. The valve area (VTI) index was 1.28cm^2/m^2.    The mean systolic gradient was 11mm Hg. The peak systolic gradient was 22mm Hg. Tricuspid valve:  The valve is structurally normal. Leaflet separation was normal.  Doppler:  Transvalvular velocity was within the normal range. There was no evidence for stenosis. There was mild regurgitation. Pulmonic valve:   The valve is structurally normal. Cusp separation was normal.  Doppler:  Transvalvular velocity was within the normal range. There was no evidence for stenosis. There was mild regurgitation. Pericardium:   There was no  pericardial effusion. Aorta: Aortic root: The aortic root was normal-sized. Ascending aorta: The ascending aorta was mildly dilated. Pulmonary arteries: The main pulmonary artery was normal-sized. Systolic pressure was mildly increased, estimated to be 40mm Hg. Systemic veins:  Central venous respirophasic diameter changes are blunted (< 50%). Inferior vena cava: The IVC was dilated. ---------------------------------------------------------------------------- Measurements  Left ventricle                  Value          Ref  IVS thickness, ED, PLAX         0.9   cm       0.6 - 0.9  LV ID, ED, PLAX                 5.1   cm       3.8 - 5.2  LV ID, ES, PLAX                 3.4   cm       2.2 - 3.5  LV PW thickness, ED, PLAX       0.8   cm       0.6 - 0.9  IVS/LV PW ratio, ED, PLAX       1.13           ---------  LV PW/LV ID ratio, ED, PLAX     0.16           ---------  LV ejection fraction            62    %        54 - 74  Stroke volume/bsa, 2D           58    ml/m^2   ---------  LV end-diastolic volume,        134   ml       48 - 140  1-p A4C  LV ejection fraction, 1-p       49    %        46 - 78  A4C  Stroke volume, 1-p A4C          65    ml       ---------  LV end-diastolic                63    ml/m^2   30 - 82  volume/bsa, 1-p A4C  Stroke volume/bsa, 1-p A4C      30    ml/m^2   ---------  LV end-diastolic volume,    (H) 137   ml       46 - 106  2-p  LV end-systolic volume, 2-p (H) 71    ml       14 - 42  LV ejection fraction, 2-p       60    %        54 - 74  Stroke volume, 2-p              66    ml       ---------  LV end-diastolic            (H) 64    ml/m^2   29 - 61  volume/bsa, 2-p  LV end-systolic volume/bsa, (H) 33    ml/m^2   8 - 24  2-p  Stroke volume/bsa, 2-p          30.9  ml/m^2   ---------  LV e', lateral                  11.3  cm/sec   >=10.0  LV E/e', lateral                11             <=13  LV e', medial                   11.1  cm/sec   >=7.0  LV E/e', medial                 11              ---------  LV e', average                  11.2  cm/sec   ---------  LV E/e', average                11             <=14  LVOT                            Value          Ref  LVOT ID                         2.2   cm       ---------  LVOT peak velocity, S           1.58  m/sec    ---------  LVOT VTI, S                     32.6  cm       ---------  LVOT peak gradient, S           10    mm Hg    ---------  LVOT mean gradient, S           7     mm Hg    ---------  Stroke volume (SV), LVOT DP     124   ml       ---------  Stroke index (SV/bsa), LVOT     58    ml/m^2   ---------  DP  Aortic valve                    Value          Ref  Aortic valve peak velocity,     2.29  m/sec    ---------  S  Aortic valve VTI, S             42.8  cm       ---------  Aortic mean gradient, S         11    mm Hg    ---------  Aortic peak gradient, S         22    mm Hg    ---------  Aortic valve area, VTI          2.73  cm^2     ---------  Aortic valve area/bsa, VTI      1.28  cm^2/m^2 ---------  Velocity ratio, peak,           0.67           ---------  LVOT/AV  Aortic root                     Value          Ref  Aortic root ID                  3.2   cm       2.8 - 4.2  Ascending aorta                 Value          Ref  Ascending aorta ID          (H) 3.9   cm       1.9 - 3.5  Left atrium                     Value          Ref  LA ID, A-P, ES              (H) 4.4   cm       2.7 - 3.8  LA volume, S                (H) 84    ml       22 - 52  LA volume/bsa, S            (H) 39    ml/m^2   16 - 34  LA volume, ES, 1-p A4C      (H) 92    ml       22 - 52  LA volume, ES, 1-p A2C      (H) 74    ml       22 - 52  LA volume, ES, A/L              89    ml       ---------  LA volume/bsa, ES, A/L      (H) 42    ml/m^2   16 - 34  LA/aortic root ratio            1.38           ---------  Mitral valve                    Value          Ref  Mitral E-wave peak velocity     1.22  m/sec    ---------  Mitral A-wave peak velocity     1.08  m/sec    ---------   Mitral deceleration time        159   ms       ---------  Mitral pressure half-time       47    ms       ---------  Mitral peak gradient, D         6     mm Hg    ---------  Mitral E/A ratio, peak          1.1            ---------  Mitral valve area, PHT, DP      4.68  cm^2     ---------  Mitral valve area/bsa, PHT,     2.19  cm^2/m^2 ---------  DP  Pulmonary artery                Value          Ref  PA pressure, S, DP              40    mm Hg    ---------  Tricuspid valve                 Value          Ref  Tricuspid regurg peak           2.5   m/sec    <=2.8  velocity  Tricuspid peak RV-RA            25    mm Hg    ---------  gradient  Systemic veins                  Value          Ref  Estimated CVP                   15    mm Hg    ---------  Inferior vena cava              Value          Ref  ID                          (H) 2.7   cm       <=2.1  Right ventricle                 Value          Ref  TAPSE, 2D                       1.74  cm       >=1.70  TAPSE, MM                       1.74  cm       >=1.70  RV pressure, S, DP              40    mm Hg    ---------  RV s', lateral                  14.0  cm/sec   >=9.5 Legend: (L)  and  (H)  consuelo values outside specified reference range. ---------------------------------------------------------------------------- Prepared and electronically signed by Ajay Fry 05/05/2024 09:23     CT CHEST PE AORTA (IV ONLY) (CPT=71260)    Result Date: 5/3/2024  PROCEDURE: CT CHEST PE AORTA (IV ONLY) (CPT=71260)  COMPARISON: [facility], CT ABDOMEN+PELVIS (CPT=74176), 11/27/2023, 7:14 PM.  [facility], XR CHEST AP PORTABLE (CPT=71045), 5/03/2024, 6:58 PM.  INDICATIONS: hypotension  TECHNIQUE: CT images of the chest were obtained with non-ionic intravenous contrast material.  Automated exposure control for dose reduction was used. Adjustment of the mA and/or kV was done based on the patient's size. Use of iterative reconstruction technique for dose  reduction was used. Dose information is transmitted to the ACR (American College of Radiology) NRDR (National Radiology Data Registry) which includes the Dose Index Registry.  FINDINGS:  PULMONARY VASCULATURE: There is adequate opacification of the pulmonary arterial tree. No suspicious filling defects are identified in the main, lobar, segmental, or proximal subsegmental pulmonary artery branches to suggest acute pulmonary embolism.   The distal subsegmental branches are less well assessed.  Main pulmonary arteries severely enlarged measuring 4.5 cm.  CARDIAC: The heart is mildly enlarged. There is n bowing of the interventricular septum.  Mild-to-moderate coronary artery calcifications.  Aortic and mitral annular calcifications.  Mild calcifications of the aorta and its branch vessels.  THORACIC AORTA: There is mild ectasia of the ascending thoracic aorta, without aneurysmal dilation.  MEDIASTINUM/KEYON: No mass or lymphadenopathy.  There are calcified mediastinal and right hilar lymph nodes.   LUNGS/PLEURA: The trachea and central bronchi are clear.  There is mild diffuse interlobular septal thickening.  There are curvilinear opacities at the lung bases.  CHEST WALL: No thyroidal mass, within the limitation of artifact. No axillary lymphadenopathy. bilateral saline breast prostheses noted.  LIMITED ABDOMEN: Fluid density hepatic lesions are unchanged.   BONES: There is multilevel degenerative disease of the spine. There are flowing ventral osteophytes at multiple consecutive levels, compatible with diffuse idiopathic skeletal hyperostosis (DISH).          CONCLUSION:   No evidence of acute pulmonary embolism to the level of the first order subsegmental pulmonary artery branches.  Severe enlargement of the main pulmonary artery measuring 4.5 cm, as is seen in chronic pulmonary arterial hypertension  No suspicious right hilar mass.  Finding on chest radiograph corresponds to engorged pulmonary vasculature.  Mild  cardiomegaly with pulmonary edema.   Scant bibasilar opacities which may be secondary to subsegmental atelectasis, with less likely differential of alveolar edema.  Trace bilateral pleural effusions.    Dictated by (CST): Isabela Baker MD on 5/03/2024 at 9:51 PM     Finalized by (CST): Isabela Baker MD on 5/03/2024 at 9:58 PM          XR CHEST AP PORTABLE  (CPT=71045)    Result Date: 5/3/2024  PROCEDURE: XR CHEST AP PORTABLE  (CPT=71045) TIME: 7:05 p.m..   COMPARISON: [facility], XR CHEST AP PORTABLE (CPT=71045), 12/11/2023, 6:17 AM.  [facility], XR CHEST AP PORTABLE (CPT=71045), 11/29/2023, 10:16 AM.  [facility], XR CHEST AP PORTABLE (CPT=71045), 11/26/2023,  11:35 PM.  INDICATIONS: Hypotension and weakness today.  TECHNIQUE:   Single view.   FINDINGS:  CARDIAC/VASC: Unchanged mild cardiomegaly.  There is calcific atherosclerosis of the thoracic aorta.  MEDIAST/KEYON:   Bilateral central hilar vascular fullness is present.  Somewhat nodular appearance of the right keyon is noted. LUNGS/PLEURA: Increased interstitial markings bilaterally.  Left retrocardiac opacity.  Minimal hazy opacity at the left lung base.  Right pleural spaces appear clear. BONES: Mild scoliosis with mild degenerative disc disease and spondylosis.  OTHER: Unremarkable         CONCLUSION:   Mild cardiomegaly with interstitial pulmonary edema.  Left retrocardiac opacity, possibly combination of subsegmental atelectasis and/or alveolar edema.  Trace left pleural effusion.  Nodular appearance of right keyon, thought to represent a vascular congestion.  Recommend radiographic follow-up to resolution.  If this finding does not resolve with treatment of pulmonary edema, contrast enhanced CT chest would then be indicated to exclude underlying mass/adenopathy.     Dictated by (CST): Isabela Baker MD on 5/03/2024 at 7:13 PM     Finalized by (CST): Isabela Baker MD on 5/03/2024 at 7:15 PM              Operative Procedures:      Day of discharge doing well today.  Initially had loose stools but now passing gas but no recent bowel movement.  Discussed with patient that she will have for as needed medications at rehab to help with constipation.    Exam  Vitals:    05/11/24 0825   BP: 115/58   Pulse: 71   Resp: 14   Temp: 97 °F (36.1 °C)     No acute distress, alert and orientedx3  Lungs Clear  Heart Regular  Abdomen Benign    Total Time Coordinating Care: > than 30 minutes  Note: This chart was prepared using voice recognition software and may contain unintended word substitution errors.     Patient had opportunity to ask questions and state understand and agree with therapeutic plan as outlined

## 2024-05-11 NOTE — PLAN OF CARE
Problem: CARDIOVASCULAR - ADULT  Goal: Maintains optimal cardiac output and hemodynamic stability  Description: INTERVENTIONS:  - Monitor vital signs, rhythm, and trends  - Monitor for bleeding, hypotension and signs of decreased cardiac output  - Evaluate effectiveness of vasoactive medications to optimize hemodynamic stability  - Monitor arterial and/or venous puncture sites for bleeding and/or hematoma  - Assess quality of pulses, skin color and temperature  - Assess for signs of decreased coronary artery perfusion - ex. Angina  - Evaluate fluid balance, assess for edema, trend weights  Outcome: Progressing  Goal: Absence of cardiac arrhythmias or at baseline  Description: INTERVENTIONS:  - Continuous cardiac monitoring, monitor vital signs, obtain 12 lead EKG if indicated  - Evaluate effectiveness of antiarrhythmic and heart rate control medications as ordered  - Initiate emergency measures for life threatening arrhythmias  - Monitor electrolytes and administer replacement therapy as ordered  Outcome: Progressing     Problem: RESPIRATORY - ADULT  Goal: Achieves optimal ventilation and oxygenation  Description: INTERVENTIONS:  - Assess for changes in respiratory status  - Assess for changes in mentation and behavior  - Position to facilitate oxygenation and minimize respiratory effort  - Oxygen supplementation based on oxygen saturation or ABGs  - Provide Smoking Cessation handout, if applicable  - Encourage broncho-pulmonary hygiene including cough, deep breathe, Incentive Spirometry  - Assess the need for suctioning and perform as needed  - Assess and instruct to report SOB or any respiratory difficulty  - Respiratory Therapy support as indicated  - Manage/alleviate anxiety  - Monitor for signs/symptoms of CO2 retention  Outcome: Progressing     Problem: GENITOURINARY - ADULT  Goal: Absence of urinary retention  Description: INTERVENTIONS:  - Assess patient’s ability to void and empty bladder  - Monitor  intake/output and perform bladder scan as needed  - Follow urinary retention protocol/standard of care  - Consider collaborating with pharmacy to review patient's medication profile  - Implement strategies to promote bladder emptying  Outcome: Progressing     Problem: METABOLIC/FLUID AND ELECTROLYTES - ADULT  Goal: Electrolytes maintained within normal limits  Description: INTERVENTIONS:  - Monitor labs and rhythm and assess patient for signs and symptoms of electrolyte imbalances  - Administer electrolyte replacement as ordered  - Monitor response to electrolyte replacements, including rhythm and repeat lab results as appropriate  - Fluid restriction as ordered  - Instruct patient on fluid and nutrition restrictions as appropriate  Outcome: Progressing  Goal: Hemodynamic stability and optimal renal function maintained  Description: INTERVENTIONS:  - Monitor labs and assess for signs and symptoms of volume excess or deficit  - Monitor intake, output and patient weight  - Monitor urine specific gravity, serum osmolarity and serum sodium as indicated or ordered  - Monitor response to interventions for patient's volume status, including labs, urine output, blood pressure (other measures as available)  - Encourage oral intake as appropriate  - Instruct patient on fluid and nutrition restrictions as appropriate  Outcome: Progressing     Problem: SKIN/TISSUE INTEGRITY - ADULT  Goal: Skin integrity remains intact  Description: INTERVENTIONS  - Assess and document risk factors for pressure ulcer development  - Assess and document skin integrity  - Monitor for areas of redness and/or skin breakdown  - Initiate interventions, skin care algorithm/standards of care as needed  Outcome: Progressing  Goal: Incision(s), wounds(s) or drain site(s) healing without S/S of infection  Description: INTERVENTIONS:  - Assess and document risk factors for pressure ulcer development  - Assess and document skin integrity  - Assess and  document dressing/incision, wound bed, drain sites and surrounding tissue  - Implement wound care per orders  - Initiate isolation precautions as appropriate  - Initiate Pressure Ulcer prevention bundle as indicated  Outcome: Progressing  Goal: Oral mucous membranes remain intact  Description: INTERVENTIONS  - Assess oral mucosa and hygiene practices  - Implement preventative oral hygiene regimen  - Implement oral medicated treatments as ordered  Outcome: Progressing     Problem: HEMATOLOGIC - ADULT  Goal: Maintains hematologic stability  Description: INTERVENTIONS  - Assess for signs and symptoms of bleeding or hemorrhage  - Monitor labs and vital signs for trends  - Administer supportive blood products/factors, fluids and medications as ordered and appropriate  - Administer supportive blood products/factors as ordered and appropriate  Outcome: Progressing  Goal: Free from bleeding injury  Description: (Example usage: patient with low platelets)  INTERVENTIONS:  - Avoid intramuscular injections, enemas and rectal medication administration  - Ensure safe mobilization of patient  - Hold pressure on venipuncture sites to achieve adequate hemostasis  - Assess for signs and symptoms of internal bleeding  - Monitor lab trends  - Patient is to report abnormal signs of bleeding to staff  - Avoid use of toothpicks and dental floss  - Use electric shaver for shaving  - Use soft bristle tooth brush  - Limit straining and forceful nose blowing  Outcome: Progressing     Problem: MUSCULOSKELETAL - ADULT  Goal: Return mobility to safest level of function  Description: INTERVENTIONS:  - Assess patient stability and activity tolerance for standing, transferring and ambulating w/ or w/o assistive devices  - Assist with transfers and ambulation using safe patient handling equipment as needed  - Ensure adequate protection for wounds/incisions during mobilization  - Obtain PT/OT consults as needed  - Advance activity as appropriate  -  Communicate ordered activity level and limitations with patient/family  Outcome: Progressing     Problem: Impaired Functional Mobility  Goal: Achieve highest/safest level of mobility/gait  Description: Interventions:  - Assess patient's functional ability and stability  - Promote increasing activity/tolerance for mobility and gait  - Educate and engage patient/family in tolerated activity level and precautions    Outcome: Progressing

## 2024-05-11 NOTE — PLAN OF CARE
Patient cleared for discharge this afternoon. Patient's son will pick her up and transport to [facility]. Discharge education given to patient. Safety precautions maintained. Call light in reach.     [facility]     Problem: CARDIOVASCULAR - ADULT  Goal: Maintains optimal cardiac output and hemodynamic stability  Description: INTERVENTIONS:  - Monitor vital signs, rhythm, and trends  - Monitor for bleeding, hypotension and signs of decreased cardiac output  - Evaluate effectiveness of vasoactive medications to optimize hemodynamic stability  - Monitor arterial and/or venous puncture sites for bleeding and/or hematoma  - Assess quality of pulses, skin color and temperature  - Assess for signs of decreased coronary artery perfusion - ex. Angina  - Evaluate fluid balance, assess for edema, trend weights  Outcome: Completed  Goal: Absence of cardiac arrhythmias or at baseline  Description: INTERVENTIONS:  - Continuous cardiac monitoring, monitor vital signs, obtain 12 lead EKG if indicated  - Evaluate effectiveness of antiarrhythmic and heart rate control medications as ordered  - Initiate emergency measures for life threatening arrhythmias  - Monitor electrolytes and administer replacement therapy as ordered  Outcome: Completed     Problem: RESPIRATORY - ADULT  Goal: Achieves optimal ventilation and oxygenation  Description: INTERVENTIONS:  - Assess for changes in respiratory status  - Assess for changes in mentation and behavior  - Position to facilitate oxygenation and minimize respiratory effort  - Oxygen supplementation based on oxygen saturation or ABGs  - Provide Smoking Cessation handout, if applicable  - Encourage broncho-pulmonary hygiene including cough, deep breathe, Incentive Spirometry  - Assess the need for suctioning and perform as needed  - Assess and instruct to report SOB or any respiratory difficulty  - Respiratory Therapy support as indicated  - Manage/alleviate  anxiety  - Monitor for signs/symptoms of CO2 retention  Outcome: Completed     Problem: GENITOURINARY - ADULT  Goal: Absence of urinary retention  Description: INTERVENTIONS:  - Assess patient’s ability to void and empty bladder  - Monitor intake/output and perform bladder scan as needed  - Follow urinary retention protocol/standard of care  - Consider collaborating with pharmacy to review patient's medication profile  - Implement strategies to promote bladder emptying  Outcome: Completed     Problem: METABOLIC/FLUID AND ELECTROLYTES - ADULT  Goal: Electrolytes maintained within normal limits  Description: INTERVENTIONS:  - Monitor labs and rhythm and assess patient for signs and symptoms of electrolyte imbalances  - Administer electrolyte replacement as ordered  - Monitor response to electrolyte replacements, including rhythm and repeat lab results as appropriate  - Fluid restriction as ordered  - Instruct patient on fluid and nutrition restrictions as appropriate  Outcome: Completed  Goal: Hemodynamic stability and optimal renal function maintained  Description: INTERVENTIONS:  - Monitor labs and assess for signs and symptoms of volume excess or deficit  - Monitor intake, output and patient weight  - Monitor urine specific gravity, serum osmolarity and serum sodium as indicated or ordered  - Monitor response to interventions for patient's volume status, including labs, urine output, blood pressure (other measures as available)  - Encourage oral intake as appropriate  - Instruct patient on fluid and nutrition restrictions as appropriate  Outcome: Completed     Problem: SKIN/TISSUE INTEGRITY - ADULT  Goal: Skin integrity remains intact  Description: INTERVENTIONS  - Assess and document risk factors for pressure ulcer development  - Assess and document skin integrity  - Monitor for areas of redness and/or skin breakdown  - Initiate interventions, skin care algorithm/standards of care as needed  Outcome:  Completed  Goal: Incision(s), wounds(s) or drain site(s) healing without S/S of infection  Description: INTERVENTIONS:  - Assess and document risk factors for pressure ulcer development  - Assess and document skin integrity  - Assess and document dressing/incision, wound bed, drain sites and surrounding tissue  - Implement wound care per orders  - Initiate isolation precautions as appropriate  - Initiate Pressure Ulcer prevention bundle as indicated  Outcome: Completed  Goal: Oral mucous membranes remain intact  Description: INTERVENTIONS  - Assess oral mucosa and hygiene practices  - Implement preventative oral hygiene regimen  - Implement oral medicated treatments as ordered  Outcome: Completed     Problem: HEMATOLOGIC - ADULT  Goal: Maintains hematologic stability  Description: INTERVENTIONS  - Assess for signs and symptoms of bleeding or hemorrhage  - Monitor labs and vital signs for trends  - Administer supportive blood products/factors, fluids and medications as ordered and appropriate  - Administer supportive blood products/factors as ordered and appropriate  Outcome: Completed  Goal: Free from bleeding injury  Description: (Example usage: patient with low platelets)  INTERVENTIONS:  - Avoid intramuscular injections, enemas and rectal medication administration  - Ensure safe mobilization of patient  - Hold pressure on venipuncture sites to achieve adequate hemostasis  - Assess for signs and symptoms of internal bleeding  - Monitor lab trends  - Patient is to report abnormal signs of bleeding to staff  - Avoid use of toothpicks and dental floss  - Use electric shaver for shaving  - Use soft bristle tooth brush  - Limit straining and forceful nose blowing  Outcome: Completed     Problem: MUSCULOSKELETAL - ADULT  Goal: Return mobility to safest level of function  Description: INTERVENTIONS:  - Assess patient stability and activity tolerance for standing, transferring and ambulating w/ or w/o assistive devices  -  Assist with transfers and ambulation using safe patient handling equipment as needed  - Ensure adequate protection for wounds/incisions during mobilization  - Obtain PT/OT consults as needed  - Advance activity as appropriate  - Communicate ordered activity level and limitations with patient/family  Outcome: Completed     Problem: Impaired Functional Mobility  Goal: Achieve highest/safest level of mobility/gait  Description: Interventions:  - Assess patient's functional ability and stability  - Promote increasing activity/tolerance for mobility and gait  - Educate and engage patient/family in tolerated activity level and precautions  - Recommend use of chair position in bed 3 times per day  Outcome: Completed

## 2024-05-11 NOTE — CM/SW NOTE
05/11/24 1000   Discharge disposition   Expected discharge disposition subacute   Post Acute Care Provider   (JAKUB CRAWLEY)   Discharge transportation Private car     CM jorge w/Katlin RN on below arrangements    RN report Phone: [phone]  ROOM 120    Son will transport pt to SNF @ 12 noon    [facility] liaison at Sn[facility] is aware of above.    Roseanne BENNETT, RN, [facility]  Nurse   Ext. [phone]

## 2024-05-15 ENCOUNTER — TELEPHONE (OUTPATIENT)
Dept: ALLERGY | Facility: CLINIC | Age: 79
End: 2024-05-15

## 2024-05-15 NOTE — TELEPHONE ENCOUNTER
Patient is calling to advise Dr. Vaughn that she had to cancel all appointments scheduled in May 2024 because she was hospitalized and now in rehab.  Patient will call back to reschedule appointments when she is discharged from rehab.    Please advise.

## 2024-05-15 NOTE — TELEPHONE ENCOUNTER
RN called to patient  Patient may call to reschedule appointments when discharged from rehab  Advised to please ask to speak to a nurse when scheduling due to she had a patch test scheduled and only a nurse may schedule those types of appointments   Patient verbalized understanding and denied further questions at this time

## 2024-06-07 ENCOUNTER — OFFICE VISIT (OUTPATIENT)
Dept: WOUND CARE | Facility: HOSPITAL | Age: 79
End: 2024-06-07
Attending: FAMILY MEDICINE
Payer: MEDICARE

## 2024-06-07 VITALS
TEMPERATURE: 98 F | RESPIRATION RATE: 18 BRPM | HEART RATE: 53 BPM | SYSTOLIC BLOOD PRESSURE: 125 MMHG | DIASTOLIC BLOOD PRESSURE: 73 MMHG | OXYGEN SATURATION: 94 %

## 2024-06-07 DIAGNOSIS — E66.01 CLASS 3 SEVERE OBESITY DUE TO EXCESS CALORIES WITH SERIOUS COMORBIDITY AND BODY MASS INDEX (BMI) OF 45.0 TO 49.9 IN ADULT (HCC): ICD-10-CM

## 2024-06-07 DIAGNOSIS — I87.333 CHRONIC VENOUS HYPERTENSION (IDIOPATHIC) WITH ULCER AND INFLAMMATION OF BILATERAL LOWER EXTREMITY (HCC): Primary | ICD-10-CM

## 2024-06-07 DIAGNOSIS — Z86.79 H/O CHF: ICD-10-CM

## 2024-06-07 PROCEDURE — 99214 OFFICE O/P EST MOD 30 MIN: CPT | Performed by: FAMILY MEDICINE

## 2024-06-10 NOTE — PROGRESS NOTES
Chief Complaint   Patient presents with    Wound Recheck     Left plantar foot       HPI:     Patient is here for a wound on the left plantar foot which she has had for the past several days or more.  She was hospitalized since previous visit and her wounds on the legs are completely healed now.  Patient had developed cellulitis of the legs and had developed septic shock.  She was adequately treated and was in rehab facility and now discharged.  She is doing quite well now.    Lab Results   Component Value Date    BUN 7 (L) 05/10/2024    CREATSERUM 0.60 05/10/2024    ALB 3.1 (L) 05/08/2024    TP 5.8 05/08/2024         Current Outpatient Medications:     polyethylene glycol, PEG 3350, 17 g Oral Powd Pack, Take 17 g by mouth daily as needed., Disp: 10 each, Rfl: 0    bumetanide 1 MG Oral Tab, Take 1 tablet (1 mg total) by mouth daily., Disp: , Rfl:     spironolactone 25 MG Oral Tab, Take 1 tablet (25 mg total) by mouth daily., Disp: , Rfl:     cholecalciferol 50 MCG (2000 UT) Oral Cap, Take 1 capsule (2,000 Units total) by mouth in the morning and 1 capsule (2,000 Units total) before bedtime., Disp: , Rfl:     cyanocobalamin 1000 MCG Oral Tab, Take 1 tablet (1,000 mcg total) by mouth daily., Disp: , Rfl:     dilTIAZem HCl  MG Oral Capsule SR 24 Hr, Take 1 capsule (240 mg total) by mouth daily., Disp: , Rfl:     pantoprazole 40 MG Oral Tab EC, Take 1 tablet (40 mg total) by mouth every morning before breakfast., Disp: 60 tablet, Rfl: 0    apixaban 5 MG Oral Tab, Take 1 tablet (5 mg total) by mouth 2 (two) times daily., Disp: , Rfl:     potassium chloride 20 MEQ Oral Tab CR, Take 0.5 tablets (10 mEq total) by mouth daily., Disp: , Rfl:     cycloSPORINE 0.05 % Ophthalmic Emulsion, Place 1 drop into both eyes 2 (two) times daily., Disp: , Rfl:     triamcinolone 0.1 % External Cream, Apply 1 Application topically 2 (two) times daily as needed., Disp: , Rfl:     simvastatin 10 MG Oral Tab, Take 1 tablet (10 mg total)  by mouth daily., Disp: , Rfl:     gabapentin 100 MG Oral Cap, Take 2 capsules (200 mg total) by mouth 2 (two) times daily., Disp: , Rfl:     metoprolol succinate  MG Oral Tablet 24 Hr, Take 1 tablet (100 mg total) by mouth daily. 0.5 tablet per day, Disp: , Rfl:     sennosides 17.2 MG Oral Tab, Take 1 tablet (17.2 mg total) by mouth 2 (two) times daily as needed (constipation, as needed if no bowel movement that day). (Patient not taking: Reported on 6/7/2024), Disp: 30 tablet, Rfl: 0    docusate sodium 100 MG Oral Cap, Take 1 capsule (100 mg total) by mouth 2 (two) times daily as needed for constipation. (Patient not taking: Reported on 6/7/2024), Disp: 30 capsule, Rfl: 0    bisacodyl 10 MG Rectal Suppos, Place 1 suppository (10 mg total) rectally daily as needed. (Patient not taking: Reported on 6/7/2024), Disp: 10 suppository, Rfl: 0    Allergies   Allergen Reactions    Lactose OTHER (SEE COMMENTS)     Patient denies allergy            REVIEW OF SYSTEMS:     Review of Systems (ROS)  This information was obtained from the patient, chart    A comprehensive 10 point review of systems was completed.  Pertinent positives and negatives noted in the the HPI.     Past medical, surgical, family and social history updated where appropriate.    PHYSICAL EXAM:   /73   Pulse 53   Temp 97.9 °F (36.6 °C) (Temporal)   Resp 18   SpO2 94%    Estimated body mass index is 47.77 kg/m² as calculated from the following:    Height as of 5/4/24: 62.01\".    Weight as of 5/6/24: 261 lb 3.9 oz.   Vital signs reviewed.Appears stated age, well groomed.      Awake, alert, in no acute distress  HENT:  normocephalic, atraumatic, external ear canals clear bilaterally, tympanic membranes appear opaque, non-bulging, non-erythematous, nasal turbinates are non-inflamed and not swollen, oropharynx without erythema, swelling, or exudate, gingiva and lips without any apparent lesions.   Cardiac:  S1 S2 regular rate and rhythm, no murmur,  gallop, or rub  Respiratory:  Bilaterally clear to auscultation, no chest tenderness to palpation, no wheezing, no rhonchi, no rales, air entry is adequate, no accessory respiratory muscle use, no chest asymmetry, normal excursion.  GI:  bowel sound positive in all four quadrants, abdomen is soft, non-tender, non-distended, no hepatosplenomegaly, no abnormal aortic pulsation.     Calf                      Ankle                            Foot                            Wound 1 Foot Left;Plantar (Active)   No Date First Assessed or Time First Assessed found.    Wound Number (Wound Clinic Only): 1  Primary Wound Type: Pressure Injury  Location: Foot  Wound Location Orientation: Left;Plantar      Assessments 6/7/2024  2:59 PM   Wound Image     Site Assessment Pink;Moist   Closure Not approximated   Drainage Amount Scant   Drainage Description Serous   Treatments Cleansed   Dressing Hydrofera ready;Tape   Dressing Changed New   Dressing Status Clean;Dry;Intact   Wound Length (cm) 0.5 cm   Wound Width (cm) 0.3 cm   Wound Surface Area (cm^2) 0.15 cm^2   Wound Depth (cm) 0.1 cm   Wound Volume (cm^3) 0.015 cm^3   Margins Flat and Intact   Non-staged Wound Description Partial thickness   [place]-wound Assessment Clean;Dry;Intact   Wound Bed Epithelium (%) 90 %   State of Healing Non-healing   Pressure Injury Stage Stage 2       No associated orders.          ASSESSMENT AND PLAN:      1. Chronic venous hypertension (idiopathic) with ulcer and inflammation of bilateral lower extremity (HCC)    2. H/O CHF    3. Class 3 severe obesity due to excess calories with serious comorbidity and body mass index (BMI) of 45.0 to 49.9 in adult (HCC)    Wound on the left plantar foot is hardly open.  Leg wounds appear to be completely healed.  Will apply a piece of silver foam dressing onto the wound base and remove after 3 to 4 days and the wound should be healed at that time.  No further wound care appointment needed unless the wound remains  or new wounds develop.      Risks, benefits, and alternatives of current treatment plan discussed in detail.  Questions and concerns addressed. Red flags to RTC or ED reviewed.  Patient (or parent) agrees to plan.      Return if symptoms worsen or fail to improve.    Also refer to patient instructions.     I spent 30 minutes with the patient. This time included:  preparing to see the patient (eg, review notes and recent diagnostics), seeing the patient, performing a medically appropriate examination and/or evaluation, counseling and educating the patient, and documenting in the record.    I agree with staff documentation except for any changes made in my note.       Dontrell Tinajero M.D., Wound Care Physician  [facility]  6/10/2024

## 2024-06-19 ENCOUNTER — HOSPITAL ENCOUNTER (INPATIENT)
Facility: HOSPITAL | Age: 79
LOS: 3 days | Discharge: HOME HEALTH | DRG: 603 | End: 2024-06-22
Attending: EMERGENCY MEDICINE | Admitting: HOSPITALIST

## 2024-06-19 DIAGNOSIS — L03.116 LEFT LEG CELLULITIS: Primary | ICD-10-CM

## 2024-06-19 DIAGNOSIS — Z86.19 HISTORY OF SEPTIC SHOCK: ICD-10-CM

## 2024-06-19 DIAGNOSIS — Z86.14 HISTORY OF MRSA INFECTION: ICD-10-CM

## 2024-06-19 LAB
ANION GAP SERPL CALC-SCNC: 7 MMOL/L (ref 0–18)
BASOPHILS # BLD AUTO: 0.04 X10(3) UL (ref 0–0.2)
BASOPHILS NFR BLD AUTO: 0.6 %
BUN BLD-MCNC: 16 MG/DL (ref 9–23)
BUN/CREAT SERPL: 16.7 (ref 10–20)
CALCIUM BLD-MCNC: 9 MG/DL (ref 8.7–10.4)
CHLORIDE SERPL-SCNC: 108 MMOL/L (ref 98–112)
CO2 SERPL-SCNC: 28 MMOL/L (ref 21–32)
CREAT BLD-MCNC: 0.96 MG/DL
DEPRECATED RDW RBC AUTO: 55.2 FL (ref 35.1–46.3)
EGFRCR SERPLBLD CKD-EPI 2021: 61 ML/MIN/1.73M2 (ref 60–?)
EOSINOPHIL # BLD AUTO: 0.4 X10(3) UL (ref 0–0.7)
EOSINOPHIL NFR BLD AUTO: 6.1 %
ERYTHROCYTE [DISTWIDTH] IN BLOOD BY AUTOMATED COUNT: 15.6 % (ref 11–15)
GLUCOSE BLD-MCNC: 89 MG/DL (ref 70–99)
HCT VFR BLD AUTO: 38.6 %
HGB BLD-MCNC: 12.3 G/DL
IMM GRANULOCYTES # BLD AUTO: 0.03 X10(3) UL (ref 0–1)
IMM GRANULOCYTES NFR BLD: 0.5 %
LACTATE SERPL-SCNC: 0.8 MMOL/L (ref 0.5–2)
LYMPHOCYTES # BLD AUTO: 1.09 X10(3) UL (ref 1–4)
LYMPHOCYTES NFR BLD AUTO: 16.7 %
MCH RBC QN AUTO: 30.7 PG (ref 26–34)
MCHC RBC AUTO-ENTMCNC: 31.9 G/DL (ref 31–37)
MCV RBC AUTO: 96.3 FL
MONOCYTES # BLD AUTO: 0.59 X10(3) UL (ref 0.1–1)
MONOCYTES NFR BLD AUTO: 9 %
NEUTROPHILS # BLD AUTO: 4.37 X10 (3) UL (ref 1.5–7.7)
NEUTROPHILS # BLD AUTO: 4.37 X10(3) UL (ref 1.5–7.7)
NEUTROPHILS NFR BLD AUTO: 67.1 %
OSMOLALITY SERPL CALC.SUM OF ELEC: 297 MOSM/KG (ref 275–295)
PLATELET # BLD AUTO: 290 10(3)UL (ref 150–450)
POTASSIUM SERPL-SCNC: 4.6 MMOL/L (ref 3.5–5.1)
RBC # BLD AUTO: 4.01 X10(6)UL
SODIUM SERPL-SCNC: 143 MMOL/L (ref 136–145)
WBC # BLD AUTO: 6.5 X10(3) UL (ref 4–11)

## 2024-06-19 PROCEDURE — 83605 ASSAY OF LACTIC ACID: CPT | Performed by: EMERGENCY MEDICINE

## 2024-06-19 PROCEDURE — 87040 BLOOD CULTURE FOR BACTERIA: CPT | Performed by: EMERGENCY MEDICINE

## 2024-06-19 PROCEDURE — 99285 EMERGENCY DEPT VISIT HI MDM: CPT

## 2024-06-19 PROCEDURE — 36415 COLL VENOUS BLD VENIPUNCTURE: CPT

## 2024-06-19 PROCEDURE — 85025 COMPLETE CBC W/AUTO DIFF WBC: CPT | Performed by: EMERGENCY MEDICINE

## 2024-06-19 PROCEDURE — 96365 THER/PROPH/DIAG IV INF INIT: CPT

## 2024-06-19 PROCEDURE — 80048 BASIC METABOLIC PNL TOTAL CA: CPT | Performed by: EMERGENCY MEDICINE

## 2024-06-19 RX ORDER — GABAPENTIN 300 MG/1
300 CAPSULE ORAL 2 TIMES DAILY
Status: DISCONTINUED | OUTPATIENT
Start: 2024-06-19 | End: 2024-06-22

## 2024-06-19 RX ORDER — PANTOPRAZOLE SODIUM 40 MG/1
40 TABLET, DELAYED RELEASE ORAL
Status: DISCONTINUED | OUTPATIENT
Start: 2024-06-20 | End: 2024-06-22

## 2024-06-19 RX ORDER — VANCOMYCIN HYDROCHLORIDE
10 EVERY 24 HOURS
Status: DISCONTINUED | OUTPATIENT
Start: 2024-06-20 | End: 2024-06-22

## 2024-06-19 RX ORDER — SPIRONOLACTONE 25 MG/1
25 TABLET ORAL DAILY
Status: DISCONTINUED | OUTPATIENT
Start: 2024-06-20 | End: 2024-06-22

## 2024-06-19 RX ORDER — ACETAMINOPHEN 500 MG
500 TABLET ORAL EVERY 4 HOURS PRN
Status: DISCONTINUED | OUTPATIENT
Start: 2024-06-19 | End: 2024-06-22

## 2024-06-19 RX ORDER — PRAVASTATIN SODIUM 20 MG
20 TABLET ORAL NIGHTLY
Status: DISCONTINUED | OUTPATIENT
Start: 2024-06-19 | End: 2024-06-22

## 2024-06-19 RX ORDER — VANCOMYCIN 2 GRAM/500 ML IN 0.9 % SODIUM CHLORIDE INTRAVENOUS
2000 ONCE
Status: COMPLETED | OUTPATIENT
Start: 2024-06-19 | End: 2024-06-19

## 2024-06-19 RX ORDER — METOPROLOL SUCCINATE 50 MG/1
50 TABLET, EXTENDED RELEASE ORAL DAILY
Status: DISCONTINUED | OUTPATIENT
Start: 2024-06-20 | End: 2024-06-22

## 2024-06-19 RX ORDER — METOCLOPRAMIDE HYDROCHLORIDE 5 MG/ML
5 INJECTION INTRAMUSCULAR; INTRAVENOUS EVERY 8 HOURS PRN
Status: DISCONTINUED | OUTPATIENT
Start: 2024-06-19 | End: 2024-06-22

## 2024-06-19 RX ORDER — ONDANSETRON 2 MG/ML
4 INJECTION INTRAMUSCULAR; INTRAVENOUS EVERY 6 HOURS PRN
Status: DISCONTINUED | OUTPATIENT
Start: 2024-06-19 | End: 2024-06-22

## 2024-06-19 RX ORDER — DILTIAZEM HYDROCHLORIDE 240 MG/1
240 CAPSULE, COATED, EXTENDED RELEASE ORAL DAILY
Status: DISCONTINUED | OUTPATIENT
Start: 2024-06-20 | End: 2024-06-22

## 2024-06-19 RX ORDER — BUMETANIDE 1 MG/1
1 TABLET ORAL DAILY
Status: DISCONTINUED | OUTPATIENT
Start: 2024-06-20 | End: 2024-06-22

## 2024-06-19 NOTE — ED QUICK NOTES
Orders for admission, patient is aware of plan and ready to go upstairs. Any questions, please call ED RN Aleja at extension [phone].     Patient Covid vaccination status: Fully vaccinated     COVID Test Ordered in ED: None    COVID Suspicion at Admission: N/A    Running Infusions:  None    Mental Status/LOC at time of transport: A&Ox4    Other pertinent information: Continent      CIWA score: N/A   NIH score:  N/A

## 2024-06-19 NOTE — H&P
[facility] Hospitalist H&P       CC:   Chief Complaint   Patient presents with    Cellulitis        PCP: Maral Huffman MD    Date of Admission: 6/19/2024  5:08 PM    ASSESSMENT / PLAN:     Ms. Covington is a 79 year old female with PMH sig for COPD, A- Fib on Eliquis, CHF, HTN, HLD, PAD, Neuropathy, SUSAN prior hx of septic shock 2/2 cellulitis who presents with LLE celluitis     LLE Cellulitis,  Hx MRSA positive   - started on IV vancomycin  - may need consult ID pending course given hx   - LA and Bcx pending  - had MRI last admission without OM    L foot wound  - wound care  - possible site of innoculation    Chronic  A-fib  Secondary Hypercoagulable state   S/p Cardioversion   - Eliquis   - she is no longer on amidarone  - she is on diltiazem 240mg daily and metoprolol 50mg for rate control     Chronic Diastolic HF   - Echo with normal EF  - continue home meds: bumex, aldactone     H/o COPD not on inhalers   SUSAN  - cpap as tolerated     HTN   - BP stable    PAD/HLD   - statin     Neuropathy   - gabapentin      Hx Urinary Retention   - required ivey last admission    FN:  - IVF: none  - Diet: cardiac    DVT Prophy: SCD, Eliquis  Lines: PIV    Dispo: pending clinical course    Outpatient records or previous hospital records reviewed.     Further recommendations pending patient's clinical course.  [facility] hospitalist to continue to follow patient while in house    Patient and/or patient's family given opportunity to ask questions and note understanding and agreeing with therapeutic plan as outlined    Yamile Hernandez MD  [facility] Hospitalist  Answering Service number: [phone]    HPI     History of Present Illness:     Ms. Covington is a 79 year old female with PMH sig for COPD, A- Fib on Eliquis, CHF, HTN, HLD, PAD, Neuropathy, SUSAN prior hx of septic shock 2/2 cellulitis who presents with LLE celluitis. Patient states she noticed some redness about 2-3 days ago. She has a wound on her L foot that she previously saw wound clinic  for and had healed as of 2 weeks ago and no longer needed to go to wound clinic but she thinks that wound opened up within the past week. No fevers or chills. But noticed redness started a few days ago and has been worsening. Denies pain in leg. HH RN recommended PCP follow-up, seen in clinic today and planned for admission, no beds for direct admit, sent to the ER for admission.    In the ED, vitals stable. Labs with normal WBC. Started on IV vancomycin.       PMH  Past Medical History:    Arrhythmia    Atrial fibrillation (HCC)    Congestive heart disease (HCC)    COPD (chronic obstructive pulmonary disease) (HCC)    Essential hypertension    High blood pressure    High cholesterol    Morbid obesity with BMI of 50.0-59.9, adult (HCC)    Muscle weakness    Neuropathy    Peripheral vascular disease (HCC)    Sleep apnea    Visual impairment        PSH  Past Surgical History:   Procedure Laterality Date    Knee surgery Bilateral         ALL:  Allergies   Allergen Reactions    Lactose OTHER (SEE COMMENTS)     Patient denies allergy        Home Medications:  No outpatient medications have been marked as taking for the 24 encounter (Hospital Encounter).         Soc Hx  Social History     Tobacco Use    Smoking status: Former     Current packs/day: 0.00     Types: Cigarettes     Quit date:      Years since quittin.4     Passive exposure: Never    Smokeless tobacco: Never   Substance Use Topics    Alcohol use: Yes     Comment: 1-2 drinks daily        Fam Hx  No family history on file.    Review of Systems  Comprehensive ROS reviewed and negative except for what's stated above.     OBJECTIVE:  /88   Pulse 60   Temp 97.8 °F (36.6 °C)   Resp 18   SpO2 94%     GEN: obese female in NAD  HEENT: EOMI  Pulm: CTAB, no crackles or wheezes  CV: RRR, no murmurs  ABD: Soft, non-tender, non-distended, +BS  MSK: erythema LLE, +warm to touch, nontender  SKIN: warm, dry, chronic venous stasis changes BLE  EXT: trace  edema    Diagnostic Data:    CBC/Chem    Recent Labs   Lab 06/19/24  1749   RBC 4.01   HGB 12.3   HCT 38.6   MCV 96.3   MCH 30.7   MCHC 31.9   RDW 15.6*   NEPRELIM 4.37   WBC 6.5   .0         No results for input(s): \"GLU\", \"BUN\", \"CREATSERUM\", \"GFRAA\", \"GFRNAA\", \"EGFRCR\", \"CA\", \"NA\", \"K\", \"CL\", \"CO2\" in the last 168 hours.       No results for input(s): \"TROP\" in the last 168 hours.    Additional Diagnostics:     Radiology: No results found.

## 2024-06-19 NOTE — ED PROVIDER NOTES
Patient Seen in: [facility] Emergency Department    History     Chief Complaint   Patient presents with    Cellulitis     Stated Complaint: Cellulitis     HPI    79-year-old female with past medical history of atrial fibrillation on eliquis, COPD, hypertension, dyslipidemia, obesity, venous stasis complicated by recurrent left lower extremity cellulitis and with recent mission for septic shock present for evaluation with several days of recurrent left lower extremity redness/swelling in setting of prior MRSA history.  No fevers or chills, nausea or vomiting.  No trauma.  Seen as outpatient with plan for direct admission though without inpatient bed availability.    Past Medical History:    Arrhythmia    Atrial fibrillation (HCC)    Congestive heart disease (HCC)    COPD (chronic obstructive pulmonary disease) (HCC)    Essential hypertension    High blood pressure    High cholesterol    Morbid obesity with BMI of 50.0-59.9, adult (HCC)    Muscle weakness    Neuropathy    Peripheral vascular disease (HCC)    Sleep apnea    Visual impairment       Past Surgical History:   Procedure Laterality Date    Knee surgery Bilateral             No family history on file.    Social History     Socioeconomic History    Marital status:    Tobacco Use    Smoking status: Former     Current packs/day: 0.00     Types: Cigarettes     Quit date:      Years since quittin.4     Passive exposure: Never    Smokeless tobacco: Never   Substance and Sexual Activity    Alcohol use: Yes     Comment: 1-2 drinks daily    Drug use: Never     Social Determinants of Health     Financial Resource Strain: Unknown (2021)    Received from [employer]    Overall Financial Resource Strain (CARDIA)     Difficulty of Paying Living Expenses: Patient declined   Food Insecurity: No Food Insecurity (2024)    Food Insecurity     Food Insecurity: Never true   Transportation Needs: No Transportation Needs  (5/4/2024)    Transportation Needs     Lack of Transportation: No   Housing Stability: Low Risk  (5/4/2024)    Housing Stability     Housing Instability: No       Review of Systems :  Constitutional: As per HPI  Musculoskeletal: (+) LLE swelling/redness/warmth.    Positive for stated complaint: Cellulitis  Other systems are as noted in HPI.  Constitutional and vital signs reviewed.      All other systems reviewed and negative except as noted above.    PSFH elements reviewed from today and agreed except as otherwise stated in HPI.    Physical Exam     ED Triage Vitals [06/19/24 1643]   /44   Pulse 59   Resp 18   Temp 97.8 °F (36.6 °C)   Temp src    SpO2 99 %   O2 Device        Current:/44   Pulse 59   Temp 97.8 °F (36.6 °C)   Resp 18   SpO2 99%         Physical Exam   Constitutional: No distress. Nontoxic, well-appearing.  HEENT: MMM.  Head: Normocephalic.   Eyes: No injection.   Cardiovascular: BLE with intact and equal DP/PT pulses.   Pulmonary/Chest: Effort normal.   Musculoskeletal: No gross deformity. LLE with circumferential erythema and pretibial warmth without fluctuance/crepitance with soft compartments.  Neurological: Alert. LLE with 5/5 strength proximally and distally.  Skin: Skin is warm.   Psychiatric: Cooperative.  Nursing note and vitals reviewed.        ED Course     Labs Reviewed   BASIC METABOLIC PANEL (8) - Abnormal; Notable for the following components:       Result Value    Calculated Osmolality 297 (*)     All other components within normal limits   CBC W/ DIFFERENTIAL - Abnormal; Notable for the following components:    RDW-SD 55.2 (*)     RDW 15.6 (*)     All other components within normal limits   LACTIC ACID, PLASMA - Normal   CBC WITH DIFFERENTIAL WITH PLATELET    Narrative:     The following orders were created for panel order CBC With Differential With Platelet.  Procedure                               Abnormality         Status                     ---------                                -----------         ------                     CBC W/ DIFFERENTIAL[[id number]]          Abnormal            Final result                 Please view results for these tests on the individual orders.   BLOOD CULTURE   BLOOD CULTURE            MDM   DIFFERENTIAL DIAGNOSIS: After history and physical exam differential diagnosis includes but is not limited to cellulitis.    Pulse ox: 99%:Normal on RA, as independently interpreted by myself    Medical Decision Making  Evaluation for recurrent/worsening left lower extremity redness and warmth similar to prior cellulitis without constitutional complaints in setting of prior MRSA and septic shock without neurovascular compromise or crepitance/fluctuant change and with soft compartments to LLE. Case d/w PharmD David and in agreement with vancomycin with labs pending and cultures obtained prior to antibiotics; case d/w [facility] hospitalist Dr. Hernandez for admission in AF/HDS, nontoxic and well-appearing patient without leukocytosis/bandemia.    Amount and/or Complexity of Data Reviewed  External Data Reviewed: labs, radiology and notes.     Details: 2/9/2024 wound culture with MRSA/pseudomonas noted demonstrating vanc/cefepime sensitivity; 5/5/2024 MRI and 5/11/2024 discharge summary reviewed   Labs: ordered. Decision-making details documented in ED Course.  Discussion of management or test interpretation with external provider(s): Case d/w Zaida Hernandez for antibiotic therapy, [facility] hospitalist Dr. Hernandez for admssion    Risk  Prescription drug management.  Decision regarding hospitalization.        I was wearing at minimum a facemask and eye protection throughout this encounter with handwashing performed prior and after patient evaluation without personal hand/facial/oropharyngeal contact and gloves worn throughout encounter. See note and/or contact this provider for further PPE details.  Disposition and Plan     Clinical Impression:  1. Left leg  cellulitis    2. History of MRSA infection    3. History of septic shock        Disposition:  Admit    Follow-up:  No follow-up provider specified.    Medications Prescribed:  Current Discharge Medication List

## 2024-06-20 LAB
ANION GAP SERPL CALC-SCNC: 6 MMOL/L (ref 0–18)
BUN BLD-MCNC: 12 MG/DL (ref 9–23)
BUN/CREAT SERPL: 15 (ref 10–20)
CALCIUM BLD-MCNC: 8.3 MG/DL (ref 8.7–10.4)
CHLORIDE SERPL-SCNC: 110 MMOL/L (ref 98–112)
CO2 SERPL-SCNC: 27 MMOL/L (ref 21–32)
CREAT BLD-MCNC: 0.8 MG/DL
DEPRECATED RDW RBC AUTO: 54.3 FL (ref 35.1–46.3)
EGFRCR SERPLBLD CKD-EPI 2021: 76 ML/MIN/1.73M2 (ref 60–?)
ERYTHROCYTE [DISTWIDTH] IN BLOOD BY AUTOMATED COUNT: 15.7 % (ref 11–15)
GLUCOSE BLD-MCNC: 83 MG/DL (ref 70–99)
HCT VFR BLD AUTO: 32.9 %
HGB BLD-MCNC: 10.9 G/DL
MCH RBC QN AUTO: 31.5 PG (ref 26–34)
MCHC RBC AUTO-ENTMCNC: 33.1 G/DL (ref 31–37)
MCV RBC AUTO: 95.1 FL
OSMOLALITY SERPL CALC.SUM OF ELEC: 295 MOSM/KG (ref 275–295)
PLATELET # BLD AUTO: 230 10(3)UL (ref 150–450)
POTASSIUM SERPL-SCNC: 3.9 MMOL/L (ref 3.5–5.1)
PROCALCITONIN SERPL-MCNC: <0.04 NG/ML (ref ?–0.05)
RBC # BLD AUTO: 3.46 X10(6)UL
SODIUM SERPL-SCNC: 143 MMOL/L (ref 136–145)
WBC # BLD AUTO: 5.3 X10(3) UL (ref 4–11)

## 2024-06-20 PROCEDURE — 84145 PROCALCITONIN (PCT): CPT | Performed by: HOSPITALIST

## 2024-06-20 PROCEDURE — 85027 COMPLETE CBC AUTOMATED: CPT | Performed by: HOSPITALIST

## 2024-06-20 PROCEDURE — 80048 BASIC METABOLIC PNL TOTAL CA: CPT | Performed by: HOSPITALIST

## 2024-06-20 PROCEDURE — 99212 OFFICE O/P EST SF 10 MIN: CPT

## 2024-06-20 RX ORDER — ACETAMINOPHEN 325 MG/1
650 TABLET ORAL EVERY 6 HOURS PRN
Status: ON HOLD | COMMUNITY

## 2024-06-20 RX ORDER — HYDROXYZINE HYDROCHLORIDE 25 MG/1
25 TABLET, FILM COATED ORAL 3 TIMES DAILY PRN
Status: DISCONTINUED | OUTPATIENT
Start: 2024-06-20 | End: 2024-06-22

## 2024-06-20 NOTE — PROGRESS NOTES
06/20/24 1136   Wound 06/19/24 Foot Left;Plantar   Date First Assessed: 06/19/24   Present on Original Admission: Yes  Primary Wound Type: Pressure Injury  Location: Foot  Wound Location Orientation: Left;Plantar  Wound Description (Comments): dry pink intact skin   Wound Image    Site Assessment Dry;Intact;Fragile;Pink   Drainage Amount None   Treatments Cleansed;Saline;Site Care  (Vaseline to dry skin)   Dressing Aquacel Foam   Dressing Changed Changed   Dressing Status Clean;Dry;Intact;Dressing Changed   Wound Depth (cm) 0 cm   Kamryn-wound Assessment Intact;Dry;Fragile;Pink   Wound Bed Epithelium (%) 100 %   Wound Odor None   Pressure Injury Stage   (healing dry intact skin)   Wound 06/19/24 Leg Left;Medial   Date First Assessed: 06/19/24   Present on Original Admission: Yes  Primary Wound Type: Venous Ulcer  Location: Leg  Wound Location Orientation: Left;Medial  Wound Description (Comments): weeping   Wound Image    Site Assessment Moist;Fragile;Pink   Drainage Amount Small   Drainage Description Serous   Treatments Cleansed;Saline   Dressing 4x4s;Aquacel;Kerlix roll;Tape   Dressing Changed Changed   Dressing Status Intact;Clean;Dry;Dressing Changed   Non-staged Wound Description Partial thickness   Kamryn-wound Assessment Intact;Edema;Fragile;Pink;Red   Wound Odor None   Wound Follow Up   Follow up needed No     Wound Care Services  MD order to see the pt's left plantar foot, see the wound care assessments, cleansed both and dressed both. LLE medial aspect weeping, educated the pt. on the care and dressings, offloading, elevating legs, compression. Measured the pt's LLE for 19 inches- Spandagrip size G. Educated on the application. Legs are elevated. Per the pt. she has compression at home that a few years old. All questions answered. Spoke with the nurse. Recommendations: elevated legs cleansed LLE and left foot, Vaseline and border foam to left foot daily, Aquacel extra, gauze and Kerlix roll to LLE weeping  daily and as needed.

## 2024-06-20 NOTE — PROGRESS NOTES
[facility] Hospitalist Progress Note     CC: Hospital Follow up    PCP: Maral Huffman MD       Assessment/Plan:     Principal Problem:    Left leg cellulitis  Active Problems:    History of MRSA infection    History of septic shock    Ms. Covington is a 79 year old female with PMH sig for COPD, A- Fib on Eliquis, CHF, HTN, HLD, PAD, Neuropathy, SUSAN prior hx of septic shock 2/2 cellulitis who presents with LLE celluitis     LLE Cellulitis,  Hx MRSA positive   - started on IV vancomycin  - will consult ID  - LA normal and Bcx pending  - had MRI last admission without OM     L foot wound  - wound care  - possible site of innoculation     Chronic  A-fib  Secondary Hypercoagulable state   S/p Cardioversion   - Eliquis   - she is no longer on amidarone  - she is on diltiazem 240mg daily and metoprolol 50mg for rate control     Chronic Diastolic HF   - Echo with normal EF  - continue home meds: bumex, aldactone     H/o COPD not on inhalers   SUSAN  - cpap as tolerated      HTN   - BP stable     PAD/HLD   - statin      Neuropathy   - gabapentin      Hx Urinary Retention   - required ivey last admission     FN:  - IVF: none  - Diet: cardiac     DVT Prophy: SCD, Eliquis  Lines: PIV     Dispo: pending clinical course     Outpatient records or previous hospital records reviewed.      Further recommendations pending patient's clinical course.  [facility] hospital[place] to continue to follow patient while in house     Patient and/or patient's family given opportunity to ask questions and note understanding and agreeing with therapeutic plan as outlined     Yamile Hernandez MD  [facility] [place]ist  Answering Service number: [phone]        Subjective:     LLE redness a little better this AM, no fevers or chills. Denies pain.     OBJECTIVE:    Blood pressure 94/54, pulse 59, temperature 98.1 °F (36.7 °C), temperature source Oral, resp. rate 20, height 5' 2\" (1.575 m), weight 255 lb 12.8 oz (116 kg), SpO2 95%.    Temp:  [97.5 °F (36.4 °C)-98.1 °F (36.7  °C)] 98.1 °F (36.7 °C)  Pulse:  [53-69] 59  Resp:  [13-21] 20  BP: ()/(44-88) 94/54  SpO2:  [94 %-99 %] 95 %      Intake/Output:    Intake/Output Summary (Last 24 hours) at 6/20/2024 1302  Last data filed at 6/20/2024 0551  Gross per 24 hour   Intake 1700 ml   Output 300 ml   Net 1400 ml       Last 3 Weights   06/20/24 0543 255 lb 12.8 oz (116 kg)   06/19/24 2134 258 lb 4.8 oz (117.2 kg)   05/06/24 0500 261 lb 3.9 oz (118.5 kg)   05/05/24 0600 257 lb 8 oz (116.8 kg)   05/04/24 0324 261 lb 3.9 oz (118.5 kg)   05/03/24 1837 261 lb 9.6 oz (118.7 kg)   05/04/24 1456 261 lb 3.9 oz (118.5 kg)       Exam   GEN: obese female in NAD  HEENT: EOMI  Pulm: CTAB, no crackles or wheezes  CV: RRR, no murmurs  ABD: Soft, non-tender, non-distended, +BS  MSK: erythema LLE, +warm to touch, nontender  SKIN: warm, dry, chronic venous stasis changes BLE  EXT: trace edema      Data Review:       Labs:     Recent Labs   Lab 06/19/24 1749 06/20/24  0538   RBC 4.01 3.46*   HGB 12.3 10.9*   HCT 38.6 32.9*   MCV 96.3 95.1   MCH 30.7 31.5   MCHC 31.9 33.1   RDW 15.6* 15.7*   NEPRELIM 4.37  --    WBC 6.5 5.3   .0 230.0         Recent Labs   Lab 06/19/24 1749 06/20/24  0538   GLU 89 83   BUN 16 12   CREATSERUM 0.96 0.80   EGFRCR 61 76   CA 9.0 8.3*    143   K 4.6 3.9    110   CO2 28.0 27.0       No results for input(s): \"ALT\", \"AST\", \"ALB\", \"AMYLASE\", \"LIPASE\", \"LDH\" in the last 168 hours.    Invalid input(s): \"ALPHOS\", \"TBIL\", \"DBIL\", \"TPROT\"      Imaging:  No results found.      Meds:     INPATIENT MEDICATIONS    Scheduled Medications:      apixaban, 5 mg, BID  bumetanide, 1 mg, Daily  dilTIAZem ER, 240 mg, Daily  gabapentin, 300 mg, BID  metoprolol succinate ER, 50 mg, Daily  pantoprazole, 40 mg, QAM AC  pravastatin, 20 mg, Nightly  spironolactone, 25 mg, Daily  vancomycin, 10 mg/kg, Q24H            Drips:       PRN Medications  hydrOXYzine, 25 mg, TID PRN  acetaminophen, 500 mg, Q4H PRN  ondansetron, 4 mg, Q6H  PRN  metoclopramide, 5 mg, Q8H PRN  glycerin-hypromellose-, 1 drop, QID PRN

## 2024-06-20 NOTE — PLAN OF CARE
Problem: Patient Centered Care  Goal: Patient preferences are identified and integrated in the patient's plan of care  Description: Interventions:  - What would you like us to know as we care for you? 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  - Provide timely, complete, and accurate information to patient/family  - Incorporate patient and family knowledge, values, beliefs, and cultural backgrounds into the planning and delivery of care  - Encourage patient/family to participate in care and decision-making at the level they choose  - Honor patient and family perspectives and choices  Outcome: Progressing     Problem: Patient/Family Goals  Goal: Patient/Family Long Term Goal  Description: Patient's Long Term Goal: To return home    Interventions:  - Monitor vital signs and labs  - IV antibiotics per MD order  - Wound care consult  - Tele monitoring/pulse ox per MD order  - CPAP at night  - See additional Care Plan goals for specific interventions  Outcome: Progressing  Goal: Patient/Family Short Term Goal  Description: Patient's Short Term Goal: For the redness in my legs to go away    Interventions:   - Monitor vital signs and labs  - IV antibiotics per MD order  - Wound care consult  - Tele monitoring/pulse ox per MD order  - CPAP at night  - See additional Care Plan goals for specific interventions  Outcome: Progressing     Problem: PAIN - ADULT  Goal: Verbalizes/displays adequate comfort level or patient's stated pain goal  Description: INTERVENTIONS:  - Encourage pt to monitor pain and request assistance  - Assess pain using appropriate pain scale  - Administer analgesics based on type and severity of pain and evaluate response  - Implement non-pharmacological measures as appropriate and evaluate response  - Consider cultural and social influences on pain and pain management  - Manage/alleviate anxiety  - Utilize distraction and/or relaxation techniques  - Monitor for opioid side effects  - Notify MD/LIP if  interventions unsuccessful or patient reports new pain  - Anticipate increased pain with activity and pre-medicate as appropriate  Outcome: Progressing     Problem: SAFETY ADULT - FALL  Goal: Free from fall injury  Description: INTERVENTIONS:  - Assess pt frequently for physical needs  - Identify cognitive and physical deficits and behaviors that affect risk of falls.  - [place] fall precautions as indicated by assessment.  - Educate pt/family on patient safety including physical limitations  - Instruct pt to call for assistance with activity based on assessment  - Modify environment to reduce risk of injury  - Provide assistive devices as appropriate  - Consider OT/PT consult to assist with strengthening/mobility  - Encourage toileting schedule  Outcome: Progressing     Problem: DISCHARGE PLANNING  Goal: Discharge to home or other facility with appropriate resources  Description: INTERVENTIONS:  - Identify barriers to discharge w/pt and caregiver  - Include patient/family/discharge partner in discharge planning  - Arrange for needed discharge resources and transportation as appropriate  - Identify discharge learning needs (meds, wound care, etc)  - Arrange for interpreters to assist at discharge as needed  - Consider post-discharge preferences of patient/family/discharge partner  - Complete POLST form as appropriate  - Assess patient's ability to be responsible for managing their own health  - Refer to Case Management Department for coordinating discharge planning if the patient needs post-hospital services based on physician/LIP order or complex needs related to functional status, cognitive ability or social support system  Outcome: Progressing     Problem: CARDIOVASCULAR - ADULT  Goal: Maintains optimal cardiac output and hemodynamic stability  Description: INTERVENTIONS:  - Monitor vital signs, rhythm, and trends  - Monitor for bleeding, hypotension and signs of decreased cardiac output  - Evaluate  effectiveness of vasoactive medications to optimize hemodynamic stability  - Monitor arterial and/or venous puncture sites for bleeding and/or hematoma  - Assess quality of pulses, skin color and temperature  - Assess for signs of decreased coronary artery perfusion - ex. Angina  - Evaluate fluid balance, assess for edema, trend weights  Outcome: Progressing  Goal: Absence of cardiac arrhythmias or at baseline  Description: INTERVENTIONS:  - Continuous cardiac monitoring, monitor vital signs, obtain 12 lead EKG if indicated  - Evaluate effectiveness of antiarrhythmic and heart rate control medications as ordered  - Initiate emergency measures for life threatening arrhythmias  - Monitor electrolytes and administer replacement therapy as ordered  Outcome: Progressing     Problem: RESPIRATORY - ADULT  Goal: Achieves optimal ventilation and oxygenation  Description: INTERVENTIONS:  - Assess for changes in respiratory status  - Assess for changes in mentation and behavior  - Position to facilitate oxygenation and minimize respiratory effort  - Oxygen supplementation based on oxygen saturation or ABGs  - Provide Smoking Cessation handout, if applicable  - Encourage broncho-pulmonary hygiene including cough, deep breathe, Incentive Spirometry  - Assess the need for suctioning and perform as needed  - Assess and instruct to report SOB or any respiratory difficulty  - Respiratory Therapy support as indicated  - Manage/alleviate anxiety  - Monitor for signs/symptoms of CO2 retention  Outcome: Progressing     Problem: METABOLIC/FLUID AND ELECTROLYTES - ADULT  Goal: Electrolytes maintained within normal limits  Description: INTERVENTIONS:  - Monitor labs and rhythm and assess patient for signs and symptoms of electrolyte imbalances  - Administer electrolyte replacement as ordered  - Monitor response to electrolyte replacements, including rhythm and repeat lab results as appropriate  - Fluid restriction as ordered  - Instruct  patient on fluid and nutrition restrictions as appropriate  Outcome: Progressing  Goal: Hemodynamic stability and optimal renal function maintained  Description: INTERVENTIONS:  - Monitor labs and assess for signs and symptoms of volume excess or deficit  - Monitor intake, output and patient weight  - Monitor urine specific gravity, serum osmolarity and serum sodium as indicated or ordered  - Monitor response to interventions for patient's volume status, including labs, urine output, blood pressure (other measures as available)  - Encourage oral intake as appropriate  - Instruct patient on fluid and nutrition restrictions as appropriate  Outcome: Progressing     Problem: SKIN/TISSUE INTEGRITY - ADULT  Goal: Skin integrity remains intact  Description: INTERVENTIONS  - Assess and document risk factors for pressure ulcer development  - Assess and document skin integrity  - Monitor for areas of redness and/or skin breakdown  - Initiate interventions, skin care algorithm/standards of care as needed  Outcome: Progressing  Goal: Incision(s), wounds(s) or drain site(s) healing without S/S of infection  Description: INTERVENTIONS:  - Assess and document risk factors for pressure ulcer development  - Assess and document skin integrity  - Assess and document dressing/incision, wound bed, drain sites and surrounding tissue  - Implement wound care per orders  - Initiate isolation precautions as appropriate  - Initiate Pressure Ulcer prevention bundle as indicated  Outcome: Progressing     Problem: Impaired Functional Mobility  Goal: Achieve highest/safest level of mobility/gait  Description: Interventions:  - Assess patient's functional ability and stability  - Promote increasing activity/tolerance for mobility and gait  - Educate and engage patient/family in tolerated activity level and precautions  Outcome: Progressing

## 2024-06-20 NOTE — CM/SW NOTE
06/20/24 1100   CM/SW Referral Data   Referral Source Social Work (self-referral)   Reason for Referral Discharge planning   Informant EMR;Clinical Staff Member   Medical Hx   Does patient have an established PCP? Yes  (Dr. Maral Huffman)   Significant Past Medical/Mental Health Hx SUSAN; hx of MRSA   Patient Info   Advanced directives? Yes   Patient's Current Mental Status at Time of Assessment Alert;Oriented   Patient's Home Environment Independent Living   Number of Levels in Home 1   Patient lives with Alone   Patient Status Prior to Admission   Independent with ADLs and Mobility Yes   Services in place prior to admission Home Health Care;DME/Supplies at home   Home Health Provider Info [facility]   DME Provider/Supplier [facility]   Type of DME/Supplies CPAP   Discharge Needs   Anticipated D/C needs Home health care;Infusion care;Subacute rehab;To be determined     SW self-referred to this case for discharge planning.    SW performed EMR review for above information.    Patient is a direct admission for LLE cellulitis.  Plan is for IV antibiotics.    The patient [facility].  She is independent with ADLs at her baseline.    The patient has SUSAN and uses a home CPAP supplied by [facility]- no supplemental O2 use.      The patient is current with [facility] for RN/PT services.  Referral sent and DOC entered.    The patient has history with Bailee Farmer SHARYN    The patient has hx with Bailee Farmer for SHARYN in May 2024.    SW to continue to follow therapy and IV antibiotic needs at discharge.    If SHARYN is needed, patient will need referrals/PASSR/list for chocie.    PLAN: SHARYN vs. Home /[facility] (pending therapy/IV abx plan)    / to remain available for support and/or discharge planning.     Emily Hernandez MSW, LSW [id number]

## 2024-06-20 NOTE — PROGRESS NOTES
[facility]  part of [facility] Infectious Disease Consult    Adore Covington Patient Status:  Inpatient    1945 MRN [id number]   Location [facility] 5SW/SE Attending Yamile Hernandez MD   Hosp Day # 1 PCP Maral Huffman MD     Reason for Consultation:  To help with infection and treatment, requested by Dr. Hernandez,     History of Present Illness:  Adore Covington is a 79 year old female admitted to [facility] on  with Lower extremity Cellulitis,   Significant hx of   - Venous stasis ulcers,    - PAD,   - Neuropathy,   - Prior history of septic shock with cellulitis,   Noticed some erythema and swelling of LLE,   She developed a wound on plantar L foot, saw WCC for it, wound was healing well and she was discharged two weeks ago from wound clinic,   In last few days she noted new wounds on the lateral and medial aspect of L leg,   No trauma, significant drainage from the L Leg,   No fevers or chills.  Was seen by PCP and sent to ED for further management,   ID is now asked to help with infection and treatment,       History:  Past Medical History:    Arrhythmia    Atrial fibrillation (HCC)    Congestive heart disease (HCC)    COPD (chronic obstructive pulmonary disease) (HCC)    Essential hypertension    High blood pressure    High cholesterol    Morbid obesity with BMI of 50.0-59.9, adult (HCC)    Muscle weakness    Neuropathy    Peripheral vascular disease (HCC)    Sleep apnea    Visual impairment     Past Surgical History:   Procedure Laterality Date    Knee surgery Bilateral      History reviewed. No pertinent family history.   reports that she quit smoking about 41 years ago. Her smoking use included cigarettes. She has never been exposed to tobacco smoke. She has never used smokeless tobacco. She reports current alcohol use. She reports that she does not use drugs.    Allergies:  No Active Allergies    Medications:    Current Facility-Administered Medications:      hydrOXYzine (Atarax) tab 25 mg, 25 mg, Oral, TID PRN    acetaminophen (Tylenol Extra Strength) tab 500 mg, 500 mg, Oral, Q4H PRN    ondansetron (Zofran) 4 MG/2ML injection 4 mg, 4 mg, Intravenous, Q6H PRN    metoclopramide (Reglan) 5 mg/mL injection 5 mg, 5 mg, Intravenous, Q8H PRN    apixaban (Eliquis) tab 5 mg, 5 mg, Oral, BID    bumetanide (Bumex) tab 1 mg, 1 mg, Oral, Daily    glycerin-hypromellose- (Artificial Tears) 0.2-0.2-1 % ophthalmic solution 1 drop, 1 drop, Both Eyes, QID PRN    dilTIAZem ER (CardIZEM CD) 24 hr cap 240 mg, 240 mg, Oral, Daily    gabapentin (Neurontin) cap 300 mg, 300 mg, Oral, BID    metoprolol succinate ER (Toprol XL) 24 hr tab 50 mg, 50 mg, Oral, Daily    pantoprazole (Protonix) DR tab 40 mg, 40 mg, Oral, QAM AC    pravastatin (Pravachol) tab 20 mg, 20 mg, Oral, Nightly    spironolactone (Aldactone) tab 25 mg, 25 mg, Oral, Daily    vancomycin (Vancocin) 1.25 g in sodium chloride 0.9% 250mL IVPB premix, 10 mg/kg, Intravenous, Q24H    Review of Systems:   Constitutional: Negative for anorexia, chills, fatigue, fevers, malaise, night sweats and weight loss.  Eyes: Negative for visual disturbance, irritation and redness.  Ears, nose, mouth, throat, and face: Negative for hearing loss, tinnitus, nasal congestion, snoring, sore throat, hoarseness and voice change.  Respiratory: Negative for cough, sputum, hemoptysis, chest pain, wheezing, dyspnea on exertion, or stridor.  Cardiovascular: Negative for chest pain, palpitations, irregular heart beats, syncope, fatigue, orthopnea, paroxysmal nocturnal dyspnea, lower extremity edema.  Gastrointestinal: Negative for dysphagia, odynophagia, reflux symptoms, nausea, vomiting, change in bowel habits, diarrhea, constipation and abdominal pain.  Integument/breast: Negative for rash, skin lesions, and pruritus.  Hematologic/lymphatic: Negative for easy bruising, bleeding, and lymphadenopathy.  Musculoskeletal: Negative for myalgias, arthralgias,  muscle weakness.  Neurological: Negative for headaches, dizziness, seizures, memory problems, trouble swallowing, speech problems, gait problems and weakness.  Behavioral/Psych: Negative for active tobacco use.  Endocrine: No history of of diabetes, thyroid disorder.  All other review of systems are negative.    Vital signs in last 24 hours:  Patient Vitals for the past 24 hrs:   BP Temp Temp src Pulse Resp SpO2 Height Weight   06/20/24 0935 94/54 98.1 °F (36.7 °C) Oral 59 20 95 % -- --   06/20/24 0543 110/57 97.5 °F (36.4 °C) Oral 57 18 97 % -- 255 lb 12.8 oz (116 kg)   06/19/24 2320 -- -- -- 60 -- 96 % -- --   06/19/24 2134 110/59 98 °F (36.7 °C) Oral 61 17 97 % 5' 2\" (1.575 m) 258 lb 4.8 oz (117.2 kg)   06/19/24 2115 118/68 -- -- 62 18 98 % -- --   06/19/24 1930 133/84 -- -- 69 21 95 % -- --   06/19/24 1800 130/73 -- -- 53 13 98 % -- --   06/19/24 1730 111/88 -- -- 60 -- 94 % -- --   06/19/24 1643 127/44 97.8 °F (36.6 °C) -- 59 18 99 % -- --       Physical Exam:   General: alert, cooperative, oriented.  No respiratory distress.   Head: Normocephalic, without obvious abnormality, atraumatic.   Eyes: Conjunctivae/corneas clear.  No scleral icterus.  No conjunctival     hemorrhage.   Nose: Nares normal.   Throat: Lips, mucosa, and tongue normal.  No thrush noted.   Neck: Soft, supple neck; trachea midline, no adenopathy, no thyromegaly.   Lungs: CTAB, normal and equal bilateral chest rise   Chest wall: No tenderness or deformity.   Heart: Regular rate and rhythm, normal S1S2, no murmur.   Abdomen: soft, non-tender, non-distended, no masses, no guarding, no     rebound, positive BS.   Extremity: no edema, no cyanosis   Skin:LLE erythema, open wound L Plantar aspect, L leg lateral and medial area,    Neurological: Alert, interactive, no focal deficits    Labs:  Lab Results   Component Value Date    WBC 5.3 06/20/2024    HGB 10.9 06/20/2024    HCT 32.9 06/20/2024    .0 06/20/2024    CREATSERUM 0.80 06/20/2024     BUN 12 06/20/2024     06/20/2024    K 3.9 06/20/2024     06/20/2024    CO2 27.0 06/20/2024    GLU 83 06/20/2024    CA 8.3 06/20/2024       Radiology:  MRI foot May 2024   1. No osteomyelitis or abscess.   2. Superficial and deep soft tissue edema.   3. Muscular atrophy.   4. 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   5. 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   6. Advanced degenerative change of the midfoot.  Other less pronounced degenerative changes.   7. Small ankle joint effusion.       Cultures:  Reviewed,     Assessment and Plan:    1.  Left Leg Cellulitis in the presence of Multiple open wounds and venous stasis,  - Drainage Cul in the past with MRSA and PSAR,    - Wounds are superficial, no probe to bone,   - Blood cul are NGTD  - On IV Vanc, will add IV Cefepime,   - Leg Compression,   - If continues to improve will discharge home on PO abx,     2.    Hx of Bilateral TKA; Infection needs to be treated aggressively,     3.    Disposition: in house, an elderly female with LLE Cellulitis with open wounds sec to venous stasis admitted with worsening Cellulitis, no new trauma, no fever, chills,   - Follow Pending cul,   - Continue IV Vanc, pharm to dose,   - Add IV Cefepime, pharm to dose,   - Compression wraps,   - Leg elevation,     Discussed with patient, RN, all questions answered, further recommendations to follow, Thanks,     Thank you for consulting [facility] for Adore Covington.  If you have any questions or concerns please call [facility] Infectious Disease at [phone].     Carlos Manuel Mcdermott MD  6/20/2024  2:43 PM

## 2024-06-20 NOTE — PLAN OF CARE
Patient admitted from ER, A/O x4 forgetful at times. Patient using home CPAP overnight while sleeping. Tele monitoring and continuous pulse ox monitoring in place. Wound on left foot traced, cleansed, and dressed - pending wound consult. Call light within reach, bed alarm on, non-slip socks, frequent rounding done.    Problem: Patient Centered Care  Goal: Patient preferences are identified and integrated in the patient's plan of care  Description: Interventions:  - What would you like us to know as we care for you? 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  - Provide timely, complete, and accurate information to patient/family  - Incorporate patient and family knowledge, values, beliefs, and cultural backgrounds into the planning and delivery of care  - Encourage patient/family to participate in care and decision-making at the level they choose  - Honor patient and family perspectives and choices  Outcome: Progressing     Problem: Patient/Family Goals  Goal: Patient/Family Long Term Goal  Description: Patient's Long Term Goal: To return home    Interventions:  - Monitor vital signs and labs  - IV antibiotics per MD order  - Wound care consult  - Tele monitoring/pulse ox per MD order  - CPAP at night  - See additional Care Plan goals for specific interventions  Outcome: Progressing  Goal: Patient/Family Short Term Goal  Description: Patient's Short Term Goal: For the redness in my legs to go away    Interventions:   - Monitor vital signs and labs  - IV antibiotics per MD order  - Wound care consult  - Tele monitoring/pulse ox per MD order  - CPAP at night  - See additional Care Plan goals for specific interventions  Outcome: Progressing     Problem: PAIN - ADULT  Goal: Verbalizes/displays adequate comfort level or patient's stated pain goal  Description: INTERVENTIONS:  - Encourage pt to monitor pain and request assistance  - Assess pain using appropriate pain scale  - Administer analgesics based on type and  severity of pain and evaluate response  - Implement non-pharmacological measures as appropriate and evaluate response  - Consider cultural and social influences on pain and pain management  - Manage/alleviate anxiety  - Utilize distraction and/or relaxation techniques  - Monitor for opioid side effects  - Notify MD/LIP if interventions unsuccessful or patient reports new pain  - Anticipate increased pain with activity and pre-medicate as appropriate  Outcome: Progressing     Problem: SAFETY ADULT - FALL  Goal: Free from fall injury  Description: INTERVENTIONS:  - Assess pt frequently for physical needs  - Identify cognitive and physical deficits and behaviors that affect risk of falls.  - [place] fall precautions as indicated by assessment.  - Educate pt/family on patient safety including physical limitations  - Instruct pt to call for assistance with activity based on assessment  - Modify environment to reduce risk of injury  - Provide assistive devices as appropriate  - Consider OT/PT consult to assist with strengthening/mobility  - Encourage toileting schedule  Outcome: Progressing     Problem: DISCHARGE PLANNING  Goal: Discharge to home or other facility with appropriate resources  Description: INTERVENTIONS:  - Identify barriers to discharge w/pt and caregiver  - Include patient/family/discharge partner in discharge planning  - Arrange for needed discharge resources and transportation as appropriate  - Identify discharge learning needs (meds, wound care, etc)  - Arrange for interpreters to assist at discharge as needed  - Consider post-discharge preferences of patient/family/discharge partner  - Complete POLST form as appropriate  - Assess patient's ability to be responsible for managing their own health  - Refer to Case Management Department for coordinating discharge planning if the patient needs post-hospital services based on physician/LIP order or complex needs related to functional status, cognitive  ability or social support system  Outcome: Progressing     Problem: CARDIOVASCULAR - ADULT  Goal: Maintains optimal cardiac output and hemodynamic stability  Description: INTERVENTIONS:  - Monitor vital signs, rhythm, and trends  - Monitor for bleeding, hypotension and signs of decreased cardiac output  - Evaluate effectiveness of vasoactive medications to optimize hemodynamic stability  - Monitor arterial and/or venous puncture sites for bleeding and/or hematoma  - Assess quality of pulses, skin color and temperature  - Assess for signs of decreased coronary artery perfusion - ex. Angina  - Evaluate fluid balance, assess for edema, trend weights  Outcome: Progressing  Goal: Absence of cardiac arrhythmias or at baseline  Description: INTERVENTIONS:  - Continuous cardiac monitoring, monitor vital signs, obtain 12 lead EKG if indicated  - Evaluate effectiveness of antiarrhythmic and heart rate control medications as ordered  - Initiate emergency measures for life threatening arrhythmias  - Monitor electrolytes and administer replacement therapy as ordered  Outcome: Progressing     Problem: RESPIRATORY - ADULT  Goal: Achieves optimal ventilation and oxygenation  Description: INTERVENTIONS:  - Assess for changes in respiratory status  - Assess for changes in mentation and behavior  - Position to facilitate oxygenation and minimize respiratory effort  - Oxygen supplementation based on oxygen saturation or ABGs  - Provide Smoking Cessation handout, if applicable  - Encourage broncho-pulmonary hygiene including cough, deep breathe, Incentive Spirometry  - Assess the need for suctioning and perform as needed  - Assess and instruct to report SOB or any respiratory difficulty  - Respiratory Therapy support as indicated  - Manage/alleviate anxiety  - Monitor for signs/symptoms of CO2 retention  Outcome: Progressing     Problem: METABOLIC/FLUID AND ELECTROLYTES - ADULT  Goal: Electrolytes maintained within normal  limits  Description: INTERVENTIONS:  - Monitor labs and rhythm and assess patient for signs and symptoms of electrolyte imbalances  - Administer electrolyte replacement as ordered  - Monitor response to electrolyte replacements, including rhythm and repeat lab results as appropriate  - Fluid restriction as ordered  - Instruct patient on fluid and nutrition restrictions as appropriate  Outcome: Progressing  Goal: Hemodynamic stability and optimal renal function maintained  Description: INTERVENTIONS:  - Monitor labs and assess for signs and symptoms of volume excess or deficit  - Monitor intake, output and patient weight  - Monitor urine specific gravity, serum osmolarity and serum sodium as indicated or ordered  - Monitor response to interventions for patient's volume status, including labs, urine output, blood pressure (other measures as available)  - Encourage oral intake as appropriate  - Instruct patient on fluid and nutrition restrictions as appropriate  Outcome: Progressing     Problem: SKIN/TISSUE INTEGRITY - ADULT  Goal: Skin integrity remains intact  Description: INTERVENTIONS  - Assess and document risk factors for pressure ulcer development  - Assess and document skin integrity  - Monitor for areas of redness and/or skin breakdown  - Initiate interventions, skin care algorithm/standards of care as needed  Outcome: Progressing  Goal: Incision(s), wounds(s) or drain site(s) healing without S/S of infection  Description: INTERVENTIONS:  - Assess and document risk factors for pressure ulcer development  - Assess and document skin integrity  - Assess and document dressing/incision, wound bed, drain sites and surrounding tissue  - Implement wound care per orders  - Initiate isolation precautions as appropriate  - Initiate Pressure Ulcer prevention bundle as indicated  Outcome: Progressing     Problem: Impaired Functional Mobility  Goal: Achieve highest/safest level of mobility/gait  Description:  Interventions:  - Assess patient's functional ability and stability  - Promote increasing activity/tolerance for mobility and gait  - Educate and engage patient/family in tolerated activity level and precautions  Outcome: Progressing

## 2024-06-20 NOTE — PROGRESS NOTES
[facility] Pharmacy Dosing Service      Initial Pharmacokinetic Consult for Vancomycin Dosing     Adore Covington is a 79 year old female who is being initiated on vancomycin therapy for cellulitis.  Pharmacy has been asked to dose vancomycin by Dr Hernandez.  The initial treatment and monitoring approach will be steady state AUC strategy.        Weight and Temperature:    Wt Readings from Last 1 Encounters:   24 117.2 kg (258 lb 4.8 oz)        Temp Readings from Last 1 Encounters:   24 98 °F (36.7 °C) (Oral)      Labs:   Recent Labs   Lab 24  1749   CREATSERUM 0.96      Estimated Creatinine Clearance: 38.8 mL/min (based on SCr of 0.96 mg/dL).     Recent Labs   Lab 24  1749   WBC 6.5          The Pharmacokinetic Target is:     to 600 mg-h/L and trough <=15 mg/L    Renal Dosing Considerations:    None     Assessment/Plan:   Initial/Loading dose: Has received 2000 mg IV (15 mg/kg, capped at 2250 mg) x 1 initial dose.      Maintenance dose: Pharmacy will dose vancomycin at 1250 mg IV every 24 hours    Monitorin) Plan for vancomycin peak and trough to be obtained at steady state    2) Pharmacy will order SCr as clinically indicated to assess renal function.    3) Pharmacy will monitor for toxicity and efficacy, adjust vancomycin dose and/or frequency, and order vancomycin levels as appropriate per the Pharmacy and Therapeutics Committee approved protocol until discontinuation of the medication.       We appreciate the opportunity to assist in the care of this patient.     Leigha Gordon, PharmD  2024  10:05 PM  [place]  Pharmacy Extension: [phone]

## 2024-06-21 LAB
ANION GAP SERPL CALC-SCNC: 3 MMOL/L (ref 0–18)
BUN BLD-MCNC: 10 MG/DL (ref 9–23)
BUN/CREAT SERPL: 13.7 (ref 10–20)
CALCIUM BLD-MCNC: 8.7 MG/DL (ref 8.7–10.4)
CHLORIDE SERPL-SCNC: 108 MMOL/L (ref 98–112)
CO2 SERPL-SCNC: 30 MMOL/L (ref 21–32)
CREAT BLD-MCNC: 0.73 MG/DL
DEPRECATED RDW RBC AUTO: 53.4 FL (ref 35.1–46.3)
EGFRCR SERPLBLD CKD-EPI 2021: 85 ML/MIN/1.73M2 (ref 60–?)
ERYTHROCYTE [DISTWIDTH] IN BLOOD BY AUTOMATED COUNT: 15.5 % (ref 11–15)
GLUCOSE BLD-MCNC: 98 MG/DL (ref 70–99)
HCT VFR BLD AUTO: 33.4 %
HGB BLD-MCNC: 11.1 G/DL
MCH RBC QN AUTO: 31.2 PG (ref 26–34)
MCHC RBC AUTO-ENTMCNC: 33.2 G/DL (ref 31–37)
MCV RBC AUTO: 93.8 FL
OSMOLALITY SERPL CALC.SUM OF ELEC: 291 MOSM/KG (ref 275–295)
PLATELET # BLD AUTO: 223 10(3)UL (ref 150–450)
POTASSIUM SERPL-SCNC: 3.6 MMOL/L (ref 3.5–5.1)
RBC # BLD AUTO: 3.56 X10(6)UL
SODIUM SERPL-SCNC: 141 MMOL/L (ref 136–145)
WBC # BLD AUTO: 5.4 X10(3) UL (ref 4–11)

## 2024-06-21 PROCEDURE — 97165 OT EVAL LOW COMPLEX 30 MIN: CPT

## 2024-06-21 PROCEDURE — 85027 COMPLETE CBC AUTOMATED: CPT | Performed by: HOSPITALIST

## 2024-06-21 PROCEDURE — 80048 BASIC METABOLIC PNL TOTAL CA: CPT | Performed by: HOSPITALIST

## 2024-06-21 PROCEDURE — 97530 THERAPEUTIC ACTIVITIES: CPT

## 2024-06-21 PROCEDURE — 97161 PT EVAL LOW COMPLEX 20 MIN: CPT

## 2024-06-21 PROCEDURE — 97116 GAIT TRAINING THERAPY: CPT

## 2024-06-21 RX ORDER — POLYETHYLENE GLYCOL 3350 17 G/17G
17 POWDER, FOR SOLUTION ORAL DAILY
Status: DISCONTINUED | OUTPATIENT
Start: 2024-06-21 | End: 2024-06-22

## 2024-06-21 RX ORDER — DOCUSATE SODIUM 100 MG/1
100 CAPSULE, LIQUID FILLED ORAL 2 TIMES DAILY
Status: DISCONTINUED | OUTPATIENT
Start: 2024-06-21 | End: 2024-06-22

## 2024-06-21 NOTE — PLAN OF CARE
Problem: Patient Centered Care  Goal: Patient preferences are identified and integrated in the patient's plan of care  Description: Interventions:  - What would you like us to know as we care for you? 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  - Provide timely, complete, and accurate information to patient/family  - Incorporate patient and family knowledge, values, beliefs, and cultural backgrounds into the planning and delivery of care  - Encourage patient/family to participate in care and decision-making at the level they choose  - Honor patient and family perspectives and choices  Outcome: Progressing     Problem: Patient/Family Goals  Goal: Patient/Family Long Term Goal  Description: Patient's Long Term Goal: To return home    Interventions:  - Monitor vital signs and labs  - IV antibiotics per MD order  - Wound care consult  - Tele monitoring/pulse ox per MD order  - CPAP at night  - See additional Care Plan goals for specific interventions  Outcome: Progressing  Goal: Patient/Family Short Term Goal  Description: Patient's Short Term Goal: For the redness in my legs to go away    Interventions:   - Monitor vital signs and labs  - IV antibiotics per MD order  - Wound care consult  - Tele monitoring/pulse ox per MD order  - CPAP at night  - See additional Care Plan goals for specific interventions  Outcome: Progressing     Problem: PAIN - ADULT  Goal: Verbalizes/displays adequate comfort level or patient's stated pain goal  Description: INTERVENTIONS:  - Encourage pt to monitor pain and request assistance  - Assess pain using appropriate pain scale  - Administer analgesics based on type and severity of pain and evaluate response  - Implement non-pharmacological measures as appropriate and evaluate response  - Consider cultural and social influences on pain and pain management  - Manage/alleviate anxiety  - Utilize distraction and/or relaxation techniques  - Monitor for opioid side effects  - Notify MD/LIP if  interventions unsuccessful or patient reports new pain  - Anticipate increased pain with activity and pre-medicate as appropriate  Outcome: Progressing     Problem: SAFETY ADULT - FALL  Goal: Free from fall injury  Description: INTERVENTIONS:  - Assess pt frequently for physical needs  - Identify cognitive and physical deficits and behaviors that affect risk of falls.  - [place] fall precautions as indicated by assessment.  - Educate pt/family on patient safety including physical limitations  - Instruct pt to call for assistance with activity based on assessment  - Modify environment to reduce risk of injury  - Provide assistive devices as appropriate  - Consider OT/PT consult to assist with strengthening/mobility  - Encourage toileting schedule  Outcome: Progressing     Problem: DISCHARGE PLANNING  Goal: Discharge to home or other facility with appropriate resources  Description: INTERVENTIONS:  - Identify barriers to discharge w/pt and caregiver  - Include patient/family/discharge partner in discharge planning  - Arrange for needed discharge resources and transportation as appropriate  - Identify discharge learning needs (meds, wound care, etc)  - Arrange for interpreters to assist at discharge as needed  - Consider post-discharge preferences of patient/family/discharge partner  - Complete POLST form as appropriate  - Assess patient's ability to be responsible for managing their own health  - Refer to Case Management Department for coordinating discharge planning if the patient needs post-hospital services based on physician/LIP order or complex needs related to functional status, cognitive ability or social support system  Outcome: Progressing     Problem: CARDIOVASCULAR - ADULT  Goal: Maintains optimal cardiac output and hemodynamic stability  Description: INTERVENTIONS:  - Monitor vital signs, rhythm, and trends  - Monitor for bleeding, hypotension and signs of decreased cardiac output  - Evaluate  effectiveness of vasoactive medications to optimize hemodynamic stability  - Monitor arterial and/or venous puncture sites for bleeding and/or hematoma  - Assess quality of pulses, skin color and temperature  - Assess for signs of decreased coronary artery perfusion - ex. Angina  - Evaluate fluid balance, assess for edema, trend weights  Outcome: Progressing  Goal: Absence of cardiac arrhythmias or at baseline  Description: INTERVENTIONS:  - Continuous cardiac monitoring, monitor vital signs, obtain 12 lead EKG if indicated  - Evaluate effectiveness of antiarrhythmic and heart rate control medications as ordered  - Initiate emergency measures for life threatening arrhythmias  - Monitor electrolytes and administer replacement therapy as ordered  Outcome: Progressing     Problem: RESPIRATORY - ADULT  Goal: Achieves optimal ventilation and oxygenation  Description: INTERVENTIONS:  - Assess for changes in respiratory status  - Assess for changes in mentation and behavior  - Position to facilitate oxygenation and minimize respiratory effort  - Oxygen supplementation based on oxygen saturation or ABGs  - Provide Smoking Cessation handout, if applicable  - Encourage broncho-pulmonary hygiene including cough, deep breathe, Incentive Spirometry  - Assess the need for suctioning and perform as needed  - Assess and instruct to report SOB or any respiratory difficulty  - Respiratory Therapy support as indicated  - Manage/alleviate anxiety  - Monitor for signs/symptoms of CO2 retention  Outcome: Progressing     Problem: METABOLIC/FLUID AND ELECTROLYTES - ADULT  Goal: Electrolytes maintained within normal limits  Description: INTERVENTIONS:  - Monitor labs and rhythm and assess patient for signs and symptoms of electrolyte imbalances  - Administer electrolyte replacement as ordered  - Monitor response to electrolyte replacements, including rhythm and repeat lab results as appropriate  - Fluid restriction as ordered  - Instruct  patient on fluid and nutrition restrictions as appropriate  Outcome: Progressing  Goal: Hemodynamic stability and optimal renal function maintained  Description: INTERVENTIONS:  - Monitor labs and assess for signs and symptoms of volume excess or deficit  - Monitor intake, output and patient weight  - Monitor urine specific gravity, serum osmolarity and serum sodium as indicated or ordered  - Monitor response to interventions for patient's volume status, including labs, urine output, blood pressure (other measures as available)  - Encourage oral intake as appropriate  - Instruct patient on fluid and nutrition restrictions as appropriate  Outcome: Progressing     Problem: SKIN/TISSUE INTEGRITY - ADULT  Goal: Skin integrity remains intact  Description: INTERVENTIONS  - Assess and document risk factors for pressure ulcer development  - Assess and document skin integrity  - Monitor for areas of redness and/or skin breakdown  - Initiate interventions, skin care algorithm/standards of care as needed  Outcome: Progressing  Goal: Incision(s), wounds(s) or drain site(s) healing without S/S of infection  Description: INTERVENTIONS:  - Assess and document risk factors for pressure ulcer development  - Assess and document skin integrity  - Assess and document dressing/incision, wound bed, drain sites and surrounding tissue  - Implement wound care per orders  - Initiate isolation precautions as appropriate  - Initiate Pressure Ulcer prevention bundle as indicated  Outcome: Progressing     Problem: Impaired Functional Mobility  Goal: Achieve highest/safest level of mobility/gait  Description: Interventions:  - Assess patient's functional ability and stability  - Promote increasing activity/tolerance for mobility and gait  - Educate and engage patient/family in tolerated activity level and precautions  Outcome: Progressing

## 2024-06-21 NOTE — PLAN OF CARE
Problem: Patient Centered Care  Goal: Patient preferences are identified and integrated in the patient's plan of care  Description: Interventions:  - What would you like us to know as we care for you? 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  - Provide timely, complete, and accurate information to patient/family  - Incorporate patient and family knowledge, values, beliefs, and cultural backgrounds into the planning and delivery of care  - Encourage patient/family to participate in care and decision-making at the level they choose  - Honor patient and family perspectives and choices  Outcome: Progressing     Problem: Patient/Family Goals  Goal: Patient/Family Long Term Goal  Description: Patient's Long Term Goal: To return home    Interventions:  - Monitor vital signs and labs  - IV antibiotics per MD order  - Wound care consult  - Tele monitoring/pulse ox per MD order  - CPAP at night  - See additional Care Plan goals for specific interventions  Outcome: Progressing  Goal: Patient/Family Short Term Goal  Description: Patient's Short Term Goal: For the redness in my legs to go away    Interventions:   - Monitor vital signs and labs  - IV antibiotics per MD order  - Wound care consult  - Tele monitoring/pulse ox per MD order  - CPAP at night  - See additional Care Plan goals for specific interventions  Outcome: Progressing     Problem: PAIN - ADULT  Goal: Verbalizes/displays adequate comfort level or patient's stated pain goal  Description: INTERVENTIONS:  - Encourage pt to monitor pain and request assistance  - Assess pain using appropriate pain scale  - Administer analgesics based on type and severity of pain and evaluate response  - Implement non-pharmacological measures as appropriate and evaluate response  - Consider cultural and social influences on pain and pain management  - Manage/alleviate anxiety  - Utilize distraction and/or relaxation techniques  - Monitor for opioid side effects  - Notify MD/LIP if  interventions unsuccessful or patient reports new pain  - Anticipate increased pain with activity and pre-medicate as appropriate  Outcome: Progressing     Problem: SAFETY ADULT - FALL  Goal: Free from fall injury  Description: INTERVENTIONS:  - Assess pt frequently for physical needs  - Identify cognitive and physical deficits and behaviors that affect risk of falls.  - [place] fall precautions as indicated by assessment.  - Educate pt/family on patient safety including physical limitations  - Instruct pt to call for assistance with activity based on assessment  - Modify environment to reduce risk of injury  - Provide assistive devices as appropriate  - Consider OT/PT consult to assist with strengthening/mobility  - Encourage toileting schedule  Outcome: Progressing     Problem: DISCHARGE PLANNING  Goal: Discharge to home or other facility with appropriate resources  Description: INTERVENTIONS:  - Identify barriers to discharge w/pt and caregiver  - Include patient/family/discharge partner in discharge planning  - Arrange for needed discharge resources and transportation as appropriate  - Identify discharge learning needs (meds, wound care, etc)  - Arrange for interpreters to assist at discharge as needed  - Consider post-discharge preferences of patient/family/discharge partner  - Complete POLST form as appropriate  - Assess patient's ability to be responsible for managing their own health  - Refer to Case Management Department for coordinating discharge planning if the patient needs post-hospital services based on physician/LIP order or complex needs related to functional status, cognitive ability or social support system  Outcome: Progressing     Problem: CARDIOVASCULAR - ADULT  Goal: Maintains optimal cardiac output and hemodynamic stability  Description: INTERVENTIONS:  - Monitor vital signs, rhythm, and trends  - Monitor for bleeding, hypotension and signs of decreased cardiac output  - Evaluate  effectiveness of vasoactive medications to optimize hemodynamic stability  - Monitor arterial and/or venous puncture sites for bleeding and/or hematoma  - Assess quality of pulses, skin color and temperature  - Assess for signs of decreased coronary artery perfusion - ex. Angina  - Evaluate fluid balance, assess for edema, trend weights  Outcome: Progressing  Goal: Absence of cardiac arrhythmias or at baseline  Description: INTERVENTIONS:  - Continuous cardiac monitoring, monitor vital signs, obtain 12 lead EKG if indicated  - Evaluate effectiveness of antiarrhythmic and heart rate control medications as ordered  - Initiate emergency measures for life threatening arrhythmias  - Monitor electrolytes and administer replacement therapy as ordered  Outcome: Progressing     Problem: RESPIRATORY - ADULT  Goal: Achieves optimal ventilation and oxygenation  Description: INTERVENTIONS:  - Assess for changes in respiratory status  - Assess for changes in mentation and behavior  - Position to facilitate oxygenation and minimize respiratory effort  - Oxygen supplementation based on oxygen saturation or ABGs  - Provide Smoking Cessation handout, if applicable  - Encourage broncho-pulmonary hygiene including cough, deep breathe, Incentive Spirometry  - Assess the need for suctioning and perform as needed  - Assess and instruct to report SOB or any respiratory difficulty  - Respiratory Therapy support as indicated  - Manage/alleviate anxiety  - Monitor for signs/symptoms of CO2 retention  Outcome: Progressing     Problem: METABOLIC/FLUID AND ELECTROLYTES - ADULT  Goal: Electrolytes maintained within normal limits  Description: INTERVENTIONS:  - Monitor labs and rhythm and assess patient for signs and symptoms of electrolyte imbalances  - Administer electrolyte replacement as ordered  - Monitor response to electrolyte replacements, including rhythm and repeat lab results as appropriate  - Fluid restriction as ordered  - Instruct  patient on fluid and nutrition restrictions as appropriate  Outcome: Progressing  Goal: Hemodynamic stability and optimal renal function maintained  Description: INTERVENTIONS:  - Monitor labs and assess for signs and symptoms of volume excess or deficit  - Monitor intake, output and patient weight  - Monitor urine specific gravity, serum osmolarity and serum sodium as indicated or ordered  - Monitor response to interventions for patient's volume status, including labs, urine output, blood pressure (other measures as available)  - Encourage oral intake as appropriate  - Instruct patient on fluid and nutrition restrictions as appropriate  Outcome: Progressing     Problem: SKIN/TISSUE INTEGRITY - ADULT  Goal: Skin integrity remains intact  Description: INTERVENTIONS  - Assess and document risk factors for pressure ulcer development  - Assess and document skin integrity  - Monitor for areas of redness and/or skin breakdown  - Initiate interventions, skin care algorithm/standards of care as needed  Outcome: Progressing  Goal: Incision(s), wounds(s) or drain site(s) healing without S/S of infection  Description: INTERVENTIONS:  - Assess and document risk factors for pressure ulcer development  - Assess and document skin integrity  - Assess and document dressing/incision, wound bed, drain sites and surrounding tissue  - Implement wound care per orders  - Initiate isolation precautions as appropriate  - Initiate Pressure Ulcer prevention bundle as indicated  Outcome: Progressing     Problem: Impaired Functional Mobility  Goal: Achieve highest/safest level of mobility/gait  Description: Interventions:  - Assess patient's functional ability and stability  - Promote increasing activity/tolerance for mobility and gait  - Educate and engage patient/family in tolerated activity level and precautions    Outcome: Progressing  Ambulated in room/hallway several times. Plan is for her to go home tomorrow, possibly on oral  antibiotics.

## 2024-06-21 NOTE — OCCUPATIONAL THERAPY NOTE
OCCUPATIONAL THERAPY EVALUATION - INPATIENT     Room Number: 570/570-A  Evaluation Date: 6/21/2024  Type of Evaluation: Initial  Presenting Problem: LLE cellulitis    Physician Order: IP Consult to Occupational Therapy  Reason for Therapy: ADL/IADL Dysfunction and Discharge Planning    OCCUPATIONAL THERAPY ASSESSMENT   Patient is a 79 year old female admitted 6/19/2024.  Prior to admission, patient's baseline is mod I-I.  Patient is currently functioning at baseline with ADLs/mobility.  Patient is requiring independent and modified independent.     Patient does not require continued skilled OT Services For duration of hospitalization, given the patient is functioning near baseline level do not anticipate skilled therapy needs at discharge     PLAN  Patient has been evaluated and presents with no skilled Occupational Therapy needs  at this time.  Patient will be discharged from Occupational Therapy services. Please re-order if a new functional limitation presents during this admission.  OT Device Recommendations: Other (Comment) (owns AE needed)    OCCUPATIONAL THERAPY MEDICAL/SOCIAL HISTORY   Problem List  Principal Problem:    Left leg cellulitis  Active Problems:    History of MRSA infection    History of septic shock    HOME SITUATION  Type of Home: Independent living facility  Home Layout: One level  Lives With: Alone; Other (Comment) (was having home health)  Toilet and Equipment: Standard height toilet; Grab bar  Shower/Tub and Equipment: Walk-in shower; Grab bar; Shower chair  Other Equipment: Other (Comment) (walker for short distances)  Drives: Yes    Prior Level of [place]: mod I ADLs, RW for longer distance mobility. In ILF, goes to dining room for most meals, drives    SUBJECTIVE  \"I have been doing well at home\"    OCCUPATIONAL THERAPY EXAMINATION    OBJECTIVE  Precautions: Other (Comment) (dressings on L foot)  Fall Risk: Standard fall risk    WEIGHT BEARING RESTRICTION     PAIN  ASSESSMENT  Ratin        COGNITION  Overall Cognitive Status:  WFL - within functional limits    VISION  Current Vision: no visual deficits  During ADLs    Communication: WFL    Behavioral/Emotional/Social: WFL    RANGE OF MOTION   Upper extremity ROM is within functional limits     STRENGTH ASSESSMENT  Upper extremity strength is within functional limits     COORDINATION  Gross Motor: WFL   Fine Motor: WFL     ACTIVITIES OF DAILY LIVING ASSESSMENT  AM-PAC ‘6-Clicks’ Inpatient Daily Activity Short Form  How much help from another person does the patient currently need…  -   Putting on and taking off regular lower body clothing?: None  -   Bathing (including washing, rinsing, drying)?: None  -   Toileting, which includes using toilet, bedpan or urinal? : None  -   Putting on and taking off regular upper body clothing?: None  -   Taking care of personal grooming such as brushing teeth?: None  -   Eating meals?: None    AM-PAC Score:  Score: 24  Approx Degree of Impairment: 0%  Standardized Score (AM-PAC Scale): 57.54  CMS Modifier (G-Code): CH    FUNCTIONAL TRANSFER ASSESSMENT  Sit to Stand: Edge of Bed; Chair  Edge of Bed: Modified Independent  Chair: Modified Independent  Toilet Transfer: Modified Independent (per pt report)    BED MOBILITY  Supine to Sit : Modified Independent    BALANCE ASSESSMENT  Static Sitting: Independent  Static Standing: Modified Independent    FUNCTIONAL ADL ASSESSMENT  Eating: Independent  Grooming Standing: Modified Independent (simulated with functional reach)  UB Dressing Standing: Contact Guard Assist (donning posterior gown, assist for threading RUE)  LB Dressing Seated: Modified Independent (socks, modified figure four at EOB)  Toileting Standing: Modified Independent (simulated with functional reach)       Skilled Therapy Provided: RN approved session. Handoff to PT following OT eval. Received patient ambulating in room. Vitals: stable. Performed ADLs/functional  mobility/transfers as stated above.  Provided skilled cues/education on OT role, POC, BADL training, compensatory strategies, energy conservation, balance, and patient with good receptiveness. At the end of session, patient left in chair with needs met, and RN aware of session.    EDUCATION PROVIDED  Patient: Role of Occupational Therapy; Plan of Care; Discharge Recommendations; Compensatory ADL Techniques  Patient's Response to Education: Verbalized Understanding; Returned Demonstration    The patient's Approx Degree of Impairment: 0% has been calculated based on documentation in the [facility] '6 clicks' Inpatient Daily Activity Short Form.  Research supports that patients with this level of impairment may benefit from none.  Final disposition will be made by interdisciplinary medical team.    Patient End of Session: Up in chair;Needs met;Call light within reach;RN aware of session/findings;All patient questions and concerns addressed    Patient was able to achieve the following ...   Patient able to toilet transfer  safely and independently    Patient able to dress lower extremities  safely and independently    Patient/Caregiver able to demonstrate safety with ADLS  safely and independently     Patient Evaluation Complexity Level:   Occupational Profile/Medical History LOW - Brief history including review of medical or therapy records    Specific performance deficits impacting engagement in ADL/IADL LOW  1 - 3 performance deficits    Client Assessment/Performance Deficits LOW - No comorbidities nor modifications of tasks    Clinical Decision Making LOW - Analysis of occupational profile, problem-focused assessments, limited treatment options    Overall Complexity LOW     OT Session Time: 20 minutes  SYLVIE Holder/L

## 2024-06-21 NOTE — PHYSICAL THERAPY NOTE
PHYSICAL THERAPY EVALUATION - INPATIENT     Room Number: 570/570-A  Evaluation Date: 6/21/2024  Type of Evaluation: Initial   Physician Order: PT Eval and Treat    Presenting Problem: LLE cellulitis and edema, here for IV antibiotics  Co-Morbidities : recent hospitalization for sepsis after recurrent LLE cellulitis, COPD, a-fib on Eliquis, HTN, obesity,  Reason for Therapy: Mobility Dysfunction and Discharge Planning    PHYSICAL THERAPY ASSESSMENT   Patient is a 79 year old female admitted 6/19/2024 for LLE redness and edema, found to have cellulitis and admitted for IV antibiotics. Pt hospitalized last month and went to rehab, now back home at [facility].  Prior to admission, patient's baseline is independent with a RW, still limited distances.  Patient is currently functioning near baseline with transfers, gait, stair negotiation, standing prolonged periods, and performing household tasks.  Patient is requiring modified independent and supervision as a result of the following impairments: decreased endurance/aerobic capacity, pain, decreased muscular endurance, medical status, and limited ankle ROM and BLE edema needing wraps. Patient is mobilizing at modified I level for mobility in room, supervision for longer distance gait and stoop navigation. She is near her baseline, no skilled hospital PT needs so can be mobile with RN staff, RN and PCT aware    Patient will benefit from continued skilled PT Services at discharge to promote functional independence in home.  Anticipate patient will return home with home health PT.    PLAN  Patient has been evaluated and presents with no skilled Physical Therapy needs at this time.  Patient will be discharged from Physical Therapy services. Please re-order if a new functional limitation presents during this admission.    PHYSICAL THERAPY MEDICAL/SOCIAL HISTORY   History related to current admission: 79-year-old female with past medical history of atrial fibrillation on eliquis,  COPD, hypertension, dyslipidemia, obesity, venous stasis complicated by recurrent left lower extremity cellulitis and with recent mission for septic shock present for evaluation with several days of recurrent left lower extremity redness/swelling in setting of prior MRSA history.  No fevers or chills, nausea or vomiting.  No trauma.  Seen as outpatient with plan for direct admission though without inpatient bed availability.      Problem List  Principal Problem:    Left leg cellulitis  Active Problems:    History of MRSA infection    History of septic shock      HOME SITUATION  Home Situation  Type of Home: Independent living facility  Home Layout: One level  Stairs to Enter : 0  Stairs to Bedroom: 0  Lives With: Alone;Other (Comment) (was having home health)  Drives: Yes  Patient Owned Equipment: Rolling walker;Cane     Prior Level of [place]: Pt reports independence at home with cane or RW. She drives and goes to the store but can't walk too far and uses a cart at grocery store.    SUBJECTIVE  \"I came in sooner this time so I don't have sepsis\"    PHYSICAL THERAPY EXAMINATION   OBJECTIVE  Precautions: Limb alert - right;Limb alert - left (compresion dressings BLE)  Fall Risk: Standard fall risk    WEIGHT BEARING RESTRICTION  Weight Bearing Restriction: None                PAIN ASSESSMENT  Ratin  Location: no complaints of pain  Management Techniques: Activity promotion  COGNITION  Overall Cognitive Status:  WFL - within functional limits    RANGE OF MOTION AND STRENGTH ASSESSMENT  Upper extremity ROM and strength are within functional limits   Lower extremity ROM is within functional limits except ankles slightly limited due to ace wraps and edema  Lower extremity strength is within functional limits but limited at ankles by swelling    BALANCE  Static Sitting: Normal  Dynamic Sitting: Normal  Static Standing: Fair +  Dynamic Standing: Fair -    ACTIVITY TOLERANCE  Pulse: 68  Heart Rate Source: Monitor      BP: 108/67  BP Location: Right arm  BP Method: Automatic  Patient Position: Standing    O2 WALK  Oxygen Therapy  SPO2% on Room Air at Rest: 98  SPO2% Ambulation on Room Air: 97    AM-PAC '6-Clicks' INPATIENT SHORT FORM - BASIC MOBILITY  How much difficulty does the patient currently have...  Patient Difficulty: Turning over in bed (including adjusting bedclothes, sheets and blankets)?: None   Patient Difficulty: Sitting down on and standing up from a chair with arms (e.g., wheelchair, bedside commode, etc.): None   Patient Difficulty: Moving from lying on back to sitting on the side of the bed?: None   How much help from another person does the patient currently need...   Help from Another: Moving to and from a bed to a chair (including a wheelchair)?: None   Help from Another: Need to walk in hospital room?: None   Help from Another: Climbing 3-5 steps with a railing?: A Little     AM-PAC Score:  Raw Score: 23   Approx Degree of Impairment: 11.2%   Standardized Score (AM-PAC Scale): 56.93   CMS Modifier (G-Code): CI    FUNCTIONAL ABILITY STATUS  Functional Mobility/Gait Assessment  Gait Assistance: Supervision;Modified independent  Distance (ft): 200  Assistive Device: Rolling walker  Pattern: Within Functional Limits (education that she should be wearing protective well fitting shoes to prevent skin breakdown. Patient reports her podiatrist told her the same thing)  Rolling: independent  Supine to Sit: independent  Sit to Supine: independent  Sit to Stand: modified independent    Exercise/Education Provided:  Body mechanics  Energy conservation  Functional activity tolerated  Gait training  Neuromuscular re-educate  Strengthening  Transfer training    THIEN Weston approves participation in therapy session. She presents up walking in room with RW and agreeable to work with therapy. Education and discussion about need for more supportive footwear to protect her feet as she has had a wound re-open. She report she is  working on this but good shoes have been hard to find. She is educated about the importance of short frequent bouts of activity and walking in the halls several times a day, elevating LE's to help manage edema with good understanding. All questions answered, encouraged pt to walk several times a day with RN staff. Planned dc home tomorrow per pt.    The patient's Approx Degree of Impairment: 11.2% has been calculated based on documentation in the [facility] '6 clicks' Inpatient Basic Mobility Short Form.  Research supports that patients with this level of impairment may benefit from home with no needs. However, patient is normally independent without any devices so recommend home with HHPT to progress to independent without a device.  Final disposition will be made by interdisciplinary medical team.    Patient End of Session: Up in chair;Needs met;Call light within reach;RN aware of session/findings;All patient questions and concerns addressed;Discussed recommendations with /    Patient was able to achieve the following ...   Patient able to transfer  Safely and independently    Patient able to ambulate on level surfaces  Safely and independently     Patient Evaluation Complexity Level:  History High - 3 or more personal factors and/or co-morbidities   Examination of body systems Moderate - addressing a total of 3 or more elements   Clinical Presentation Low- Stable   Clinical Decision Making  Low Complexity     Gait Training: 15 minutes  Therapeutic Activity:  18 minutes

## 2024-06-21 NOTE — PROGRESS NOTES
[facility] Hospitalist Progress Note     CC: Hospital Follow up    PCP: Maral Huffman MD       Assessment/Plan:     Ms. Covington is a 79 year old female with PMH sig for COPD, A- Fib on Eliquis, CHF, HTN, HLD, PAD, Neuropathy, SUSAN prior hx of septic shock 2/2 cellulitis who presents with LLE celluitis     LLE Cellulitis,  Hx MRSA positive   - started on IV vancomycin, IV cefepime was added 6/20  - had MRI last admission without OM  - continue IV antibiotics today  - maybe can switch to orals soon and at discharge.      L foot wound  - wound care  - possible site of innoculation     Chronic  A-fib  Secondary Hypercoagulable state   S/p Cardioversion   - Eliquis   - she is no longer on amidarone  - she is on diltiazem 240mg daily and metoprolol 50mg for rate control     Chronic Diastolic HF   - Echo with normal EF  - continue home meds: bumex, aldactone     H/o COPD not on inhalers   SUSAN  - cpap as tolerated      HTN   - BP stable     PAD/HLD   - statin      Neuropathy   - gabapentin      Hx Urinary Retention   - required ivey last admission     - Diet: cardiac     DVT Prophy: is on eliquis     Dispo: home tomorrow most likely      [facility]   [facility] Hospitalist      Subjective:     Feels better. No fevers.     OBJECTIVE:    Blood pressure 108/67, pulse 68, temperature 98.1 °F (36.7 °C), temperature source Oral, resp. rate 18, height 5' 2\" (1.575 m), weight 255 lb 12.8 oz (116 kg), SpO2 96%.    Temp:  [97.7 °F (36.5 °C)-98.2 °F (36.8 °C)] 98.1 °F (36.7 °C)  Pulse:  [65-74] 68  Resp:  [18-20] 18  BP: (107-129)/(55-67) 108/67  SpO2:  [95 %-99 %] 96 %      Intake/Output:    Intake/Output Summary (Last 24 hours) at 6/21/2024 1337  Last data filed at 6/20/2024 1857  Gross per 24 hour   Intake 120 ml   Output --   Net 120 ml       Last 3 Weights   06/20/24 0543 255 lb 12.8 oz (116 kg)   06/19/24 2134 258 lb 4.8 oz (117.2 kg)   05/06/24 0500 261 lb 3.9 oz (118.5 kg)   05/05/24 0600 257 lb 8 oz (116.8 kg)   05/04/24  0324 261 lb 3.9 oz (118.5 kg)   05/03/24 1837 261 lb 9.6 oz (118.7 kg)   05/04/24 1456 261 lb 3.9 oz (118.5 kg)       Exam   GEN: obese female in NAD  Pulm: CTAB  CV: RRR, no murmurs  ABD: Soft, non-tender  MSK: leg wrapped but pain is less severe  SKIN: warm, dry, chronic venous stasis changes BLE  EXT: trace edema      Data Review:       Labs:     Recent Labs   Lab 06/19/24 1749 06/20/24  0538 06/21/24  0558   RBC 4.01 3.46* 3.56*   HGB 12.3 10.9* 11.1*   HCT 38.6 32.9* 33.4*   MCV 96.3 95.1 93.8   MCH 30.7 31.5 31.2   MCHC 31.9 33.1 33.2   RDW 15.6* 15.7* 15.5*   NEPRELIM 4.37  --   --    WBC 6.5 5.3 5.4   .0 230.0 223.0         Recent Labs   Lab 06/19/24 1749 06/20/24  0538 06/21/24  0558   GLU 89 83 98   BUN 16 12 10   CREATSERUM 0.96 0.80 0.73   EGFRCR 61 76 85   CA 9.0 8.3* 8.7    143 141   K 4.6 3.9 3.6    110 108   CO2 28.0 27.0 30.0       No results for input(s): \"ALT\", \"AST\", \"ALB\", \"AMYLASE\", \"LIPASE\", \"LDH\" in the last 168 hours.    Invalid input(s): \"ALPHOS\", \"TBIL\", \"DBIL\", \"TPROT\"      Imaging:  No results found.      Meds:     INPATIENT MEDICATIONS    Scheduled Medications:      docusate sodium, 100 mg, BID  polyethylene glycol (PEG 3350), 17 g, Daily  cefepime, 1 g, Q12H  apixaban, 5 mg, BID  bumetanide, 1 mg, Daily  dilTIAZem ER, 240 mg, Daily  gabapentin, 300 mg, BID  metoprolol succinate ER, 50 mg, Daily  pantoprazole, 40 mg, QAM AC  pravastatin, 20 mg, Nightly  spironolactone, 25 mg, Daily  vancomycin, 10 mg/kg, Q24H            Drips:       PRN Medications  hydrOXYzine, 25 mg, TID PRN  acetaminophen, 500 mg, Q4H PRN  ondansetron, 4 mg, Q6H PRN  metoclopramide, 5 mg, Q8H PRN  glycerin-hypromellose-, 1 drop, QID PRN

## 2024-06-21 NOTE — PROGRESS NOTES
[facility]  part of [facility] Infectious Disease  Progress Note    Adore Covington Patient Status:  Inpatient    1945 MRN [id number]   Location [facility] 5SW/SE Attending Yolanda Judd, DO   Hosp Day # 2 PCP Maral Huffman MD     Subjective:    Patient seen and examined sitting up in the chair, states she feels her legs are \"more pink today\" Tolerating the IV abx     Review of Systems:  Review of systems reviewed and negative except as mentioned    Objective:  Blood pressure 129/67, pulse 72, temperature 98.1 °F (36.7 °C), temperature source Oral, resp. rate 18, height 5' 2\" (1.575 m), weight 255 lb 12.8 oz (116 kg), SpO2 96%.      Physical Exam:  General: Awake, alert, non-tox, NAD.  HEENT:  Oropharynx clear, trachea ML.  Heart: RRR S1S2 no murmurs.  Lungs: Essentially CTA b/l, no rhonchi, rales, wheezes.  Abdomen: Soft, NT/ND.  BS present.  No organomegaly.  Extremity: +LLE erythema with improvement +ttp, improving  Neurological: No focal deficits.  Derm:  Warm, dry, free from rashes.    Lab Data Review:  Lab Results   Component Value Date    WBC 5.4 2024    HGB 11.1 2024    HCT 33.4 2024    .0 2024    CREATSERUM 0.73 2024    BUN 10 2024     2024    K 3.6 2024     2024    CO2 30.0 2024    GLU 98 2024    CA 8.7 2024        Cultures:  Hospital Encounter on 24   1. Blood Culture     Status: None (Preliminary result)    Collection Time: 24  5:49 PM    Specimen: Blood,peripheral   Result Value Ref Range    Blood Culture Result No Growth 1 Day N/A         Assessment and Plan:    Left Leg Cellulitis in the presence of Multiple open wounds and venous stasis,  - Drainage Cul in the past with MRSA and PSAR,    - Wounds are superficial, no probe to bone,   - Most recently finished a course of cefadroxil at the end of May   - Blood cul are NGTD  - On IV Vanc and  cefepime      Hx of Bilateral TKA  - Infection needs to be treated aggressively,     Recommendations  - Continue IV abx as Rx anticipate dc on po cefadroxil when ready  - Continue elevation and compression   - Follow pending cultures  - Repeat labs tomorrow  - Further recommendations to follow    Plan of care discussed with Dr. Mcdermott, she is in agreement with the plan of care.        If you have any questions or concerns please call [facility] Infectious Disease at [phone].     JUN Armstrong    6/21/2024  11:56 AM

## 2024-06-22 VITALS
SYSTOLIC BLOOD PRESSURE: 112 MMHG | TEMPERATURE: 98 F | BODY MASS INDEX: 47.07 KG/M2 | HEART RATE: 66 BPM | HEIGHT: 62 IN | OXYGEN SATURATION: 98 % | RESPIRATION RATE: 20 BRPM | DIASTOLIC BLOOD PRESSURE: 54 MMHG | WEIGHT: 255.81 LBS

## 2024-06-22 LAB
ANION GAP SERPL CALC-SCNC: 7 MMOL/L (ref 0–18)
BUN BLD-MCNC: 8 MG/DL (ref 9–23)
BUN/CREAT SERPL: 10.8 (ref 10–20)
CALCIUM BLD-MCNC: 8.8 MG/DL (ref 8.7–10.4)
CHLORIDE SERPL-SCNC: 109 MMOL/L (ref 98–112)
CO2 SERPL-SCNC: 26 MMOL/L (ref 21–32)
CREAT BLD-MCNC: 0.74 MG/DL
EGFRCR SERPLBLD CKD-EPI 2021: 83 ML/MIN/1.73M2 (ref 60–?)
GLUCOSE BLD-MCNC: 87 MG/DL (ref 70–99)
OSMOLALITY SERPL CALC.SUM OF ELEC: 292 MOSM/KG (ref 275–295)
POTASSIUM SERPL-SCNC: 3.9 MMOL/L (ref 3.5–5.1)
SODIUM SERPL-SCNC: 142 MMOL/L (ref 136–145)

## 2024-06-22 PROCEDURE — 80048 BASIC METABOLIC PNL TOTAL CA: CPT | Performed by: HOSPITALIST

## 2024-06-22 RX ORDER — DOXYCYCLINE HYCLATE 100 MG/1
100 CAPSULE ORAL 2 TIMES DAILY
Qty: 14 CAPSULE | Refills: 0 | Status: ON HOLD | OUTPATIENT
Start: 2024-06-23 | End: 2024-06-30

## 2024-06-22 RX ORDER — CEFADROXIL 500 MG/1
500 CAPSULE ORAL 2 TIMES DAILY
Qty: 20 CAPSULE | Refills: 0 | Status: SHIPPED | OUTPATIENT
Start: 2024-06-22 | End: 2024-06-22

## 2024-06-22 RX ORDER — LEVOFLOXACIN 750 MG/1
750 TABLET, FILM COATED ORAL DAILY
Qty: 7 TABLET | Refills: 0 | Status: ON HOLD | OUTPATIENT
Start: 2024-06-23 | End: 2024-06-30

## 2024-06-22 NOTE — DISCHARGE PLANNING
Discharge order in per MD. ID consult okay with discharge. Discharge instructions given verbally and in writing. PIV removed. Patient educated on important follow ups. Patient discharge home with home health. Patient provided home health number. Patient discharged home via family and private vehicle.

## 2024-06-22 NOTE — PLAN OF CARE
Problem: Patient Centered Care  Goal: Patient preferences are identified and integrated in the patient's plan of care  Description: Interventions:  - What would you like us to know as we care for you? 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  - Provide timely, complete, and accurate information to patient/family  - Incorporate patient and family knowledge, values, beliefs, and cultural backgrounds into the planning and delivery of care  - Encourage patient/family to participate in care and decision-making at the level they choose  - Honor patient and family perspectives and choices  Outcome: Adequate for Discharge     Problem: Patient/Family Goals  Goal: Patient/Family Long Term Goal  Description: Patient's Long Term Goal: To return home    Interventions:  - Monitor vital signs and labs  - IV antibiotics per MD order  - Wound care consult  - Tele monitoring/pulse ox per MD order  - CPAP at night  - See additional Care Plan goals for specific interventions  Outcome: Adequate for Discharge  Goal: Patient/Family Short Term Goal  Description: Patient's Short Term Goal: For the redness in my legs to go away    Interventions:   - Monitor vital signs and labs  - IV antibiotics per MD order  - Wound care consult  - Tele monitoring/pulse ox per MD order  - CPAP at night  - See additional Care Plan goals for specific interventions  Outcome: Adequate for Discharge     Problem: PAIN - ADULT  Goal: Verbalizes/displays adequate comfort level or patient's stated pain goal  Description: INTERVENTIONS:  - Encourage pt to monitor pain and request assistance  - Assess pain using appropriate pain scale  - Administer analgesics based on type and severity of pain and evaluate response  - Implement non-pharmacological measures as appropriate and evaluate response  - Consider cultural and social influences on pain and pain management  - Manage/alleviate anxiety  - Utilize distraction and/or relaxation techniques  - Monitor for opioid  side effects  - Notify MD/LIP if interventions unsuccessful or patient reports new pain  - Anticipate increased pain with activity and pre-medicate as appropriate  Outcome: Adequate for Discharge     Problem: SAFETY ADULT - FALL  Goal: Free from fall injury  Description: INTERVENTIONS:  - Assess pt frequently for physical needs  - Identify cognitive and physical deficits and behaviors that affect risk of falls.  - [place] fall precautions as indicated by assessment.  - Educate pt/family on patient safety including physical limitations  - Instruct pt to call for assistance with activity based on assessment  - Modify environment to reduce risk of injury  - Provide assistive devices as appropriate  - Consider OT/PT consult to assist with strengthening/mobility  - Encourage toileting schedule  Outcome: Adequate for Discharge     Problem: DISCHARGE PLANNING  Goal: Discharge to home or other facility with appropriate resources  Description: INTERVENTIONS:  - Identify barriers to discharge w/pt and caregiver  - Include patient/family/discharge partner in discharge planning  - Arrange for needed discharge resources and transportation as appropriate  - Identify discharge learning needs (meds, wound care, etc)  - Arrange for interpreters to assist at discharge as needed  - Consider post-discharge preferences of patient/family/discharge partner  - Complete POLST form as appropriate  - Assess patient's ability to be responsible for managing their own health  - Refer to Case Management Department for coordinating discharge planning if the patient needs post-hospital services based on physician/LIP order or complex needs related to functional status, cognitive ability or social support system  Outcome: Adequate for Discharge     Problem: CARDIOVASCULAR - ADULT  Goal: Maintains optimal cardiac output and hemodynamic stability  Description: INTERVENTIONS:  - Monitor vital signs, rhythm, and trends  - Monitor for bleeding,  hypotension and signs of decreased cardiac output  - Evaluate effectiveness of vasoactive medications to optimize hemodynamic stability  - Monitor arterial and/or venous puncture sites for bleeding and/or hematoma  - Assess quality of pulses, skin color and temperature  - Assess for signs of decreased coronary artery perfusion - ex. Angina  - Evaluate fluid balance, assess for edema, trend weights  Outcome: Adequate for Discharge  Goal: Absence of cardiac arrhythmias or at baseline  Description: INTERVENTIONS:  - Continuous cardiac monitoring, monitor vital signs, obtain 12 lead EKG if indicated  - Evaluate effectiveness of antiarrhythmic and heart rate control medications as ordered  - Initiate emergency measures for life threatening arrhythmias  - Monitor electrolytes and administer replacement therapy as ordered  Outcome: Adequate for Discharge     Problem: RESPIRATORY - ADULT  Goal: Achieves optimal ventilation and oxygenation  Description: INTERVENTIONS:  - Assess for changes in respiratory status  - Assess for changes in mentation and behavior  - Position to facilitate oxygenation and minimize respiratory effort  - Oxygen supplementation based on oxygen saturation or ABGs  - Provide Smoking Cessation handout, if applicable  - Encourage broncho-pulmonary hygiene including cough, deep breathe, Incentive Spirometry  - Assess the need for suctioning and perform as needed  - Assess and instruct to report SOB or any respiratory difficulty  - Respiratory Therapy support as indicated  - Manage/alleviate anxiety  - Monitor for signs/symptoms of CO2 retention  Outcome: Adequate for Discharge     Problem: METABOLIC/FLUID AND ELECTROLYTES - ADULT  Goal: Electrolytes maintained within normal limits  Description: INTERVENTIONS:  - Monitor labs and rhythm and assess patient for signs and symptoms of electrolyte imbalances  - Administer electrolyte replacement as ordered  - Monitor response to electrolyte replacements,  including rhythm and repeat lab results as appropriate  - Fluid restriction as ordered  - Instruct patient on fluid and nutrition restrictions as appropriate  Outcome: Adequate for Discharge  Goal: Hemodynamic stability and optimal renal function maintained  Description: INTERVENTIONS:  - Monitor labs and assess for signs and symptoms of volume excess or deficit  - Monitor intake, output and patient weight  - Monitor urine specific gravity, serum osmolarity and serum sodium as indicated or ordered  - Monitor response to interventions for patient's volume status, including labs, urine output, blood pressure (other measures as available)  - Encourage oral intake as appropriate  - Instruct patient on fluid and nutrition restrictions as appropriate  Outcome: Adequate for Discharge     Problem: SKIN/TISSUE INTEGRITY - ADULT  Goal: Skin integrity remains intact  Description: INTERVENTIONS  - Assess and document risk factors for pressure ulcer development  - Assess and document skin integrity  - Monitor for areas of redness and/or skin breakdown  - Initiate interventions, skin care algorithm/standards of care as needed  Outcome: Adequate for Discharge  Goal: Incision(s), wounds(s) or drain site(s) healing without S/S of infection  Description: INTERVENTIONS:  - Assess and document risk factors for pressure ulcer development  - Assess and document skin integrity  - Assess and document dressing/incision, wound bed, drain sites and surrounding tissue  - Implement wound care per orders  - Initiate isolation precautions as appropriate  - Initiate Pressure Ulcer prevention bundle as indicated  Outcome: Adequate for Discharge     Problem: Impaired Functional Mobility  Goal: Achieve highest/safest level of mobility/gait  Description: Interventions:  - Assess patient's functional ability and stability  - Promote increasing activity/tolerance for mobility and gait  - Educate and engage patient/family in tolerated activity level and  precautions    Outcome: Adequate for Discharge

## 2024-06-22 NOTE — DISCHARGE INSTRUCTIONS
Sometimes managing your health at home requires assistance.  The [facility] team has recognized your preference to use [facility], Phone: [phone] or Fax: [phone]. A representative from the home health agency will contact you or your family to schedule your first visit.

## 2024-06-22 NOTE — PROGRESS NOTES
Infectious Disease Progress Note      Date of admission: 6/19/2024  5:08 PM     Reason for consult: Left leg cellulitis in the setting of chronic venous stasis ulcerations    Subjective: Feels well.  Cellulitis is improving.  No nausea or vomiting.  No diarrhea.    The rest of the systems were reviewed and found to be negative except was mentioned above    Medications:    docusate sodium    polyethylene glycol (PEG 3350)    hydrOXYzine    cefepime    acetaminophen    ondansetron    metoclopramide    apixaban    bumetanide    glycerin-hypromellose-    dilTIAZem ER    gabapentin    metoprolol succinate ER    pantoprazole    pravastatin    spironolactone    vancomycin     Allergies:  No Active Allergies    Physical Exam:  Vitals:    06/22/24 0910   BP: 112/54   Pulse: 66   Resp: 20   Temp: 97.6 °F (36.4 °C)     Vitals signs and nursing note reviewed.   Constitutional:       Appearance: Normal appearance.   HENT:      Head: Normocephalic and atraumatic.      Mouth: Mucous membranes are moist.   Neck:      Musculoskeletal: Neck supple.   Cardiovascular:      Rate and Rhythm: Normal rate.   Pulmonary:      Effort: Pulmonary effort is normal. No respiratory distress.   Musculoskeletal:      Right lower leg: +2 edema.      Left lower leg: +2 edema.  Improving cellulitis noted  Skin:     General: Skin is warm and dry.   Neurological:      General: No focal deficit present.      Mental Status: Alert and oriented to person, place, and time.       Laboratory data:  I have reviewed all the lab results independently.  Lab Results   Component Value Date    CREATSERUM 0.74 06/22/2024    BUN 8 06/22/2024     06/22/2024    K 3.9 06/22/2024     06/22/2024    CO2 26.0 06/22/2024    GLU 87 06/22/2024    CA 8.8 06/22/2024      Recent Labs   Lab 06/19/24  1749 06/20/24  0538 06/21/24  0558   RBC 4.01   < > 3.56*   HGB 12.3   < > 11.1*   HCT 38.6   < > 33.4*   MCV 96.3   < > 93.8   MCH 30.7   < > 31.2   MCHC 31.9   < >  33.2   RDW 15.6*   < > 15.5*   NEPRELIM 4.37  --   --    WBC 6.5   < > 5.4   .0   < > 223.0    < > = values in this interval not displayed.      Microbiology data:  Hospital Encounter on 06/19/24   1. Blood Culture     Status: None (Preliminary result)    Collection Time: 06/19/24  5:49 PM    Specimen: Blood,peripheral   Result Value Ref Range    Blood Culture Result No Growth 2 Days N/A      Susceptibility     Pseudomonas aeruginosa Staphylococcus aureus, MRSA     Not Specified Not Specified    Cefazolin    Resistant    Cefepime 4 Sensitive      Ceftazidime 2 Sensitive      Ciprofloxacin <=1 Sensitive      Clindamycin    Resistant    Erythromycin   >=8 Resistant    Gentamicin   <=0.5 Sensitive    Levofloxacin 1 Sensitive >=8 Resistant    Meropenem 2 Sensitive      Oxacillin   >=4 Resistant    Piperacillin + Tazobactam 8 Sensitive      Tetracycline   <=1 Sensitive    Tobramycin 2 Sensitive      Trimethoprim/Sulfa   <=10 Sensitive    Vancomycin   <=0.5 Sensitive      Impression:  Adore Covington is a 79 year old female with    Left lower extremity cellulitis in the setting of multiple open wounds and venous stasis with threat to limb  In the past, the patient had grown MRSA and Pseudomonas aeruginosa  Currently on IV vancomycin and IV cefepime  Most recently, had received a course of cefadroxil back in May    Recommendations:    Continue IV cefepime IV vancomycin for now  Discharge the patient on levofloxacin 750 mg daily along with doxycycline 100 mg twice daily to finish a 10-day course of antibiotics  Aggressive wound care  Follow-up with infectious disease in 1 week, on Thursday    The plan of care was discussed with the primary hospital team, Yolanda Judd DO     Recommendations were also discussed with the patient; all questions were answered.     Thank you for this consultation. Please don't hesitate to call the ID team for questions or any acute changes in patient's clinical condition.    Please note  that this report has been produced using speech recognition software and may contain errors related to that system including, but not limited to, errors in grammar, punctuation, and spelling, as well as words and phrases that possibly may have been recognized inappropriately.  If there are any questions or concerns, contact the dictating provider for clarification.    The  [employer] Cures Act makes medical notes like these available to patients in the interest of transparency. Please be advised this is a medical document. Medical documents are intended to carry relevant information, facts as evident, and the clinical opinion of the practitioner. The medical note is intended as peer to peer communication and may appear blunt or direct. It is written in medical language and may contain abbreviations or verbiage that are unfamiliar.     Julia Beckman MD  [facility]. Tel: [phone]. Fax: [phone].     Adore Covington : 1945 MRN: [id number] [facility]: [id number]

## 2024-06-22 NOTE — CM/SW NOTE
06/22/24 1200   Discharge disposition   Expected discharge disposition Home-Health   Post Acute Care Provider   ([facility])   Discharge transportation Private car     Pt discussed during nursing rounds. Pt is stable for [place] today. MD gallo order entered. [place] [facility] [facility] will resume RN and PT services, agency notified of [place] home today. Pt's son will provide transport at [place].     [place] [facility].    / to remain available for support and/or discharge planning.     STACIE Bliss    [phone]

## 2024-06-22 NOTE — DISCHARGE SUMMARY
General Medicine Discharge Summary     Patient ID:  Adore Covington  79 year old  1/30/1945    Admit date: 6/19/2024    Discharge date and time: 6/22/24    Attending Physician: Yolanda Judd DO     Primary Care Physician: Maral Huffman MD     Discharge Diagnoses:   Left leg cellulitis [L03.116]  History of septic shock [Z86.19]  History of MRSA infection [Z86.14]    Discharge Condition: stable    Disposition: home     Important Follow up:  Maral Huffman MD  [address]  [address]  [facility] [id number] [phone]    Follow up on 6/27/2024 6/27 at 12:20    Julia Beckman MD  [address]  [place] [id number] [phone]    Follow up on 7/11/2024 7/11 at 12;50    Hospital Course:    Ms. Covington is a 79 year old female with PMH sig for COPD, A- Fib on Eliquis, CHF, HTN, HLD, PAD, Neuropathy, SUSAN prior hx of septic shock 2/2 cellulitis who presents with LLE celluitis     LLE Cellulitis,  Hx MRSA positive   - started on IV vancomycin, IV cefepime was added 6/20  - had MRI last admission without OM  - discharge with oral levaquin and oral doxycycline to complete 10 days of antibiotics     L foot wound  - wound care  - possible site of innoculation     Chronic  A-fib  Secondary Hypercoagulable state   S/p Cardioversion   - Eliquis   - she is no longer on amidarone  - she is on diltiazem 240mg daily and metoprolol 50mg for rate control     Chronic Diastolic HF   - Echo with normal EF  - continue home meds: bumex, aldactone     H/o COPD not on inhalers   SUSAN  - cpap as tolerated      HTN   - BP stable     PAD/HLD   - statin      Neuropathy   - gabapentin      Hx Urinary Retention   - required ivey last admission    Consults:   CONSULT TO WOUND OSTOMY  IP CONSULT TO PHARMACY  IP CONSULT TO INFECTIOUS DISEASE  IP CONSULT TO SOCIAL WORK    Patient instructions:        Current Discharge Medication List        START taking these medications     Details   levoFLOXacin 750 MG Oral Tab Take 1 tablet (750 mg total) by mouth daily for 7 days.      doxycycline 100 MG Oral Cap Take 1 capsule (100 mg total) by mouth 2 (two) times daily for 7 days.           CONTINUE these medications which have NOT CHANGED    Details   acetaminophen 325 MG Oral Tab Take 2 tablets (650 mg total) by mouth every 6 (six) hours as needed for Pain.      polyethylene glycol, PEG 3350, 17 g Oral Powd Pack Take 17 g by mouth daily as needed.      bumetanide 1 MG Oral Tab Take 1 tablet (1 mg total) by mouth daily.      spironolactone 25 MG Oral Tab Take 1 tablet (25 mg total) by mouth daily.      cholecalciferol 50 MCG (2000 UT) Oral Cap Take 1 capsule (2,000 Units total) by mouth daily. Patient takes 2000 units daily      cyanocobalamin 1000 MCG Oral Tab Take 1 tablet (1,000 mcg total) by mouth daily.      dilTIAZem HCl  MG Oral Capsule SR 24 Hr Take 1 capsule (240 mg total) by mouth daily.      pantoprazole 40 MG Oral Tab EC Take 1 tablet (40 mg total) by mouth every morning before breakfast.      apixaban 5 MG Oral Tab Take 1 tablet (5 mg total) by mouth 2 (two) times daily.      potassium chloride 20 MEQ Oral Tab CR Take 0.5 tablets (10 mEq total) by mouth daily.      cycloSPORINE 0.05 % Ophthalmic Emulsion Place 1 drop into both eyes 2 (two) times daily.      triamcinolone 0.1 % External Cream Apply 1 Application topically 2 (two) times daily as needed.      simvastatin 10 MG Oral Tab Take 1 tablet (10 mg total) by mouth daily.      gabapentin 100 MG Oral Cap Take 2 capsules (200 mg total) by mouth 2 (two) times daily. Patient takes 3 capsules (300 mg) BID      metoprolol succinate  MG Oral Tablet 24 Hr Take 1 tablet (100 mg total) by mouth daily. Patient takes 0.5 tablet per day      sennosides 17.2 MG Oral Tab Take 1 tablet (17.2 mg total) by mouth 2 (two) times daily as needed (constipation, as needed if no bowel movement that day).      docusate sodium 100 MG Oral Cap  Take 1 capsule (100 mg total) by mouth 2 (two) times daily as needed for constipation.      bisacodyl 10 MG Rectal Suppos Place 1 suppository (10 mg total) rectally daily as needed.           STOP taking these medications       cefadroxil 500 MG Oral Cap            Code Status: DNAR/Selective Treatment    Exam on day of discharge:     Vitals:    06/22/24 0910   BP: 112/54   Pulse: 66   Resp: 20   Temp: 97.6 °F (36.4 °C)       General: no acute distress  Heart: RRR  Lungs: clear bilaterally, no active wheezing  Abdomen: nontender  Extremities: left leg erythema, no active drainage. No warmth     Total time coordinating care for discharge: Greater than 30 minutes    Yolanda Judd DO

## 2024-06-22 NOTE — PLAN OF CARE
Dressings changed to left leg and foot. IV antibiotics continued. Call light within reach, non-slip socks on, frequent rounding done.    Problem: Patient Centered Care  Goal: Patient preferences are identified and integrated in the patient's plan of care  Description: Interventions:  - What would you like us to know as we care for you? 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  - Provide timely, complete, and accurate information to patient/family  - Incorporate patient and family knowledge, values, beliefs, and cultural backgrounds into the planning and delivery of care  - Encourage patient/family to participate in care and decision-making at the level they choose  - Honor patient and family perspectives and choices  Outcome: Progressing     Problem: Patient/Family Goals  Goal: Patient/Family Long Term Goal  Description: Patient's Long Term Goal: To return home    Interventions:  - Monitor vital signs and labs  - IV antibiotics per MD order  - Wound care consult  - Tele monitoring/pulse ox per MD order  - CPAP at night  - See additional Care Plan goals for specific interventions  Outcome: Progressing  Goal: Patient/Family Short Term Goal  Description: Patient's Short Term Goal: For the redness in my legs to go away    Interventions:   - Monitor vital signs and labs  - IV antibiotics per MD order  - Wound care consult  - Tele monitoring/pulse ox per MD order  - CPAP at night  - See additional Care Plan goals for specific interventions  Outcome: Progressing     Problem: PAIN - ADULT  Goal: Verbalizes/displays adequate comfort level or patient's stated pain goal  Description: INTERVENTIONS:  - Encourage pt to monitor pain and request assistance  - Assess pain using appropriate pain scale  - Administer analgesics based on type and severity of pain and evaluate response  - Implement non-pharmacological measures as appropriate and evaluate response  - Consider cultural and social influences on pain and pain  management  - Manage/alleviate anxiety  - Utilize distraction and/or relaxation techniques  - Monitor for opioid side effects  - Notify MD/LIP if interventions unsuccessful or patient reports new pain  - Anticipate increased pain with activity and pre-medicate as appropriate  Outcome: Progressing     Problem: SAFETY ADULT - FALL  Goal: Free from fall injury  Description: INTERVENTIONS:  - Assess pt frequently for physical needs  - Identify cognitive and physical deficits and behaviors that affect risk of falls.  - Evant fall precautions as indicated by assessment.  - Educate pt/family on patient safety including physical limitations  - Instruct pt to call for assistance with activity based on assessment  - Modify environment to reduce risk of injury  - Provide assistive devices as appropriate  - Consider OT/PT consult to assist with strengthening/mobility  - Encourage toileting schedule  Outcome: Progressing     Problem: DISCHARGE PLANNING  Goal: Discharge to home or other facility with appropriate resources  Description: INTERVENTIONS:  - Identify barriers to discharge w/pt and caregiver  - Include patient/family/discharge partner in discharge planning  - Arrange for needed discharge resources and transportation as appropriate  - Identify discharge learning needs (meds, wound care, etc)  - Arrange for interpreters to assist at discharge as needed  - Consider post-discharge preferences of patient/family/discharge partner  - Complete POLST form as appropriate  - Assess patient's ability to be responsible for managing their own health  - Refer to Case Management Department for coordinating discharge planning if the patient needs post-hospital services based on physician/LIP order or complex needs related to functional status, cognitive ability or social support system  Outcome: Progressing     Problem: CARDIOVASCULAR - ADULT  Goal: Maintains optimal cardiac output and hemodynamic stability  Description:  INTERVENTIONS:  - Monitor vital signs, rhythm, and trends  - Monitor for bleeding, hypotension and signs of decreased cardiac output  - Evaluate effectiveness of vasoactive medications to optimize hemodynamic stability  - Monitor arterial and/or venous puncture sites for bleeding and/or hematoma  - Assess quality of pulses, skin color and temperature  - Assess for signs of decreased coronary artery perfusion - ex. Angina  - Evaluate fluid balance, assess for edema, trend weights  Outcome: Progressing  Goal: Absence of cardiac arrhythmias or at baseline  Description: INTERVENTIONS:  - Continuous cardiac monitoring, monitor vital signs, obtain 12 lead EKG if indicated  - Evaluate effectiveness of antiarrhythmic and heart rate control medications as ordered  - Initiate emergency measures for life threatening arrhythmias  - Monitor electrolytes and administer replacement therapy as ordered  Outcome: Progressing     Problem: RESPIRATORY - ADULT  Goal: Achieves optimal ventilation and oxygenation  Description: INTERVENTIONS:  - Assess for changes in respiratory status  - Assess for changes in mentation and behavior  - Position to facilitate oxygenation and minimize respiratory effort  - Oxygen supplementation based on oxygen saturation or ABGs  - Provide Smoking Cessation handout, if applicable  - Encourage broncho-pulmonary hygiene including cough, deep breathe, Incentive Spirometry  - Assess the need for suctioning and perform as needed  - Assess and instruct to report SOB or any respiratory difficulty  - Respiratory Therapy support as indicated  - Manage/alleviate anxiety  - Monitor for signs/symptoms of CO2 retention  Outcome: Progressing     Problem: METABOLIC/FLUID AND ELECTROLYTES - ADULT  Goal: Electrolytes maintained within normal limits  Description: INTERVENTIONS:  - Monitor labs and rhythm and assess patient for signs and symptoms of electrolyte imbalances  - Administer electrolyte replacement as ordered  -  Monitor response to electrolyte replacements, including rhythm and repeat lab results as appropriate  - Fluid restriction as ordered  - Instruct patient on fluid and nutrition restrictions as appropriate  Outcome: Progressing  Goal: Hemodynamic stability and optimal renal function maintained  Description: INTERVENTIONS:  - Monitor labs and assess for signs and symptoms of volume excess or deficit  - Monitor intake, output and patient weight  - Monitor urine specific gravity, serum osmolarity and serum sodium as indicated or ordered  - Monitor response to interventions for patient's volume status, including labs, urine output, blood pressure (other measures as available)  - Encourage oral intake as appropriate  - Instruct patient on fluid and nutrition restrictions as appropriate  Outcome: Progressing     Problem: SKIN/TISSUE INTEGRITY - ADULT  Goal: Skin integrity remains intact  Description: INTERVENTIONS  - Assess and document risk factors for pressure ulcer development  - Assess and document skin integrity  - Monitor for areas of redness and/or skin breakdown  - Initiate interventions, skin care algorithm/standards of care as needed  Outcome: Progressing  Goal: Incision(s), wounds(s) or drain site(s) healing without S/S of infection  Description: INTERVENTIONS:  - Assess and document risk factors for pressure ulcer development  - Assess and document skin integrity  - Assess and document dressing/incision, wound bed, drain sites and surrounding tissue  - Implement wound care per orders  - Initiate isolation precautions as appropriate  - Initiate Pressure Ulcer prevention bundle as indicated  Outcome: Progressing     Problem: Impaired Functional Mobility  Goal: Achieve highest/safest level of mobility/gait  Description: Interventions:  - Assess patient's functional ability and stability  - Promote increasing activity/tolerance for mobility and gait  - Educate and engage patient/family in tolerated activity level  and precautions  Outcome: Progressing

## 2024-06-27 ENCOUNTER — HOSPITAL ENCOUNTER (INPATIENT)
Facility: HOSPITAL | Age: 79
LOS: 5 days | Discharge: HOME HEALTH | End: 2024-07-02
Attending: EMERGENCY MEDICINE | Admitting: INTERNAL MEDICINE
Payer: MEDICARE

## 2024-06-27 DIAGNOSIS — L03.116 CELLULITIS OF LEFT LOWER LEG: Primary | ICD-10-CM

## 2024-06-27 LAB
ALBUMIN SERPL-MCNC: 4.5 G/DL (ref 3.2–4.8)
ALBUMIN/GLOB SERPL: 1.4 {RATIO} (ref 1–2)
ALP LIVER SERPL-CCNC: 76 U/L
ALT SERPL-CCNC: 10 U/L
ANION GAP SERPL CALC-SCNC: 7 MMOL/L (ref 0–18)
AST SERPL-CCNC: 17 U/L (ref ?–34)
BASOPHILS # BLD AUTO: 0.05 X10(3) UL (ref 0–0.2)
BASOPHILS NFR BLD AUTO: 0.8 %
BILIRUB SERPL-MCNC: 0.5 MG/DL (ref 0.2–1.1)
BUN BLD-MCNC: 25 MG/DL (ref 9–23)
BUN/CREAT SERPL: 24.8 (ref 10–20)
CALCIUM BLD-MCNC: 9.5 MG/DL (ref 8.7–10.4)
CHLORIDE SERPL-SCNC: 108 MMOL/L (ref 98–112)
CO2 SERPL-SCNC: 26 MMOL/L (ref 21–32)
CREAT BLD-MCNC: 1.01 MG/DL
DEPRECATED RDW RBC AUTO: 52.7 FL (ref 35.1–46.3)
EGFRCR SERPLBLD CKD-EPI 2021: 57 ML/MIN/1.73M2 (ref 60–?)
EOSINOPHIL # BLD AUTO: 0.29 X10(3) UL (ref 0–0.7)
EOSINOPHIL NFR BLD AUTO: 4.4 %
ERYTHROCYTE [DISTWIDTH] IN BLOOD BY AUTOMATED COUNT: 15.3 % (ref 11–15)
GLOBULIN PLAS-MCNC: 3.2 G/DL (ref 2–3.5)
GLUCOSE BLD-MCNC: 108 MG/DL (ref 70–99)
HCT VFR BLD AUTO: 38.8 %
HGB BLD-MCNC: 12.7 G/DL
IMM GRANULOCYTES # BLD AUTO: 0.02 X10(3) UL (ref 0–1)
IMM GRANULOCYTES NFR BLD: 0.3 %
LACTATE SERPL-SCNC: 1.2 MMOL/L (ref 0.5–2)
LYMPHOCYTES # BLD AUTO: 1.24 X10(3) UL (ref 1–4)
LYMPHOCYTES NFR BLD AUTO: 19 %
MCH RBC QN AUTO: 30.6 PG (ref 26–34)
MCHC RBC AUTO-ENTMCNC: 32.7 G/DL (ref 31–37)
MCV RBC AUTO: 93.5 FL
MONOCYTES # BLD AUTO: 0.77 X10(3) UL (ref 0.1–1)
MONOCYTES NFR BLD AUTO: 11.8 %
MRSA DNA SPEC QL NAA+PROBE: POSITIVE
NEUTROPHILS # BLD AUTO: 4.16 X10 (3) UL (ref 1.5–7.7)
NEUTROPHILS # BLD AUTO: 4.16 X10(3) UL (ref 1.5–7.7)
NEUTROPHILS NFR BLD AUTO: 63.7 %
OSMOLALITY SERPL CALC.SUM OF ELEC: 297 MOSM/KG (ref 275–295)
PLATELET # BLD AUTO: 332 10(3)UL (ref 150–450)
POTASSIUM SERPL-SCNC: 4.4 MMOL/L (ref 3.5–5.1)
PROT SERPL-MCNC: 7.7 G/DL (ref 5.7–8.2)
RBC # BLD AUTO: 4.15 X10(6)UL
SODIUM SERPL-SCNC: 141 MMOL/L (ref 136–145)
WBC # BLD AUTO: 6.5 X10(3) UL (ref 4–11)

## 2024-06-27 PROCEDURE — 87641 MR-STAPH DNA AMP PROBE: CPT | Performed by: EMERGENCY MEDICINE

## 2024-06-27 PROCEDURE — 96368 THER/DIAG CONCURRENT INF: CPT

## 2024-06-27 PROCEDURE — 84145 PROCALCITONIN (PCT): CPT | Performed by: HOSPITALIST

## 2024-06-27 PROCEDURE — 87040 BLOOD CULTURE FOR BACTERIA: CPT | Performed by: EMERGENCY MEDICINE

## 2024-06-27 PROCEDURE — 96365 THER/PROPH/DIAG IV INF INIT: CPT

## 2024-06-27 PROCEDURE — 99285 EMERGENCY DEPT VISIT HI MDM: CPT

## 2024-06-27 PROCEDURE — 80053 COMPREHEN METABOLIC PANEL: CPT | Performed by: EMERGENCY MEDICINE

## 2024-06-27 PROCEDURE — 85025 COMPLETE CBC W/AUTO DIFF WBC: CPT | Performed by: EMERGENCY MEDICINE

## 2024-06-27 PROCEDURE — 83605 ASSAY OF LACTIC ACID: CPT | Performed by: EMERGENCY MEDICINE

## 2024-06-27 PROCEDURE — 36415 COLL VENOUS BLD VENIPUNCTURE: CPT

## 2024-06-27 PROCEDURE — 85025 COMPLETE CBC W/AUTO DIFF WBC: CPT

## 2024-06-27 PROCEDURE — 80053 COMPREHEN METABOLIC PANEL: CPT

## 2024-06-27 RX ORDER — VANCOMYCIN HYDROCHLORIDE
10 ONCE
Status: COMPLETED | OUTPATIENT
Start: 2024-06-27 | End: 2024-06-27

## 2024-06-28 ENCOUNTER — APPOINTMENT (OUTPATIENT)
Dept: PICC SERVICES | Facility: HOSPITAL | Age: 79
End: 2024-06-28
Attending: INTERNAL MEDICINE
Payer: MEDICARE

## 2024-06-28 ENCOUNTER — LAB REQUISITION (OUTPATIENT)
Dept: LAB | Facility: HOSPITAL | Age: 79
End: 2024-06-28
Payer: MEDICARE

## 2024-06-28 LAB
BNP SERPL-MCNC: 84 PG/ML
PROCALCITONIN SERPL-MCNC: 0.04 NG/ML (ref ?–0.05)

## 2024-06-28 PROCEDURE — 83880 ASSAY OF NATRIURETIC PEPTIDE: CPT | Performed by: HOSPITALIST

## 2024-06-28 PROCEDURE — 99212 OFFICE O/P EST SF 10 MIN: CPT

## 2024-06-28 PROCEDURE — 87075 CULTR BACTERIA EXCEPT BLOOD: CPT | Performed by: NURSE PRACTITIONER

## 2024-06-28 PROCEDURE — 87070 CULTURE OTHR SPECIMN AEROBIC: CPT | Performed by: NURSE PRACTITIONER

## 2024-06-28 PROCEDURE — 87205 SMEAR GRAM STAIN: CPT | Performed by: NURSE PRACTITIONER

## 2024-06-28 RX ORDER — SPIRONOLACTONE 25 MG/1
25 TABLET ORAL DAILY
Status: DISCONTINUED | OUTPATIENT
Start: 2024-06-28 | End: 2024-07-02

## 2024-06-28 RX ORDER — VANCOMYCIN HYDROCHLORIDE
10 EVERY 24 HOURS
Status: DISCONTINUED | OUTPATIENT
Start: 2024-06-28 | End: 2024-07-02

## 2024-06-28 RX ORDER — PANTOPRAZOLE SODIUM 40 MG/1
40 TABLET, DELAYED RELEASE ORAL
Status: DISCONTINUED | OUTPATIENT
Start: 2024-06-28 | End: 2024-07-02

## 2024-06-28 RX ORDER — METOPROLOL SUCCINATE 50 MG/1
50 TABLET, EXTENDED RELEASE ORAL DAILY
Status: DISCONTINUED | OUTPATIENT
Start: 2024-06-28 | End: 2024-07-02

## 2024-06-28 RX ORDER — GABAPENTIN 100 MG/1
200 CAPSULE ORAL 3 TIMES DAILY
Status: DISCONTINUED | OUTPATIENT
Start: 2024-06-28 | End: 2024-07-02

## 2024-06-28 RX ORDER — BUMETANIDE 0.25 MG/ML
0.5 INJECTION INTRAMUSCULAR; INTRAVENOUS ONCE
Status: COMPLETED | OUTPATIENT
Start: 2024-06-28 | End: 2024-06-28

## 2024-06-28 RX ORDER — PRAVASTATIN SODIUM 20 MG
20 TABLET ORAL NIGHTLY
Status: DISCONTINUED | OUTPATIENT
Start: 2024-06-28 | End: 2024-07-02

## 2024-06-28 RX ORDER — DILTIAZEM HYDROCHLORIDE 120 MG/1
240 CAPSULE, EXTENDED RELEASE ORAL DAILY
Status: DISCONTINUED | OUTPATIENT
Start: 2024-06-28 | End: 2024-07-02

## 2024-06-28 RX ORDER — GABAPENTIN 100 MG/1
200 CAPSULE ORAL 2 TIMES DAILY
Status: DISCONTINUED | OUTPATIENT
Start: 2024-06-28 | End: 2024-06-28

## 2024-06-28 RX ORDER — BUMETANIDE 0.5 MG/1
1 TABLET ORAL DAILY
Status: DISCONTINUED | OUTPATIENT
Start: 2024-06-28 | End: 2024-07-02

## 2024-06-28 RX ORDER — VANCOMYCIN 1.75 GRAM/500 ML IN 0.9 % SODIUM CHLORIDE INTRAVENOUS
15 EVERY 24 HOURS
Status: DISCONTINUED | OUTPATIENT
Start: 2024-06-28 | End: 2024-06-28

## 2024-06-28 RX ORDER — METOPROLOL SUCCINATE 100 MG/1
100 TABLET, EXTENDED RELEASE ORAL DAILY
Status: DISCONTINUED | OUTPATIENT
Start: 2024-06-28 | End: 2024-06-28

## 2024-06-28 NOTE — CM/SW NOTE
06/28/24 1500   JASON/GHISLAINE Referral Data   Referral Source Physician   Reason for Referral Discharge planning   Informant Patient   Readmission Assessment   Factors that patient feels contributed to this readmission Acute/Chronic Clinical Presentation   Pt's living situation prior to admission? Home alone   Was full assessment completed by GHISLAINE/JASON on prior admission? Yes   Was pt. discharged w/out services? No   Medical Hx   Does patient have an established PCP? Yes   Patient Info   Patient's Current Mental Status at Time of Assessment Alert;Oriented   Patient's Home Environment Independent Living   Patient lives with Alone   Patient Status Prior to Admission   Independent with ADLs and Mobility Yes   Services in place prior to admission DME/Supplies at home;Home Health Care   Home Health Provider Info [facility]   Type of DME/Supplies Standard Walker   Discharge Needs   Anticipated D/C needs Infusion care;Subacute rehab;Home health care   Services Requested   Submitted to [facility] Yes   PASRR Level 1 Submitted Yes   Choice of Post-Acute Provider   Informed patient of right to choose their preferred provider Yes     SW received MDO for discharge planning- IV ABX after DC. Ghislaine met with pt, who is alert and oriented at time of assessment. Pt provided above information. Pt has hx of [facility] and [facility] for SHARYN. Pt is current with [facility]. SW reviewed IV ABX options with patient. Pt informed SW that she was told that it would 2x/3x a day. SW informed patient she has the option to do it at home with  teach and train or SHARYN. Patient is indecisive at this time and wishes to review with her sons. Patient is agreeable to SW sending SHARYN referrals in case patient wishes to go SHARYN route. [facility] request,PASRR level 1 screen submitted and completed and uploaded to aidin referral. Patient will need 2 more MN. GHISLAINE sent return referral to [facility]. GHISLAINE initiated tentative home infusion referral via aidin.  F2f placed    Plan:  Pending medical clearance, DC to [facility]- [facility], PASRR, *[facility], *list/choice VS Home with [facility] with home infusion provider, f2f placed, *list/choice,*Final IV ABX RX, *line info    SW/CM to remain available for support and/or discharge planning.     Ashlie Flores, MSW, LSW   x [phone]

## 2024-06-28 NOTE — VASCULAR ACCESS
Consult placed to Vascular access for single lumen PICC insertion for IV abx  upon discharge. PICC procedure explained to patient. All questions answered, At this time patient declined PICC insertion because she would like to speak with her doctors. Her current peripheral IV was noted to be red/swollen. Painful to touch. PIV d/c'd. 20 G extend length placed in right upper arm. PICC consent left at bedside. RN notified.

## 2024-06-28 NOTE — CONSULTS
[facility]  part of [facility] Infectious Disease Consult    Adore Covington Patient Status:  Inpatient    1945 MRN [id number]   Location [facility] 5SW/SE Attending Yamile Hernandez MD   Hosp Day # 1 PCP Maral Huffman MD     Reason for Consultation:  To help with infection and treatment, requested by Dr. Hernandez,     History of Present Illness:  Adore Covington is a 79 year old female admitted to [facility] on  with Worsening of Lower extremity Cellulitis while on PO abx,   Significant hx of   - Venous stasis ulcers,    - PAD,   - Neuropathy,   - Prior history of septic shock with cellulitis,   - Was recently admitted to [facility] from - for LLE Cellulitis, treated in house with IV Vanc and IV Cefepime and discharged on PO Doxy and Cipro,   She was seen by ID as OP and erythema was noted to be worsening in spite of compliance with PO abx,   She also has LE wounds, with cul historically growing MRSA, PSAR and Strep,   A recent MRI done a month ago did not show any evidence of OM or Abscess,   She is brought back to the hospital sec to failure of PO abx from ID office,   No trauma,states she is compliant with PO abx,   There is significant drainage from the L leg,   No fevers or chills.  ID is now asked to help with infection and treatment,     History:  Past Medical History:    Arrhythmia    Atrial fibrillation (HCC)    Congestive heart disease (HCC)    COPD (chronic obstructive pulmonary disease) (HCC)    Essential hypertension    High blood pressure    High cholesterol    Morbid obesity with BMI of 50.0-59.9, adult (HCC)    Muscle weakness    Neuropathy    Peripheral vascular disease (HCC)    Sleep apnea    Visual impairment     Past Surgical History:   Procedure Laterality Date    Knee surgery Bilateral      History reviewed. No pertinent family history.   reports that she quit smoking about 41 years ago. Her smoking use included cigarettes. She has never  been exposed to tobacco smoke. She has never used smokeless tobacco. She reports current alcohol use. She reports that she does not use drugs.    Allergies:  No Known Allergies    Medications:    Current Facility-Administered Medications:     apixaban (Eliquis) tab 5 mg, 5 mg, Oral, BID    bumetanide (Bumex) tab 1 mg, 1 mg, Oral, Daily    dilTIAZem ER (Dilacor XR) 24 hr cap 240 mg, 240 mg, Oral, Daily    pantoprazole (Protonix) DR tab 40 mg, 40 mg, Oral, QAM AC    pravastatin (Pravachol) tab 20 mg, 20 mg, Oral, Nightly    ceFEPIme (Maxpime) 2 g in sodium chloride 0.9% 100 mL IVPB-MBP, 2 g, Intravenous, Q12H    spironolactone (Aldactone) tab 25 mg, 25 mg, Oral, Daily    metoprolol succinate ER (Toprol XL) 24 hr tab 50 mg, 50 mg, Oral, Daily    gabapentin (Neurontin) cap 200 mg, 200 mg, Oral, TID    vancomycin (Vancocin) 1.25 g in sodium chloride 0.9% 250mL IVPB premix, 10 mg/kg, Intravenous, Q24H    bumetanide (Bumex) 0.25 MG/ML injection 0.5 mg, 0.5 mg, Intravenous, Once    Review of Systems:   Constitutional: Negative for anorexia, chills, fatigue, fevers, malaise, night sweats and weight loss.  Eyes: Negative for visual disturbance, irritation and redness.  Ears, nose, mouth, throat, and face: Negative for hearing loss, tinnitus, nasal congestion, snoring, sore throat, hoarseness and voice change.  Respiratory: Negative for cough, sputum, hemoptysis, chest pain, wheezing, dyspnea on exertion, or stridor.  Cardiovascular: Negative for chest pain, palpitations, irregular heart beats, syncope, fatigue, orthopnea, paroxysmal nocturnal dyspnea, lower extremity edema.  Gastrointestinal: Negative for dysphagia, odynophagia, reflux symptoms, nausea, vomiting, change in bowel habits, diarrhea, constipation and abdominal pain.  Integument/breast: Negative for rash, skin lesions, and pruritus.  Hematologic/lymphatic: Negative for easy bruising, bleeding, and lymphadenopathy.  Musculoskeletal: Negative for myalgias,  arthralgias, muscle weakness.  Neurological: Negative for headaches, dizziness, seizures, memory problems, trouble swallowing, speech problems, gait problems and weakness.  Behavioral/Psych: Negative for active tobacco use.  Endocrine: No history of of diabetes, thyroid disorder.  All other review of systems are negative.    Vital signs in last 24 hours:  Patient Vitals for the past 24 hrs:   BP Temp Temp src Pulse Resp SpO2 Height Weight   06/28/24 0829 101/54 97.7 °F (36.5 °C) Oral 76 18 99 % -- --   06/28/24 0538 107/65 98.4 °F (36.9 °C) Oral -- 18 99 % -- --   06/28/24 0127 -- -- -- 68 -- -- -- --   06/27/24 2137 113/55 98.2 °F (36.8 °C) Oral 73 18 96 % 5' 2.99\" (1.6 m) 251 lb 14.4 oz (114.3 kg)   06/27/24 2045 111/58 -- -- 71 -- 96 % -- --   06/27/24 1823 123/66 98 °F (36.7 °C) Oral 74 19 96 % 5' 3\" (1.6 m) 250 lb (113.4 kg)       Physical Exam:   General: alert, cooperative, oriented.  No respiratory distress.   Head: Normocephalic, without obvious abnormality, atraumatic.   Eyes: Conjunctivae/corneas clear.  No scleral icterus.  No conjunctival     hemorrhage.   Nose: Nares normal.   Throat: Lips, mucosa, and tongue normal.  No thrush noted.   Neck: Soft, supple neck; trachea midline, no adenopathy, no thyromegaly.   Lungs: CTAB, normal and equal bilateral chest rise   Chest wall: No tenderness or deformity.   Heart: Regular rate and rhythm, normal S1S2, no murmur.   Abdomen: soft, non-tender, non-distended, no masses, no guarding, no     rebound, positive BS.   Extremity:erythema no open wound, callous skis is weeping,    Skin: No rashes or lesions.   Neurological: Alert, interactive, no focal deficits    Labs:  Lab Results   Component Value Date    WBC 6.5 06/27/2024    HGB 12.7 06/27/2024    HCT 38.8 06/27/2024    .0 06/27/2024    CREATSERUM 1.01 06/27/2024    BUN 25 06/27/2024     06/27/2024    K 4.4 06/27/2024     06/27/2024    CO2 26.0 06/27/2024     06/27/2024    CA 9.5  06/27/2024    ALB 4.5 06/27/2024    ALKPHO 76 06/27/2024    BILT 0.5 06/27/2024    TP 7.7 06/27/2024    AST 17 06/27/2024    ALT 10 06/27/2024       Radiology:  Reviewed,       Cultures:  Reviewed,     Assessment and Plan:    1.  Left > R Leg Cellulitis in the presence of venous stasis and recent abx with hospitalization,   - Drainage Cul in the past with MRSA and PSAR,    - Blood cul are NGTD  - Wound care to see, compression wraps,   - On IV Vanc, IV Cefepime, d # 2, Will likely need PICC line and IV abx on discharge,      2.    Hx of Bilateral TKA; Infection needs to be treated aggressively,      3.    Disposition: in house, an elderly female with LLE Cellulitis with open wounds sec to venous stasis admitted with worsening Cellulitis, no new trauma, no fever, chills,   - Continue IV Vanc, IV Cefepime, pharm to dose,   - Wound care input,   - PICC line,   - Compression wraps,   - Leg elevation,      Discussed with patient, RN, all questions answered, further recommendations to follow, Thanks,    Thank you for consulting [facility] for Aodre Covington.  If you have any questions or concerns please call [facility] Infectious Disease at [phone].     Carlos Manuel Mcdermott MD  6/28/2024  10:41 AM

## 2024-06-28 NOTE — ED PROVIDER NOTES
Patient Seen in: [facility] Emergency Department    History     Chief Complaint   Patient presents with    Cellulitis       HPI    79-year-old female with a history of COPD, hypertension, hypercholesterolemia, chronic lower extremity lymphedema with a chronic left lower extremity wound sent here from the ID clinic for admission.  Patient has had infections in this wound in the past and has grown out MRSA and Pseudomonas and concern for needing IV broad-spectrum antibiotics.  Recommended cefepime and vancomycin.    History reviewed.   Past Medical History:    Arrhythmia    Atrial fibrillation (HCC)    Congestive heart disease (HCC)    COPD (chronic obstructive pulmonary disease) ([facility])    Essential hypertension    High blood pressure    High cholesterol    Morbid obesity with BMI of 50.0-59.9, adult ([facility])    Muscle weakness    Neuropathy    Peripheral vascular disease ([facility])    Sleep apnea    Visual impairment       History reviewed.   Past Surgical History:   Procedure Laterality Date    Knee surgery Bilateral          Medications :  Medications Prior to Admission   Medication Sig Dispense Refill Last Dose    levoFLOXacin 750 MG Oral Tab Take 1 tablet (750 mg total) by mouth daily for 7 days. 7 tablet 0 6/27/2024    doxycycline 100 MG Oral Cap Take 1 capsule (100 mg total) by mouth 2 (two) times daily for 7 days. 14 capsule 0 6/27/2024    bumetanide 1 MG Oral Tab Take 1 tablet (1 mg total) by mouth daily.   6/27/2024    spironolactone 25 MG Oral Tab Take 1 tablet (25 mg total) by mouth daily.   6/27/2024    cholecalciferol 50 MCG (2000 UT) Oral Cap Take 1 capsule (2,000 Units total) by mouth daily. Patient takes 2000 units daily   6/27/2024    cyanocobalamin 1000 MCG Oral Tab Take 1 tablet (1,000 mcg total) by mouth daily.   6/27/2024    dilTIAZem HCl  MG Oral Capsule SR 24 Hr Take 1 capsule (240 mg total) by mouth daily.   6/27/2024    pantoprazole 40 MG Oral Tab EC Take 1 tablet (40 mg total) by mouth  every morning before breakfast. 60 tablet 0 2024    apixaban 5 MG Oral Tab Take 1 tablet (5 mg total) by mouth 2 (two) times daily.   2024    potassium chloride 20 MEQ Oral Tab CR Take 0.5 tablets (10 mEq total) by mouth daily.   2024    cycloSPORINE 0.05 % Ophthalmic Emulsion Place 1 drop into both eyes 2 (two) times daily.   2024    triamcinolone 0.1 % External Cream Apply 1 Application topically 2 (two) times daily as needed.       simvastatin 10 MG Oral Tab Take 1 tablet (10 mg total) by mouth daily.   2024    gabapentin 100 MG Oral Cap Take 2 capsules (200 mg total) by mouth 2 (two) times daily. Patient takes 3 capsules (300 mg) BID   2024    metoprolol succinate  MG Oral Tablet 24 Hr Take 1 tablet (100 mg total) by mouth daily. Patient takes 0.5 tablet per day   2024    acetaminophen 325 MG Oral Tab Take 2 tablets (650 mg total) by mouth every 6 (six) hours as needed for Pain.       sennosides 17.2 MG Oral Tab Take 1 tablet (17.2 mg total) by mouth 2 (two) times daily as needed (constipation, as needed if no bowel movement that day). (Patient not taking: Reported on 2024) 30 tablet 0     docusate sodium 100 MG Oral Cap Take 1 capsule (100 mg total) by mouth 2 (two) times daily as needed for constipation. (Patient not taking: Reported on 2024) 30 capsule 0     polyethylene glycol, PEG 3350, 17 g Oral Powd Pack Take 17 g by mouth daily as needed. 10 each 0     bisacodyl 10 MG Rectal Suppos Place 1 suppository (10 mg total) rectally daily as needed. (Patient not taking: Reported on 2024) 10 suppository 0         History reviewed. No pertinent family history.    Smoking Status:   Social History     Socioeconomic History    Marital status:    Tobacco Use    Smoking status: Former     Current packs/day: 0.00     Types: Cigarettes     Quit date:      Years since quittin.5     Passive exposure: Never    Smokeless tobacco: Never   Vaping Use     Vaping status: Never Used   Substance and Sexual Activity    Alcohol use: Yes     Comment: 1-2 drinks daily    Drug use: Never       Constitutional and vital signs reviewed.      Social History and Family History elements reviewed from today, pertinent positives to the presenting problem noted.    Physical Exam     ED Triage Vitals [06/27/24 1823]   /66   Pulse 74   Resp 19   Temp 98 °F (36.7 °C)   Temp src Oral   SpO2 96 %   O2 Device None (Room air)       All measures to prevent infection transmission during my interaction with the patient were taken. Handwashing was performed prior to and after the exam.  Stethoscope and any equipment used during my examination was cleaned with super sani-cloth germicidal wipes following the exam.     Physical Exam  Vitals and nursing note reviewed.   Cardiovascular:      Rate and Rhythm: Normal rate.   Pulmonary:      Effort: Pulmonary effort is normal.   Abdominal:      Palpations: Abdomen is soft.   Skin:     General: Skin is warm and dry.      Capillary Refill: Capillary refill takes less than 2 seconds.      Comments: Left lower extremity chronic wound with surrounding erythema.  Good distal pulses right lower leg also some mild erythema but no open wounds.   Neurological:      General: No focal deficit present.      Mental Status: She is alert.   Psychiatric:         Mood and Affect: Mood normal.         ED Course        Labs Reviewed   COMP METABOLIC PANEL (14) - Abnormal; Notable for the following components:       Result Value    Glucose 108 (*)     BUN 25 (*)     BUN/CREA Ratio 24.8 (*)     Calculated Osmolality 297 (*)     eGFR-Cr 57 (*)     All other components within normal limits   ED/MRSA SCREEN BY PCR-CC - Abnormal; Notable for the following components:    MRSA Screen By PCR Positive (*)     All other components within normal limits    Narrative:     A positive result does not necessarily indicate the presence of viable organisms. For confirmation,  epidemiological typing and susceptibility testing, appropriate culture is needed.                                    PLEASE REFER TO MRSA SCREENING PROTOCOL FOR TREATMENT.                     CBC W/ DIFFERENTIAL - Abnormal; Notable for the following components:    RDW-SD 52.7 (*)     RDW 15.3 (*)     All other components within normal limits   LACTIC ACID, PLASMA - Normal   CBC WITH DIFFERENTIAL WITH PLATELET    Narrative:     The following orders were created for panel order CBC With Differential With Platelet.                  Procedure                               Abnormality         Status                                     ---------                               -----------         ------                                     CBC W/ DIFFERENTIAL[[id number]]          Abnormal            Final result                                                 Please view results for these tests on the individual orders.   RAINBOW DRAW LAVENDER   RAINBOW DRAW LIGHT GREEN   RAINBOW DRAW BLUE   BLOOD CULTURE   BLOOD CULTURE       As Interpreted by me    Imaging Results Available and Reviewed while in ED: No results found.  ED Medications Administered:   Medications   apixaban (Eliquis) tab 5 mg (5 mg Oral Given 6/28/24 0121)   bumetanide (Bumex) tab 1 mg (has no administration in time range)   dilTIAZem ER (Dilacor XR) 24 hr cap 240 mg (has no administration in time range)   gabapentin (Neurontin) cap 200 mg (200 mg Oral Given 6/28/24 0121)   metoprolol succinate ER (Toprol XL) 24 hr tab 100 mg (has no administration in time range)   pantoprazole (Protonix) DR tab 40 mg (has no administration in time range)   pravastatin (Pravachol) tab 20 mg (has no administration in time range)   ceFEPIme (Maxpime) 2 g in sodium chloride 0.9% 100 mL IVPB-MBP (has no administration in time range)   vancomycin (Vancocin) 1.75 g in sodium chloride 0.9% 500mL IVPB premix (has no administration in time range)   ceFEPIme (Maxpime) 2 g in sodium  chloride 0.9% 100 mL IVPB-MBP (0 g Intravenous Stopped 6/27/24 2130)   vancomycin (Vancocin) 1.25 g in sodium chloride 0.9% 250mL IVPB premix (1,250 mg Intravenous Handoff 6/27/24 2130)         MDM     Vitals:    06/27/24 1823 06/27/24 2045 06/27/24 2137 06/28/24 0127   BP: 123/66 111/58 113/55    BP Location:   Right arm    Pulse: 74 71 73 68   Resp: 19  18    Temp: 98 °F (36.7 °C)  98.2 °F (36.8 °C)    TempSrc: Oral  Oral    SpO2: 96% 96% 96%    Weight: 113.4 kg  114.3 kg    Height: 160 cm (5' 3\")  160 cm (5' 2.99\")      *I personally reviewed and interpreted all ED vitals.    Pulse Ox: 96%, Room air, Normal       Differential Diagnosis/ Diagnostic Considerations: Cellulitis, chronic wound    Complicating Factors: The patient already has does not have any pertinent problems on file. to contribute to the complexity of this ED evaluation.    Medical Decision Making  Amount and/or Complexity of Data Reviewed  External Data Reviewed: labs, radiology and notes.     Details: Reviewed multiple previous outpatient notes and admission records, including today's infectious disease notes  Labs: ordered. Decision-making details documented in ED Course.    Risk  Prescription drug management.  Decision regarding hospitalization.  Risk Details: Discussed case with infectious disease physician and notified of consult    Critical Care  Total time providing critical care: 30 minutes      I reviewed lab results with patient.  Explained to the patient after reviewed old records and ID notes from today they do recommend broad-spectrum antibiotics and admission.  Patient states she was explained that and okay with admission for IV antibiotics most likely PICC line placement and ID consultation during admission    Discussed case with hospitalist who accepts admission      Condition upon leaving the department: Stable    Disposition and Plan     Clinical Impression:  1. Cellulitis of left lower leg         Disposition:  Admit    Follow-up:  No follow-up provider specified.    Medications Prescribed:  Current Discharge Medication List          Hospital Problems       Present on Admission  Date Reviewed: 4/29/2024            ICD-10-CM Noted POA    * (Principal) Cellulitis of left lower leg L03.116 6/27/2024 Unknown

## 2024-06-28 NOTE — PROGRESS NOTES
[facility] Pharmacy Dosing Service                                         Initial Pharmacokinetic Consult for Vancomycin Dosing     Adore Covington is a 79 year old female who is being initiated on vancomycin therapy for cellulitis.  Pharmacy has been asked to dose vancomycin by Dr Michelle TATE.  The initial treatment and monitoring approach will be steady state AUC strategy.        Weight and Temperature:    Wt Readings from Last 1 Encounters:   24 114.3 kg (251 lb 14.4 oz)        Temp Readings from Last 1 Encounters:   24 98.2 °F (36.8 °C) (Oral)      Labs:   Recent Labs   Lab 24  0558 24  0853 24  1836   CREATSERUM 0.73 0.74 1.01      Estimated Creatinine Clearance: 38.6 mL/min (based on SCr of 1.01 mg/dL).     Recent Labs   Lab 24  0558 24  1836   WBC 5.4 6.5          The Pharmacokinetic Target is:     to 600 mg-h/L and trough <=15 mg/L    Renal Dosing Considerations:    None     Assessment/Plan:   Initial/Loading dose: Has received 1250 mg IV (10 mg/kg, capped at 2250 mg) x 1 initial dose.      Maintenance dose: Pharmacy will dose vancomycin at 1750 mg IV every 24 hours    Monitorin) Plan for vancomycin trough to be obtained at steady state    2) Pharmacy will order SCr as clinically indicated to assess renal function.    3) Pharmacy will monitor for toxicity and efficacy, adjust vancomycin dose and/or frequency, and order vancomycin levels as appropriate per the Pharmacy and Therapeutics Committee approved protocol until discontinuation of the medication.       We appreciate the opportunity to assist in the care of this patient.     Sheri Carnes, PharmD  2024  4:56 AM  [facility] Pharmacy Extension: [phone]

## 2024-06-28 NOTE — H&P
[facility]   Hospitalist Team  History and Physical  Admit Date:  6/27/2024    Is this a shared or split note between Advanced Practice Provider and Physician? Yes    ASSESSMENT / PLAN:   Ms. Covington is a 79 year old female with PMH sig for COPD, A- Fib on Eliquis, CHF, HTN, HLD, PAD, Neuropathy, SUSAN,  prior hx of septic shock 2/2 cellulitis with admission 6/19-6/22 with LLE cellulitis  discharged on oral levaquin and doxy seen by ID on 6/27 and found to have worsening swelling and drainage, sent to ER for admission for IV abx, see below for details      Recurrent LLE Cellulitis  Chronic Stasis Dermatitis   Hx MRSA positive and pseudomonas  -6/19 discharged on levaquin and doxy, seen by ID 6/27 w/ worsening swelling and drainage  and sent in for IV abx  -afebrile. No leukocytosis. La negative   -MRSA +  -blood  cx pending   -wound cx pending  -wound care   -abx-cefepime and vanco, per ID plans for outpt IV abx at discharge   -ID consult      PaFib S/P CV 1/2024  Secondary Hypercoagulable state   S/p Cardioversion   HTN  HL  Chronic Diastolic CHF  -5/2024 echo with normal EF 55%, mild as, mild ai   -continue  home eliquis, cardizem, lopressor, bumex, aldactone and pravastatin for home zocor   -additional dose of IV bumex today with LE edema, will need to dose daily prn   -follows with Dr Murphy      COPD not on inhalers   SUSAN  - cpap as tolerated      PAD  -continue statin.   -not on asa 2/2 eliquis ??     Neuropathy   - gabapentin     GERD  -PPI     Hx Urinary Retention with hx ivey insertion      GOC  -DNR/Select-confirmed with pt     MA/ACO Reach  -ER Visits 2024: 0  -Admissions 2024: 4 with 1 admit for elective CV  -Re- Entry: YES-7 day-discharged El 6/22  -Consults: ID   -Discharge Needs: IV Abx   -Appointments: [x ] PCP Maral Huffman MD- 7/1                             [X] ID Rk 7/11                            [X] outpt wound NP 7/1  FEN  -lytes in am  -diet-cardiac     Prophy  -SCD  -Eliquis      Dispo  -pending clinical course  PCP: Maral Huffman MD       Outpatient records or previous hospital records reviewed.   Further recommendations pending patient's clinical course.  [facility] hospitalist  team to continue to follow patient while in house  Concerns regarding plan of care were discussed with patient. Patient agrees with plan as detailed above. Discussed plan of care with Dr. Ogden    Note: This chart was prepared using voice recognition software and may contain unintended word substitution errors.     Sharon Chaudhry RN, NP  [facility] Hospitalist Team   Available via Perfect Serve or Bubble Chat (check Availability)    6/28/2024      HISTORY:   CC:   Chief Complaint   Patient presents with    Cellulitis        PCP: Maral Huffman MD    History of Present Illness:      Ms. Covington is a 79 year old female with PMH sig for COPD, A- Fib on Eliquis, CHF, HTN, HLD, PAD, Neuropathy, SUSAN,  prior hx of septic shock 2/2 cellulitis with admission 6/19-6/22 with LLE cellulitis  discharged on oral levaquin and doxy seen by ID on 6/27 and found to have worsening swelling and drainage, sent to ER for admission for IV abx.     Patient states that she noted her legs were getting red within 2 days of discharge from the hospital while she was on oral antibiotics.  She states that her legs were swollen erythematous and started to have blisters with draining.  She went to see infectious disease yesterday who recommended admission for IV antibiotics.  She denied any fevers or chills.  She notes having lower extremity edema and does take Bumex daily. Sis unaware that she had a history of MRSA.  Patient does live alone and drives and is unclear whether or not she would be able to manage IV antibiotics at home.  She is reluctant to go to rehab as she feels that her issues with her leg were caused from her stay at [facility].  She is aware that she can only go to an infusion center if her antibiotics are once  daily.  I spoke with social work who will follow-up with patient once recommendations from ID is made.      Review of Systems  12 point systems reviewed, please see HPI for pertinent positives, otherwise negative    OBJECTIVE:  /65 (BP Location: Left arm)   Pulse 68   Temp 98.4 °F (36.9 °C) (Oral)   Resp 18   Ht 5' 2.99\" (1.6 m)   Wt 251 lb 14.4 oz (114.3 kg)   SpO2 99%   BMI 44.63 kg/m²     GENERAL: no apparent distress  NEUROLOGIC: A/A; Ox3  HEENT: normocephalic, normal nose, pharynx and TM's, sclera anicteric, conjunctiva normal  RESPIRATORY: normal expansion; non labored, CTA   CARDIOVASCULAR: regular,nl S1 S2  ABDOMEN:  Soft, BS+; non distended, non tender    EXTREMITIES: B/L LE edema L>R, erythema b/l shins L>>>R, noted clear serous drainage from calf on left.     PMH  Past Medical History:    Arrhythmia    Atrial fibrillation (HCC)    Congestive heart disease (HCC)    COPD (chronic obstructive pulmonary disease) (HCC)    Essential hypertension    High blood pressure    High cholesterol    Morbid obesity with BMI of 50.0-59.9, adult (HCC)    Muscle weakness    Neuropathy    Peripheral vascular disease (HCC)    Sleep apnea    Visual impairment        PSH  Past Surgical History:   Procedure Laterality Date    Knee surgery Bilateral         ALL:  No Known Allergies     Home Medications:  Outpatient Medications Marked as Taking for the 6/27/24 encounter (Hospital Encounter)   Medication Sig Dispense Refill    levoFLOXacin 750 MG Oral Tab Take 1 tablet (750 mg total) by mouth daily for 7 days. 7 tablet 0    doxycycline 100 MG Oral Cap Take 1 capsule (100 mg total) by mouth 2 (two) times daily for 7 days. 14 capsule 0    bumetanide 1 MG Oral Tab Take 1 tablet (1 mg total) by mouth daily.      spironolactone 25 MG Oral Tab Take 1 tablet (25 mg total) by mouth daily.      cholecalciferol 50 MCG (2000 UT) Oral Cap Take 1 capsule (2,000 Units total) by mouth daily. Patient takes 2000 units daily       cyanocobalamin 1000 MCG Oral Tab Take 1 tablet (1,000 mcg total) by mouth daily.      dilTIAZem HCl  MG Oral Capsule SR 24 Hr Take 1 capsule (240 mg total) by mouth daily.      pantoprazole 40 MG Oral Tab EC Take 1 tablet (40 mg total) by mouth every morning before breakfast. 60 tablet 0    apixaban 5 MG Oral Tab Take 1 tablet (5 mg total) by mouth 2 (two) times daily.      potassium chloride 20 MEQ Oral Tab CR Take 0.5 tablets (10 mEq total) by mouth daily.      cycloSPORINE 0.05 % Ophthalmic Emulsion Place 1 drop into both eyes 2 (two) times daily.      triamcinolone 0.1 % External Cream Apply 1 Application topically 2 (two) times daily as needed.      simvastatin 10 MG Oral Tab Take 1 tablet (10 mg total) by mouth daily.      gabapentin 100 MG Oral Cap Take 2 capsules (200 mg total) by mouth 2 (two) times daily. Patient takes 3 capsules (300 mg) BID      metoprolol succinate  MG Oral Tablet 24 Hr Take 1 tablet (100 mg total) by mouth daily. Patient takes 0.5 tablet per day         Soc Hx  Social History     Tobacco Use    Smoking status: Former     Current packs/day: 0.00     Types: Cigarettes     Quit date:      Years since quittin.5     Passive exposure: Never    Smokeless tobacco: Never   Substance Use Topics    Alcohol use: Yes     Comment: 1-2 drinks daily        Fam Hx  History reviewed. No pertinent family history.      DIAGNOSTIC DATA:   CBC/Chem  Recent Labs   Lab 24  1836   WBC 6.5   HGB 12.7   MCV 93.5   .0       Recent Labs   Lab 24  0853 24  1836    141   K 3.9 4.4    108   CO2 26.0 26.0   BUN 8* 25*   CREATSERUM 0.74 1.01   GLU 87 108*   CA 8.8 9.5       Recent Labs   Lab 24  1836   ALT 10   AST 17   ALB 4.5           SEE ATTENDING NOTE BELOW      Patient examined and assessed independently. Agree with above APN assessment.     Ms. Covington is a 79 year old female with PMH sig for COPD, A- Fib on Eliquis, CHF, HTN, HLD, PAD, Neuropathy,  SUSAN,  prior hx of septic shock 2/2 cellulitis with admission 6/19-6/22 with LLE cellulitis  discharged on oral levaquin and doxy seen by ID on 6/27 and found to have worsening swelling and drainage, sent to ER for admission for IV abx. States that legs were much better prior to discharge but then redness and swelling returned. Has been taking diuretic.     Objective  /54 (BP Location: Right arm)   Pulse 76   Temp 97.7 °F (36.5 °C) (Oral)   Resp 18   Ht 5' 2.99\" (1.6 m)   Wt 251 lb 14.4 oz (114.3 kg)   SpO2 99%   BMI 44.63 kg/m²     Exam   GEN: obese female in NAD  HEENT: EOMI  Pulm: CTAB, no crackles or wheezes  CV: RRR, no murmurs  ABD: Soft, non-tender, non-distended, +BS  MSK: erythema BLE, +warm to touch, nontender  SKIN: warm, dry, chronic venous stasis changes BLE  EXT: trace edema    Assessment/Plan    Ms. Covington is a 79 year old female with PMH sig for COPD, A- Fib on Eliquis, CHF, HTN, HLD, PAD, Neuropathy, SUSAN,  prior hx of septic shock 2/2 cellulitis with admission 6/19-6/22 with LLE cellulitis  discharged on oral levaquin and doxy seen by ID on 6/27 and found to have worsening swelling and drainage, sent to ER for admission for IV abx, see below for details      Recurrent LLE Cellulitis  Chronic Stasis Dermatitis   Hx MRSA positive and pseudomonas  PaFib S/P CV 1/2024  Secondary Hypercoagulable state   S/p Cardioversion   HTN  HL  Chronic Diastolic CHF  COPD not on inhalers   SUSAN  PAD  Neuropathy   GERD  Hx Urinary Retention with hx ivey insertion   GOC  -DNR/Select-confirmed with pt     Plan:  - continue IV cefepime  - ID consulted, appreciate recs  - will give IV bumex PRN, dose given today  - monitor BMP    Rest as above.    Yamile Hernandez MD  [facility] hospitalist

## 2024-06-28 NOTE — ED QUICK NOTES
Orders for admission, patient is aware of plan and ready to go upstairs. Any questions, please call ED RN doni at extension [phone].     Patient Covid vaccination status: Fully vaccinated     COVID Test Ordered in ED: None    COVID Suspicion at Admission: N/A    Running Infusions:  None    Mental Status/LOC at time of transport: x4    Other pertinent information:   CIWA score: N/A   NIH score:  N/A

## 2024-06-28 NOTE — PLAN OF CARE
AxOx4, RA (CPAP night), Tele NSR, Cont x2, SBA walker. LLE swollen red and weeping. + MRSA and Cultures pending.   PLAN: x1 IV bumex, ID consult, pt will need a PICC line for outpatient IV abx  Problem: Patient Centered Care  Goal: Patient preferences are identified and integrated in the patient's plan of care  Description: Interventions:  - What would you like us to know as we care for you? From AL  - Provide timely, complete, and accurate information to patient/family  - Incorporate patient and family knowledge, values, beliefs, and cultural backgrounds into the planning and delivery of care  - Encourage patient/family to participate in care and decision-making at the level they choose  - Honor patient and family perspectives and choices  Outcome: Progressing     Problem: Patient/Family Goals  Goal: Patient/Family Long Term Goal  Description: Patient's Long Term Goal: go home    Interventions:  - go home  - See additional Care Plan goals for specific interventions  Outcome: Progressing  Goal: Patient/Family Short Term Goal  Description: Patient's Short Term Goal: reduce leg swelling    Interventions:   - IV abx  -dressing changes  -diuretics  - See additional Care Plan goals for specific interventions  Outcome: Progressing     Problem: SKIN/TISSUE INTEGRITY - ADULT  Goal: Skin integrity remains intact  Description: INTERVENTIONS  - Assess and document risk factors for pressure ulcer development  - Assess and document skin integrity  - Monitor for areas of redness and/or skin breakdown  - Initiate interventions, skin care algorithm/standards of care as needed  Outcome: Progressing  Goal: Incision(s), wounds(s) or drain site(s) healing without S/S of infection  Description: INTERVENTIONS:  - Assess and document risk factors for pressure ulcer development  - Assess and document skin integrity  - Assess and document dressing/incision, wound bed, drain sites and surrounding tissue  - Implement wound care per orders  -  Initiate isolation precautions as appropriate  - Initiate Pressure Ulcer prevention bundle as indicated  Outcome: Progressing     Problem: MUSCULOSKELETAL - ADULT  Goal: Return mobility to safest level of function  Description: INTERVENTIONS:  - Assess patient stability and activity tolerance for standing, transferring and ambulating w/ or w/o assistive devices  - Assist with transfers and ambulation using safe patient handling equipment as needed  - Ensure adequate protection for wounds/incisions during mobilization  - Obtain PT/OT consults as needed  - Advance activity as appropriate  - Communicate ordered activity level and limitations with patient/family  Outcome: Progressing

## 2024-06-28 NOTE — PLAN OF CARE
Problem: Patient Centered Care  Goal: Patient preferences are identified and integrated in the patient's plan of care  Description: Interventions:  - What would you like us to know as we care for you?   - Provide timely, complete, and accurate information to patient/family  - Incorporate patient and family knowledge, values, beliefs, and cultural backgrounds into the planning and delivery of care  - Encourage patient/family to participate in care and decision-making at the level they choose  - Honor patient and family perspectives and choices  Outcome: Progressing    Problem: SKIN/TISSUE INTEGRITY - ADULT  Goal: Skin integrity remains intact  Description: INTERVENTIONS  - Assess and document risk factors for pressure ulcer development  - Assess and document skin integrity  - Monitor for areas of redness and/or skin breakdown  - Initiate interventions, skin care algorithm/standards of care as needed  Outcome: Progressing  Goal: Incision(s), wounds(s) or drain site(s) healing without S/S of infection  Description: INTERVENTIONS:  - Assess and document risk factors for pressure ulcer development  - Assess and document skin integrity  - Assess and document dressing/incision, wound bed, drain sites and surrounding tissue  - Implement wound care per orders  - Initiate isolation precautions as appropriate  - Initiate Pressure Ulcer prevention bundle as indicated  Outcome: Progressing     Problem: MUSCULOSKELETAL - ADULT  Goal: Return mobility to safest level of function  Description: INTERVENTIONS:  - Assess patient stability and activity tolerance for standing, transferring and ambulating w/ or w/o assistive devices  - Assist with transfers and ambulation using safe patient handling equipment as needed  - Ensure adequate protection for wounds/incisions during mobilization  - Obtain PT/OT consults as needed  - Advance activity as appropriate  - Communicate ordered activity level and limitations with  patient/family  Outcome: Progressing

## 2024-06-28 NOTE — PROGRESS NOTES
24 1134   Wound 24 Foot Left;Plantar   Date First Assessed: 24   Present on Original Admission: Yes  Primary Wound Type: Diabetic Ulcer  Location: Foot  Wound Location Orientation: Left;Plantar  Wound Description (Comments): dry pink intact skin dry callous   Wound Image   WOUND CARE NOTE    History:  Past Medical History:    Arrhythmia    Atrial fibrillation (HCC)    Congestive heart disease (HCC)    COPD (chronic obstructive pulmonary disease) (HCC)    Essential hypertension    High blood pressure    High cholesterol    Morbid obesity with BMI of 50.0-59.9, adult (HCC)    Muscle weakness    Neuropathy    Peripheral vascular disease (HCC)    Sleep apnea    Visual impairment     Past Surgical History:   Procedure Laterality Date    Knee surgery Bilateral       Social History     Socioeconomic History    Marital status:    Tobacco Use    Smoking status: Former     Current packs/day: 0.00     Types: Cigarettes     Quit date:      Years since quittin.5     Passive exposure: Never    Smokeless tobacco: Never   Vaping Use    Vaping status: Never Used   Substance and Sexual Activity    Alcohol use: Yes     Comment: 1-2 drinks daily    Drug use: Never     Social Determinants of Health     Financial Resource Strain: Unknown (2021)    Received from [facility] [employer], [facility]    Overall Financial Resource Strain (CARDIA)     Difficulty of Paying Living Expenses: Patient declined   Food Insecurity: No Food Insecurity (2024)    Food Insecurity     Food Insecurity: Never true   Transportation Needs: No Transportation Needs (2024)    Transportation Needs     Lack of Transportation: No   Housing Stability: Low Risk  (2024)    Housing Stability     Housing Instability: No       Lower Extremity Assessment:  Bilateral feet are warm, pedal pulses are palpable  LLE widest part of calf 18  inches, RLE- 17 inches  PLAN   Recommendations:  ID is following the pt.    Elevate  legs and feet in the bed and in the chair  Heels elevated using pillows to offload heels  Glucose control to help promote wound healing    Wound(s)  Location: Left medial/plantar leg weeping  Cleansing  Saline   Dressings Silver alginate, 6x6 foam  Secure with Kerlix roll  Frequency daily and prn   Location: Left plantar foot dry callous  Cleansing  Saline   Dressings Xeroform gauze  Secure with Gauze, Kerlix roll  Frequency daily     Compression:  SpandaGrip: 1 layer size G from base of toes to bend in knees. BLE        Discharge Recommendations:  to assist pt.  Pt. has a wound eval at the wound clinic 7/1/24      OBJECTIVE   MD Consult new LLE      ASSESSMENT   Janes Score:  Janes Scale Score: 21    Chart Reviewed: yes    Wound(s):  The pt. is resting in bed, Dr. Mcdermott is here seeing the pt. The pt. is known to wound care services. I saw 6/20/24 for the same LLE weeping. I had recommended and gave the pt. Spandagrip compression at that time. See the wound assessment, LLE and foot cleansed and dressed. Answered the pt's questions. Call light in reach. Spoke with the nurse.         Allergies: Patient has no known allergies.    Labs:   Lab Results   Component Value Date    WBC 6.5 06/27/2024    HGB 12.7 06/27/2024    HCT 38.8 06/27/2024    .0 06/27/2024    CREATSERUM 1.01 06/27/2024    BUN 25 (H) 06/27/2024     06/27/2024    K 4.4 06/27/2024     06/27/2024    CO2 26.0 06/27/2024     (H) 06/27/2024    CA 9.5 06/27/2024    ALB 4.5 06/27/2024    ALKPHO 76 06/27/2024    BILT 0.5 06/27/2024    TP 7.7 06/27/2024    AST 17 06/27/2024    ALT 10 06/27/2024    MG 1.9 05/08/2024     No results found for: \"PREALBUMIN\"      Time Spent 30 Minutes.    Prerna Gottlieb RN  [facility]  [facility]  [phone]     Site Assessment Dry;Fragile;Pink;White  (callous)   Drainage Amount None   Treatments Cleansed;Saline;Site Care   Dressing 4x4s;Kerlix  roll;Gauze;Tape;Xeroform   Dressing Changed New   Dressing Status Clean;Dry;Intact   Kamryn-wound Assessment Dry;Intact;Fragile   Tee Scale Grade 1   Wound 06/27/24 Leg Left;Medial;Posterior   Date First Assessed: 06/27/24   Present on Original Admission: Yes  Primary Wound Type: Venous Ulcer  Location: Leg  Wound Location Orientation: Left;Medial;Posterior  Wound Description (Comments): weeping   Wound Image     Site Assessment Fragile;Moist;Pink;Red  (weeping)   Drainage Amount Moderate   Drainage Description Yellow;Serous   Treatments Cleansed;Saline;Site Care   Dressing 4x4s;Aquacel Foam;Gauze;Hydrofiber with silver;Kerlix roll;Tape   Dressing Changed New   Dressing Status Clean;Dry;Intact   Wound Length (cm)   (generalized weeping)   Non-staged Wound Description Partial thickness   Kamryn-wound Assessment Dry;Intact;Edema;Fragile;Pink;Red   Wound Odor None   Wound Follow Up   Follow up needed Yes

## 2024-06-29 LAB
ANION GAP SERPL CALC-SCNC: 5 MMOL/L (ref 0–18)
BASOPHILS # BLD AUTO: 0.05 X10(3) UL (ref 0–0.2)
BASOPHILS NFR BLD AUTO: 0.9 %
BUN BLD-MCNC: 16 MG/DL (ref 9–23)
BUN/CREAT SERPL: 19.8 (ref 10–20)
CALCIUM BLD-MCNC: 8.8 MG/DL (ref 8.7–10.4)
CHLORIDE SERPL-SCNC: 109 MMOL/L (ref 98–112)
CO2 SERPL-SCNC: 29 MMOL/L (ref 21–32)
CREAT BLD-MCNC: 0.81 MG/DL
DEPRECATED RDW RBC AUTO: 52.5 FL (ref 35.1–46.3)
EGFRCR SERPLBLD CKD-EPI 2021: 75 ML/MIN/1.73M2 (ref 60–?)
EOSINOPHIL # BLD AUTO: 0.31 X10(3) UL (ref 0–0.7)
EOSINOPHIL NFR BLD AUTO: 5.5 %
ERYTHROCYTE [DISTWIDTH] IN BLOOD BY AUTOMATED COUNT: 15.4 % (ref 11–15)
GLUCOSE BLD-MCNC: 90 MG/DL (ref 70–99)
HCT VFR BLD AUTO: 33 %
HGB BLD-MCNC: 11.1 G/DL
IMM GRANULOCYTES # BLD AUTO: 0.02 X10(3) UL (ref 0–1)
IMM GRANULOCYTES NFR BLD: 0.4 %
LYMPHOCYTES # BLD AUTO: 1.51 X10(3) UL (ref 1–4)
LYMPHOCYTES NFR BLD AUTO: 26.7 %
MAGNESIUM SERPL-MCNC: 1.8 MG/DL (ref 1.6–2.6)
MCH RBC QN AUTO: 31.1 PG (ref 26–34)
MCHC RBC AUTO-ENTMCNC: 33.6 G/DL (ref 31–37)
MCV RBC AUTO: 92.4 FL
MONOCYTES # BLD AUTO: 0.68 X10(3) UL (ref 0.1–1)
MONOCYTES NFR BLD AUTO: 12 %
NEUTROPHILS # BLD AUTO: 3.09 X10 (3) UL (ref 1.5–7.7)
NEUTROPHILS # BLD AUTO: 3.09 X10(3) UL (ref 1.5–7.7)
NEUTROPHILS NFR BLD AUTO: 54.5 %
OSMOLALITY SERPL CALC.SUM OF ELEC: 297 MOSM/KG (ref 275–295)
PLATELET # BLD AUTO: 252 10(3)UL (ref 150–450)
POTASSIUM SERPL-SCNC: 3.7 MMOL/L (ref 3.5–5.1)
RBC # BLD AUTO: 3.57 X10(6)UL
SODIUM SERPL-SCNC: 143 MMOL/L (ref 136–145)
WBC # BLD AUTO: 5.7 X10(3) UL (ref 4–11)

## 2024-06-29 PROCEDURE — 80048 BASIC METABOLIC PNL TOTAL CA: CPT | Performed by: NURSE PRACTITIONER

## 2024-06-29 PROCEDURE — 83735 ASSAY OF MAGNESIUM: CPT | Performed by: INTERNAL MEDICINE

## 2024-06-29 PROCEDURE — 85025 COMPLETE CBC W/AUTO DIFF WBC: CPT | Performed by: NURSE PRACTITIONER

## 2024-06-29 PROCEDURE — 87493 C DIFF AMPLIFIED PROBE: CPT | Performed by: HOSPITALIST

## 2024-06-29 RX ORDER — MAGNESIUM OXIDE 400 MG/1
400 TABLET ORAL ONCE
Status: COMPLETED | OUTPATIENT
Start: 2024-06-29 | End: 2024-06-29

## 2024-06-29 RX ORDER — POTASSIUM CHLORIDE 20 MEQ/1
40 TABLET, EXTENDED RELEASE ORAL ONCE
Status: COMPLETED | OUTPATIENT
Start: 2024-06-29 | End: 2024-06-29

## 2024-06-29 RX ORDER — DOCUSATE SODIUM 100 MG/1
100 CAPSULE, LIQUID FILLED ORAL 2 TIMES DAILY
Status: DISCONTINUED | OUTPATIENT
Start: 2024-06-29 | End: 2024-07-02

## 2024-06-29 RX ORDER — POLYETHYLENE GLYCOL 3350 17 G/17G
17 POWDER, FOR SOLUTION ORAL DAILY PRN
Status: DISCONTINUED | OUTPATIENT
Start: 2024-06-29 | End: 2024-07-02

## 2024-06-29 RX ORDER — SENNOSIDES 8.6 MG
8.6 TABLET ORAL 2 TIMES DAILY
Status: DISCONTINUED | OUTPATIENT
Start: 2024-06-29 | End: 2024-07-02

## 2024-06-29 RX ORDER — BISACODYL 10 MG
10 SUPPOSITORY, RECTAL RECTAL
Status: DISCONTINUED | OUTPATIENT
Start: 2024-06-29 | End: 2024-07-02

## 2024-06-29 NOTE — PLAN OF CARE
No acute changes. Systolic blood pressure in 90s today, patient asymptomatic. Metoprolol and aldactone on hold, new parameters for bumex and diltiaziem per MD order. Left leg and left foot dressings changed, no weeping present. Safety precautions in place.     Problem: Patient Centered Care  Goal: Patient preferences are identified and integrated in the patient's plan of care  Description: Interventions:  - What would you like us to know as we care for you? From AL  - Provide timely, complete, and accurate information to patient/family  - Incorporate patient and family knowledge, values, beliefs, and cultural backgrounds into the planning and delivery of care  - Encourage patient/family to participate in care and decision-making at the level they choose  - Honor patient and family perspectives and choices  Outcome: Progressing     Problem: Patient/Family Goals  Goal: Patient/Family Long Term Goal  Description: Patient's Long Term Goal: go home    Interventions:  - go home  - See additional Care Plan goals for specific interventions  Outcome: Progressing  Goal: Patient/Family Short Term Goal  Description: Patient's Short Term Goal: reduce leg swelling    Interventions:   - IV abx  -dressing changes  -diuretics  - See additional Care Plan goals for specific interventions  Outcome: Progressing     Problem: SKIN/TISSUE INTEGRITY - ADULT  Goal: Skin integrity remains intact  Description: INTERVENTIONS  - Assess and document risk factors for pressure ulcer development  - Assess and document skin integrity  - Monitor for areas of redness and/or skin breakdown  - Initiate interventions, skin care algorithm/standards of care as needed  Outcome: Progressing  Goal: Incision(s), wounds(s) or drain site(s) healing without S/S of infection  Description: INTERVENTIONS:  - Assess and document risk factors for pressure ulcer development  - Assess and document skin integrity  - Assess and document dressing/incision, wound bed, drain  sites and surrounding tissue  - Implement wound care per orders  - Initiate isolation precautions as appropriate  - Initiate Pressure Ulcer prevention bundle as indicated  Outcome: Progressing     Problem: MUSCULOSKELETAL - ADULT  Goal: Return mobility to safest level of function  Description: INTERVENTIONS:  - Assess patient stability and activity tolerance for standing, transferring and ambulating w/ or w/o assistive devices  - Assist with transfers and ambulation using safe patient handling equipment as needed  - Ensure adequate protection for wounds/incisions during mobilization  - Obtain PT/OT consults as needed  - Advance activity as appropriate  - Communicate ordered activity level and limitations with patient/family  Outcome: Progressing

## 2024-06-29 NOTE — PROGRESS NOTES
[facility]  part of [facility] Infectious Disease Consult    Adore Covington Patient Status:  Inpatient    1945 MRN [id number]   Location [facility] 5SW/SE Attending Yamile Hernandez MD   Hosp Day # 2 PCP MD Adore Mireles seen and examined,   Afebrile,   Previous entries noted,   No new complaints,   Feels better, no pain in the L leg     History:  Past Medical History:    Arrhythmia    Atrial fibrillation (HCC)    Congestive heart disease (HCC)    COPD (chronic obstructive pulmonary disease) (HCC)    Essential hypertension    High blood pressure    High cholesterol    Morbid obesity with BMI of 50.0-59.9, adult (HCC)    Muscle weakness    Neuropathy    Peripheral vascular disease (HCC)    Sleep apnea    Visual impairment     Past Surgical History:   Procedure Laterality Date    Knee surgery Bilateral      History reviewed. No pertinent family history.   reports that she quit smoking about 41 years ago. Her smoking use included cigarettes. She has never been exposed to tobacco smoke. She has never used smokeless tobacco. She reports current alcohol use. She reports that she does not use drugs.    Allergies:  No Known Allergies    Medications:    Current Facility-Administered Medications:     docusate sodium (Colace) cap 100 mg, 100 mg, Oral, BID    polyethylene glycol (PEG 3350) (Miralax) 17 g oral packet 17 g, 17 g, Oral, Daily PRN    magnesium hydroxide (Milk of Magnesia) 400 MG/5ML oral suspension 30 mL, 30 mL, Oral, Daily PRN    bisacodyl (Dulcolax) 10 MG rectal suppository 10 mg, 10 mg, Rectal, Daily PRN    sennosides (Senokot) tab 8.6 mg, 8.6 mg, Oral, BID    apixaban (Eliquis) tab 5 mg, 5 mg, Oral, BID    bumetanide (Bumex) tab 1 mg, 1 mg, Oral, Daily    dilTIAZem ER (Dilacor XR) 24 hr cap 240 mg, 240 mg, Oral, Daily    pantoprazole (Protonix) DR tab 40 mg, 40 mg, Oral, QAM AC    pravastatin (Pravachol) tab 20 mg, 20 mg, Oral, Nightly     ceFEPIme (Maxpime) 2 g in sodium chloride 0.9% 100 mL IVPB-MBP, 2 g, Intravenous, Q12H    [Held by provider] spironolactone (Aldactone) tab 25 mg, 25 mg, Oral, Daily    [Held by provider] metoprolol succinate ER (Toprol XL) 24 hr tab 50 mg, 50 mg, Oral, Daily    gabapentin (Neurontin) cap 200 mg, 200 mg, Oral, TID    vancomycin (Vancocin) 1.25 g in sodium chloride 0.9% 250mL IVPB premix, 10 mg/kg, Intravenous, Q24H    Review of Systems:   Constitutional: Negative for anorexia, chills, fatigue, fevers, malaise, night sweats and weight loss.  Eyes: Negative for visual disturbance, irritation and redness.  Ears, nose, mouth, throat, and face: Negative for hearing loss, tinnitus, nasal congestion, snoring, sore throat, hoarseness and voice change.  Respiratory: Negative for cough, sputum, hemoptysis, chest pain, wheezing, dyspnea on exertion, or stridor.  Cardiovascular: Negative for chest pain, palpitations, irregular heart beats, syncope, fatigue, orthopnea, paroxysmal nocturnal dyspnea, lower extremity edema.  Gastrointestinal: Negative for dysphagia, odynophagia, reflux symptoms, nausea, vomiting, change in bowel habits, diarrhea, constipation and abdominal pain.  Integument/breast: Negative for rash, skin lesions, and pruritus.  Hematologic/lymphatic: Negative for easy bruising, bleeding, and lymphadenopathy.  Musculoskeletal: Negative for myalgias, arthralgias, muscle weakness.  Neurological: Negative for headaches, dizziness, seizures, memory problems, trouble swallowing, speech problems, gait problems and weakness.  Behavioral/Psych: Negative for active tobacco use.  Endocrine: No history of of diabetes, thyroid disorder.  All other review of systems are negative.    Vital signs in last 24 hours:  Patient Vitals for the past 24 hrs:   BP Temp Temp src Pulse Resp SpO2 Height Weight   06/28/24 0829 101/54 97.7 °F (36.5 °C) Oral 76 18 99 % -- --   06/28/24 0538 107/65 98.4 °F (36.9 °C) Oral -- 18 99 % -- --    06/28/24 0127 -- -- -- 68 -- -- -- --   06/27/24 2137 113/55 98.2 °F (36.8 °C) Oral 73 18 96 % 5' 2.99\" (1.6 m) 251 lb 14.4 oz (114.3 kg)   06/27/24 2045 111/58 -- -- 71 -- 96 % -- --   06/27/24 1823 123/66 98 °F (36.7 °C) Oral 74 19 96 % 5' 3\" (1.6 m) 250 lb (113.4 kg)       Physical Exam:   General: alert, cooperative, oriented.  No respiratory distress.   Head: Normocephalic, without obvious abnormality, atraumatic.   Eyes: Conjunctivae/corneas clear.  No scleral icterus.  No conjunctival     hemorrhage.   Nose: Nares normal.   Throat: Lips, mucosa, and tongue normal.  No thrush noted.   Neck: Soft, supple neck; trachea midline, no adenopathy, no thyromegaly.   Lungs: CTAB, normal and equal bilateral chest rise   Chest wall: No tenderness or deformity.   Heart: Regular rate and rhythm, normal S1S2, no murmur.   Abdomen: soft, non-tender, non-distended, no masses, no guarding, no     rebound, positive BS.   Extremity:erythema no open wound, callous skis is weeping,    Skin: No rashes or lesions.   Neurological: Alert, interactive, no focal deficits    Labs:  Lab Results   Component Value Date    WBC 5.7 06/29/2024    HGB 11.1 06/29/2024    HCT 33.0 06/29/2024    .0 06/29/2024    CREATSERUM 0.81 06/29/2024    BUN 16 06/29/2024     06/29/2024    K 3.7 06/29/2024     06/29/2024    CO2 29.0 06/29/2024    GLU 90 06/29/2024    CA 8.8 06/29/2024    MG 1.8 06/29/2024       Radiology:  Reviewed,       Cultures:  Reviewed,     Assessment and Plan:    1.  Left > R Leg Cellulitis in the presence of venous stasis and recent abx with hospitalization,   - Drainage Cul in the past with MRSA and PSAR,    - Blood cul are NGTD  - Wound care to see, compression wraps,   - On IV Vanc, IV Cefepime, d # 3, Will likely need PICC line and IV abx on discharge,      2.    Hx of Bilateral TKA; Infection needs to be treated aggressively,      3.    Disposition: in house, an elderly female with LLE Cellulitis with open  wounds sec to venous stasis admitted with worsening Cellulitis, no new trauma, no fever, chills,   - Continue IV Vanc, IV Cefepime, pharm to dose,   - Wound care input,   - PICC line,   - Compression wraps,   - Leg elevation,      Discussed with patient, RN, all questions answered, further recommendations to follow, Thanks,    Thank you for consulting [facility] for Adore Covington.  If you have any questions or concerns please call [facility] Infectious Disease at [phone].     Carlos Manuel Mcdermott MD  6/28/2024  10:41 AM

## 2024-06-29 NOTE — PLAN OF CARE
Problem: Patient Centered Care  Goal: Patient preferences are identified and integrated in the patient's plan of care  Description: Interventions:  - What would you like us to know as we care for you? From AL  - Provide timely, complete, and accurate information to patient/family  - Incorporate patient and family knowledge, values, beliefs, and cultural backgrounds into the planning and delivery of care  - Encourage patient/family to participate in care and decision-making at the level they choose  - Honor patient and family perspectives and choices  Outcome: Progressing     Problem: Patient/Family Goals  Goal: Patient/Family Long Term Goal  Description: Patient's Long Term Goal: go home    Interventions:  - go home  - See additional Care Plan goals for specific interventions  Outcome: Progressing  Goal: Patient/Family Short Term Goal  Description: Patient's Short Term Goal: reduce leg swelling    Interventions:   - IV abx  -dressing changes  -diuretics  - See additional Care Plan goals for specific interventions  Outcome: Progressing     Problem: SKIN/TISSUE INTEGRITY - ADULT  Goal: Skin integrity remains intact  Description: INTERVENTIONS  - Assess and document risk factors for pressure ulcer development  - Assess and document skin integrity  - Monitor for areas of redness and/or skin breakdown  - Initiate interventions, skin care algorithm/standards of care as needed  Outcome: Progressing  Goal: Incision(s), wounds(s) or drain site(s) healing without S/S of infection  Description: INTERVENTIONS:  - Assess and document risk factors for pressure ulcer development  - Assess and document skin integrity  - Assess and document dressing/incision, wound bed, drain sites and surrounding tissue  - Implement wound care per orders  - Initiate isolation precautions as appropriate  - Initiate Pressure Ulcer prevention bundle as indicated  Outcome: Progressing     Problem: MUSCULOSKELETAL - ADULT  Goal: Return mobility to  safest level of function  Description: INTERVENTIONS:  - Assess patient stability and activity tolerance for standing, transferring and ambulating w/ or w/o assistive devices  - Assist with transfers and ambulation using safe patient handling equipment as needed  - Ensure adequate protection for wounds/incisions during mobilization  - Obtain PT/OT consults as needed  - Advance activity as appropriate  - Communicate ordered activity level and limitations with patient/family  Outcome: Progressing

## 2024-06-29 NOTE — PROGRESS NOTES
[facility]  part of [facility]     [facility] Hospitalist Progress Note     PCP: Maral Huffman MD    CC: Follow up       Assessment/Plan:   Ms. Covington is a 79 year old female with PMH sig for COPD, A- Fib on Eliquis, CHF, HTN, HLD, PAD, Neuropathy, SUSAN,  prior hx of septic shock 2/2 cellulitis with admission 6/19-6/22 with LLE cellulitis  discharged on oral levaquin and doxy seen by ID on 6/27 and found to have worsening swelling and drainage, sent to ER for admission for IV abx, see below for details      Recurrent LLE Cellulitis  Chronic Stasis Dermatitis   Hx MRSA positive and pseudomonas  -6/19 discharged on levaquin and doxy, seen by ID 6/27 w/ worsening swelling and drainage  and sent in for IV abx  -afebrile. No leukocytosis. La negative   -MRSA +  -blood  cx pending   -wound cx pending  -wound care   -abx-cefepime and vanco, per ID plans for outpt IV abx at discharge   -ID consult      PaFib S/P CV 1/2024  Secondary Hypercoagulable state   S/p Cardioversion   HTN  HL  Chronic Diastolic CHF  -5/2024 echo with normal EF 55%, mild as, mild ai   -continue  home eliquis, cardizem, lopressor, bumex, aldactone and pravastatin for home zocor.  Due to systolic blood pressures in the high 80s will hold her home Lopressor and Aldactone.  Resume Cardizem and Bumex with parameters.  Most recent blood pressure in the 90s, patient denies any lightheadedness or dizziness or shortness of breath or chest pain.  -additional dose of IV bumex today with LE edema, will need to dose daily prn -> on 6/29 due to lower blood pressures.  Patient not symptomatic  -follows with Dr Murphy      COPD not on inhalers   SUSAN  - cpap as tolerated      PAD  -continue statin.   -not on asa 2/2 eliquis ??     Neuropathy   - gabapentin      GERD  -PPI     Hx Urinary Retention with hx ivey insertion      GOC  -DNR/Select-confirmed with pt      MA/ACO Reach  -ER Visits 2024: 0  -Admissions 2024: 4 with 1 admit for elective CV  -Re-  Entry: YES-7 day-discharged [facility] 6/22  -Consults: ID   -Discharge Needs: IV Abx   -Appointments: [x ] PCP Maral Huffman MD- 7/1                             [X] ID Hajmarlari 7/11                            [X] outpt wound NP 7/1  FEN  -lytes in am  -diet-cardiac      Prophy  -SCD  -Eliquis      Dispo  -pending clinical course  PCP: Maral Huffman MD    Questions/concerns were discussed with patient and/or family by bedside.    Note: This chart was prepared using voice recognition software and may contain unintended word substitution errors.       Thank You,  Gabby Boudreaux M.D.  [facility] Hospitalist  Answering Service: [phone]        Subjective     No complaints.  Feels well overall.  Denies feeling lightheaded even when getting up to the bathroom.  No CP, SOB, or N/V.      Objective     OBJECTIVE:  Temp:  [97.9 °F (36.6 °C)-98.6 °F (37 °C)] 97.9 °F (36.6 °C)  Pulse:  [67-73] 68  Resp:  [16-18] 16  BP: ()/(44-95) 91/44  SpO2:  [95 %-97 %] 97 %    Intake/Output:    Intake/Output Summary (Last 24 hours) at 6/29/2024 1507  Last data filed at 6/29/2024 1100  Gross per 24 hour   Intake 922 ml   Output 450 ml   Net 472 ml       Last 3 Weights   06/27/24 2137 251 lb 14.4 oz (114.3 kg)   06/27/24 1823 250 lb (113.4 kg)   06/20/24 0543 255 lb 12.8 oz (116 kg)   06/19/24 2134 258 lb 4.8 oz (117.2 kg)   05/06/24 0500 261 lb 3.9 oz (118.5 kg)   05/05/24 0600 257 lb 8 oz (116.8 kg)   05/04/24 0324 261 lb 3.9 oz (118.5 kg)   05/03/24 1837 261 lb 9.6 oz (118.7 kg)       Exam  GENERAL: no apparent distress  NEUROLOGIC: A/A; Ox3  HEENT: normocephalic, normal nose, pharynx and TM's, sclera anicteric, conjunctiva normal  RESPIRATORY: normal expansion; non labored, CTA   CARDIOVASCULAR: regular,nl S1 S2  ABDOMEN:  Soft, BS+; non distended, non tender    EXTREMITIES: B/L LE edema L>R, erythema b/l shins L>>>R, noted clear serous drainage from calf on left.     Medications      docusate sodium  100 mg Oral BID    sennosides   8.6 mg Oral BID    apixaban  5 mg Oral BID    bumetanide  1 mg Oral Daily    dilTIAZem HCl ER  240 mg Oral Daily    pantoprazole  40 mg Oral QAM AC    pravastatin  20 mg Oral Nightly    cefepime  2 g Intravenous Q12H    [Held by provider] spironolactone  25 mg Oral Daily    [Held by provider] metoprolol succinate ER  50 mg Oral Daily    gabapentin  200 mg Oral TID    vancomycin  10 mg/kg Intravenous Q24H         polyethylene glycol (PEG 3350)    magnesium hydroxide    bisacodyl    Data Review:       Labs:     Recent Labs   Lab 06/27/24 1836 06/29/24  0510   WBC 6.5 5.7   HGB 12.7 11.1*   MCV 93.5 92.4   .0 252.0       Recent Labs   Lab 06/27/24 1836 06/29/24  0510    143   K 4.4 3.7    109   CO2 26.0 29.0   BUN 25* 16   CREATSERUM 1.01 0.81   CA 9.5 8.8   MG  --  1.8   * 90       Recent Labs   Lab 06/27/24 1836   ALT 10   AST 17   ALB 4.5       No results for input(s): \"PGLU\" in the last 168 hours.    No results for input(s): \"TROP\" in the last 168 hours.    Imaging:  No results found.

## 2024-06-30 LAB
ANION GAP SERPL CALC-SCNC: 6 MMOL/L (ref 0–18)
ANTIBODY SCREEN: NEGATIVE
BASOPHILS # BLD AUTO: 0.07 X10(3) UL (ref 0–0.2)
BASOPHILS NFR BLD AUTO: 1.4 %
BUN BLD-MCNC: 13 MG/DL (ref 9–23)
BUN/CREAT SERPL: 18.1 (ref 10–20)
C DIFF TOX B STL QL: NEGATIVE
CALCIUM BLD-MCNC: 9.1 MG/DL (ref 8.7–10.4)
CHLORIDE SERPL-SCNC: 111 MMOL/L (ref 98–112)
CO2 SERPL-SCNC: 26 MMOL/L (ref 21–32)
CREAT BLD-MCNC: 0.72 MG/DL
DEPRECATED HBV CORE AB SER IA-ACNC: 56.4 NG/ML
DEPRECATED RDW RBC AUTO: 54.6 FL (ref 35.1–46.3)
EGFRCR SERPLBLD CKD-EPI 2021: 86 ML/MIN/1.73M2 (ref 60–?)
EOSINOPHIL # BLD AUTO: 0.33 X10(3) UL (ref 0–0.7)
EOSINOPHIL NFR BLD AUTO: 6.4 %
ERYTHROCYTE [DISTWIDTH] IN BLOOD BY AUTOMATED COUNT: 15.5 % (ref 11–15)
GLUCOSE BLD-MCNC: 93 MG/DL (ref 70–99)
HAPTOGLOB SERPL-MCNC: 185 MG/DL (ref 30–200)
HCT VFR BLD AUTO: 29.2 %
HGB BLD-MCNC: 11.4 G/DL
HGB BLD-MCNC: 9.4 G/DL
IMM GRANULOCYTES # BLD AUTO: 0.03 X10(3) UL (ref 0–1)
IMM GRANULOCYTES NFR BLD: 0.6 %
IRON SATN MFR SERPL: 18 %
IRON SERPL-MCNC: 54 UG/DL
LYMPHOCYTES # BLD AUTO: 1.21 X10(3) UL (ref 1–4)
LYMPHOCYTES NFR BLD AUTO: 23.4 %
MAGNESIUM SERPL-MCNC: 1.9 MG/DL (ref 1.6–2.6)
MCH RBC QN AUTO: 30.6 PG (ref 26–34)
MCHC RBC AUTO-ENTMCNC: 32.2 G/DL (ref 31–37)
MCV RBC AUTO: 95.1 FL
MONOCYTES # BLD AUTO: 0.6 X10(3) UL (ref 0.1–1)
MONOCYTES NFR BLD AUTO: 11.6 %
NEUTROPHILS # BLD AUTO: 2.93 X10 (3) UL (ref 1.5–7.7)
NEUTROPHILS # BLD AUTO: 2.93 X10(3) UL (ref 1.5–7.7)
NEUTROPHILS NFR BLD AUTO: 56.6 %
OSMOLALITY SERPL CALC.SUM OF ELEC: 296 MOSM/KG (ref 275–295)
PLATELET # BLD AUTO: 215 10(3)UL (ref 150–450)
POTASSIUM SERPL-SCNC: 4.1 MMOL/L (ref 3.5–5.1)
POTASSIUM SERPL-SCNC: 4.1 MMOL/L (ref 3.5–5.1)
RBC # BLD AUTO: 3.07 X10(6)UL
RH BLOOD TYPE: POSITIVE
RH BLOOD TYPE: POSITIVE
SODIUM SERPL-SCNC: 143 MMOL/L (ref 136–145)
TIBC SERPL-MCNC: 304 UG/DL (ref 250–425)
TRANSFERRIN SERPL-MCNC: 204 MG/DL (ref 250–380)
VANCOMYCIN PEAK SERPL-MCNC: 17.7 UG/ML (ref 30–50)
VANCOMYCIN TROUGH SERPL-MCNC: 9.7 UG/ML (ref 10–20)
WBC # BLD AUTO: 5.2 X10(3) UL (ref 4–11)

## 2024-06-30 PROCEDURE — 85025 COMPLETE CBC W/AUTO DIFF WBC: CPT | Performed by: NURSE PRACTITIONER

## 2024-06-30 PROCEDURE — 84466 ASSAY OF TRANSFERRIN: CPT | Performed by: HOSPITALIST

## 2024-06-30 PROCEDURE — 80048 BASIC METABOLIC PNL TOTAL CA: CPT | Performed by: NURSE PRACTITIONER

## 2024-06-30 PROCEDURE — 84132 ASSAY OF SERUM POTASSIUM: CPT | Performed by: HOSPITALIST

## 2024-06-30 PROCEDURE — 80202 ASSAY OF VANCOMYCIN: CPT | Performed by: INTERNAL MEDICINE

## 2024-06-30 PROCEDURE — 86900 BLOOD TYPING SEROLOGIC ABO: CPT | Performed by: HOSPITALIST

## 2024-06-30 PROCEDURE — 83540 ASSAY OF IRON: CPT | Performed by: HOSPITALIST

## 2024-06-30 PROCEDURE — 83735 ASSAY OF MAGNESIUM: CPT | Performed by: HOSPITALIST

## 2024-06-30 PROCEDURE — 86901 BLOOD TYPING SEROLOGIC RH(D): CPT | Performed by: HOSPITALIST

## 2024-06-30 PROCEDURE — 85018 HEMOGLOBIN: CPT | Performed by: HOSPITALIST

## 2024-06-30 PROCEDURE — 83010 ASSAY OF HAPTOGLOBIN QUANT: CPT | Performed by: HOSPITALIST

## 2024-06-30 PROCEDURE — 82728 ASSAY OF FERRITIN: CPT | Performed by: HOSPITALIST

## 2024-06-30 PROCEDURE — 86850 RBC ANTIBODY SCREEN: CPT | Performed by: HOSPITALIST

## 2024-06-30 NOTE — PROGRESS NOTES
[facility]  part of [facility]     [facility] Hospitalist Progress Note     PCP: Maral Huffman MD    CC: Follow up       Assessment/Plan:   Ms. Covington is a 79 year old female with PMH sig for COPD, A- Fib on Eliquis, CHF, HTN, HLD, PAD, Neuropathy, SUSAN,  prior hx of septic shock 2/2 cellulitis with admission 6/19-6/22 with LLE cellulitis  discharged on oral levaquin and doxy seen by ID on 6/27 and found to have worsening swelling and drainage, sent to ER for admission for IV abx, see below for details      Recurrent LLE Cellulitis  Chronic Stasis Dermatitis   Hx MRSA positive and pseudomonas  -6/19 discharged on levaquin and doxy, seen by ID 6/27 w/ worsening swelling and drainage  and sent in for IV abx  -afebrile. No leukocytosis. La negative   -MRSA +  -blood  cx pending   -wound cx pending  -wound care   -abx-cefepime and vanco, per ID plans for outpt IV abx at discharge   -ID consult      PaFib S/P CV 1/2024  Secondary Hypercoagulable state   S/p Cardioversion   HTN  HL  Chronic Diastolic CHF  -5/2024 echo with normal EF 55%, mild as, mild ai   -continue  home eliquis, cardizem, lopressor, bumex, aldactone and pravastatin for home zocor.  Due to systolic blood pressures in the high 80s will hold her home Lopressor and Aldactone.  Resume Cardizem and Bumex with parameters.  Most recent blood pressure in the 90s, patient denies any lightheadedness or dizziness or shortness of breath or chest pain.  -additional dose of IV bumex today with LE edema, will need to dose daily prn -> on 6/29 due to lower blood pressures.  Patient not symptomatic  -follows with Dr Murphy      Acute drop in hemoglobin  - Outpatient hemoglobin posterior to 12's, did drift down to 11's.  Was 11.1 yesterday but then acutely 9.4.  Age 11.4.  Will continue blood thinners and continue to monitor.  No clinical signs of active bleeding.  No black or bloody bowel movements.    COPD not on inhalers   SUSAN  - cpap as tolerated       PAD  -continue statin.   -not on asa 2/2 eliquis ??     Neuropathy   - gabapentin      GERD  -PPI     Hx Urinary Retention with hx ivey insertion      GOC  -DNR/Select-confirmed with pt      MA/ACO Reach  -ER Visits 2024: 0  -Admissions 2024: 4 with 1 admit for elective CV  -Re- Entry: YES-7 day-discharged [facility] 6/22  -Consults: ID   -Discharge Needs: IV Abx   -Appointments: [x ] PCP Maral Huffman MD- 7/1                             [X] ID Rk 7/11                            [X] outpt wound NP 7/1  FEN  -lytes in am  -diet-cardiac      Prophy  -SCD  -Eliquis      Dispo  -pending clinical course  PCP: Maral Huffman MD    Questions/concerns were discussed with patient and/or family by bedside.    Note: This chart was prepared using voice recognition software and may contain unintended word substitution errors.       Thank You,  Gabby Boudreaux M.D.  [facility] Hospitalist  Answering Service: [phone]        Subjective     No complaints.  Feels well overall.  Denies feeling lightheaded even when getting up to the bathroom.  No CP, SOB, or N/V.      Objective     OBJECTIVE:  Temp:  [97.8 °F (36.6 °C)-98.2 °F (36.8 °C)] 98.2 °F (36.8 °C)  Pulse:  [66-73] 72  Resp:  [18-20] 18  BP: ()/(39-63) 91/63  SpO2:  [95 %-99 %] 96 %    Intake/Output:    Intake/Output Summary (Last 24 hours) at 6/30/2024 1340  Last data filed at 6/30/2024 1130  Gross per 24 hour   Intake 1182 ml   Output --   Net 1182 ml       Last 3 Weights   06/27/24 2137 251 lb 14.4 oz (114.3 kg)   06/27/24 1823 250 lb (113.4 kg)   06/20/24 0543 255 lb 12.8 oz (116 kg)   06/19/24 2134 258 lb 4.8 oz (117.2 kg)   05/06/24 0500 261 lb 3.9 oz (118.5 kg)   05/05/24 0600 257 lb 8 oz (116.8 kg)   05/04/24 0324 261 lb 3.9 oz (118.5 kg)   05/03/24 1837 261 lb 9.6 oz (118.7 kg)       Exam  GENERAL: no apparent distress  NEUROLOGIC: A/A; Ox3  HEENT: normocephalic, normal nose, pharynx and TM's, sclera anicteric, conjunctiva normal  RESPIRATORY: normal  expansion; non labored, CTA   CARDIOVASCULAR: regular,nl S1 S2  ABDOMEN:  Soft, BS+; non distended, non tender    EXTREMITIES: B/L LE edema L>R, erythema b/l shins L>>>R, noted clear serous drainage from calf on left.     Medications      docusate sodium  100 mg Oral BID    sennosides  8.6 mg Oral BID    apixaban  5 mg Oral BID    bumetanide  1 mg Oral Daily    dilTIAZem HCl ER  240 mg Oral Daily    pantoprazole  40 mg Oral QAM AC    pravastatin  20 mg Oral Nightly    cefepime  2 g Intravenous Q12H    [Held by provider] spironolactone  25 mg Oral Daily    [Held by provider] metoprolol succinate ER  50 mg Oral Daily    gabapentin  200 mg Oral TID    vancomycin  10 mg/kg Intravenous Q24H         polyethylene glycol (PEG 3350)    magnesium hydroxide    bisacodyl    Data Review:       Labs:     Recent Labs   Lab 06/27/24  1836 06/29/24  0510 06/30/24  0403 06/30/24  1008   WBC 6.5 5.7 5.2  --    HGB 12.7 11.1* 9.4* 11.4*   MCV 93.5 92.4 95.1  --    .0 252.0 215.0  --        Recent Labs   Lab 06/27/24  1836 06/29/24  0510 06/30/24  0404    143 143   K 4.4 3.7 4.1  4.1    109 111   CO2 26.0 29.0 26.0   BUN 25* 16 13   CREATSERUM 1.01 0.81 0.72   CA 9.5 8.8 9.1   MG  --  1.8 1.9   * 90 93       Recent Labs   Lab 06/27/24  1836   ALT 10   AST 17   ALB 4.5       No results for input(s): \"PGLU\" in the last 168 hours.    No results for input(s): \"TROP\" in the last 168 hours.    Imaging:  No results found.

## 2024-06-30 NOTE — PROGRESS NOTES
[facility]  part of [facility] Infectious Disease Consult    Adore Covington Patient Status:  Inpatient    1945 MRN [id number]   Location [facility] 5SW/SE Attending Yamile Hernandez MD   Hosp Day # 3 PCP MD Adore Mireles seen and examined,   Afebrile,   Previous entries noted,   No new complaints,   Feels better, no pain in the L leg     History:  Past Medical History:    Arrhythmia    Atrial fibrillation (HCC)    Congestive heart disease (HCC)    COPD (chronic obstructive pulmonary disease) (HCC)    Essential hypertension    High blood pressure    High cholesterol    Morbid obesity with BMI of 50.0-59.9, adult (HCC)    Muscle weakness    Neuropathy    Peripheral vascular disease (HCC)    Sleep apnea    Visual impairment     Past Surgical History:   Procedure Laterality Date    Knee surgery Bilateral      History reviewed. No pertinent family history.   reports that she quit smoking about 41 years ago. Her smoking use included cigarettes. She has never been exposed to tobacco smoke. She has never used smokeless tobacco. She reports current alcohol use. She reports that she does not use drugs.    Allergies:  No Known Allergies    Medications:    Current Facility-Administered Medications:     docusate sodium (Colace) cap 100 mg, 100 mg, Oral, BID    polyethylene glycol (PEG 3350) (Miralax) 17 g oral packet 17 g, 17 g, Oral, Daily PRN    magnesium hydroxide (Milk of Magnesia) 400 MG/5ML oral suspension 30 mL, 30 mL, Oral, Daily PRN    bisacodyl (Dulcolax) 10 MG rectal suppository 10 mg, 10 mg, Rectal, Daily PRN    sennosides (Senokot) tab 8.6 mg, 8.6 mg, Oral, BID    apixaban (Eliquis) tab 5 mg, 5 mg, Oral, BID    bumetanide (Bumex) tab 1 mg, 1 mg, Oral, Daily    dilTIAZem ER (Dilacor XR) 24 hr cap 240 mg, 240 mg, Oral, Daily    pantoprazole (Protonix) DR tab 40 mg, 40 mg, Oral, QAM AC    pravastatin (Pravachol) tab 20 mg, 20 mg, Oral, Nightly     ceFEPIme (Maxpime) 2 g in sodium chloride 0.9% 100 mL IVPB-MBP, 2 g, Intravenous, Q12H    [Held by provider] spironolactone (Aldactone) tab 25 mg, 25 mg, Oral, Daily    [Held by provider] metoprolol succinate ER (Toprol XL) 24 hr tab 50 mg, 50 mg, Oral, Daily    gabapentin (Neurontin) cap 200 mg, 200 mg, Oral, TID    vancomycin (Vancocin) 1.25 g in sodium chloride 0.9% 250mL IVPB premix, 10 mg/kg, Intravenous, Q24H    Review of Systems:   Constitutional: Negative for anorexia, chills, fatigue, fevers, malaise, night sweats and weight loss.  Eyes: Negative for visual disturbance, irritation and redness.  Ears, nose, mouth, throat, and face: Negative for hearing loss, tinnitus, nasal congestion, snoring, sore throat, hoarseness and voice change.  Respiratory: Negative for cough, sputum, hemoptysis, chest pain, wheezing, dyspnea on exertion, or stridor.  Cardiovascular: Negative for chest pain, palpitations, irregular heart beats, syncope, fatigue, orthopnea, paroxysmal nocturnal dyspnea, lower extremity edema.  Gastrointestinal: Negative for dysphagia, odynophagia, reflux symptoms, nausea, vomiting, change in bowel habits, diarrhea, constipation and abdominal pain.  Integument/breast: Negative for rash, skin lesions, and pruritus.  Hematologic/lymphatic: Negative for easy bruising, bleeding, and lymphadenopathy.  Musculoskeletal: Negative for myalgias, arthralgias, muscle weakness.  Neurological: Negative for headaches, dizziness, seizures, memory problems, trouble swallowing, speech problems, gait problems and weakness.  Behavioral/Psych: Negative for active tobacco use.  Endocrine: No history of of diabetes, thyroid disorder.  All other review of systems are negative.    Vital signs in last 24 hours:  Patient Vitals for the past 24 hrs:   BP Temp Temp src Pulse Resp SpO2 Height Weight   06/28/24 0829 101/54 97.7 °F (36.5 °C) Oral 76 18 99 % -- --   06/28/24 0538 107/65 98.4 °F (36.9 °C) Oral -- 18 99 % -- --    06/28/24 0127 -- -- -- 68 -- -- -- --   06/27/24 2137 113/55 98.2 °F (36.8 °C) Oral 73 18 96 % 5' 2.99\" (1.6 m) 251 lb 14.4 oz (114.3 kg)   06/27/24 2045 111/58 -- -- 71 -- 96 % -- --   06/27/24 1823 123/66 98 °F (36.7 °C) Oral 74 19 96 % 5' 3\" (1.6 m) 250 lb (113.4 kg)       Physical Exam:   General: alert, cooperative, oriented.  No respiratory distress.   Head: Normocephalic, without obvious abnormality, atraumatic.   Eyes: Conjunctivae/corneas clear.  No scleral icterus.  No conjunctival     hemorrhage.   Nose: Nares normal.   Throat: Lips, mucosa, and tongue normal.  No thrush noted.   Neck: Soft, supple neck; trachea midline, no adenopathy, no thyromegaly.   Lungs: CTAB, normal and equal bilateral chest rise   Chest wall: No tenderness or deformity.   Heart: Regular rate and rhythm, normal S1S2, no murmur.   Abdomen: soft, non-tender, non-distended, no masses, no guarding, no     rebound, positive BS.   Extremity:erythema no open wound, callous skis is weeping,    Skin: No rashes or lesions.   Neurological: Alert, interactive, no focal deficits    Labs:  Lab Results   Component Value Date    WBC 5.2 06/30/2024    HGB 11.4 06/30/2024    HCT 29.2 06/30/2024    .0 06/30/2024    CREATSERUM 0.72 06/30/2024    BUN 13 06/30/2024     06/30/2024    K 4.1 06/30/2024    K 4.1 06/30/2024     06/30/2024    CO2 26.0 06/30/2024    GLU 93 06/30/2024    CA 9.1 06/30/2024    MG 1.9 06/30/2024       Radiology:  Reviewed,       Cultures:  Reviewed,     Assessment and Plan:    1.  Left > R Leg Cellulitis in the presence of venous stasis and recent abx with hospitalization,   - Drainage Cul in the past with MRSA and PSAR,    - Blood cul are NGTD  - Wound care to see, compression wraps,   - On IV Vanc, IV Cefepime, d # 3, Will likely need PICC line and IV abx on discharge,      2.    Hx of Bilateral TKA; Infection needs to be treated aggressively,      3.    Disposition: in house, an elderly female with LLE  Cellulitis with open wounds sec to venous stasis admitted with worsening Cellulitis, no new trauma, no fever, chills,   - Continue IV Vanc, IV Cefepime, pharm to dose,   - Wound care input,   - PICC line,   - Compression wraps,   - Leg elevation,      Discussed with patient, RN, all questions answered, further recommendations to follow, Thanks,    Thank you for consulting [facility] ID for Adore Lema Adria.  If you have any questions or concerns please call [facility] Infectious Disease at [phone].     Carlos Manuel Mcdermott MD  6/28/2024  10:41 AM

## 2024-06-30 NOTE — PLAN OF CARE
No acute changes. Safety precautions in place.     Problem: Patient Centered Care  Goal: Patient preferences are identified and integrated in the patient's plan of care  Description: Interventions:  - What would you like us to know as we care for you? From AL  - Provide timely, complete, and accurate information to patient/family  - Incorporate patient and family knowledge, values, beliefs, and cultural backgrounds into the planning and delivery of care  - Encourage patient/family to participate in care and decision-making at the level they choose  - Honor patient and family perspectives and choices  Outcome: Progressing     Problem: Patient/Family Goals  Goal: Patient/Family Long Term Goal  Description: Patient's Long Term Goal: go home    Interventions:  - go home  - See additional Care Plan goals for specific interventions  Outcome: Progressing  Goal: Patient/Family Short Term Goal  Description: Patient's Short Term Goal: reduce leg swelling    Interventions:   - IV abx  -dressing changes  -diuretics  - See additional Care Plan goals for specific interventions  Outcome: Progressing     Problem: SKIN/TISSUE INTEGRITY - ADULT  Goal: Skin integrity remains intact  Description: INTERVENTIONS  - Assess and document risk factors for pressure ulcer development  - Assess and document skin integrity  - Monitor for areas of redness and/or skin breakdown  - Initiate interventions, skin care algorithm/standards of care as needed  Outcome: Progressing  Goal: Incision(s), wounds(s) or drain site(s) healing without S/S of infection  Description: INTERVENTIONS:  - Assess and document risk factors for pressure ulcer development  - Assess and document skin integrity  - Assess and document dressing/incision, wound bed, drain sites and surrounding tissue  - Implement wound care per orders  - Initiate isolation precautions as appropriate  - Initiate Pressure Ulcer prevention bundle as indicated  Outcome: Progressing     Problem:  MUSCULOSKELETAL - ADULT  Goal: Return mobility to safest level of function  Description: INTERVENTIONS:  - Assess patient stability and activity tolerance for standing, transferring and ambulating w/ or w/o assistive devices  - Assist with transfers and ambulation using safe patient handling equipment as needed  - Ensure adequate protection for wounds/incisions during mobilization  - Obtain PT/OT consults as needed  - Advance activity as appropriate  - Communicate ordered activity level and limitations with patient/family  Outcome: Progressing

## 2024-07-01 ENCOUNTER — APPOINTMENT (OUTPATIENT)
Dept: WOUND CARE | Facility: HOSPITAL | Age: 79
End: 2024-07-01
Attending: NURSE PRACTITIONER
Payer: MEDICARE

## 2024-07-01 ENCOUNTER — APPOINTMENT (OUTPATIENT)
Dept: PICC SERVICES | Facility: HOSPITAL | Age: 79
End: 2024-07-01
Attending: INTERNAL MEDICINE
Payer: MEDICARE

## 2024-07-01 ENCOUNTER — APPOINTMENT (OUTPATIENT)
Dept: GENERAL RADIOLOGY | Facility: HOSPITAL | Age: 79
End: 2024-07-01
Attending: INTERNAL MEDICINE
Payer: MEDICARE

## 2024-07-01 LAB
ANION GAP SERPL CALC-SCNC: 5 MMOL/L (ref 0–18)
BASOPHILS # BLD AUTO: 0.06 X10(3) UL (ref 0–0.2)
BASOPHILS NFR BLD AUTO: 1 %
BUN BLD-MCNC: 12 MG/DL (ref 9–23)
BUN/CREAT SERPL: 17.4 (ref 10–20)
CALCIUM BLD-MCNC: 9 MG/DL (ref 8.7–10.4)
CHLORIDE SERPL-SCNC: 111 MMOL/L (ref 98–112)
CO2 SERPL-SCNC: 26 MMOL/L (ref 21–32)
CREAT BLD-MCNC: 0.69 MG/DL
DEPRECATED RDW RBC AUTO: 53.1 FL (ref 35.1–46.3)
EGFRCR SERPLBLD CKD-EPI 2021: 89 ML/MIN/1.73M2 (ref 60–?)
EOSINOPHIL # BLD AUTO: 0.42 X10(3) UL (ref 0–0.7)
EOSINOPHIL NFR BLD AUTO: 6.8 %
ERYTHROCYTE [DISTWIDTH] IN BLOOD BY AUTOMATED COUNT: 15.3 % (ref 11–15)
GLUCOSE BLD-MCNC: 90 MG/DL (ref 70–99)
HCT VFR BLD AUTO: 33.1 %
HGB BLD-MCNC: 10.6 G/DL
IMM GRANULOCYTES # BLD AUTO: 0.03 X10(3) UL (ref 0–1)
IMM GRANULOCYTES NFR BLD: 0.5 %
LYMPHOCYTES # BLD AUTO: 1.27 X10(3) UL (ref 1–4)
LYMPHOCYTES NFR BLD AUTO: 20.6 %
MCH RBC QN AUTO: 30.6 PG (ref 26–34)
MCHC RBC AUTO-ENTMCNC: 32 G/DL (ref 31–37)
MCV RBC AUTO: 95.7 FL
MONOCYTES # BLD AUTO: 0.65 X10(3) UL (ref 0.1–1)
MONOCYTES NFR BLD AUTO: 10.6 %
NEUTROPHILS # BLD AUTO: 3.73 X10 (3) UL (ref 1.5–7.7)
NEUTROPHILS # BLD AUTO: 3.73 X10(3) UL (ref 1.5–7.7)
NEUTROPHILS NFR BLD AUTO: 60.5 %
OSMOLALITY SERPL CALC.SUM OF ELEC: 293 MOSM/KG (ref 275–295)
PLATELET # BLD AUTO: 246 10(3)UL (ref 150–450)
POTASSIUM SERPL-SCNC: 4.2 MMOL/L (ref 3.5–5.1)
RBC # BLD AUTO: 3.46 X10(6)UL
SODIUM SERPL-SCNC: 142 MMOL/L (ref 136–145)
WBC # BLD AUTO: 6.2 X10(3) UL (ref 4–11)

## 2024-07-01 PROCEDURE — 85025 COMPLETE CBC W/AUTO DIFF WBC: CPT | Performed by: NURSE PRACTITIONER

## 2024-07-01 PROCEDURE — 05HY33Z INSERTION OF INFUSION DEVICE INTO UPPER VEIN, PERCUTANEOUS APPROACH: ICD-10-PCS | Performed by: HOSPITALIST

## 2024-07-01 PROCEDURE — 80048 BASIC METABOLIC PNL TOTAL CA: CPT | Performed by: NURSE PRACTITIONER

## 2024-07-01 PROCEDURE — 36569 INSJ PICC 5 YR+ W/O IMAGING: CPT

## 2024-07-01 PROCEDURE — 71045 X-RAY EXAM CHEST 1 VIEW: CPT | Performed by: INTERNAL MEDICINE

## 2024-07-01 RX ORDER — CEFEPIME 1 G/50ML
2 INJECTION, SOLUTION INTRAVENOUS EVERY 12 HOURS
Qty: 1800 ML | Refills: 0 | Status: SHIPPED | OUTPATIENT
Start: 2024-07-01 | End: 2024-07-10

## 2024-07-01 RX ORDER — VANCOMYCIN HYDROCHLORIDE
10 EVERY 24 HOURS
Qty: 2057.4 ML | Refills: 0 | Status: SHIPPED | OUTPATIENT
Start: 2024-07-01 | End: 2024-07-10

## 2024-07-01 NOTE — CM/SW NOTE
Pt discussed in RN DC rounds. Sw followed up w patient on plan for IV ABX - All or Home with infusion provider with [facility]. Pt wishes to do IV ABX at home. Sw updated [facility]. Pt still needs PICC place and final orders for IV ABX.     1219pm- AMMON sent updated signed MD justa note,  line information, ID Rxs to [facility] and [facility] via aidiin.     138pm- Pt was given cost of IV ABX, pt provided  [employer] drug insurance card. AMMON made a copy and sent to [facility]    319pm - SW was informed by [facility] that the cost is with the Med [employer]. Patient updated and informed of above. Patient will be aware of above. Patient will be calling [facility].     Plan: Pending medical clearance, DC to [facility] with [facility] & [facility] for IV ABX,    AMMON/JASON to remain available for support and/or discharge planning.     Ashlie Flores, MSW, LSW   x [phone]

## 2024-07-01 NOTE — PLAN OF CARE
Pt is A&O x4. Breathing on room air. CPAP at HS. Remote tele in place. Voiding freely. Up with standby assist. Given IV antibiotic coverage per order. Plan for PICC line placement and IV antibiotic on discharge. Call light within reach. Safety precaution in place.    Problem: Patient Centered Care  Goal: Patient preferences are identified and integrated in the patient's plan of care  Description: Interventions:  - What would you like us to know as we care for you? From AL.  - Provide timely, complete, and accurate information to patient/family  - Incorporate patient and family knowledge, values, beliefs, and cultural backgrounds into the planning and delivery of care  - Encourage patient/family to participate in care and decision-making at the level they choose  - Honor patient and family perspectives and choices  Outcome: Progressing     Problem: Patient/Family Goals  Goal: Patient/Family Long Term Goal  Description: Patient's Long Term Goal: go home    Interventions:  - go home  - See additional Care Plan goals for specific interventions  Outcome: Progressing  Goal: Patient/Family Short Term Goal  Description: Patient's Short Term Goal: reduce leg swelling    Interventions:   - IV abx  -dressing changes  -diuretics  - See additional Care Plan goals for specific interventions  Outcome: Progressing     Problem: SKIN/TISSUE INTEGRITY - ADULT  Goal: Skin integrity remains intact  Description: INTERVENTIONS  - Assess and document risk factors for pressure ulcer development  - Assess and document skin integrity  - Monitor for areas of redness and/or skin breakdown  - Initiate interventions, skin care algorithm/standards of care as needed  Outcome: Progressing  Goal: Incision(s), wounds(s) or drain site(s) healing without S/S of infection  Description: INTERVENTIONS:  - Assess and document risk factors for pressure ulcer development  - Assess and document skin integrity  - Assess and document dressing/incision, wound  bed, drain sites and surrounding tissue  - Implement wound care per orders  - Initiate isolation precautions as appropriate  - Initiate Pressure Ulcer prevention bundle as indicated  Outcome: Progressing     Problem: MUSCULOSKELETAL - ADULT  Goal: Return mobility to safest level of function  Description: INTERVENTIONS:  - Assess patient stability and activity tolerance for standing, transferring and ambulating w/ or w/o assistive devices  - Assist with transfers and ambulation using safe patient handling equipment as needed  - Ensure adequate protection for wounds/incisions during mobilization  - Obtain PT/OT consults as needed  - Advance activity as appropriate  - Communicate ordered activity level and limitations with patient/family  Outcome: Progressing

## 2024-07-01 NOTE — PROGRESS NOTES
[facility]  part of [facility] Infectious Disease  Progress Note    Adore Covington Patient Status:  Inpatient    1945 MRN [id number]   Location [facility] 5SW/SE Attending Yamile Hernandez MD   Hosp Day # 4 PCP Maral Huffman MD     Subjective:  Patient seen and examined in bedside chair. No acute overnight events. Tolerating IV antibiotics. Denies any leg pain. Denies F/C. Denies n/v/d. Otherwise no complaints.     Objective:  Blood pressure 119/67, pulse 62, temperature 98.6 °F (37 °C), temperature source Oral, resp. rate 16, height 5' 2.99\" (1.6 m), weight 251 lb 14.4 oz (114.3 kg), SpO2 97%.    Intake/Output:    Intake/Output Summary (Last 24 hours) at 2024 0833  Last data filed at 2024 2200  Gross per 24 hour   Intake 780 ml   Output --   Net 780 ml       Physical Exam:  General: Awake, alert, non-tox, NAD.  HEENT:  Oropharynx clear, trachea ML.  Heart: RRR S1S2 no murmurs.  Lungs: Essentially CTA b/l, no rhonchi, rales, wheezes.  Abdomen: Soft, NT/ND.  BS present.  No organomegaly.  Extremity: BLEs with improving erythema. Skin temp consistent throughout. No open wounds. No TTP.   Neurological: No focal deficits.  Derm:  Warm and dry.    Lab Data Review:  Lab Results   Component Value Date    WBC 6.2 2024    HGB 10.6 2024    HCT 33.1 2024    .0 2024    CREATSERUM 0.69 2024    BUN 12 2024     2024    K 4.2 2024     2024    CO2 26.0 2024    GLU 90 2024    CA 9.0 2024        Cultures:  Hospital Encounter on 24   1. Anaerobic Culture     Status: None (Preliminary result)    Collection Time: 24  9:38 AM    Specimen: Leg, left; Other   Result Value Ref Range    Anaerobic Culture Pending N/A   2. Aerobic Bacterial Culture     Status: None    Collection Time: 24  9:38 AM    Specimen: Leg, left; Other   Result Value Ref Range    Aerobic  Culture Result  N/A     Mixture of organisms suggestive of normal skin kaitlyn    Aerobic Smear No WBCs seen N/A    Aerobic Smear No organisms seen N/A   3. Blood Culture     Status: None (Preliminary result)    Collection Time: 06/27/24  8:51 PM    Specimen: Blood,peripheral   Result Value Ref Range    Blood Culture Result No Growth 3 Days N/A       Radiology:  No results found.      Assessment and Plan:  1.  BLE cellulitis, L > R, in the presence of venous stasis and recent antibiotics with hospitalization  - Patient recently hospitalized from 6/19 - 6/22 for LLE cellulitis.  - Drainage cultures isolating MRSA and Pseudomonas at that time.  - Treated with IV vancomycin + cefepime and discharged on PO doxycycline + Levaquin.  - Seen by ID as outpatient with worsening and spite of PO abx, and subsequently sent to the ED for admission and IV abx.  - Blood cultures NGTD.  - MRSA PCR positive.  - Drainage cultures, 6/28, suggestive of normal skin kaitlyn thus far.  - Wound care following.  - IV vancomycin + cefepime, ongoing, day #5.    2.  H/o bilateral TKA    3.  Recs  - Continue IV vancomycin + cefepime.  Pharmacy to dose. Plan to discharge on same Rx to complete a total of 14 days abx therapy through 7/10/24.   - Patient will need PICC line prior to discharge.  - F/u WBC and fever curve.  - Continue local wound care.  - Supportive care as per the primary team.  - Patient stable for discharge from ID perspective pending clearance from other care teams, abx set-up and PICC line placement.   - Please draw weekly CBC with diff, CMP, CRP and vancomycin trough for duration of abx therapy and fax results to [facility] at [phone].  - F/u with ID in 2 weeks or sooner if necessary.   - Discussed plan of care with nursing.  - Discussed plan of care with patient.  All questions addressed understanding verbalized.  - Further recommendations pending clinical course.    Discussed case with ID attending/collaborating physician,  Dr. Carlos Manuel Mcdermott, who is in agreement with the above plan of care.      Please note that this report has been produced using speech recognition software and may contain errors related to that system including, but not limited to, errors in grammar, punctuation, and spelling, as well as words and phrases that possibly may have been recognized inappropriately.  If there are any questions or concerns, contact the dictating provider for clarification.     The 21st Century Cures Act makes medical notes like these available to patients in the interest of transparency. Please be advised this is a medical document. Medical documents are intended to carry relevant information, facts as evident, and the clinical opinion of the practitioner. The medical note is intended as peer to peer communication and may appear blunt or direct. It is written in medical language and may contain abbreviations or verbiage that are unfamiliar.     If you have any questions or concerns please call [facility] Infectious Disease at [phone].     ANABEL Miranda    7/1/2024  8:33 AM

## 2024-07-01 NOTE — CONGREGATE LIVING REVIEW
[facility] Living Authorization    The [facility] Review Committee has reviewed this case and the patient IS APPROVED for discharge to a facility for Short Term Skilled once the following procedure is followed:     - The physician discharge instructions (contained within the [facility] note for SNF) must inlcude the below appropriate and approved COVID instructions to the facility    For questions regarding CLRC approval process, please contact the CM assigned to the case.  For questions regarding RN discharge workflow, please contact the unit Clinical Leader.

## 2024-07-01 NOTE — PLAN OF CARE
No acute changes at this time. PICC line placed in left arm today. Plan pending for possible discharge tomorrow on long term IV abx. Call light within reach.   Problem: Patient Centered Care  Goal: Patient preferences are identified and integrated in the patient's plan of care  Description: Interventions:  - What would you like us to know as we care for you? From AL  - Provide timely, complete, and accurate information to patient/family  - Incorporate patient and family knowledge, values, beliefs, and cultural backgrounds into the planning and delivery of care  - Encourage patient/family to participate in care and decision-making at the level they choose  - Honor patient and family perspectives and choices  Outcome: Progressing     Problem: Patient/Family Goals  Goal: Patient/Family Long Term Goal  Description: Patient's Long Term Goal: go home    Interventions:  - go home  - See additional Care Plan goals for specific interventions  Outcome: Progressing  Goal: Patient/Family Short Term Goal  Description: Patient's Short Term Goal: reduce leg swelling    Interventions:   - IV abx  -dressing changes  -diuretics  - See additional Care Plan goals for specific interventions  Outcome: Progressing     Problem: SKIN/TISSUE INTEGRITY - ADULT  Goal: Skin integrity remains intact  Description: INTERVENTIONS  - Assess and document risk factors for pressure ulcer development  - Assess and document skin integrity  - Monitor for areas of redness and/or skin breakdown  - Initiate interventions, skin care algorithm/standards of care as needed  Outcome: Progressing  Goal: Incision(s), wounds(s) or drain site(s) healing without S/S of infection  Description: INTERVENTIONS:  - Assess and document risk factors for pressure ulcer development  - Assess and document skin integrity  - Assess and document dressing/incision, wound bed, drain sites and surrounding tissue  - Implement wound care per orders  - Initiate isolation precautions as  appropriate  - Initiate Pressure Ulcer prevention bundle as indicated  Outcome: Progressing     Problem: MUSCULOSKELETAL - ADULT  Goal: Return mobility to safest level of function  Description: INTERVENTIONS:  - Assess patient stability and activity tolerance for standing, transferring and ambulating w/ or w/o assistive devices  - Assist with transfers and ambulation using safe patient handling equipment as needed  - Ensure adequate protection for wounds/incisions during mobilization  - Obtain PT/OT consults as needed  - Advance activity as appropriate  - Communicate ordered activity level and limitations with patient/family  Outcome: Progressing

## 2024-07-01 NOTE — PROGRESS NOTES
[facility] Pharmacy Dosing Service      Follow Up Pharmacokinetic Consult for Vancomycin Dosing     Adore Covington is a 79 year old female who is receiving vancomycin therapy for cellulitis. Patient is on day 4 of vancomycin and is currently receiving 1250 mg IV every 24 hours. The current treatment and monitoring approach is non-AUC strategy.        Weight and Temperature:    Wt Readings from Last 1 Encounters:   24 114.3 kg (251 lb 14.4 oz)         Temp Readings from Last 1 Encounters:   24 98.4 °F (36.9 °C) (Oral)      Labs:   Recent Labs   Lab 24  1836 24  0510 24  0404   CREATSERUM 1.01 0.81 0.72      Estimated Creatinine Clearance: 54.1 mL/min (based on SCr of 0.72 mg/dL).     Recent Labs   Lab 246 24  0510 24  0403   WBC 6.5 5.7 5.2        Vancomycin Levels:  Lab Results   Component Value Date/Time    VANCT 9.7 (L) 2024 08:37 PM    VANCP 17.7 (L) 2024 04:04 AM       Corresponding 24 h-AUC: N/A     The Pharmacokinetic Target is:    Trough/random 10-15 mg/L    Renal Dosing Considerations:    None     Assessment/Plan:   Maintenance Regimen: Continue vancomycin 1250 mg IV every 24 hours    Monitorin) Plan for vancomycin trough to be obtained at steady state    2) Pharmacy will order SCr as clinically indicated to assess renal function.    3) Pharmacy will monitor for toxicity and efficacy, adjust vancomycin dose and/or frequency, and order vancomycin levels as appropriate per the Pharmacy and Therapeutics Committee approved protocol until discontinuation of the medication.       We appreciate the opportunity to assist in the care of this patient.     Tamara Smallwood, PharmD, [facility]  [facility] Pharmacy Extension: [phone]

## 2024-07-02 VITALS
WEIGHT: 251.88 LBS | TEMPERATURE: 97 F | BODY MASS INDEX: 44.63 KG/M2 | OXYGEN SATURATION: 96 % | RESPIRATION RATE: 18 BRPM | HEART RATE: 68 BPM | DIASTOLIC BLOOD PRESSURE: 60 MMHG | SYSTOLIC BLOOD PRESSURE: 123 MMHG | HEIGHT: 62.99 IN

## 2024-07-02 RX ORDER — GABAPENTIN 100 MG/1
300 CAPSULE ORAL 2 TIMES DAILY
Status: SHIPPED | COMMUNITY
Start: 2024-07-02

## 2024-07-02 NOTE — PROGRESS NOTES
[facility]  part of [facility] Infectious Disease  Progress Note    Adore Covington Patient Status:  Inpatient    1945 MRN [id number]   Location [facility] 5SW/SE Attending Yamile Hernandez MD   Hosp Day # 5 PCP Maral Huffman MD     Subjective:  Patient seen and examined in bed. Nursing present at the bedside. No acute overnight events. Denies any leg pain. Denies F/C. Denies n/v/d. Otherwise no complaints.     Objective:  Blood pressure 124/67, pulse 68, temperature 97.4 °F (36.3 °C), temperature source Oral, resp. rate 18, height 5' 2.99\" (1.6 m), weight 251 lb 14.4 oz (114.3 kg), SpO2 96%.    Intake/Output:    Intake/Output Summary (Last 24 hours) at 2024 0845  Last data filed at 2024 0629  Gross per 24 hour   Intake 900 ml   Output 1950 ml   Net -1050 ml       Physical Exam:  General: Awake, alert, non-tox, NAD.  HEENT:  Oropharynx clear, trachea ML.  Heart: RRR S1S2 no murmurs.  Lungs: Essentially CTA b/l, no rhonchi, rales, wheezes.  Abdomen: Soft, NT/ND.  BS present.  No organomegaly.  Extremity: BLEs with ongoing clinical improvement. Skin temp consistent throughout. No TTP.   Neurological: No focal deficits.  Derm:  Warm and dry.    Lab Data Review:        Cultures:  Hospital Encounter on 24   1. Anaerobic Culture     Status: None (Preliminary result)    Collection Time: 24  9:38 AM    Specimen: Leg, left; Other   Result Value Ref Range    Anaerobic Culture No Anaerobes to date N/A   2. Aerobic Bacterial Culture     Status: None    Collection Time: 24  9:38 AM    Specimen: Leg, left; Other   Result Value Ref Range    Aerobic Culture Result  N/A     Mixture of organisms suggestive of normal skin kaitlyn    Aerobic Smear No WBCs seen N/A    Aerobic Smear No organisms seen N/A   3. Blood Culture     Status: None (Preliminary result)    Collection Time: 24  8:51 PM    Specimen: Blood,peripheral   Result Value Ref  Range    Blood Culture Result No Growth 4 Days N/A       Radiology:  XR CHEST AP PORTABLE  (CPT=71045)    Result Date: 7/1/2024  CONCLUSION:  1. Left-sided PICC line with tip projecting just above the cavoatrial junction.  2. Nonspecific left basilar opacity which may reflect some combination of effusion, atelectasis, and/or superimposed infectious process.  3. Cardiomegaly with mild pulmonary interstitial edema.     Dictated by (CST): Severo Moore MD on 7/01/2024 at 12:44 PM     Finalized by (CST): Severo Moore MD on 7/01/2024 at 12:46 PM             Assessment and Plan:  1.  BLE cellulitis, L > R, in the presence of venous stasis and recent antibiotics with hospitalization  - Patient recently hospitalized from 6/19 - 6/22 for LLE cellulitis.  - Drainage cultures isolating MRSA and Pseudomonas at that time.  - Treated with IV vancomycin + cefepime and discharged on PO doxycycline + Levaquin.  - Seen by my colleague, Dr. Julia Beckman, as outpatient with worsening in spite of PO abx, and subsequently sent to the ED for admission and IV abx.  - Blood cultures NGTD.  - MRSA PCR positive.  - Drainage cultures, 6/28, suggestive of normal skin kaitlyn.  - Wound care following.  - IV vancomycin + cefepime, ongoing, day #6.    2.  H/o bilateral TKA    3.  Recs  - Continue IV vancomycin + cefepime. Pharmacy to dose. Plan to discharge on same Rx to complete a total of 14 days abx therapy through 7/10/24. Orders in med rec.  - PICC line in place.  - F/u WBC and fever curve.  - Continue local wound care.  - Recommend compression/elevation.   - Supportive care as per the primary team.  - Patient stable for discharge from ID perspective pending clearance from other care teams and abx set-up.  - Please draw weekly CBC with diff, CMP, CRP and vancomycin trough for duration of abx therapy and fax results to [facility] at [phone].  - F/u with ID at EOT or sooner if necessary.   - Discussed plan of care with nursing.  -  Discussed plan of care with patient.  All questions addressed understanding verbalized.  - Further recommendations pending clinical course.    Discussed case with ID attending/collaborating physician, Dr. Carlos Manuel Mcdermott, who is in agreement with the above plan of care.    Please note that this report has been produced using speech recognition software and may contain errors related to that system including, but not limited to, errors in grammar, punctuation, and spelling, as well as words and phrases that possibly may have been recognized inappropriately.  If there are any questions or concerns, contact the dictating provider for clarification.     The 21st Century Cures Act makes medical notes like these available to patients in the interest of transparency. Please be advised this is a medical document. Medical documents are intended to carry relevant information, facts as evident, and the clinical opinion of the practitioner. The medical note is intended as peer to peer communication and may appear blunt or direct. It is written in medical language and may contain abbreviations or verbiage that are unfamiliar.     If you have any questions or concerns please call [facility] Infectious Disease at [phone].     Barrera Oliveira, ANABEL    7/2/2024  8:44 AM

## 2024-07-02 NOTE — PLAN OF CARE
No acute changes at this time. Plan for discharge this afternoon on longterm IV abx. Safety precautions in place. Call light within reach.  Problem: Patient Centered Care  Goal: Patient preferences are identified and integrated in the patient's plan of care  Description: Interventions:  - What would you like us to know as we care for you? From AL  - Provide timely, complete, and accurate information to patient/family  - Incorporate patient and family knowledge, values, beliefs, and cultural backgrounds into the planning and delivery of care  - Encourage patient/family to participate in care and decision-making at the level they choose  - Honor patient and family perspectives and choices  Outcome: Progressing     Problem: Patient/Family Goals  Goal: Patient/Family Long Term Goal  Description: Patient's Long Term Goal: go home    Interventions:  - go home  - See additional Care Plan goals for specific interventions  Outcome: Progressing  Goal: Patient/Family Short Term Goal  Description: Patient's Short Term Goal: reduce leg swelling    Interventions:   - IV abx  -dressing changes  -diuretics  - See additional Care Plan goals for specific interventions  Outcome: Progressing     Problem: SKIN/TISSUE INTEGRITY - ADULT  Goal: Skin integrity remains intact  Description: INTERVENTIONS  - Assess and document risk factors for pressure ulcer development  - Assess and document skin integrity  - Monitor for areas of redness and/or skin breakdown  - Initiate interventions, skin care algorithm/standards of care as needed  Outcome: Progressing  Goal: Incision(s), wounds(s) or drain site(s) healing without S/S of infection  Description: INTERVENTIONS:  - Assess and document risk factors for pressure ulcer development  - Assess and document skin integrity  - Assess and document dressing/incision, wound bed, drain sites and surrounding tissue  - Implement wound care per orders  - Initiate isolation precautions as appropriate  -  Initiate Pressure Ulcer prevention bundle as indicated  Outcome: Progressing     Problem: MUSCULOSKELETAL - ADULT  Goal: Return mobility to safest level of function  Description: INTERVENTIONS:  - Assess patient stability and activity tolerance for standing, transferring and ambulating w/ or w/o assistive devices  - Assist with transfers and ambulation using safe patient handling equipment as needed  - Ensure adequate protection for wounds/incisions during mobilization  - Obtain PT/OT consults as needed  - Advance activity as appropriate  - Communicate ordered activity level and limitations with patient/family  Outcome: Progressing

## 2024-07-02 NOTE — CM/SW NOTE
Pt discussed in RN DC rounds. AMMON informed [facility] of times of IV ABX given today- Vanco at 1204pm, Cefepime was at 9am, and next dose is due at 5pm. [facility] is aware of patient discharging today.     AMMON was informed by NP to arrange Wound Care Clinic appointment. AMMON called wound care clinic at [phone], patient has appt scheduled for 7/19/2024 at 230pm.    Home health information, [facility] information, and wound care apt time placed in AVS.        07/02/24 1300   Discharge disposition   Expected discharge disposition Home or Self  ([facility])   Post Acute Care Provider   ([facility])   DME/Infusion Providers [facility]   Discharge transportation Private car       SW/CM to remain available for support and/or discharge planning.     Ashlie Flores, MSW, LSW   x [phone]

## 2024-07-02 NOTE — DISCHARGE SUMMARY
[facility] and [facility] [place] Hospitalist Discharge Summary   Patient ID:  Adore Covington  [id number]  79 year old  1/30/1945    Admit date: 6/27/2024  Discharge date: 7/2/2024    Primary Care Physician: Maral Huffman MD   Attending Physician: Yamile Hernandez MD   Consults:   Consultants         Provider   Role Specialty     Julia Beckman MD      Consulting Physician INFECTIOUS DISEASES            Hospital Discharge Diagnoses:   Cellulitis of left lower leg  ----See D/C Summary for further Dx    Risk of Readmission Lace+ Score: 66  59-90 High Risk  29-58 Medium Risk  0-28   Low Risk.    TCM Follow-Up Recommendation:  LACE > 58: High Risk of readmission after discharge from the hospital.    Please note that only [facility] and [facility] patients enrolled in the Medicare ACO, BCBS ACO and BCBS HMOs will be handled by the [facility] Care Management team.  For all other patients, please follow usual protocol for discharge care transition.    Reason for admission  Ms. Covington is a 79 year old female with PMH sig for COPD, A- Fib on Eliquis, CHF, HTN, HLD, PAD, Neuropathy, SUSAN,  prior hx of septic shock 2/2 cellulitis with admission 6/19-6/22 with LLE cellulitis  discharged on oral levaquin and doxy seen by ID on 6/27 and found to have worsening swelling and drainage, sent to ER for admission for IV abx.      Patient states that she noted her legs were getting red within 2 days of discharge from the hospital while she was on oral antibiotics.  She states that her legs were swollen erythematous and started to have blisters with draining.  She went to see infectious disease yesterday who recommended admission for IV antibiotics.  She denied any fevers or chills.  She notes having lower extremity edema and does take Bumex daily. Sis unaware that she had a history of MRSA.  Patient does live alone and drives and is unclear whether or not she would be able to manage IV antibiotics at home.  She is reluctant to go to rehab as she feels that  her issues with her leg were caused from her stay at [facility].  She is aware that she can only go to an infusion center if her antibiotics are once daily.  I spoke with social work who will follow-up with patient once recommendations from ID is made    Hospital Course:    Ms. Covington is a 79 year old female with PMH sig for COPD, A- Fib on Eliquis, CHF, HTN, HLD, PAD, Neuropathy, SUSAN,  prior hx of septic shock 2/2 cellulitis with admission 6/19-6/22 with LLE cellulitis  discharged on oral levaquin and doxy seen by ID on 6/27 and found to have worsening swelling and drainage of her left lower extremity 6/27, sent for admission for IV abx. Patient discharged on Vanco and cefepime until July 10th with home infusions.  Patient will follow-up with infectious disease July 11, outpatient wound NP July 19 and PCP July 9th, See below for details see below for details      Recurrent LLE Cellulitis  Chronic Stasis Dermatitis   Hx MRSA + and pseudomonas  -6/19 discharged on levaquin and doxy, seen by ID 6/27 w/ worsening swelling and drainage  and sent in for IV abx  -afebrile. No leukocytosis. La negative   -MRSA +  -blood  cx NGTD  -wound cx NGTD  -wound care, outpt wound clinic appt 7/19   -abx-cefepime and vanco until 7/10, s/p picc left arm, weekly CBC, CMP, CRP and Vanco trough   -F/U with ID 7/11      PaFib S/P CV 1/2024  Secondary Hypercoagulable state   S/p Cardioversion   HTN  HL  Chronic Diastolic CHF  -5/2024 echo with normal EF 55%, mild as, mild ai   -continue  home eliquis, cardizem, bumex, aldactone and pravastatin   -home lopressor/Toprol held with lower bp     Acute drop in hemoglobin  - Outpatient hemoglobin 12's, discharged hgb 10.4  -no active bleeding  -outpt CBC      COPD not on inhalers   SUSAN  - cpap as tolerated      PAD  -continue statin.   -not on asa 2/2 eliquis ??     Neuropathy   - gabapentin      GERD  -PPI     GOC  -DNR/Select-confirmed with pt      MA/ACO Reach  -ER Visits 2024:  0  -Admissions 2024: 4 with 1 admit for elective CV  -Re- Entry: YES-7 day-discharged [facility] 6/22  -Consults: ID   -Discharge Needs:HHC-[facility], [facility]  -Appointments: [x ] PCP Maral Huffman MD- 7/9                            [X] ID Rk 7/11                            [X] outpt wound NP 7/19      EXAM:   GENERAL: no apparent distress, overweight  NEURO: A/A Ox3  RESP: non labored, CTA  CARDIO: Regular  ABD: soft, NT, ND, BS+  EXTREMITIES: left leg with swelling, dressing d/I and venous stasis changes       Radiology:   XR CHEST AP PORTABLE  (CPT=71045)    Result Date: 7/1/2024  CONCLUSION:  1. Left-sided PICC line with tip projecting just above the cavoatrial junction.  2. Nonspecific left basilar opacity which may reflect some combination of effusion, atelectasis, and/or superimposed infectious process.  3. Cardiomegaly with mild pulmonary interstitial edema.     Dictated by (CST): Severo Moore MD on 7/01/2024 at 12:44 PM     Finalized by (CST): Severo Moore MD on 7/01/2024 at 12:46 PM           Discharge Instructions     Medication List        START taking these medications      cefepime HCl 1 GM/50ML Soln  Commonly known as: MAXIPIME  Inject 100 mL (2 g total) into the vein every 12 (twelve) hours for 9 days.     vancomycin in sodium chloride 0.9% 1.25 g/250mL Soln  Commonly known as: Vancocin  Inject 228.6 mL (1,143 mg total) into the vein daily for 9 days. Weekly PICC care per protocol. Please draw weekly CBC with diff, CMP, CRP and vancomycin trough for duration of abx therapy and fax results to [facility] at [phone].            CHANGE how you take these medications      gabapentin 100 MG Caps  Commonly known as: Neurontin  What changed: See the new instructions.            CONTINUE taking these medications      acetaminophen 325 MG Tabs  Commonly known as: Tylenol     apixaban 5 MG Tabs  Commonly known as: Eliquis     bumetanide 1 MG Tabs  Commonly known as: Bumex      cholecalciferol 50 MCG (2000 UT) Caps  Commonly known as: Vitamin D3     cyanocobalamin 1000 MCG Tabs  Commonly known as: Vitamin B12     cycloSPORINE 0.05 % Emul  Commonly known as: RESTASIS     dilTIAZem HCl  MG Cp24  Commonly known as: DILACOR XR     pantoprazole 40 MG Tbec  Commonly known as: Protonix  Take 1 tablet (40 mg total) by mouth every morning before breakfast.     Polyethylene Glycol 3350 17 g Pack  Commonly known as: MIRALAX  Take 17 g by mouth daily as needed.     potassium chloride 20 MEQ Tbcr  Commonly known as: Klor-Con M20     simvastatin 10 MG Tabs  Commonly known as: Zocor     spironolactone 25 MG Tabs  Commonly known as: Aldactone     triamcinolone 0.1 % Crea  Commonly known as: Kenalog            STOP taking these medications      doxycycline 100 MG Caps  Commonly known as: Vibramycin     levoFLOXacin 750 MG Tabs  Commonly known as: Levaquin     metoprolol succinate  MG Tb24  Commonly known as: Toprol XL               Where to Get Your Medications        You can get these medications from any pharmacy    Bring a paper prescription for each of these medications  cefepime HCl 1 GM/50ML Soln  vancomycin in sodium chloride 0.9% 1.25 g/250mL Soln       Code Status: DNAR/Selective Treatment    Important follow up:   Follow-up Information       Maral Huffman MD Follow up on 7/9/2024.    Specialty: Internal Medicine  Why: 7/9 at 3:20  Contact information:  [address]  [address]  [facility] [id number] [phone]               Julia Beckman MD Follow up on 7/11/2024.    Specialty: INFECTIOUS DISEASES  Contact information:  [address]  [facility] [address]  [place] [id number] [phone]                             Disposition: home  Discharged Condition: stable      Additional patient instructions:  Home infusion provider - [facility]  Phone: [phone]  Fax: [phone]    Home health information- [facility], [facility]  Phone: [phone]  Fax: [phone]    Appointment at [facility]  7/19/2024-230pm  P: [phone]    Hold home toprol with lower blood pressure.     Please do not miss or cancel the appointments that have been scheduled for you with your primary and ID.  The purpose of these appointments are to ensure that you continue to do well and do not relapse and need to be rehospitalized.  Our goal is always to give you the best quality of life outside the hospital setting.  =========================================================================================================================    I Reconciled current and discharge medications on day of discharge  Patient had opportunity to ask questions and state understand and agree with therapeutic plan as outlined    Total Time Coordinating Care: greater than 30 minutes  Is this a shared or split note between Advanced Practice Provider and Physician? Yes    Note: This chart was prepared using voice recognition software and may contain unintended word substitution errors.     Sharon Chaudhry RN, NP   [facility]   7/2/2024      SEE ATTENDING NOTE BELOW      Patient examined and assessed independently. Agree with above APN assessment.       jasper Covington is a 79 year old female with PMH sig for COPD, A- Fib on Eliquis, CHF, HTN, HLD, PAD, Neuropathy, SUSAN,  prior hx of septic shock 2/2 cellulitis with admission 6/19-6/22 with LLE cellulitis  discharged on oral levaquin and doxy seen by ID on 6/27 and found to have worsening swelling and drainage of her left lower extremity 6/27, sent for admission for IV abx. Patient discharged on Vanco and cefepime until July 10th with home infusions.  Patient will follow-up with infectious disease July 11, outpatient wound NP July 19 and PCP July 9th, See below for details see below for details     Objective  /52 (BP Location: Right arm)   Pulse 68   Temp 97.6 °F (36.4 °C) (Oral)   Resp 18   Ht 5' 2.99\" (1.6 m)    Wt 251 lb 14.4 oz (114.3 kg)   SpO2 94%   BMI 44.63 kg/m²     Exam   GEN: obese female in NAD  HEENT: EOMI  Pulm: CTAB, no crackles or wheezes  CV: RRR, no murmurs  ABD: Soft, non-tender, non-distended, +BS  MSK: erythema BLE, +warm to touch, nontender  SKIN: warm, dry, chronic venous stasis changes BLE  EXT: trace edema    Assessment/Plan    Ms. Covington is a 79 year old female with PMH sig for COPD, A- Fib on Eliquis, CHF, HTN, HLD, PAD, Neuropathy, SUSAN,  prior hx of septic shock 2/2 cellulitis with admission 6/19-6/22 with LLE cellulitis  discharged on oral levaquin and doxy seen by ID on 6/27 and found to have worsening swelling and drainage of her left lower extremity 6/27, sent for admission for IV abx.      Recurrent LLE Cellulitis  Chronic Stasis Dermatitis   Hx MRSA + and pseudomonas  PaFib S/P CV 1/2024  Secondary Hypercoagulable state   S/p Cardioversion   HTN  HL  Chronic Diastolic CHF  Acute drop in hemoglobin  COPD not on inhalers   SUSAN  PAD  Neuropathy   GERD  GOC  -DNR/Select-confirmed with pt     Plan:  - home today with IV vancomycin and cefepime    Rest as above.    Yamile Hernandez MD  [facility] hospitalist

## 2024-07-02 NOTE — DISCHARGE INSTRUCTIONS
Home infusion provider - [facility]  Phone: [phone]  Fax: [phone]    Home health information- [facility], [facility]  Phone: [phone]  Fax: [phone]    Appointment at [facility]  7/19/2024-230pm  P: [phone]    Hold home toprol with lower blood pressure.     Please do not miss or cancel the appointments that have been scheduled for you with your primary and ID.  The purpose of these appointments are to ensure that you continue to do well and do not relapse and need to be rehospitalized.  Our goal is always to give you the best quality of life outside the hospital setting.

## 2024-07-02 NOTE — BH RN DISCHARGE NOTE
Patient discharging home with son. All questions answered. Education provided. Nurse  checked and emptied.

## 2024-07-09 ENCOUNTER — APPOINTMENT (OUTPATIENT)
Dept: WOUND CARE | Facility: HOSPITAL | Age: 79
End: 2024-07-09
Attending: NURSE PRACTITIONER
Payer: MEDICARE

## 2024-07-19 ENCOUNTER — APPOINTMENT (OUTPATIENT)
Dept: WOUND CARE | Facility: HOSPITAL | Age: 79
End: 2024-07-19
Attending: NURSE PRACTITIONER
Payer: MEDICARE

## 2024-07-22 ENCOUNTER — TELEPHONE (OUTPATIENT)
Dept: ALLERGY | Facility: CLINIC | Age: 79
End: 2024-07-22

## 2024-07-22 NOTE — TELEPHONE ENCOUNTER
Patient called to request an appointment for patch testing and a 48hr follow up.     Patient was scheduled 5/20 but had to cancel her appointments due to being in and out of the hospital.     Patient will like to know if she needs to schedule another appointment with Dr. Vaughn first or if she can re-schedule the patch testing appointment?

## 2024-07-22 NOTE — TELEPHONE ENCOUNTER
Pt was scheduled for patch testing, and was in the hospital. She has been discharged and on the mend.   Appt's rescheduled for 9/30 and 10/2 for patch application.   Follow up with Dr. Vaughn for 72 hour follow up on 10/3/2024.    Patient is flexible with her dates, so if something opens sooner, patient would be agreeable to moving up.   RN to leave message in in-basket.     Pt also will forward results from infectious disease doctor- she has a new allergy to an antibiotic.   RN informed patient once that is received, we can update her chart, as that currently reflects \"no known medication allergies\".   Pt agreeable to plan of care.

## 2024-08-01 ENCOUNTER — HOSPITAL ENCOUNTER (EMERGENCY)
Facility: HOSPITAL | Age: 79
Discharge: HOME | End: 2024-08-01
Attending: EMERGENCY MEDICINE
Payer: MEDICARE

## 2024-08-01 ENCOUNTER — APPOINTMENT (OUTPATIENT)
Dept: GENERAL RADIOLOGY | Facility: HOSPITAL | Age: 79
End: 2024-08-01
Attending: EMERGENCY MEDICINE
Payer: MEDICARE

## 2024-08-01 VITALS
WEIGHT: 252 LBS | RESPIRATION RATE: 20 BRPM | SYSTOLIC BLOOD PRESSURE: 101 MMHG | DIASTOLIC BLOOD PRESSURE: 69 MMHG | HEART RATE: 98 BPM | OXYGEN SATURATION: 95 % | TEMPERATURE: 98 F | BODY MASS INDEX: 45 KG/M2

## 2024-08-01 DIAGNOSIS — I87.2 VENOUS STASIS DERMATITIS OF LEFT LOWER EXTREMITY: ICD-10-CM

## 2024-08-01 DIAGNOSIS — M25.562 ACUTE PAIN OF LEFT KNEE: Primary | ICD-10-CM

## 2024-08-01 PROCEDURE — 73560 X-RAY EXAM OF KNEE 1 OR 2: CPT | Performed by: EMERGENCY MEDICINE

## 2024-08-01 PROCEDURE — 99283 EMERGENCY DEPT VISIT LOW MDM: CPT

## 2024-08-01 PROCEDURE — 99284 EMERGENCY DEPT VISIT MOD MDM: CPT

## 2024-08-01 RX ORDER — ACETAMINOPHEN 500 MG
1000 TABLET ORAL ONCE
Status: COMPLETED | OUTPATIENT
Start: 2024-08-01 | End: 2024-08-01

## 2024-08-01 NOTE — ED INITIAL ASSESSMENT (HPI)
Pt to ED with family with c/o left knee/leg pain s/p mechanical fall yesterday. Pt states she was walking on carpets after they had been clean and slipped in kitchen on linoleum floor. Pt states she landed on her bottom. Denies hitting head or LOC. Pt on Eliquis for hx of Afib. Pt left leg appear red in appearance with +drainage noted. Pt arrived with compression stockings in place. Pt states hx of cellulitis to left leg. Pt is alert and oriented x4.   No respiratory distress noted.

## 2024-08-01 NOTE — ED PROVIDER NOTES
Patient Seen in: [facility] Emergency Department      History     Chief Complaint   Patient presents with    Fall     Stated Complaint: Fall; Left Leg Injury    Subjective:   HPI        Objective:   Past Medical History:    Arrhythmia    Atrial fibrillation (HCC)    Congestive heart disease (HCC)    COPD (chronic obstructive pulmonary disease) (HCC)    Essential hypertension    High blood pressure    High cholesterol    Morbid obesity with BMI of 50.0-59.9, adult (HCC)    Muscle weakness    Neuropathy    Peripheral vascular disease (HCC)    Sleep apnea    Visual impairment              Past Surgical History:   Procedure Laterality Date    Knee surgery Bilateral                 Social History     Socioeconomic History    Marital status:    Tobacco Use    Smoking status: Former     Current packs/day: 0.00     Types: Cigarettes     Quit date:      Years since quittin.6     Passive exposure: Never    Smokeless tobacco: Never   Vaping Use    Vaping status: Never Used   Substance and Sexual Activity    Alcohol use: Yes     Comment: 1-2 drinks daily    Drug use: Never     Social Determinants of Health     Financial Resource Strain: Unknown (2021)    Received from [facility]    Overall Financial Resource Strain (CARDIA)     Difficulty of Paying Living Expenses: Patient declined   Food Insecurity: No Food Insecurity (2024)    Food Insecurity     Food Insecurity: Never true   Transportation Needs: No Transportation Needs (2024)    Transportation Needs     Lack of Transportation: No   Housing Stability: Low Risk  (2024)    Housing Stability     Housing Instability: No              Review of Systems    Positive for stated Chief Complaint: Fall    Other systems are as noted in HPI.  Constitutional and vital signs reviewed.      All other systems reviewed and negative except as noted above.    Physical Exam     ED Triage Vitals [24 1703]   BP (!) 168/74    Pulse 94   Resp 22   Temp 98 °F (36.7 °C)   Temp src Temporal   SpO2 94 %   O2 Device None (Room air)       Current Vitals:   Vital Signs  BP: 94/52  Pulse: 106  Resp: 20  Temp: 98 °F (36.7 °C)  Temp src: Temporal    Oxygen Therapy  SpO2: 95 %  O2 Device: None (Room air)            Physical Exam          ED Course   Labs Reviewed - No data to display                   MDM      79-year-old female with a history of A-fib on apixaban, hypertension, CHF, COPD, peripheral vascular disease, and chronic lymphedema with recent treatment for cellulitis on the left lower extremity presents today with left knee pain.  Patient states that last night, she slipped on a wet floor and fell onto her knees.  Since then, she has had left knee pain particularly with weightbearing.  Denies any other injuries, head injury, loss of consciousness with the fall.  Denies any recent illness.    On exam, slightly hypertensive with otherwise normal vitals, well-appearing, some tenderness to palpation of the left knee around the patella.  Obesity with lymphedema makes eval for bony deformity difficult.  Bilateral lymphedema appears chronic.  Redness and some warmth on the left side which has been improving spontaneously per the patient.  Clear drainage from the right side consistent with normal drainage from lymphedema no purulence or other signs of infection.    Differential: Knee contusion versus fracture, mechanical fall, venous stasis dermatitis    Patient evaluated with x-ray and given analgesia    I personally reviewed the x-rays and agree with the radiologist read: no acute fracture visualized.    Patient well-appearing on reexamination.  Will advise on treatment at home with close PMD versus orthopedic follow-up as needed and return precautions.                               Medical Decision Making      Disposition and Plan     Clinical Impression:  1. Acute pain of left knee    2. Venous stasis dermatitis of left lower extremity          Disposition:  Discharge  8/1/2024  6:47 pm    Follow-up:  Maral Huffman MD  [address] [facility] [address]  [address]  [facility] [id number] [phone]    Follow up in 1 week(s)  As needed    [address] [id number]  Follow up  As needed    We recommend that you schedule follow up care with a primary care provider within the next three months to obtain basic health screening including reassessment of your blood pressure.      Medications Prescribed:  Current Discharge Medication List

## 2024-08-02 NOTE — CM/SW NOTE
Patient called stating that she was in the ER yesterday, 8/1/24, for a fall and left leg injury.  Patient stated that ERMD asked her about SNF and she declined but today she has fallen twice already and has called the Fire Department to help her up.    Patient is requesting assisting getting to rehab today as she now realizes that she does need SNF.    EDCM explained that she does not have a qualifying stay with Medicare but could go to respite care with PT under her Medicare B.    Patient asking for assistance in respite placement.    EDCM sent out referrals in [place] for respite care and will call patient with any accepting faciltiies and their costs.    Costs were discussed with patient prior to referrals being sent and patient is aware of estimated costs per day ($250 - $450 approx)

## 2024-08-02 NOTE — ED QUICK NOTES
Patient presents with left knee pain after she slipped and fell yesterday on just cleaned carpet. Patient denies hitting her head or passing out. Patient states having bilat. Knee replacements in the past. Patient is on Eliquis.  Patient also has redness on her left lower leg x few days. Patient says that she was treated for cellulitis and was placed on antibiotics though was removed by her MD.

## 2024-08-02 NOTE — CM/SW NOTE
Patient chose to go to [facility].    [facility] were in contact and she will have a private room for $250 per day.    Patient agreeable.    Patient's son will bring her to  around 600pm today

## 2024-09-18 RX ORDER — BUMETANIDE 1 MG/1
1 TABLET ORAL DAILY
COMMUNITY

## 2024-09-18 RX ORDER — DILTIAZEM HYDROCHLORIDE EXTENDED-RELEASE TABLETS 240 MG/1
240 TABLET, EXTENDED RELEASE ORAL DAILY
COMMUNITY

## 2024-09-18 RX ORDER — POTASSIUM CHLORIDE 1.5 G/1.58G
10 POWDER, FOR SOLUTION ORAL DAILY
COMMUNITY

## 2024-09-18 RX ORDER — SIMVASTATIN 10 MG
10 TABLET ORAL NIGHTLY
COMMUNITY

## 2024-09-18 RX ORDER — PANTOPRAZOLE SODIUM 40 MG/1
40 TABLET, DELAYED RELEASE ORAL DAILY
COMMUNITY

## 2024-09-18 RX ORDER — SPIRONOLACTONE 25 MG/1
25 TABLET ORAL DAILY
COMMUNITY

## 2024-09-18 RX ORDER — SENNOSIDES 8.6 MG
650 CAPSULE ORAL 3 TIMES DAILY
COMMUNITY

## 2024-09-18 RX ORDER — METOPROLOL SUCCINATE 100 MG/1
50 TABLET, EXTENDED RELEASE ORAL DAILY
COMMUNITY

## 2024-09-18 RX ORDER — GABAPENTIN 100 MG/1
200 CAPSULE ORAL 2 TIMES DAILY
COMMUNITY

## 2024-09-18 RX ORDER — CYCLOSPORINE 0.5 MG/ML
1 EMULSION OPHTHALMIC 2 TIMES DAILY
COMMUNITY

## 2024-09-19 ENCOUNTER — HOSPITAL ENCOUNTER (OUTPATIENT)
Dept: CARDIOLOGY | Age: 79
Discharge: HOME | End: 2024-09-19
Attending: INTERNAL MEDICINE

## 2024-09-19 VITALS
OXYGEN SATURATION: 96 % | TEMPERATURE: 96.9 F | HEART RATE: 76 BPM | DIASTOLIC BLOOD PRESSURE: 56 MMHG | RESPIRATION RATE: 14 BRPM | SYSTOLIC BLOOD PRESSURE: 92 MMHG

## 2024-09-19 DIAGNOSIS — I48.0 PAF (PAROXYSMAL ATRIAL FIBRILLATION)  (CMD): ICD-10-CM

## 2024-09-19 PROCEDURE — 13000001 HB PHASE II RECOVERY EA 30 MINUTES

## 2024-09-19 PROCEDURE — 93005 ELECTROCARDIOGRAM TRACING: CPT | Performed by: INTERNAL MEDICINE

## 2024-09-19 PROCEDURE — 10002800 HB RX 250 W HCPCS: Performed by: INTERNAL MEDICINE

## 2024-09-19 RX ORDER — NALOXONE HCL 0.4 MG/ML
VIAL (ML) INJECTION
Status: DISCONTINUED
Start: 2024-09-19 | End: 2024-09-19 | Stop reason: WASHOUT

## 2024-09-19 RX ORDER — MIDAZOLAM HYDROCHLORIDE 1 MG/ML
INJECTION, SOLUTION INTRAMUSCULAR; INTRAVENOUS PRN
Status: COMPLETED | OUTPATIENT
Start: 2024-09-19 | End: 2024-09-19

## 2024-09-19 RX ORDER — 0.9 % SODIUM CHLORIDE 0.9 %
2 VIAL (ML) INJECTION EVERY 12 HOURS SCHEDULED
Status: DISCONTINUED | OUTPATIENT
Start: 2024-09-19 | End: 2024-09-20 | Stop reason: HOSPADM

## 2024-09-19 RX ORDER — FLUMAZENIL 0.1 MG/ML
INJECTION, SOLUTION INTRAVENOUS
Status: DISCONTINUED
Start: 2024-09-19 | End: 2024-09-19 | Stop reason: WASHOUT

## 2024-09-19 RX ORDER — MIDAZOLAM HYDROCHLORIDE 5 MG/5ML
INJECTION, SOLUTION INTRAMUSCULAR; INTRAVENOUS
Status: DISCONTINUED
Start: 2024-09-19 | End: 2024-09-20 | Stop reason: HOSPADM

## 2024-09-19 RX ADMIN — MIDAZOLAM HYDROCHLORIDE 2 MG: 1 INJECTION, SOLUTION INTRAMUSCULAR; INTRAVENOUS at 13:01

## 2024-09-19 RX ADMIN — MIDAZOLAM HYDROCHLORIDE 1 MG: 1 INJECTION, SOLUTION INTRAMUSCULAR; INTRAVENOUS at 13:03

## 2024-09-19 RX ADMIN — FENTANYL CITRATE 25 MCG: 50 INJECTION INTRAMUSCULAR; INTRAVENOUS at 13:03

## 2024-09-19 RX ADMIN — FENTANYL CITRATE 50 MCG: 50 INJECTION INTRAMUSCULAR; INTRAVENOUS at 13:01

## 2024-09-19 ASSESSMENT — PAIN SCALES - GENERAL
PAINLEVEL_OUTOF10: 0

## 2024-09-20 LAB
ATRIAL RATE (BPM): 70
P AXIS (DEGREES): 71
PR-INTERVAL (MSEC): 212
QRS-INTERVAL (MSEC): 86
QT-INTERVAL (MSEC): 386
QTC: 416
R AXIS (DEGREES): 23
REPORT TEXT: NORMAL
T AXIS (DEGREES): 26
VENTRICULAR RATE EKG/MIN (BPM): 70

## 2024-10-02 ENCOUNTER — TELEPHONE (OUTPATIENT)
Dept: ALLERGY | Facility: CLINIC | Age: 79
End: 2024-10-02

## 2024-10-02 NOTE — TELEPHONE ENCOUNTER
Patient called and canceled her appointments for today at 1pm and her appointment for tomorrow at 9:30am. Per patient she did not want to reschedule her appointments because she was able to figure out that she was having reactions to an IV medication that she was having.

## 2024-10-07 ENCOUNTER — HOSPITAL ENCOUNTER (INPATIENT)
Facility: HOSPITAL | Age: 79
LOS: 3 days | Discharge: HOME | End: 2024-10-10
Attending: INTERNAL MEDICINE | Admitting: INTERNAL MEDICINE
Payer: MEDICARE

## 2024-10-07 LAB
ANION GAP SERPL CALC-SCNC: 6 MMOL/L (ref 0–18)
BUN BLD-MCNC: 17 MG/DL (ref 9–23)
BUN/CREAT SERPL: 18.1 (ref 10–20)
CALCIUM BLD-MCNC: 8.9 MG/DL (ref 8.7–10.4)
CHLORIDE SERPL-SCNC: 106 MMOL/L (ref 98–112)
CO2 SERPL-SCNC: 27 MMOL/L (ref 21–32)
CREAT BLD-MCNC: 0.94 MG/DL
EGFRCR SERPLBLD CKD-EPI 2021: 62 ML/MIN/1.73M2 (ref 60–?)
GLUCOSE BLD-MCNC: 103 MG/DL (ref 70–99)
MAGNESIUM SERPL-MCNC: 1.6 MG/DL (ref 1.6–2.6)
OSMOLALITY SERPL CALC.SUM OF ELEC: 290 MOSM/KG (ref 275–295)
POTASSIUM SERPL-SCNC: 3 MMOL/L (ref 3.5–5.1)
SODIUM SERPL-SCNC: 139 MMOL/L (ref 136–145)

## 2024-10-07 PROCEDURE — 93005 ELECTROCARDIOGRAM TRACING: CPT

## 2024-10-07 PROCEDURE — 80048 BASIC METABOLIC PNL TOTAL CA: CPT | Performed by: INTERNAL MEDICINE

## 2024-10-07 PROCEDURE — 83735 ASSAY OF MAGNESIUM: CPT | Performed by: INTERNAL MEDICINE

## 2024-10-07 PROCEDURE — 93010 ELECTROCARDIOGRAM REPORT: CPT | Performed by: INTERNAL MEDICINE

## 2024-10-07 RX ORDER — DILTIAZEM HYDROCHLORIDE 120 MG/1
240 CAPSULE, EXTENDED RELEASE ORAL DAILY
Status: DISCONTINUED | OUTPATIENT
Start: 2024-10-07 | End: 2024-10-07

## 2024-10-07 RX ORDER — MAGNESIUM OXIDE 400 MG/1
400 TABLET ORAL ONCE
Status: COMPLETED | OUTPATIENT
Start: 2024-10-07 | End: 2024-10-07

## 2024-10-07 RX ORDER — PANTOPRAZOLE SODIUM 40 MG/1
40 TABLET, DELAYED RELEASE ORAL
Status: DISCONTINUED | OUTPATIENT
Start: 2024-10-08 | End: 2024-10-10

## 2024-10-07 RX ORDER — DILTIAZEM HYDROCHLORIDE 120 MG/1
240 CAPSULE, EXTENDED RELEASE ORAL DAILY
Status: DISCONTINUED | OUTPATIENT
Start: 2024-10-08 | End: 2024-10-08

## 2024-10-07 RX ORDER — DOFETILIDE 0.25 MG/1
250 CAPSULE ORAL EVERY 12 HOURS
Status: DISCONTINUED | OUTPATIENT
Start: 2024-10-08 | End: 2024-10-08

## 2024-10-07 RX ORDER — SPIRONOLACTONE 25 MG/1
12.5 TABLET ORAL DAILY
Status: DISCONTINUED | OUTPATIENT
Start: 2024-10-08 | End: 2024-10-10

## 2024-10-07 RX ORDER — GABAPENTIN 300 MG/1
300 CAPSULE ORAL 2 TIMES DAILY
Status: DISCONTINUED | OUTPATIENT
Start: 2024-10-07 | End: 2024-10-10

## 2024-10-07 RX ORDER — METOPROLOL SUCCINATE 100 MG/1
50 TABLET, EXTENDED RELEASE ORAL DAILY
COMMUNITY

## 2024-10-07 RX ORDER — METOPROLOL SUCCINATE 50 MG/1
50 TABLET, EXTENDED RELEASE ORAL DAILY
Status: DISCONTINUED | OUTPATIENT
Start: 2024-10-08 | End: 2024-10-08

## 2024-10-07 RX ORDER — PRAVASTATIN SODIUM 20 MG
20 TABLET ORAL NIGHTLY
Status: DISCONTINUED | OUTPATIENT
Start: 2024-10-07 | End: 2024-10-10

## 2024-10-07 RX ORDER — BUMETANIDE 1 MG/1
1 TABLET ORAL DAILY
Status: DISCONTINUED | OUTPATIENT
Start: 2024-10-08 | End: 2024-10-10

## 2024-10-07 RX ORDER — POTASSIUM CHLORIDE 1.5 G/1.58G
40 POWDER, FOR SOLUTION ORAL EVERY 4 HOURS
Status: COMPLETED | OUTPATIENT
Start: 2024-10-08 | End: 2024-10-08

## 2024-10-07 NOTE — TRANSFER CENTER NOTE
DIRECT ADMISSION for Tikosyn loading     Admitting MD: Dr Ozzy Cardenas  Called in by: [facility]   Call back #:   Date of admission: 10/7/1800  Diagnosis: Atrialfibrillation   Specialist MD:  Dr. Vega Carcamo cardiology   Hospitalist assigned: NA   Type of admission: INPT  Type of bed: Cardiac Telemetry  Time of admission: 1800  Insurance Verification complete: Yes

## 2024-10-08 LAB
ANION GAP SERPL CALC-SCNC: 4 MMOL/L (ref 0–18)
BUN BLD-MCNC: 16 MG/DL (ref 9–23)
BUN/CREAT SERPL: 19.8 (ref 10–20)
CALCIUM BLD-MCNC: 9.4 MG/DL (ref 8.7–10.4)
CHLORIDE SERPL-SCNC: 108 MMOL/L (ref 98–112)
CO2 SERPL-SCNC: 28 MMOL/L (ref 21–32)
CREAT BLD-MCNC: 0.81 MG/DL
EGFRCR SERPLBLD CKD-EPI 2021: 75 ML/MIN/1.73M2 (ref 60–?)
GLUCOSE BLD-MCNC: 89 MG/DL (ref 70–99)
MAGNESIUM SERPL-MCNC: 1.8 MG/DL (ref 1.6–2.6)
OSMOLALITY SERPL CALC.SUM OF ELEC: 291 MOSM/KG (ref 275–295)
POTASSIUM SERPL-SCNC: 4.4 MMOL/L (ref 3.5–5.1)
POTASSIUM SERPL-SCNC: 4.5 MMOL/L (ref 3.5–5.1)
Q-T INTERVAL: 356 MS
Q-T INTERVAL: 358 MS
Q-T INTERVAL: 372 MS
QRS DURATION: 80 MS
QRS DURATION: 84 MS
QRS DURATION: 88 MS
QTC CALCULATION (BEZET): 445 MS
QTC CALCULATION (BEZET): 465 MS
QTC CALCULATION (BEZET): 468 MS
R AXIS: 30 DEGREES
R AXIS: 45 DEGREES
R AXIS: 55 DEGREES
SODIUM SERPL-SCNC: 140 MMOL/L (ref 136–145)
T AXIS: 50 DEGREES
T AXIS: 53 DEGREES
T AXIS: 63 DEGREES
VENTRICULAR RATE: 104 BPM
VENTRICULAR RATE: 93 BPM
VENTRICULAR RATE: 94 BPM

## 2024-10-08 PROCEDURE — 83735 ASSAY OF MAGNESIUM: CPT | Performed by: INTERNAL MEDICINE

## 2024-10-08 PROCEDURE — 93010 ELECTROCARDIOGRAM REPORT: CPT | Performed by: STUDENT IN AN ORGANIZED HEALTH CARE EDUCATION/TRAINING PROGRAM

## 2024-10-08 PROCEDURE — 80048 BASIC METABOLIC PNL TOTAL CA: CPT | Performed by: INTERNAL MEDICINE

## 2024-10-08 PROCEDURE — 93010 ELECTROCARDIOGRAM REPORT: CPT | Performed by: INTERNAL MEDICINE

## 2024-10-08 PROCEDURE — 93005 ELECTROCARDIOGRAM TRACING: CPT

## 2024-10-08 PROCEDURE — 84132 ASSAY OF SERUM POTASSIUM: CPT | Performed by: INTERNAL MEDICINE

## 2024-10-08 RX ORDER — ACETAMINOPHEN 325 MG/1
650 TABLET ORAL EVERY 6 HOURS PRN
Status: DISCONTINUED | OUTPATIENT
Start: 2024-10-08 | End: 2024-10-10

## 2024-10-08 RX ORDER — DOFETILIDE 0.25 MG/1
500 CAPSULE ORAL ONCE
Status: COMPLETED | OUTPATIENT
Start: 2024-10-08 | End: 2024-10-08

## 2024-10-08 RX ORDER — MAGNESIUM OXIDE 400 MG/1
400 TABLET ORAL ONCE
Status: COMPLETED | OUTPATIENT
Start: 2024-10-08 | End: 2024-10-08

## 2024-10-08 NOTE — PLAN OF CARE
Problem: Patient Centered Care  Goal: Patient preferences are identified and integrated in the patient's plan of care  Description: Interventions:  - What would you like us to know as we care for you? I'm from [facility].  - Provide timely, complete, and accurate information to patient/family  - Incorporate patient and family knowledge, values, beliefs, and cultural backgrounds into the planning and delivery of care  - Encourage patient/family to participate in care and decision-making at the level they choose  - Honor patient and family perspectives and choices  Outcome: Progressing     Problem: Patient/Family Goals  Goal: Patient/Family Long Term Goal  Description: Patient's Long Term Goal: getting stronger    Interventions:  - PT/OT  - See additional Care Plan goals for specific interventions  Outcome: Progressing  Goal: Patient/Family Short Term Goal  Description: Patient's Short Term Goal: go home    Interventions:   - Take all prescribed medications  - See additional Care Plan goals for specific interventions  Outcome: Progressing

## 2024-10-08 NOTE — CONSULTS
ELECTROPHYSIOLOGY CONSULT  Mohinder Ferrari MD  ?  Patient: Adore Covington  MRN: [id number]     Primary Care Physician: Dr. Maral Huffman MD  Referring Physician : Eben Bond MD   Reason for Consultation: Symptomatic persistent AF     HPI:  Adore Covington is a 79 year old female with a history of HTN, DL, obesity, PAD, HFpEF, and persistent atrial fibrillation. She also carries a history of chronic venous hypertension with LE ulcerations as well as recent lower extremity cellulitis along with COPD/interstitial lung disease.    More recently, she was seen in EP clinic with ongoing symptomatic AF despite several prior cardioversions.  Several options were discussed including consideration for antiarrhythmic drug therapy, specifically Tikosyn.  The patient was agreeable to proceeding and presents for such today.    Past Medical History:    Arrhythmia    Atrial fibrillation (HCC)    Congestive heart disease (HCC)    COPD (chronic obstructive pulmonary disease) (HCC)    Essential hypertension    High blood pressure    High cholesterol    Morbid obesity with BMI of 50.0-59.9, adult (HCC)    Muscle weakness    Neuropathy    Peripheral vascular disease (HCC)    Sleep apnea    Visual impairment     Active Problems:    * No active hospital problems. *          Medications:  .  Current Facility-Administered Medications on File Prior to Encounter   Medication Dose Route Frequency Provider Last Rate Last Admin    [COMPLETED] acetaminophen (Tylenol Extra Strength) tab 1,000 mg  1,000 mg Oral Once Finn Aguilar MD   1,000 mg at 08/01/24 5722     Current Outpatient Medications on File Prior to Encounter   Medication Sig Dispense Refill    metoprolol succinate  MG Oral Tablet 24 Hr Take 0.5 tablets (50 mg total) by mouth daily.      gabapentin 100 MG Oral Cap Take 3 capsules (300 mg total) by mouth 2 (two) times daily.      bumetanide 1 MG Oral Tab Take 1 tablet (1 mg total) by mouth daily.      spironolactone 25  MG Oral Tab Take 0.5 tablets (12.5 mg total) by mouth daily.      cholecalciferol 50 MCG (2000 UT) Oral Cap Take 1 capsule (2,000 Units total) by mouth daily. Patient takes 2000 units daily      cyanocobalamin 1000 MCG Oral Tab Take 1 tablet (1,000 mcg total) by mouth daily.      dilTIAZem HCl  MG Oral Capsule SR 24 Hr Take 1 capsule (240 mg total) by mouth daily.      pantoprazole 40 MG Oral Tab EC Take 1 tablet (40 mg total) by mouth every morning before breakfast. 60 tablet 0    apixaban 5 MG Oral Tab Take 1 tablet (5 mg total) by mouth 2 (two) times daily.      potassium chloride 20 MEQ Oral Tab CR Take 0.5 tablets (10 mEq total) by mouth daily.      cycloSPORINE 0.05 % Ophthalmic Emulsion Place 1 drop into both eyes 2 (two) times daily.      simvastatin 10 MG Oral Tab Take 1 tablet (10 mg total) by mouth daily.      acetaminophen 325 MG Oral Tab Take 2 tablets (650 mg total) by mouth every 6 (six) hours as needed for Pain.      polyethylene glycol, PEG 3350, 17 g Oral Powd Pack Take 17 g by mouth daily as needed. 10 each 0     Allergies:   Allergies   Allergen Reactions    Cefepime RASH     Per patient     Social History     Socioeconomic History    Marital status:      Spouse name: Not on file    Number of children: Not on file    Years of education: Not on file    Highest education level: Not on file   Occupational History    Not on file   Tobacco Use    Smoking status: Former     Current packs/day: 0.00     Types: Cigarettes     Quit date:      Years since quittin.7     Passive exposure: Never    Smokeless tobacco: Never   Vaping Use    Vaping status: Never Used   Substance and Sexual Activity    Alcohol use: Yes     Comment: 1-2 drinks daily    Drug use: Never    Sexual activity: Not on file   Other Topics Concern    Not on file   Social History Narrative    Not on file     Social Determinants of Health     Financial Resource Strain: Unknown (2021)    Received from [facility]    Overall Financial Resource Strain (CARDIA)     Difficulty of Paying Living Expenses: Patient declined   Food Insecurity: No Food Insecurity (10/7/2024)    Food Insecurity     Food Insecurity: Never true   Transportation Needs: No Transportation Needs (10/7/2024)    Transportation Needs     Lack of Transportation: No     Car Seat: Not on file   Physical Activity: Not on file   Stress: Not on file   Social Connections: Not on file   Housing Stability: Low Risk  (10/7/2024)    Housing Stability     Housing Instability: No     Housing Instability Emergency: Not on file     Crib or Bassinette: Not on file     History reviewed. No pertinent family history.  SH and FH were reviewed and updated.     Review of Systems  Constitutional: negative for fatigue, negative for change in appetite, chills, fevers, sweats (diaphoresis), weight loss or weight gain     Eyes: negative for irritation, redness and visual disturbance     Ears, nose, mouth, throat, and face: negative for hearing loss and sinus problems; negative for dental problems, negative for epistaxis, nasal congestion and sore throat     Respiratory: negative for cough, hemoptysis, pleurisy/chest pain, sputum and wheezing; negative for dyspnea upon exertion     Cardiovascular: See HPI     Gastrointestinal: negative for vomiting, nausea, heartburn(sensation) and abdominal bloating. Negative for diarrhea, constipation or melena     Genitourinary:negative for dysuria and hematuria     Integumentary: negative for rash and skin lesion(s)     Hematologic/lymphatic: negative for bleeding,easy bruising and lymphadenopathy     Musculoskeletal:negative for back pain, myalgias and muscle cramps/weakness     Neurological: negative for gait/coordination problems, headaches, memory problems, seizure, vertigo, dizziness and lightheadedness     Behavioral/Psych: negative for anxiety and depression     Endocrine: negative for diabetic symptoms including  blurry vision, increased fatigue, polydipsia and polyuria and temperature intolerance     Allergic/Immunologic: negative for anaphylaxis, hay fever and pruritus     Physical Exam:  Blood pressure 119/82, pulse 91, resp. rate 18, weight 217 lb 1.6 oz (98.5 kg), SpO2 94%.  ?  Alert and Oriented times x 3, pleasant, no distress, conversant, appears stated age  PER, Anicteric sclerae, moist conjunctivae, no eye lid lag present, EOM's intact  Lips, mucosa, and tongue normal; teeth and gums normal,  Trachea midline, no thyromegaly, no cervical lymphadenopathy  No JVD or No carotid bruits  Lungs are CTAB, no wheezes or crackles, normal respiratory effort  Heart exam is irregularly irregular, no murmurs, PMI is non-displaced  Abdomen is soft, nontender, bowel sounds are present, no HSM, no bruits  Lower extremities with trace edema, no clubbing  1+ pedal and femoral pulses bilaterally  Normal skin turgor, texture, no rashes or lesions?  Normal muscle tone      LABS:  .  Lab Results   Component Value Date    WBC 6.2 07/01/2024    HGB 10.6 (L) 07/01/2024    HCT 33.1 (L) 07/01/2024    MCV 95.7 07/01/2024    .0 07/01/2024     .  Lab Results   Component Value Date    BUN 12 07/01/2024     07/01/2024    K 4.2 07/01/2024     07/01/2024    CO2 26.0 07/01/2024     .No results found for: \"INR\", \"PROTIME\"  .No components found for: \"CHLPL\"  No results found for: \"HDL\"  No results found for: \"LDL\"  No results found for: \"TRIG\"  No results found for: \"CHOLHDL\"  .  Lab Results   Component Value Date    ALT 10 06/27/2024    AST 17 06/27/2024     .  Lab Results   Component Value Date    TSH 3.369 05/03/2024     Assessment/plan:  Adore Covington is a 79 year old female with a history of HTN, DL, obesity, PAD, HFpEF, and persistent atrial fibrillation. She also carries a history of chronic venous hypertension with LE ulcerations as well as recent lower extremity cellulitis along with COPD/interstitial lung disease.  Most  recently, she is admitted for Tikosyn loading.    Persistent atrial fibrillation  -[id number] of at least 6 -currently on Eliquis 5 twice daily  -Recurrent symptomatic AF noted despite several cardioversions in the past  -Previously discontinued off amiodarone due to concomitant ILD  -Echo 7/23 with preserved LV systolic function and without significant valvular abnormalities  -Stress test 7/22 without evidence for ischemia  -On diltiazem 240 daily along with Toprol 50 daily  -Potassium 3.0 and creatinine 0.94 (10/7/24)  -ECG with AF and VR of 93.  Qtc 445 ms    Plan  1.  Monitor on telemetry  2.  DNR status to be reversed temporarily to full code while inpatient given risk of torsades and need for potential external defibrillation (patient agreeable to temporary change in code status)  3.  K replaced (was 3.0) - Daily BMP  4.  Continue Eliquis uninterrupted    Received dose 1 of 250 at [place]  Plan for dose 2 of 375 micrograms 10AM provided K is stable    Please page/Call Dr. Ferrari with any questions.    Plan of care discussed with RN.    Will continue to follow.    Thank you for allowing me to participate in the care of your patient.    Mohinder Ferrari MD  [facility]

## 2024-10-08 NOTE — PLAN OF CARE
Problem: Patient Centered Care  Goal: Patient preferences are identified and integrated in the patient's plan of care  Description: Interventions:  - What would you like us to know as we care for you? I would like to return home.  - Provide timely, complete, and accurate information to patient/family  - Incorporate patient and family knowledge, values, beliefs, and cultural backgrounds into the planning and delivery of care  - Encourage patient/family to participate in care and decision-making at the level they choose  - Honor patient and family perspectives and choices  Outcome: Progressing     Problem: Patient/Family Goals  Goal: Patient/Family Long Term Goal  Description: Patient's Long Term Goal: to remain in normal sinus rhythm    Interventions:  - Tikosyn loading dose  - See additional Care Plan goals for specific interventions  Outcome: Progressing  Goal: Patient/Family Short Term Goal  Description: Patient's Short Term Goal: less pain in left knee when walking    Interventions:   - Tylenol  -orthopedic consult as an outpatient  - See additional Care Plan goals for specific interventions  Outcome: Progressing     Problem: CARDIOVASCULAR - ADULT  Goal: Maintains optimal cardiac output and hemodynamic stability  Description: INTERVENTIONS:  - Monitor vital signs, rhythm, and trends  - Monitor for bleeding, hypotension and signs of decreased cardiac output  - Evaluate effectiveness of vasoactive medications to optimize hemodynamic stability  - Monitor arterial and/or venous puncture sites for bleeding and/or hematoma  - Assess quality of pulses, skin color and temperature  - Assess for signs of decreased coronary artery perfusion - ex. Angina  - Evaluate fluid balance, assess for edema, trend weights  Outcome: Progressing  Goal: Absence of cardiac arrhythmias or at baseline  Description: INTERVENTIONS:  - Continuous cardiac monitoring, monitor vital signs, obtain 12 lead EKG if indicated  - Evaluate  effectiveness of antiarrhythmic and heart rate control medications as ordered  - Initiate emergency measures for life threatening arrhythmias  - Monitor electrolytes and administer replacement therapy as ordered  Outcome: Progressing   Patient is getting Tikosyn loading dose. Will obtain EKG per protocol.

## 2024-10-09 LAB
ANION GAP SERPL CALC-SCNC: 4 MMOL/L (ref 0–18)
ATRIAL RATE: 70 BPM
ATRIAL RATE: 72 BPM
ATRIAL RATE: 73 BPM
ATRIAL RATE: 81 BPM
BUN BLD-MCNC: 17 MG/DL (ref 9–23)
BUN/CREAT SERPL: 22.4 (ref 10–20)
CALCIUM BLD-MCNC: 8.9 MG/DL (ref 8.7–10.4)
CHLORIDE SERPL-SCNC: 106 MMOL/L (ref 98–112)
CO2 SERPL-SCNC: 28 MMOL/L (ref 21–32)
CREAT BLD-MCNC: 0.76 MG/DL
EGFRCR SERPLBLD CKD-EPI 2021: 81 ML/MIN/1.73M2 (ref 60–?)
GLUCOSE BLD-MCNC: 100 MG/DL (ref 70–99)
MAGNESIUM SERPL-MCNC: 1.7 MG/DL (ref 1.6–2.6)
OSMOLALITY SERPL CALC.SUM OF ELEC: 288 MOSM/KG (ref 275–295)
P AXIS: 65 DEGREES
P AXIS: 66 DEGREES
P AXIS: 70 DEGREES
P AXIS: 74 DEGREES
P-R INTERVAL: 176 MS
P-R INTERVAL: 188 MS
P-R INTERVAL: 194 MS
P-R INTERVAL: 200 MS
POTASSIUM SERPL-SCNC: 3.6 MMOL/L (ref 3.5–5.1)
POTASSIUM SERPL-SCNC: 5.3 MMOL/L (ref 3.5–5.1)
Q-T INTERVAL: 362 MS
Q-T INTERVAL: 424 MS
Q-T INTERVAL: 424 MS
Q-T INTERVAL: 440 MS
QRS DURATION: 66 MS
QRS DURATION: 80 MS
QRS DURATION: 84 MS
QRS DURATION: 86 MS
QTC CALCULATION (BEZET): 420 MS
QTC CALCULATION (BEZET): 464 MS
QTC CALCULATION (BEZET): 467 MS
QTC CALCULATION (BEZET): 475 MS
R AXIS: 30 DEGREES
R AXIS: 36 DEGREES
R AXIS: 41 DEGREES
R AXIS: 54 DEGREES
SODIUM SERPL-SCNC: 138 MMOL/L (ref 136–145)
T AXIS: 43 DEGREES
T AXIS: 47 DEGREES
T AXIS: 49 DEGREES
T AXIS: 79 DEGREES
VENTRICULAR RATE: 70 BPM
VENTRICULAR RATE: 72 BPM
VENTRICULAR RATE: 73 BPM
VENTRICULAR RATE: 81 BPM

## 2024-10-09 PROCEDURE — 93010 ELECTROCARDIOGRAM REPORT: CPT | Performed by: INTERNAL MEDICINE

## 2024-10-09 PROCEDURE — 93010 ELECTROCARDIOGRAM REPORT: CPT | Performed by: STUDENT IN AN ORGANIZED HEALTH CARE EDUCATION/TRAINING PROGRAM

## 2024-10-09 PROCEDURE — 83735 ASSAY OF MAGNESIUM: CPT | Performed by: INTERNAL MEDICINE

## 2024-10-09 PROCEDURE — 93005 ELECTROCARDIOGRAM TRACING: CPT

## 2024-10-09 PROCEDURE — 84132 ASSAY OF SERUM POTASSIUM: CPT | Performed by: INTERNAL MEDICINE

## 2024-10-09 PROCEDURE — 80048 BASIC METABOLIC PNL TOTAL CA: CPT | Performed by: INTERNAL MEDICINE

## 2024-10-09 RX ORDER — POTASSIUM CHLORIDE 1500 MG/1
20 TABLET, EXTENDED RELEASE ORAL DAILY
Status: DISCONTINUED | OUTPATIENT
Start: 2024-10-10 | End: 2024-10-10

## 2024-10-09 RX ORDER — MAGNESIUM OXIDE 400 MG/1
400 TABLET ORAL ONCE
Status: COMPLETED | OUTPATIENT
Start: 2024-10-09 | End: 2024-10-09

## 2024-10-09 RX ORDER — DOFETILIDE 0.25 MG/1
250 CAPSULE ORAL 2 TIMES DAILY
Status: DISCONTINUED | OUTPATIENT
Start: 2024-10-09 | End: 2024-10-10

## 2024-10-09 RX ORDER — POTASSIUM CHLORIDE 1500 MG/1
40 TABLET, EXTENDED RELEASE ORAL EVERY 4 HOURS
Status: COMPLETED | OUTPATIENT
Start: 2024-10-09 | End: 2024-10-09

## 2024-10-09 NOTE — PLAN OF CARE
Patient is A&Ox4, room air, 1 assist with walker. Pt hypotensive throughout shift Dr.Hai medrano. Pt c/o of mild pain PRN meds given. Electrolytes replaced per protocol. Call light within reach and fall precautions in place.     Problem: Patient Centered Care  Goal: Patient preferences are identified and integrated in the patient's plan of care  Description: Interventions:  - What would you like us to know as we care for you? Im from [facility]   - Provide timely, complete, and accurate information to patient/family  - Incorporate patient and family knowledge, values, beliefs, and cultural backgrounds into the planning and delivery of care  - Encourage patient/family to participate in care and decision-making at the level they choose  - Honor patient and family perspectives and choices  Outcome: Progressing     Problem: Patient/Family Goals  Goal: Patient/Family Long Term Goal  Description: Patient's Long Term Goal: Get stronger     Interventions:  - See additional Care Plan goals for specific interventions  Outcome: Progressing  Goal: Patient/Family Short Term Goal  Description: Patient's Short Term Goal: Go home     Interventions:   - See additional Care Plan goals for specific interventions  Outcome: Progressing     Problem: CARDIOVASCULAR - ADULT  Goal: Maintains optimal cardiac output and hemodynamic stability  Description: INTERVENTIONS:  - Monitor vital signs, rhythm, and trends  - Monitor for bleeding, hypotension and signs of decreased cardiac output  - Evaluate effectiveness of vasoactive medications to optimize hemodynamic stability  - Monitor arterial and/or venous puncture sites for bleeding and/or hematoma  - Assess quality of pulses, skin color and temperature  - Assess for signs of decreased coronary artery perfusion - ex. Angina  - Evaluate fluid balance, assess for edema, trend weights  Outcome: Progressing  Goal: Absence of cardiac arrhythmias or at baseline  Description:  INTERVENTIONS:  - Continuous cardiac monitoring, monitor vital signs, obtain 12 lead EKG if indicated  - Evaluate effectiveness of antiarrhythmic and heart rate control medications as ordered  - Initiate emergency measures for life threatening arrhythmias  - Monitor electrolytes and administer replacement therapy as ordered  Outcome: Progressing

## 2024-10-09 NOTE — DIETARY NOTE
BRIEF DIETITIAN NOTE     Pt re-screened for MST of 5 for weight loss and declined PO. Found to be at no nutrition risk at this time. Good PO intakes so far. Pt states she typically eats well at her ILF,  eats dinner every night and has healthy snacks throughout the day. Does not feel she needs any ONS at this time. Pt has has significant amount of weight loss over the past ~1 year, however, pt does state she was intentionally losing weight at some point, but she has had several medical issues back to back (unrelated) that has caused her further weight loss. Most recently she had a knee injury impacting her ambulation and having increased pain. Pt is well-nourished and feels she is eating enough. Very pleasant visit. No nutrition interventions warranted at this time. Please consult RD if further nutrition intervention is needed.     Percent Meals Eaten (last 6 days)       Date/Time Percent Meals Eaten (%)    10/08/24 1004 90 %    10/08/24 1503 80 %    10/08/24 1824 50 %    10/09/24 1138 100 %             F/u per protocol or as appropriate.       Abbi Rosario RD, LDN  Clinical Dietitian  P: [phone]

## 2024-10-09 NOTE — PLAN OF CARE
Problem: Patient Centered Care  Goal: Patient preferences are identified and integrated in the patient's plan of care  Description: Interventions:  - What would you like us to know as we care for you? I'm from [facility]   - Provide timely, complete, and accurate information to patient/family  - Incorporate patient and family knowledge, values, beliefs, and cultural backgrounds into the planning and delivery of care  - Encourage patient/family to participate in care and decision-making at the level they choose  - Honor patient and family perspectives and choices  Outcome: Progressing     Problem: Patient/Family Goals  Goal: Patient/Family Long Term Goal  Description: Patient's Long Term Goal: Get stronger     Interventions:  - See additional Care Plan goals for specific interventions  Outcome: Progressing  Goal: Patient/Family Short Term Goal  Description: Patient's Short Term Goal: Go home     Interventions:   - See additional Care Plan goals for specific interventions  Outcome: Progressing     Problem: CARDIOVASCULAR - ADULT  Goal: Maintains optimal cardiac output and hemodynamic stability  Description: INTERVENTIONS:  - Monitor vital signs, rhythm, and trends  - Monitor for bleeding, hypotension and signs of decreased cardiac output  - Evaluate effectiveness of vasoactive medications to optimize hemodynamic stability  - Monitor arterial and/or venous puncture sites for bleeding and/or hematoma  - Assess quality of pulses, skin color and temperature  - Assess for signs of decreased coronary artery perfusion - ex. Angina  - Evaluate fluid balance, assess for edema, trend weights  Outcome: Progressing  Goal: Absence of cardiac arrhythmias or at baseline  Description: INTERVENTIONS:  - Continuous cardiac monitoring, monitor vital signs, obtain 12 lead EKG if indicated  - Evaluate effectiveness of antiarrhythmic and heart rate control medications as ordered  - Initiate emergency measures for life  threatening arrhythmias  - Monitor electrolytes and administer replacement therapy as ordered  Outcome: Progressing

## 2024-10-09 NOTE — PROGRESS NOTES
EP Progress Note  Mohinder Ferrari MD  ?  Patient: Adore Covington 79 year old  MRN: [id number]  Date: 10/9/2024     Primary Care Physician: Dr. Maral Huffman MD    Patient seen and examined this AM.     Physical Exam:  Blood pressure 103/67, pulse 69, temperature 97.2 °F (36.2 °C), temperature source Oral, resp. rate 17, weight 218 lb 3.2 oz (99 kg), SpO2 96%.  ?  Alert and Oriented times x 3, pleasant, no distress, conversant, appears stated age  PER, Anicteric sclerae, moist conjunctivae, no eye lid lag present, EOM's intact  Lips, mucosa, and tongue normal; teeth and gums normal,  Trachea midline, no thyromegaly, no cervical lymphadenopathy  No JVD or No carotid bruits  Lungs are CTAB, no wheezes or crackles, normal respiratory effort  Heart exam is regular, no murmurs or S3, PMI is non-displaced  Abdomen is soft, nontender, bowel sounds are present, no HSM, no bruits  Lower extremities without edema, no clubbing  1+ pedal and femoral pulses bilaterally  Normal skin turgor, texture, no rashes or lesions?      LABS:  .  Lab Results   Component Value Date    WBC 6.2 07/01/2024    HGB 10.6 (L) 07/01/2024    HCT 33.1 (L) 07/01/2024    MCV 95.7 07/01/2024    .0 07/01/2024     .  Lab Results   Component Value Date    BUN 17 10/09/2024     10/09/2024    K 3.6 10/09/2024     10/09/2024    CO2 28.0 10/09/2024     .No results found for: \"INR\", \"PROTIME\"  .No components found for: \"CHLPL\"  No results found for: \"HDL\"  No results found for: \"LDL\"  No results found for: \"TRIG\"  No results found for: \"CHOLHDL\"  .  Lab Results   Component Value Date    ALT 10 06/27/2024    AST 17 06/27/2024     .  Lab Results   Component Value Date    TSH 3.369 05/03/2024        A/P:  Adore Covington is a 79 year old female with a history of HTN, DL, obesity, PAD, HFpEF, and persistent atrial fibrillation. She also carries a history of chronic venous hypertension with LE ulcerations as well as recent lower extremity  cellulitis along with COPD/interstitial lung disease.  Most recently, she is admitted for Tikosyn loading.     Persistent atrial fibrillation  -[id number] of at least 6 -currently on Eliquis 5 twice daily  -Pharmacological cardioversion noted following dose #2 of dofetilide (previously on amiodarone which was discontinued due to concomitant ILD)  -Echo 7/23 with preserved LV systolic function and without significant valvular abnormalities  -Stress test 7/22 without evidence for ischemia  -Potassium this morning 3.6 with creatinine 0.76  -Blood pressure 103/67 (presently off metoprolol and diltiazem -taking Aldactone 12.5 daily)         Dose 1: 250 mcg  Dose 2: 375 mcg  Dose 3: 500 mcg (administered 8 PM 10/8/2024)  Subsequent ECG (1045 PM) with sinus rhythm rate of 70 - Qtc 475 ms      Plan  Potassium to be replaced (3.6 this morning)  2.  Plan for dofetilide 375 (dose #4) this morning  3.  Repeat ECG 2 hours post dosing    Will plan for discharge tentatively tomorrow morning.    Will need to ensure patient has dofetilide upon discharge.  Will need 2-week follow-up with Dr. Ferrari    Plan of care discussed with RN.    Mohinder Ferrari MD  [facility]

## 2024-10-10 VITALS
RESPIRATION RATE: 20 BRPM | SYSTOLIC BLOOD PRESSURE: 102 MMHG | DIASTOLIC BLOOD PRESSURE: 57 MMHG | BODY MASS INDEX: 39 KG/M2 | HEART RATE: 77 BPM | TEMPERATURE: 98 F | OXYGEN SATURATION: 95 % | WEIGHT: 218.19 LBS

## 2024-10-10 LAB
ANION GAP SERPL CALC-SCNC: 6 MMOL/L (ref 0–18)
ATRIAL RATE: 73 BPM
BUN BLD-MCNC: 12 MG/DL (ref 9–23)
BUN/CREAT SERPL: 16.9 (ref 10–20)
CALCIUM BLD-MCNC: 8.9 MG/DL (ref 8.7–10.4)
CHLORIDE SERPL-SCNC: 105 MMOL/L (ref 98–112)
CO2 SERPL-SCNC: 28 MMOL/L (ref 21–32)
CREAT BLD-MCNC: 0.71 MG/DL
EGFRCR SERPLBLD CKD-EPI 2021: 88 ML/MIN/1.73M2 (ref 60–?)
GLUCOSE BLD-MCNC: 94 MG/DL (ref 70–99)
MAGNESIUM SERPL-MCNC: 1.9 MG/DL (ref 1.6–2.6)
OSMOLALITY SERPL CALC.SUM OF ELEC: 288 MOSM/KG (ref 275–295)
P AXIS: 72 DEGREES
P-R INTERVAL: 184 MS
POTASSIUM SERPL-SCNC: 4.4 MMOL/L (ref 3.5–5.1)
Q-T INTERVAL: 400 MS
QRS DURATION: 82 MS
QTC CALCULATION (BEZET): 440 MS
R AXIS: 31 DEGREES
SODIUM SERPL-SCNC: 139 MMOL/L (ref 136–145)
T AXIS: 33 DEGREES
VENTRICULAR RATE: 73 BPM

## 2024-10-10 PROCEDURE — 83735 ASSAY OF MAGNESIUM: CPT | Performed by: INTERNAL MEDICINE

## 2024-10-10 PROCEDURE — 80048 BASIC METABOLIC PNL TOTAL CA: CPT | Performed by: INTERNAL MEDICINE

## 2024-10-10 RX ORDER — DOFETILIDE 0.25 MG/1
250 CAPSULE ORAL 2 TIMES DAILY
Qty: 180 CAPSULE | Refills: 0 | Status: SHIPPED | OUTPATIENT
Start: 2024-10-10 | End: 2025-01-08

## 2024-10-10 RX ORDER — BUMETANIDE 1 MG/1
0.5 TABLET ORAL DAILY
Status: SHIPPED | COMMUNITY
Start: 2024-10-10

## 2024-10-10 NOTE — PLAN OF CARE
Problem: Patient Centered Care  Goal: Patient preferences are identified and integrated in the patient's plan of care  Description: Interventions:  - What would you like us to know as we care for you? Im from [facility]   - Provide timely, complete, and accurate information to patient/family  - Incorporate patient and family knowledge, values, beliefs, and cultural backgrounds into the planning and delivery of care  - Encourage patient/family to participate in care and decision-making at the level they choose  - Honor patient and family perspectives and choices  Outcome: Progressing     Problem: Patient/Family Goals  Goal: Patient/Family Long Term Goal  Description: Patient's Long Term Goal: Get stronger     Interventions:  - See additional Care Plan goals for specific interventions  Outcome: Progressing  Goal: Patient/Family Short Term Goal  Description: Patient's Short Term Goal: Go home     Interventions:   - See additional Care Plan goals for specific interventions  Outcome: Progressing     Problem: CARDIOVASCULAR - ADULT  Goal: Maintains optimal cardiac output and hemodynamic stability  Description: INTERVENTIONS:  - Monitor vital signs, rhythm, and trends  - Monitor for bleeding, hypotension and signs of decreased cardiac output  - Evaluate effectiveness of vasoactive medications to optimize hemodynamic stability  - Monitor arterial and/or venous puncture sites for bleeding and/or hematoma  - Assess quality of pulses, skin color and temperature  - Assess for signs of decreased coronary artery perfusion - ex. Angina  - Evaluate fluid balance, assess for edema, trend weights  Outcome: Progressing  Goal: Absence of cardiac arrhythmias or at baseline  Description: INTERVENTIONS:  - Continuous cardiac monitoring, monitor vital signs, obtain 12 lead EKG if indicated  - Evaluate effectiveness of antiarrhythmic and heart rate control medications as ordered  - Initiate emergency measures for life  threatening arrhythmias  - Monitor electrolytes and administer replacement therapy as ordered  Outcome: Progressing

## 2024-10-10 NOTE — DISCHARGE PLANNING
Patient was provided with discharge instructions, education, and follow up information. Prescription already sent electronically to patient's pharmacy. Patient verbalizes understanding of follow up information, specifically following up with cardiology, medication changes and when to take the next dose of each medication, afib and stroke prevention, signs and symptoms of when to call MD or EMS. Patient has no questions after reviewing all instructions and will be going back to [facility] around 2pm. Patient has concerns about getting Tikosyn from the pharmacy today, cardiologist Dr. Ferrari notified of situation.      Adore updated us that her daughter-in-law will  Tikosyn for her between 3 and 4pm today.    Mayra NEUMANN, Discharge Leader [id number]

## 2024-10-10 NOTE — PROGRESS NOTES
EP Note  Mohinder Ferrari MD  ?  Patient: Adore Covington 79 year old  MRN: [id number]  Date: 10/10/2024     Vitals:    10/10/24 0549   BP: 114/72   Pulse: 78   Resp: 18   Temp: 97.4 °F (36.3 °C)     Labs reviewed  K 4.4  Creatinine .71 with eGFR 88    Dofetilide dosing  Received total of 5 doses of Dofetilide thus far with most recent yesterday evening at 2030.  Post dosing ECG from last night with sinus rhythm rate of 73 with Qtc 440 ms.  Tolerating medication well.  No evidence for ventricular arrhythmias.  Qtc is within acceptable range.    Can be discharged later this AM on the following.    Discharge home Dofetilide 250 mcg bid with next dose scheduled for this evening (will need starter pack or prescription filled today to ensure no missed doses)  Continue eliquis uninterrupted  Discontinue diltiazem  4.   Follow up with Cardiology as scheduld.  5.   Follow up with Dr. Ferrari in 2 weeks    Mohinder Ferrari MD  [facility]

## 2024-10-10 NOTE — DISCHARGE PLANNING
I called the pharmacy below to inquire about the copay and availability of Tikosyn before patient discharges.    [id number] - [place], [place] - [address] AT [facility], [phone]     The patient's copay is $293.96 for 90 day supply with patient's insurance. Mayela reports that the medication is in stock.     Mayra NEUMANN, Discharge Leader [phone]

## 2024-10-10 NOTE — PLAN OF CARE
Problem: Patient Centered Care  Goal: Patient preferences are identified and integrated in the patient's plan of care  Description: Interventions:  - What would you like us to know as we care for you? Im from [facility]   - Provide timely, complete, and accurate information to patient/family  - Incorporate patient and family knowledge, values, beliefs, and cultural backgrounds into the planning and delivery of care  - Encourage patient/family to participate in care and decision-making at the level they choose  - Honor patient and family perspectives and choices  Outcome: Completed     Problem: Patient/Family Goals  Goal: Patient/Family Long Term Goal  Description: Patient's Long Term Goal: Get stronger     Interventions:  - See additional Care Plan goals for specific interventions  Outcome: Completed  Goal: Patient/Family Short Term Goal  Description: Patient's Short Term Goal: Go home     Interventions:   - See additional Care Plan goals for specific interventions  Outcome: Completed     Problem: CARDIOVASCULAR - ADULT  Goal: Maintains optimal cardiac output and hemodynamic stability  Description: INTERVENTIONS:  - Monitor vital signs, rhythm, and trends  - Monitor for bleeding, hypotension and signs of decreased cardiac output  - Evaluate effectiveness of vasoactive medications to optimize hemodynamic stability  - Monitor arterial and/or venous puncture sites for bleeding and/or hematoma  - Assess quality of pulses, skin color and temperature  - Assess for signs of decreased coronary artery perfusion - ex. Angina  - Evaluate fluid balance, assess for edema, trend weights  Outcome: Completed  Goal: Absence of cardiac arrhythmias or at baseline  Description: INTERVENTIONS:  - Continuous cardiac monitoring, monitor vital signs, obtain 12 lead EKG if indicated  - Evaluate effectiveness of antiarrhythmic and heart rate control medications as ordered  - Initiate emergency measures for life threatening  arrhythmias  - Monitor electrolytes and administer replacement therapy as ordered  Outcome: Completed

## 2024-10-11 NOTE — PROGRESS NOTES
Chief Complaint   Patient presents with    Wound Recheck     Bilateral legs       HPI:     79-year-old new patient here for evaluation of weeping wounds on the left lower extremity on the outer aspect mostly but also on the inner aspect and then a little bit on the inner aspect of the right lower extremity.  Patient states that she developed a wound during recent hospitalization where she was admitted for atrial fibrillation.  Patient has had these for the last couple of months and started out as a small wound when she was in the hospital.  When she was discharged to rehab facility the wounds got worse as they were not doing dressing changes according to the patient.  Now patient has been getting home health and having dressing changes and using Xeroform gauze and alternate dressing as well as some sort of 2 layer compression wrap to the left leg.    Lab Results   Component Value Date    BUN 17 01/26/2024    CREATSERUM 1.07 (H) 01/26/2024    ALB 4.4 11/26/2023    TP 7.1 11/26/2023         Current Outpatient Medications:     cephalexin 500 MG Oral Cap, Take 1 capsule (500 mg total) by mouth 2 (two) times daily., Disp: , Rfl:     dilTIAZem HCl  MG Oral Capsule SR 24 Hr, Take 1 capsule (240 mg total) by mouth daily., Disp: , Rfl:     amiodarone 200 MG Oral Tab, Take 1 tablet (200 mg total) by mouth daily., Disp: 30 tablet, Rfl: 0    pantoprazole 40 MG Oral Tab EC, Take 1 tablet (40 mg total) by mouth every morning before breakfast., Disp: 60 tablet, Rfl: 0    apixaban 5 MG Oral Tab, Take 1 tablet (5 mg total) by mouth 2 (two) times daily., Disp: , Rfl:     potassium chloride 20 MEQ Oral Tab CR, Take 1 tablet (20 mEq total) by mouth daily., Disp: , Rfl:     torsemide 20 MG Oral Tab, Take 1 tablet (20 mg total) by mouth daily. 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 A diagnosis of Generalized anxiety disorder was also pertinent to this visit.    45 minutes were spent with the patient in a video encounter.    Data and Progress toward goal(s) and objective(s):  Pt presented to appt over video. He shared that he has been having a better week with school and work but that he has been struggling with some side effects from new medication Pristiq. He shared he felt very overstimulated and for a couple hours \"it felt like I was high\". He has a hx of seizures and is worried about these side effects. He has been taking the Pristiq for 5 days now, 50mg the first day (prescribed dose) and 25 mg for 4 days. He said the 25mg has been feeling better and helping with his depressive sxs.    Intervention:  Cognitive Behavioral Strategies, Insight Oriented Strategies and Psychoeducation    Patient continues to be involved in service planning:  YES    Describe above interventions:  Provided unconditional positive regard and listened with empathy. Continued to process with pt his thoughts and feelings on his situation with school and ways to stay grounded and continue reaching out for support. Encouraged pt to reflect on continuing to stick to routines and basics of living.  Encouraged continuing maintaining supports. Held space to talk about medication and consult with psychiatry.     Patient's response to interventions:  Pt agreed to continue with his routines and staying consistent. He was open and insightful throughout session. He agreed to take things one step at a time.        Continue to support patient's efforts and progress towards  Cap, Take 1 capsule (2,000 Units total) by mouth daily., Disp: , Rfl:     acetaminophen 325 MG Oral Tab, Take 2 tablets (650 mg total) by mouth 2 (two) times daily as needed for Pain., Disp: , Rfl:     cyanocobalamin 1000 MCG Oral Tab, Take 1 tablet (1,000 mcg total) by mouth daily., Disp: , Rfl:     MYRBETRIQ 25 MG Oral Tablet 24 Hr, 1 TABLET BY MOUTH EVERYDAY FOR 90 DAYS, Disp: , Rfl:     simvastatin 10 MG Oral Tab, Take 1 tablet (10 mg total) by mouth As Directed., Disp: , Rfl:     gabapentin 100 MG Oral Cap, Take 2 capsules (200 mg total) by mouth 2 (two) times daily., Disp: , Rfl:     metoprolol succinate  MG Oral Tablet 24 Hr, Take 1 tablet (100 mg total) by mouth daily. 0.5 tablet per day, Disp: , Rfl:     triamcinolone 0.1 % External Cream, Apply 1 Application topically 2 (two) times daily as needed. (Patient not taking: Reported on 2/9/2024), Disp: , Rfl:     No Known Allergies         REVIEW OF SYSTEMS:     Review of Systems (ROS)  This information was obtained from the patient, chart    A comprehensive 10 point review of systems was completed.  Pertinent positives and negatives noted in the the HPI.     Past medical, surgical, family and social history updated where appropriate.    PHYSICAL EXAM:   BP 99/64   Pulse 66   Temp 97.4 °F (36.3 °C)   Resp 20   Ht 63\"   Wt 270 lb   SpO2 94%   BMI 47.83 kg/m²    Estimated body mass index is 47.83 kg/m² as calculated from the following:    Height as of this encounter: 63\".    Weight as of this encounter: 270 lb.   Vital signs reviewed.Appears stated age, well groomed.      Awake, alert, in no acute distress  HENT:  normocephalic, atraumatic, external ear canals clear bilaterally, tympanic membranes appear opaque, non-bulging, non-erythematous, nasal turbinates are non-inflamed and not swollen, oropharynx without erythema, swelling, or exudate, gingiva and lips without any apparent lesions.   Cardiac:  S1 S2 regular rate and rhythm, no murmur, gallop,  established treatment plan goals in the following ways:  Continue to process recent changes. Continue to listen with empathy and validate pt's emotions. Check in on psychiatry and med management.      Amy De La O LCPC    This visit is being performed virtually via Video visit using [employer].   Clinical Location: [facility]  Yony's location Home and is physically present in   the [facility] at the time of this visit.            or rub  Respiratory:  Bilaterally clear to auscultation, no chest tenderness to palpation, no wheezing, no rhonchi, no rales, air entry is adequate, no accessory respiratory muscle use, no chest asymmetry, normal excursion.  GI:  bowel sound positive in all four quadrants, abdomen is soft, non-tender, non-distended, no hepatosplenomegaly, no abnormal aortic pulsation.     Calf  Point of Measurement - Left Calf: 28  Point of Measurement - Right Calf: 28  Left Calf from:: Heel  Calf Left cm:: 43  Right Calf from:: Heel  Right Calf cm:: 47    Ankle  Point of Measurement - Left Ankle: 10  Point of Measurement - Right Ankle: 10  Left Ankle from:: Heel  Left Ankle cm:: 22     Right Ankle from:: Heel  Right Ankle cm:: 23       Foot  Point of Measurement - Left Foot: 10  Left Foot from:: Great toe  Left Foot cm:: 26  Left Heel to Knee: 38  Point of Measurement - Right Foot: 10  Right Foot from:: Great toe  Right Foot cm:: 24.5  Right Heel to Knee: 38    Wound 02/09/24 1 Leg Right;Lower (Active)   Date First Assessed/Time First Assessed: 02/09/24 1452    Wound Number (Wound Clinic Only): 1  Location: Leg  Wound Location Orientation: Right;Lower      Assessments 2/9/2024  2:54 PM   Wound Image      Site Assessment Clean;Moist;Yellow;Pale;Pink   Closure Not approximated   Drainage Amount Large   Drainage Description Yellow   Treatments Cleansed;Compression;Saline   Dressing Kerlix roll;Tape   Dressing Changed New   Dressing Status Clean;Dry;Intact   Wound Length (cm) 8 cm   Wound Width (cm) 7 cm   Wound Surface Area (cm^2) 56 cm^2   Wound Depth (cm) 0.1 cm   Wound Volume (cm^3) 5.6 cm^3   Margins Attached edges   Non-staged Wound Description Full thickness   Kamryn-wound Assessment Hemosiderin staining;Edema   Wound Granulation Tissue Pink   Wound Bed Granulation (%) 100 %   Wound Bed Epithelium (%) 0 %   Wound Bed Slough (%) 0 %   Wound Bed Eschar (%) 0 %   Wound Bed Fibrin (%) 0 %   State of Healing Early/partial granulation    Wound Odor None       No associated orders.       Wound 02/09/24 2 Leg Left;Lower (Active)   Date First Assessed/Time First Assessed: 02/09/24 1453    Wound Number (Wound Clinic Only): 2  Location: Leg  Wound Location Orientation: Left;Lower      Assessments 2/9/2024  2:54 PM   Wound Image      Site Assessment Moist;Yellow;Pink;Red;Edema   Closure Not approximated   Drainage Amount Large   Drainage Description Green   Treatments Cleansed;Saline;Compression   Dressing Kerlix roll;Tape   Dressing Changed New   Wound Length (cm) 24 cm   Wound Width (cm) 28 cm   Wound Surface Area (cm^2) 672 cm^2   Wound Depth (cm) 0.1 cm   Wound Volume (cm^3) 67.2 cm^3   Margins Attached edges   Non-staged Wound Description Full thickness   Kamryn-wound Assessment Edema;Pink;Hemosiderin staining;Dry   Wound Granulation Tissue Pink   Wound Bed Granulation (%) 50 %   Wound Bed Epithelium (%) 0 %   Wound Bed Slough (%) 50 %   Wound Bed Eschar (%) 0 %   Wound Bed Fibrin (%) 0 %   State of Healing Early/partial granulation   Wound Odor None       No associated orders.       Compression Wrap 02/09/24 Leg Right;Lower (Active)   Placement Date: 02/09/24   Location: Leg  Wound Location Orientation: Right;Lower      No assessment data to display       No associated orders.       Compression Wrap 02/09/24 Leg Left;Lower (Active)   Placement Date: 02/09/24   Location: Leg  Wound Location Orientation: Left;Lower      Assessments 2/9/2024  2:54 PM   Response to Treatment Well tolerated   Compression Layers 2       No associated orders.          ASSESSMENT AND PLAN:      1. Chronic venous hypertension (idiopathic) with ulcer and inflammation of bilateral lower extremity ([facility])  - US VENOUS INSUFFICIENCY (REFLUX) BILAT LOWER EXT SH(CPT=93970); Future  - Aerobic Bacterial Culture    2. Atrial fibrillation with rapid ventricular response ([facility])  - US VENOUS INSUFFICIENCY (REFLUX) BILAT LOWER EXT SH(CPT=93970); Future    3. H/O CHF  - US VENOUS  INSUFFICIENCY (REFLUX) BILAT LOWER EXT SH(CPT=93970); Future    4. Class 3 severe obesity due to excess calories with serious comorbidity and body mass index (BMI) of 45.0 to 49.9 in adult ([facility])  -  VENOUS INSUFFICIENCY (REFLUX) BILAT LOWER EXT SH(CPT=93970); Future    Had a long discussion with patient regarding nature of her wounds and nature of swelling in the legs.  She does have a history of CHF although last ejection fraction was 45 to 50%.  She does not state of getting shortness of breath.  She had compression wrap on the leg and was tolerating fairly well.  Will apply Enluxtra and Kerramax to all leg wounds and then Coflex lite to the left lower extremity.  I think that she may have component of CHF causing the leg swelling which is chronic but may also have either lymphedema or combination of that and venous insufficiency.  I would like her to get a venous insufficiency ultrasound the bilateral lower extremities.  Risks, benefits, and alternatives of current treatment plan discussed in detail.  Questions and concerns addressed. Red flags to RTC or ED reviewed.  Patient (or parent) agrees to plan.      Return in about 2 weeks (around 2/23/2024) for MD visit x 30 mins x1.    Also refer to patient instructions.     I spent 60 minutes with the patient. This time included:  preparing to see the patient (eg, review notes and recent diagnostics), seeing the patient, performing a medically appropriate examination and/or evaluation, counseling and educating the patient, and documenting in the record.    I agree with staff documentation except for any changes made in my note.       Dontrell Tinajero M.D., Wound Care Physician  [facility]  2/9/2024

## 2024-10-14 NOTE — DISCHARGE SUMMARY
[facility]  Discharge Summary    Adore Covington Patient Status:  Inpatient    1945 MRN [id number]   Location [facility] 3W/SW Attending No att. providers found   Hosp Day # 3 PCP Maral Huffman MD     Date of Admission: 10/7/2024    Date of Discharge: 10/10/2024  3:09 PM    Admitting Diagnosis: Atrial fibrillation (HCC) [I48.91]    Discharge Diagnosis: Persistent atrial fibrillation  Patient Active Problem List   Diagnosis    SUSAN on CPAP    Primary osteoarthritis involving multiple joints    Dyslipidemia    Morbid obesity with BMI of 50.0-59.9, adult (HCC)    Atrial fibrillation with rapid ventricular response (HCC)    Hypernatremia    Hypokalemia    Hyperglycemia    Goals of care, counseling/discussion    Advance care planning    Palliative care by specialist    Cellulitis    Septic shock ([facility])    Left leg cellulitis    History of MRSA infection    History of septic shock    Cellulitis of left lower leg       Reason for Admission: Persistent atrial fibrillation    Physical Exam: /57 (BP Location: Right arm)   Pulse 77   Temp 98.2 °F (36.8 °C) (Oral)   Resp 20   Wt 218 lb 3.2 oz (99 kg)   SpO2 95%   BMI 38.66 kg/m²     General Appearance:    Alert, cooperative, no distress, appears stated age   Head:    Normocephalic, without obvious abnormality, atraumatic   Eyes:    PERRL, conjunctiva/corneas clear, EOM's intact, fundi     benign, both eyes   Ears:    Normal TM's and external ear canals, both ears   Nose:   Nares normal, septum midline, mucosa normal, no drainage    or sinus tenderness   Throat:   Lips, mucosa, and tongue normal; teeth and gums normal   Neck:   Supple, symmetrical, trachea midline, no adenopathy;     thyroid:  no enlargement/tenderness/nodules; no carotid    bruit or JVD   Back:     Symmetric, no curvature, ROM normal, no CVA tenderness   Lungs:     Clear to auscultation bilaterally, respirations unlabored   Chest Wall:    No tenderness or deformity    Heart:     Regular rate and rhythm, S1 and S2 normal, no murmur, rub   or gallop   Breast Exam:    No tenderness, masses, or nipple abnormality   Abdomen:     Soft, non-tender, bowel sounds active all four quadrants,     no masses, no organomegaly   Genitalia:    Normal female without lesion, discharge or tenderness   Rectal:    Normal tone, no masses or tenderness;    guaiac negative stool                             History of Present Illness/Hospital course: Patient is a 79-year-old female with a history of hypertension, dyslipidemia, and persistent atrial fibrillation who has had recurrent AF despite prior electrical cardioversion.  Additionally, she has previously failed amiodarone.  More recently, she was seen in clinic with additional options discussed including consideration of dofetilide loading.  The patient was agreeable to such and underwent dosing with dofetilide.  She received a total of 5 doses with QTc remaining stable.  No evidence of ventricular arrhythmias was noted.  Anticoagulation was continued uninterrupted.  Patient will be discharged home today.      Disposition: Home or Self Care    Discharge Condition: Stable    Discharge Medications:   Discharge Medication List as of 10/10/2024 11:15 AM        START taking these medications    Details   dofetilide 250 MCG Oral Cap Take 1 capsule (250 mcg total) by mouth in the morning and 1 capsule (250 mcg total) before bedtime., Normal, Disp-180 capsule, R-0           CONTINUE these medications which have CHANGED    Details   bumetanide 1 MG Oral Tab Take 0.5 tablets (0.5 mg total) by mouth daily., Historical           CONTINUE these medications which have NOT CHANGED    Details   metoprolol succinate  MG Oral Tablet 24 Hr Take 0.5 tablets (50 mg total) by mouth daily., Historical      gabapentin 100 MG Oral Cap Take 3 capsules (300 mg total) by mouth 2 (two) times daily., Historical      spironolactone 25 MG Oral Tab Take 0.5 tablets (12.5 mg total) by  mouth daily., Historical      cholecalciferol 50 MCG (2000 UT) Oral Cap Take 1 capsule (2,000 Units total) by mouth daily. Patient takes 2000 units daily, Historical      cyanocobalamin 1000 MCG Oral Tab Take 1 tablet (1,000 mcg total) by mouth daily., Historical      pantoprazole 40 MG Oral Tab EC Take 1 tablet (40 mg total) by mouth every morning before breakfast., Normal, Disp-60 tablet, R-0      apixaban 5 MG Oral Tab Take 1 tablet (5 mg total) by mouth 2 (two) times daily., Historical      cycloSPORINE 0.05 % Ophthalmic Emulsion Place 1 drop into both eyes 2 (two) times daily., Historical      simvastatin 10 MG Oral Tab Take 1 tablet (10 mg total) by mouth daily., Historical      acetaminophen 325 MG Oral Tab Take 2 tablets (650 mg total) by mouth every 6 (six) hours as needed for Pain., Historical      polyethylene glycol, PEG 3350, 17 g Oral Powd Pack Take 17 g by mouth daily as needed., Print Script, Disp-10 each, R-0           STOP taking these medications       dilTIAZem HCl  MG Oral Capsule SR 24 Hr        potassium chloride 20 MEQ Oral Tab CR              Follow up Visits: Follow-up with Dr. Ferrari      Other Discharge Instructions: Call with any questions or worsening symptoms.    Mohinder Ferrari MD  10/14/2024  4:00 PM

## 2024-11-18 ENCOUNTER — OFFICE VISIT (OUTPATIENT)
Dept: PHYSICAL MEDICINE AND REHAB | Facility: CLINIC | Age: 79
End: 2024-11-18
Payer: MEDICARE

## 2024-11-18 ENCOUNTER — TELEPHONE (OUTPATIENT)
Dept: PHYSICAL MEDICINE AND REHAB | Facility: CLINIC | Age: 79
End: 2024-11-18

## 2024-11-18 DIAGNOSIS — Z96.653 HISTORY OF BILATERAL KNEE REPLACEMENT: ICD-10-CM

## 2024-11-18 DIAGNOSIS — M25.562 ACUTE PAIN OF LEFT KNEE: ICD-10-CM

## 2024-11-18 DIAGNOSIS — I48.91 ATRIAL FIBRILLATION WITH RAPID VENTRICULAR RESPONSE (HCC): ICD-10-CM

## 2024-11-18 DIAGNOSIS — M22.2X9 PATELLOFEMORAL PAIN SYNDROME, UNSPECIFIED LATERALITY: Primary | ICD-10-CM

## 2024-11-18 PROCEDURE — 99204 OFFICE O/P NEW MOD 45 MIN: CPT | Performed by: PHYSICAL MEDICINE & REHABILITATION

## 2024-11-18 NOTE — TELEPHONE ENCOUNTER
Per CMS Guidelines -no authorization is required for LEFT knee genicular nerve block under fluoroscopy   CPT code: 85295       Status: Authorization is not required based on medical necessity however is not a guarantee of payment and may be subject to review once claim is submitted-Covered Benefit.

## 2024-11-18 NOTE — PATIENT INSTRUCTIONS
1) Tylenol 500-1000 mg every 6-8 hours as needed for pain.  No more than 3000 mg daily.  2) Try Salonpas 4% Lidoderm patch  3) My office will call you to schedule the LEFT knee genicular nerve block under Fluoroscopy and local anesthesia once the procedure is approved by your insurance carrier.    4) IF block is positive, then we will proceed with RFA  5)  Ice 20 minutes at a time 3-4 times per day

## 2024-11-19 NOTE — H&P
[facility]  Clinic H&P    Requesting Physician: Maral Huffman MD    Chief Complaint (Reason for Visit):    Chief Complaint   Patient presents with    New Patient     New right hand dominant patient presents for left knee pain. Pain has been present for 3 months. Patient states she had a fall 3 months. Patient states she had a knee replacement 20 years ago, and had a revision 7 years ago. Pain 0/10. Pain 7/10 on a bad day. CT 10/1/24. Takes Tylenol for pain with some relief.        History of Present Illness:  The patient is a 79 year old right-handed female with a significant past medical history of atrial fibrillation, COPD, hypertension, hyperlipidemia, morbid obesity, peripheral vascular disease who presents with left anterior medial knee pain.  She does have a history of a left knee replacement with a revision about 7 years ago.  She had a fall 3 months ago where she landed on the knee and has been having significant pain in the anterior knee since then.  She denies any radicular symptoms.  She has seen orthopedic surgery and a CT scan of the knee was performed which did not demonstrate any hardware loosening or fractures.  She has also had an MRI of the knee performed which did not demonstrate any reasoning for her pain.  She rates her discomfort a 0 out of 10 while sitting but can be as high as a 7 out of 10 while trying to walk.  She does mobilize with a wheelchair in community settings but uses a rolling walker at home.  She has been taking Tylenol for pain control with mild relief.  She denies any recent injections but did have an aspiration of the knee performed which revealed mild fluid that was inflammatory.  She has tried physical therapy as well.    PAST MEDICAL HISTORY:   Past Medical History:    Arrhythmia    Atrial fibrillation (HCC)    Congestive heart disease (HCC)    COPD (chronic obstructive pulmonary disease) (HCC)    Essential hypertension     High blood pressure    High cholesterol    Morbid obesity with BMI of 50.0-59.9, adult (HCC)    Muscle weakness    Neuropathy    Peripheral vascular disease (HCC)    Sleep apnea    Visual impairment       PAST SURGICAL HISTORY:   Past Surgical History:   Procedure Laterality Date    Knee surgery Bilateral         FAMILY HISTORY:   History reviewed. No pertinent family history.    SOCIAL HISTORY:   Social History     Occupational History    Not on file   Tobacco Use    Smoking status: Former     Current packs/day: 0.00     Types: Cigarettes     Quit date:      Years since quittin.9     Passive exposure: Never    Smokeless tobacco: Never   Vaping Use    Vaping status: Never Used   Substance and Sexual Activity    Alcohol use: Yes     Comment: 1-2 drinks daily    Drug use: Never    Sexual activity: Not on file       CURRENT MEDICATIONS:   Current Outpatient Medications   Medication Sig Dispense Refill    bumetanide 1 MG Oral Tab Take 0.5 tablets (0.5 mg total) by mouth daily.      dofetilide 250 MCG Oral Cap Take 1 capsule (250 mcg total) by mouth in the morning and 1 capsule (250 mcg total) before bedtime. 180 capsule 0    metoprolol succinate  MG Oral Tablet 24 Hr Take 0.5 tablets (50 mg total) by mouth daily.      gabapentin 100 MG Oral Cap Take 3 capsules (300 mg total) by mouth 2 (two) times daily.      acetaminophen 325 MG Oral Tab Take 2 tablets (650 mg total) by mouth every 6 (six) hours as needed for Pain.      polyethylene glycol, PEG 3350, 17 g Oral Powd Pack Take 17 g by mouth daily as needed. 10 each 0    spironolactone 25 MG Oral Tab Take 0.5 tablets (12.5 mg total) by mouth daily.      cholecalciferol 50 MCG (2000 UT) Oral Cap Take 1 capsule (2,000 Units total) by mouth daily. Patient takes 2000 units daily      cyanocobalamin 1000 MCG Oral Tab Take 1 tablet (1,000 mcg total) by mouth daily.      pantoprazole 40 MG Oral Tab EC Take 1 tablet (40 mg total) by mouth every morning before  breakfast. 60 tablet 0    apixaban 5 MG Oral Tab Take 1 tablet (5 mg total) by mouth 2 (two) times daily.      cycloSPORINE 0.05 % Ophthalmic Emulsion Place 1 drop into both eyes 2 (two) times daily.      simvastatin 10 MG Oral Tab Take 1 tablet (10 mg total) by mouth daily.          ALLERGIES:   Allergies[1]      REVIEW OF SYSTEMS:   Review of Systems   Constitutional: Negative.    HENT: Negative.    Eyes: Negative.    Respiratory: Negative.    Cardiovascular: Negative.    Gastrointestinal: Negative.    Genitourinary: Negative.    Musculoskeletal: As per HPI   Skin: Negative.    Neurological: As per HPI  Endo/Heme/Allergies: Negative.    Psychiatric/Behavioral: Negative.      All other systems reviewed and are negative. Pertinent positives and negatives noted in the HPI.        PHYSICAL EXAM:   There were no vitals taken for this visit.    There is no height or weight on file to calculate BMI.      General: No immediate distress  Head: Normocephalic/ Atraumatic  Eyes: Extra-occular movements intact.   Ears: No auricular hematoma or deformities  Mouth: No lesions or ulcerations  Heart: peripheral pulses intact. Normal capillary refill.   Lungs: Non-labored respirations  Abdomen: No abdominal guarding  Extremities: No lower extremity edema bilaterally   Skin: No lesions noted.   Cognition: alert & oriented x 3, attentive, able to follow 2 step commands, comprehention intact, spontaneous speech intact  Motor:    Musculoskeletal:    Tender to palpation over the medial joint line as well as medial patellar facet    Gait:  Not tested as currently in a wheelchair    Data  Admission on 10/07/2024, Discharged on 10/10/2024   Component Date Value Ref Range Status    Ventricular rate 10/07/2024 93  BPM Final    QRS Duration 10/07/2024 88  ms Final    Q-T Interval 10/07/2024 358  ms Final    QTC Calculation (Bezet) 10/07/2024 445  ms Final    R Axis 10/07/2024 30  degrees Final    T Axis 10/07/2024 50  degrees Final     Magnesium 10/07/2024 1.6  1.6 - 2.6 mg/dL Final    Ventricular rate 10/08/2024 94  BPM Final    QRS Duration 10/08/2024 80  ms Final    Q-T Interval 10/08/2024 372  ms Final    QTC Calculation (Bezet) 10/08/2024 465  ms Final    R Axis 10/08/2024 55  degrees Final    T Axis 10/08/2024 63  degrees Final    Glucose 10/07/2024 103 (H)  70 - 99 mg/dL Final    Sodium 10/07/2024 139  136 - 145 mmol/L Final    Potassium 10/07/2024 3.0 (L)  3.5 - 5.1 mmol/L Final    Chloride 10/07/2024 106  98 - 112 mmol/L Final    CO2 10/07/2024 27.0  21.0 - 32.0 mmol/L Final    Anion Gap 10/07/2024 6  0 - 18 mmol/L Final    BUN 10/07/2024 17  9 - 23 mg/dL Final    Creatinine 10/07/2024 0.94  0.55 - 1.02 mg/dL Final    BUN/CREA Ratio 10/07/2024 18.1  10.0 - 20.0 Final    Calcium, Total 10/07/2024 8.9  8.7 - 10.4 mg/dL Final    Calculated Osmolality 10/07/2024 290  275 - 295 mOsm/kg Final    eGFR-Cr 10/07/2024 62  >=60 mL/min/1.73m2 Final    Glucose 10/08/2024 89  70 - 99 mg/dL Final    Sodium 10/08/2024 140  136 - 145 mmol/L Final    Potassium 10/08/2024 4.4  3.5 - 5.1 mmol/L Final    Chloride 10/08/2024 108  98 - 112 mmol/L Final    CO2 10/08/2024 28.0  21.0 - 32.0 mmol/L Final    Anion Gap 10/08/2024 4  0 - 18 mmol/L Final    BUN 10/08/2024 16  9 - 23 mg/dL Final    Creatinine 10/08/2024 0.81  0.55 - 1.02 mg/dL Final    BUN/CREA Ratio 10/08/2024 19.8  10.0 - 20.0 Final    Calcium, Total 10/08/2024 9.4  8.7 - 10.4 mg/dL Final    Calculated Osmolality 10/08/2024 291  275 - 295 mOsm/kg Final    eGFR-Cr 10/08/2024 75  >=60 mL/min/1.73m2 Final    Magnesium 10/08/2024 1.8  1.6 - 2.6 mg/dL Final    Potassium 10/08/2024 4.5  3.5 - 5.1 mmol/L Final    Ventricular rate 10/08/2024 104  BPM Final    QRS Duration 10/08/2024 84  ms Final    Q-T Interval 10/08/2024 356  ms Final    QTC Calculation (Bezet) 10/08/2024 468  ms Final    R Axis 10/08/2024 45  degrees Final    T Axis 10/08/2024 53  degrees Final    Hold Lavender 10/08/2024 Auto Resulted    Final    Ventricular rate 10/08/2024 81  BPM Final    Atrial rate 10/08/2024 81  BPM Final    P-R Interval 10/08/2024 176  ms Final    QRS Duration 10/08/2024 80  ms Final    Q-T Interval 10/08/2024 362  ms Final    QTC Calculation (Bezet) 10/08/2024 420  ms Final    P Axis 10/08/2024 74  degrees Final    R Axis 10/08/2024 54  degrees Final    T Axis 10/08/2024 79  degrees Final    Glucose 10/09/2024 100 (H)  70 - 99 mg/dL Final    Sodium 10/09/2024 138  136 - 145 mmol/L Final    Potassium 10/09/2024 3.6  3.5 - 5.1 mmol/L Final    Chloride 10/09/2024 106  98 - 112 mmol/L Final    CO2 10/09/2024 28.0  21.0 - 32.0 mmol/L Final    Anion Gap 10/09/2024 4  0 - 18 mmol/L Final    BUN 10/09/2024 17  9 - 23 mg/dL Final    Creatinine 10/09/2024 0.76  0.55 - 1.02 mg/dL Final    BUN/CREA Ratio 10/09/2024 22.4 (H)  10.0 - 20.0 Final    Calcium, Total 10/09/2024 8.9  8.7 - 10.4 mg/dL Final    Calculated Osmolality 10/09/2024 288  275 - 295 mOsm/kg Final    eGFR-Cr 10/09/2024 81  >=60 mL/min/1.73m2 Final    Magnesium 10/09/2024 1.7  1.6 - 2.6 mg/dL Final    Hold Lavender 10/09/2024 Auto Resulted   Final    Ventricular rate 10/08/2024 70  BPM Final    Atrial rate 10/08/2024 70  BPM Final    P-R Interval 10/08/2024 188  ms Final    QRS Duration 10/08/2024 84  ms Final    Q-T Interval 10/08/2024 440  ms Final    QTC Calculation (Bezet) 10/08/2024 475  ms Final    P Axis 10/08/2024 66  degrees Final    R Axis 10/08/2024 30  degrees Final    T Axis 10/08/2024 43  degrees Final    Ventricular rate 10/09/2024 73  BPM Final    Atrial rate 10/09/2024 73  BPM Final    P-R Interval 10/09/2024 194  ms Final    QRS Duration 10/09/2024 86  ms Final    Q-T Interval 10/09/2024 424  ms Final    QTC Calculation (Bezet) 10/09/2024 467  ms Final    P Axis 10/09/2024 70  degrees Final    R Axis 10/09/2024 41  degrees Final    T Axis 10/09/2024 47  degrees Final    Potassium 10/09/2024 5.3 (H)  3.5 - 5.1 mmol/L Final    Ventricular rate 10/09/2024 72   BPM Final    Atrial rate 10/09/2024 72  BPM Final    P-R Interval 10/09/2024 200  ms Final    QRS Duration 10/09/2024 66  ms Final    Q-T Interval 10/09/2024 424  ms Final    QTC Calculation (Bezet) 10/09/2024 464  ms Final    P Axis 10/09/2024 65  degrees Final    R Axis 10/09/2024 36  degrees Final    T Axis 10/09/2024 49  degrees Final    Ventricular rate 10/09/2024 73  BPM Final    Atrial rate 10/09/2024 73  BPM Final    P-R Interval 10/09/2024 184  ms Final    QRS Duration 10/09/2024 82  ms Final    Q-T Interval 10/09/2024 400  ms Final    QTC Calculation (Bezet) 10/09/2024 440  ms Final    P Axis 10/09/2024 72  degrees Final    R Axis 10/09/2024 31  degrees Final    T Axis 10/09/2024 33  degrees Final    Magnesium 10/10/2024 1.9  1.6 - 2.6 mg/dL Final    Glucose 10/10/2024 94  70 - 99 mg/dL Final    Sodium 10/10/2024 139  136 - 145 mmol/L Final    Potassium 10/10/2024 4.4  3.5 - 5.1 mmol/L Final    Chloride 10/10/2024 105  98 - 112 mmol/L Final    CO2 10/10/2024 28.0  21.0 - 32.0 mmol/L Final    Anion Gap 10/10/2024 6  0 - 18 mmol/L Final    BUN 10/10/2024 12  9 - 23 mg/dL Final    Creatinine 10/10/2024 0.71  0.55 - 1.02 mg/dL Final    BUN/CREA Ratio 10/10/2024 16.9  10.0 - 20.0 Final    Calcium, Total 10/10/2024 8.9  8.7 - 10.4 mg/dL Final    Calculated Osmolality 10/10/2024 288  275 - 295 mOsm/kg Final    eGFR-Cr 10/10/2024 88  >=60 mL/min/1.73m2 Final   Admission on 06/27/2024, Discharged on 07/02/2024   Component Date Value Ref Range Status    Hold Lavender 06/27/2024 Auto Resulted   Final    Hold Lt Green 06/27/2024 Auto Resulted   Final    Hold Blue 06/27/2024 Auto Resulted   Final    Glucose 06/27/2024 108 (H)  70 - 99 mg/dL Final    Sodium 06/27/2024 141  136 - 145 mmol/L Final    Potassium 06/27/2024 4.4  3.5 - 5.1 mmol/L Final    Chloride 06/27/2024 108  98 - 112 mmol/L Final    CO2 06/27/2024 26.0  21.0 - 32.0 mmol/L Final    Anion Gap 06/27/2024 7  0 - 18 mmol/L Final    BUN 06/27/2024 25 (H)  9 -  23 mg/dL Final    Creatinine 06/27/2024 1.01  0.55 - 1.02 mg/dL Final    BUN/CREA Ratio 06/27/2024 24.8 (H)  10.0 - 20.0 Final    Calcium, Total 06/27/2024 9.5  8.7 - 10.4 mg/dL Final    Calculated Osmolality 06/27/2024 297 (H)  275 - 295 mOsm/kg Final    eGFR-Cr 06/27/2024 57 (L)  >=60 mL/min/1.73m2 Final    ALT 06/27/2024 10  10 - 49 U/L Final    AST 06/27/2024 17  <=34 U/L Final    Alkaline Phosphatase 06/27/2024 76  55 - 142 U/L Final    Bilirubin, Total 06/27/2024 0.5  0.2 - 1.1 mg/dL Final    Total Protein 06/27/2024 7.7  5.7 - 8.2 g/dL Final    Albumin 06/27/2024 4.5  3.2 - 4.8 g/dL Final    Globulin  06/27/2024 3.2  2.0 - 3.5 g/dL Final    A/G Ratio 06/27/2024 1.4  1.0 - 2.0 Final    WBC 06/27/2024 6.5  4.0 - 11.0 x10(3) uL Final    RBC 06/27/2024 4.15  3.80 - 5.30 x10(6)uL Final    HGB 06/27/2024 12.7  12.0 - 16.0 g/dL Final    HCT 06/27/2024 38.8  35.0 - 48.0 % Final    MCV 06/27/2024 93.5  80.0 - 100.0 fL Final    MCH 06/27/2024 30.6  26.0 - 34.0 pg Final    MCHC 06/27/2024 32.7  31.0 - 37.0 g/dL Final    RDW-SD 06/27/2024 52.7 (H)  35.1 - 46.3 fL Final    RDW 06/27/2024 15.3 (H)  11.0 - 15.0 % Final    PLT 06/27/2024 332.0  150.0 - 450.0 10(3)uL Final    Neutrophil Absolute Prelim 06/27/2024 4.16  1.50 - 7.70 x10 (3) uL Final    Neutrophil Absolute 06/27/2024 4.16  1.50 - 7.70 x10(3) uL Final    Lymphocyte Absolute 06/27/2024 1.24  1.00 - 4.00 x10(3) uL Final    Monocyte Absolute 06/27/2024 0.77  0.10 - 1.00 x10(3) uL Final    Eosinophil Absolute 06/27/2024 0.29  0.00 - 0.70 x10(3) uL Final    Basophil Absolute 06/27/2024 0.05  0.00 - 0.20 x10(3) uL Final    Immature Granulocyte Absolute 06/27/2024 0.02  0.00 - 1.00 x10(3) uL Final    Neutrophil % 06/27/2024 63.7  % Final    Lymphocyte % 06/27/2024 19.0  % Final    Monocyte % 06/27/2024 11.8  % Final    Eosinophil % 06/27/2024 4.4  % Final    Basophil % 06/27/2024 0.8  % Final    Immature Granulocyte % 06/27/2024 0.3  % Final    Blood Culture Result  06/27/2024 No Growth 5 Days   Final    Blood Culture Result 06/27/2024 No Growth 5 Days   Final    Lactic Acid 06/27/2024 1.2  0.5 - 2.0 mmol/L Final    MRSA Screen By PCR 06/27/2024 Positive (A)  Negative Final    Anaerobic Culture 06/28/2024 No Anaerobes isolated   Final    Aerobic Culture Result 06/28/2024 Mixture of organisms suggestive of normal skin kaitlyn   Final    Aerobic Smear 06/28/2024 No WBCs seen   Final    Aerobic Smear 06/28/2024 No organisms seen   Final    Procalcitonin 06/27/2024 0.04  <0.05 ng/mL Final    Beta Natriuretic Peptide 06/28/2024 84  <100 pg/mL Final    Hold Lt Green 06/28/2024 Auto Resulted   Final    Hold Lavender 06/28/2024 Auto Resulted   Final    C. Difficile Toxin B Gene 06/29/2024 Negative  Negative Final    Magnesium 06/29/2024 1.8  1.6 - 2.6 mg/dL Final    Glucose 06/29/2024 90  70 - 99 mg/dL Final    Sodium 06/29/2024 143  136 - 145 mmol/L Final    Potassium 06/29/2024 3.7  3.5 - 5.1 mmol/L Final    Chloride 06/29/2024 109  98 - 112 mmol/L Final    CO2 06/29/2024 29.0  21.0 - 32.0 mmol/L Final    Anion Gap 06/29/2024 5  0 - 18 mmol/L Final    BUN 06/29/2024 16  9 - 23 mg/dL Final    Creatinine 06/29/2024 0.81  0.55 - 1.02 mg/dL Final    BUN/CREA Ratio 06/29/2024 19.8  10.0 - 20.0 Final    Calcium, Total 06/29/2024 8.8  8.7 - 10.4 mg/dL Final    Calculated Osmolality 06/29/2024 297 (H)  275 - 295 mOsm/kg Final    eGFR-Cr 06/29/2024 75  >=60 mL/min/1.73m2 Final    WBC 06/29/2024 5.7  4.0 - 11.0 x10(3) uL Final    RBC 06/29/2024 3.57 (L)  3.80 - 5.30 x10(6)uL Final    HGB 06/29/2024 11.1 (L)  12.0 - 16.0 g/dL Final    HCT 06/29/2024 33.0 (L)  35.0 - 48.0 % Final    MCV 06/29/2024 92.4  80.0 - 100.0 fL Final    MCH 06/29/2024 31.1  26.0 - 34.0 pg Final    MCHC 06/29/2024 33.6  31.0 - 37.0 g/dL Final    RDW-SD 06/29/2024 52.5 (H)  35.1 - 46.3 fL Final    RDW 06/29/2024 15.4 (H)  11.0 - 15.0 % Final    PLT 06/29/2024 252.0  150.0 - 450.0 10(3)uL Final    Neutrophil Absolute Prelim  06/29/2024 3.09  1.50 - 7.70 x10 (3) uL Final    Neutrophil Absolute 06/29/2024 3.09  1.50 - 7.70 x10(3) uL Final    Lymphocyte Absolute 06/29/2024 1.51  1.00 - 4.00 x10(3) uL Final    Monocyte Absolute 06/29/2024 0.68  0.10 - 1.00 x10(3) uL Final    Eosinophil Absolute 06/29/2024 0.31  0.00 - 0.70 x10(3) uL Final    Basophil Absolute 06/29/2024 0.05  0.00 - 0.20 x10(3) uL Final    Immature Granulocyte Absolute 06/29/2024 0.02  0.00 - 1.00 x10(3) uL Final    Neutrophil % 06/29/2024 54.5  % Final    Lymphocyte % 06/29/2024 26.7  % Final    Monocyte % 06/29/2024 12.0  % Final    Eosinophil % 06/29/2024 5.5  % Final    Basophil % 06/29/2024 0.9  % Final    Immature Granulocyte % 06/29/2024 0.4  % Final    Glucose 06/30/2024 93  70 - 99 mg/dL Final    Sodium 06/30/2024 143  136 - 145 mmol/L Final    Potassium 06/30/2024 4.1  3.5 - 5.1 mmol/L Final    Chloride 06/30/2024 111  98 - 112 mmol/L Final    CO2 06/30/2024 26.0  21.0 - 32.0 mmol/L Final    Anion Gap 06/30/2024 6  0 - 18 mmol/L Final    BUN 06/30/2024 13  9 - 23 mg/dL Final    Creatinine 06/30/2024 0.72  0.55 - 1.02 mg/dL Final    BUN/CREA Ratio 06/30/2024 18.1  10.0 - 20.0 Final    Calcium, Total 06/30/2024 9.1  8.7 - 10.4 mg/dL Final    Calculated Osmolality 06/30/2024 296 (H)  275 - 295 mOsm/kg Final    eGFR-Cr 06/30/2024 86  >=60 mL/min/1.73m2 Final    Potassium 06/30/2024 4.1  3.5 - 5.1 mmol/L Final    Magnesium 06/30/2024 1.9  1.6 - 2.6 mg/dL Final    Vancomycin Peak 06/30/2024 17.7 (L)  30.0 - 50.0 ug/mL Final    Vancomycin Trough 06/30/2024 9.7 (L)  10.0 - 20.0 ug/mL Final    WBC 06/30/2024 5.2  4.0 - 11.0 x10(3) uL Final    RBC 06/30/2024 3.07 (L)  3.80 - 5.30 x10(6)uL Final    HGB 06/30/2024 9.4 (L)  12.0 - 16.0 g/dL Final    HCT 06/30/2024 29.2 (L)  35.0 - 48.0 % Final    MCV 06/30/2024 95.1  80.0 - 100.0 fL Final    MCH 06/30/2024 30.6  26.0 - 34.0 pg Final    MCHC 06/30/2024 32.2  31.0 - 37.0 g/dL Final    RDW-SD 06/30/2024 54.6 (H)  35.1 - 46.3 fL  Final    RDW 06/30/2024 15.5 (H)  11.0 - 15.0 % Final    PLT 06/30/2024 215.0  150.0 - 450.0 10(3)uL Final    Neutrophil Absolute Prelim 06/30/2024 2.93  1.50 - 7.70 x10 (3) uL Final    Neutrophil Absolute 06/30/2024 2.93  1.50 - 7.70 x10(3) uL Final    Lymphocyte Absolute 06/30/2024 1.21  1.00 - 4.00 x10(3) uL Final    Monocyte Absolute 06/30/2024 0.60  0.10 - 1.00 x10(3) uL Final    Eosinophil Absolute 06/30/2024 0.33  0.00 - 0.70 x10(3) uL Final    Basophil Absolute 06/30/2024 0.07  0.00 - 0.20 x10(3) uL Final    Immature Granulocyte Absolute 06/30/2024 0.03  0.00 - 1.00 x10(3) uL Final    Neutrophil % 06/30/2024 56.6  % Final    Lymphocyte % 06/30/2024 23.4  % Final    Monocyte % 06/30/2024 11.6  % Final    Eosinophil % 06/30/2024 6.4  % Final    Basophil % 06/30/2024 1.4  % Final    Immature Granulocyte % 06/30/2024 0.6  % Final    HGB 06/30/2024 11.4 (L)  12.0 - 16.0 g/dL Final    Iron 06/30/2024 54  50 - 170 ug/dL Final    Transferrin 06/30/2024 204 (L)  250 - 380 mg/dL Final    Total Iron Binding Capacity 06/30/2024 304  250 - 425 ug/dL Final    % Saturation 06/30/2024 18  15 - 50 % Final    Ferritin 06/30/2024 56.4  18.0 - 340.0 ng/mL Final    Haptoglobin 06/30/2024 185.0  30.0 - 200.0 mg/dL Final    ABO BLOOD TYPE 06/30/2024 O   Final    RH BLOOD TYPE 06/30/2024 Positive   Final    Antibody Screen 06/30/2024 Negative   Final    ABO BLOOD TYPE 06/29/2024 O   Final    RH BLOOD TYPE 06/29/2024 Positive   Final    Glucose 07/01/2024 90  70 - 99 mg/dL Final    Sodium 07/01/2024 142  136 - 145 mmol/L Final    Potassium 07/01/2024 4.2  3.5 - 5.1 mmol/L Final    Chloride 07/01/2024 111  98 - 112 mmol/L Final    CO2 07/01/2024 26.0  21.0 - 32.0 mmol/L Final    Anion Gap 07/01/2024 5  0 - 18 mmol/L Final    BUN 07/01/2024 12  9 - 23 mg/dL Final    Creatinine 07/01/2024 0.69  0.55 - 1.02 mg/dL Final    BUN/CREA Ratio 07/01/2024 17.4  10.0 - 20.0 Final    Calcium, Total 07/01/2024 9.0  8.7 - 10.4 mg/dL Final     Calculated Osmolality 07/01/2024 293  275 - 295 mOsm/kg Final    eGFR-Cr 07/01/2024 89  >=60 mL/min/1.73m2 Final    WBC 07/01/2024 6.2  4.0 - 11.0 x10(3) uL Final    RBC 07/01/2024 3.46 (L)  3.80 - 5.30 x10(6)uL Final    HGB 07/01/2024 10.6 (L)  12.0 - 16.0 g/dL Final    HCT 07/01/2024 33.1 (L)  35.0 - 48.0 % Final    MCV 07/01/2024 95.7  80.0 - 100.0 fL Final    MCH 07/01/2024 30.6  26.0 - 34.0 pg Final    MCHC 07/01/2024 32.0  31.0 - 37.0 g/dL Final    RDW-SD 07/01/2024 53.1 (H)  35.1 - 46.3 fL Final    RDW 07/01/2024 15.3 (H)  11.0 - 15.0 % Final    PLT 07/01/2024 246.0  150.0 - 450.0 10(3)uL Final    Neutrophil Absolute Prelim 07/01/2024 3.73  1.50 - 7.70 x10 (3) uL Final    Neutrophil Absolute 07/01/2024 3.73  1.50 - 7.70 x10(3) uL Final    Lymphocyte Absolute 07/01/2024 1.27  1.00 - 4.00 x10(3) uL Final    Monocyte Absolute 07/01/2024 0.65  0.10 - 1.00 x10(3) uL Final    Eosinophil Absolute 07/01/2024 0.42  0.00 - 0.70 x10(3) uL Final    Basophil Absolute 07/01/2024 0.06  0.00 - 0.20 x10(3) uL Final    Immature Granulocyte Absolute 07/01/2024 0.03  0.00 - 1.00 x10(3) uL Final    Neutrophil % 07/01/2024 60.5  % Final    Lymphocyte % 07/01/2024 20.6  % Final    Monocyte % 07/01/2024 10.6  % Final    Eosinophil % 07/01/2024 6.8  % Final    Basophil % 07/01/2024 1.0  % Final    Immature Granulocyte % 07/01/2024 0.5  % Final   Admission on 06/19/2024, Discharged on 06/22/2024   Component Date Value Ref Range Status    Glucose 06/19/2024 89  70 - 99 mg/dL Final    Sodium 06/19/2024 143  136 - 145 mmol/L Final    Potassium 06/19/2024 4.6  3.5 - 5.1 mmol/L Final    Chloride 06/19/2024 108  98 - 112 mmol/L Final    CO2 06/19/2024 28.0  21.0 - 32.0 mmol/L Final    Anion Gap 06/19/2024 7  0 - 18 mmol/L Final    BUN 06/19/2024 16  9 - 23 mg/dL Final    Creatinine 06/19/2024 0.96  0.55 - 1.02 mg/dL Final    BUN/CREA Ratio 06/19/2024 16.7  10.0 - 20.0 Final    Calcium, Total 06/19/2024 9.0  8.7 - 10.4 mg/dL Final     Calculated Osmolality 06/19/2024 297 (H)  275 - 295 mOsm/kg Final    eGFR-Cr 06/19/2024 61  >=60 mL/min/1.73m2 Final    Blood Culture Result 06/19/2024 No Growth 5 Days   Final    Blood Culture Result 06/19/2024 No Growth 5 Days   Final    Lactic Acid 06/19/2024 0.8  0.5 - 2.0 mmol/L Final    WBC 06/19/2024 6.5  4.0 - 11.0 x10(3) uL Final    RBC 06/19/2024 4.01  3.80 - 5.30 x10(6)uL Final    HGB 06/19/2024 12.3  12.0 - 16.0 g/dL Final    HCT 06/19/2024 38.6  35.0 - 48.0 % Final    MCV 06/19/2024 96.3  80.0 - 100.0 fL Final    MCH 06/19/2024 30.7  26.0 - 34.0 pg Final    MCHC 06/19/2024 31.9  31.0 - 37.0 g/dL Final    RDW-SD 06/19/2024 55.2 (H)  35.1 - 46.3 fL Final    RDW 06/19/2024 15.6 (H)  11.0 - 15.0 % Final    PLT 06/19/2024 290.0  150.0 - 450.0 10(3)uL Final    Neutrophil Absolute Prelim 06/19/2024 4.37  1.50 - 7.70 x10 (3) uL Final    Neutrophil Absolute 06/19/2024 4.37  1.50 - 7.70 x10(3) uL Final    Lymphocyte Absolute 06/19/2024 1.09  1.00 - 4.00 x10(3) uL Final    Monocyte Absolute 06/19/2024 0.59  0.10 - 1.00 x10(3) uL Final    Eosinophil Absolute 06/19/2024 0.40  0.00 - 0.70 x10(3) uL Final    Basophil Absolute 06/19/2024 0.04  0.00 - 0.20 x10(3) uL Final    Immature Granulocyte Absolute 06/19/2024 0.03  0.00 - 1.00 x10(3) uL Final    Neutrophil % 06/19/2024 67.1  % Final    Lymphocyte % 06/19/2024 16.7  % Final    Monocyte % 06/19/2024 9.0  % Final    Eosinophil % 06/19/2024 6.1  % Final    Basophil % 06/19/2024 0.6  % Final    Immature Granulocyte % 06/19/2024 0.5  % Final    Glucose 06/20/2024 83  70 - 99 mg/dL Final    Sodium 06/20/2024 143  136 - 145 mmol/L Final    Potassium 06/20/2024 3.9  3.5 - 5.1 mmol/L Final    Chloride 06/20/2024 110  98 - 112 mmol/L Final    CO2 06/20/2024 27.0  21.0 - 32.0 mmol/L Final    Anion Gap 06/20/2024 6  0 - 18 mmol/L Final    BUN 06/20/2024 12  9 - 23 mg/dL Final    Creatinine 06/20/2024 0.80  0.55 - 1.02 mg/dL Final    BUN/CREA Ratio 06/20/2024 15.0  10.0 - 20.0  Final    Calcium, Total 06/20/2024 8.3 (L)  8.7 - 10.4 mg/dL Final    Calculated Osmolality 06/20/2024 295  275 - 295 mOsm/kg Final    eGFR-Cr 06/20/2024 76  >=60 mL/min/1.73m2 Final    WBC 06/20/2024 5.3  4.0 - 11.0 x10(3) uL Final    RBC 06/20/2024 3.46 (L)  3.80 - 5.30 x10(6)uL Final    HGB 06/20/2024 10.9 (L)  12.0 - 16.0 g/dL Final    HCT 06/20/2024 32.9 (L)  35.0 - 48.0 % Final    MCV 06/20/2024 95.1  80.0 - 100.0 fL Final    MCH 06/20/2024 31.5  26.0 - 34.0 pg Final    MCHC 06/20/2024 33.1  31.0 - 37.0 g/dL Final    RDW 06/20/2024 15.7 (H)  11.0 - 15.0 % Final    RDW-SD 06/20/2024 54.3 (H)  35.1 - 46.3 fL Final    PLT 06/20/2024 230.0  150.0 - 450.0 10(3)uL Final    Procalcitonin 06/20/2024 <0.04  <0.05 ng/mL Final    WBC 06/21/2024 5.4  4.0 - 11.0 x10(3) uL Final    RBC 06/21/2024 3.56 (L)  3.80 - 5.30 x10(6)uL Final    HGB 06/21/2024 11.1 (L)  12.0 - 16.0 g/dL Final    HCT 06/21/2024 33.4 (L)  35.0 - 48.0 % Final    MCV 06/21/2024 93.8  80.0 - 100.0 fL Final    MCH 06/21/2024 31.2  26.0 - 34.0 pg Final    MCHC 06/21/2024 33.2  31.0 - 37.0 g/dL Final    RDW 06/21/2024 15.5 (H)  11.0 - 15.0 % Final    RDW-SD 06/21/2024 53.4 (H)  35.1 - 46.3 fL Final    PLT 06/21/2024 223.0  150.0 - 450.0 10(3)uL Final    Glucose 06/21/2024 98  70 - 99 mg/dL Final    Sodium 06/21/2024 141  136 - 145 mmol/L Final    Potassium 06/21/2024 3.6  3.5 - 5.1 mmol/L Final    Chloride 06/21/2024 108  98 - 112 mmol/L Final    CO2 06/21/2024 30.0  21.0 - 32.0 mmol/L Final    Anion Gap 06/21/2024 3  0 - 18 mmol/L Final    BUN 06/21/2024 10  9 - 23 mg/dL Final    Creatinine 06/21/2024 0.73  0.55 - 1.02 mg/dL Final    BUN/CREA Ratio 06/21/2024 13.7  10.0 - 20.0 Final    Calcium, Total 06/21/2024 8.7  8.7 - 10.4 mg/dL Final    Calculated Osmolality 06/21/2024 291  275 - 295 mOsm/kg Final    eGFR-Cr 06/21/2024 85  >=60 mL/min/1.73m2 Final    Glucose 06/22/2024 87  70 - 99 mg/dL Final    Sodium 06/22/2024 142  136 - 145 mmol/L Final     Potassium 06/22/2024 3.9  3.5 - 5.1 mmol/L Final    Chloride 06/22/2024 109  98 - 112 mmol/L Final    CO2 06/22/2024 26.0  21.0 - 32.0 mmol/L Final    Anion Gap 06/22/2024 7  0 - 18 mmol/L Final    BUN 06/22/2024 8 (L)  9 - 23 mg/dL Final    Creatinine 06/22/2024 0.74  0.55 - 1.02 mg/dL Final    BUN/CREA Ratio 06/22/2024 10.8  10.0 - 20.0 Final    Calcium, Total 06/22/2024 8.8  8.7 - 10.4 mg/dL Final    Calculated Osmolality 06/22/2024 292  275 - 295 mOsm/kg Final    eGFR-Cr 06/22/2024 83  >=60 mL/min/1.73m2 Final    Hold Lavender 06/22/2024 Auto Resulted   Final   ]      Radiology Imaging:  I reviewed with the patient her X-ray and CT of the knees left   CT KNEE LEFT (CPT=73700); THREE-DIMENSIONAL RECONSTRUCTION     CLINICAL INDICATION: Chronic knee pain after total replacement of left knee joint.     COMPARISON STUDY: 20 August 2024 radiograph.     TECHNIQUE: Direct axial images were obtained through the left knee without the use of intravenous   contrast. The data was utilized for maximum intensity projection reconstruction into the coronal and   sagittal planes and spin and tumble 3D images were produced.     ADVERSE REACTION: None.     Automated exposure control and ALARA manual techniques for patient specific dose reduction were   followed while maintaining the necessary diagnostic image quality.     FINDINGS:   OSSEOUS STRUCTURES:   The visualized osseous structures are without acute fracture, dislocation or subluxation. There is   no significant osseous lytic or blastic lesion.     JOINT SPACES:   Total knee prosthetic device is identified. The femoral and tibial components are within normal   limits. There is no evidence of periprosthetic lucency and there is no evidence of visualized   medullary or cortical defect to suggest fracture.   Soft tissue windows does appear to demonstrate relatively hypodense material suggesting joint   effusion in the suprapatellar region with significant associated thickening  and heterogeneity of the   quadriceps tendon with cutaneous linear abnormality that likely represents postoperative change. The   fluid within the joint spaces measures just greater than in density greater than simple water on   this noncontrast exam.     MYOTENDINOUS STRUCTURES:   The visualized myotendinous structures are not optimized on noncontrast CT but grossly homogeneous   on the current exam. There are no discrete solid or cystic masses.     NEUROVASCULAR/REMAINING SOFT TISSUES:   The subcutaneous and neurovascular structures are without focal abnormality.     =====   IMPRESSION:   1. Left knee prosthetic appears appropriate without current CT osseous complication.   2. Mildly complex joint effusion. Correlation with surgical history is recommended and findings   could represent aging hemarthrosis. If there is concern for septic arthritis, aspiration with   culture and sensitivity is recommended.   3. Mild relative thickening and prominence of the quadriceps tendon as it inserts on the patella may   be postoperative in nature. If there is concern for tendinosis or strain, dedicated ultrasound   examination may be helpful.     ASSESSMENT AND PLAN:  Adore is a pleasant 79-year-old female presents for complaints of left knee pain after a fall 3 months ago.  She does have a left knee replacement with a revision about 7 years ago.  The CT scan of the left knee does not demonstrate any issues with the prosthesis or fractures.  I am recommending a left knee genicular nerve block under fluoroscopy and local anesthesia to determine if a genicular nerve ablation would be beneficial.  She should continue Tylenol as well as Lidoderm patches.  If the block is positive, then we will proceed with the radiofrequency ablation.  She should use ice 3-4 times per day as well.       RTC in 4 weeks    Discharge Instructions were provided as documented in AVS summary.  The patient was in agreement with the assessment and plan.   All questions were answered.  There were no barriers to learning.         1. Patellofemoral pain syndrome, unspecified laterality    2. Acute pain of left knee    3. History of bilateral knee replacement    4. Atrial fibrillation with rapid ventricular response (HCC)        Alex B. Behar MD, [facility]  Physical Medicine and Rehabilitation/Sports Medicine  [facility]             [1]   Allergies  Allergen Reactions    Cefepime RASH     Per patient

## 2024-12-20 ENCOUNTER — APPOINTMENT (OUTPATIENT)
Dept: GENERAL RADIOLOGY | Facility: HOSPITAL | Age: 79
End: 2024-12-20
Attending: PHYSICAL MEDICINE & REHABILITATION
Payer: MEDICARE

## 2024-12-20 ENCOUNTER — HOSPITAL ENCOUNTER (OUTPATIENT)
Facility: HOSPITAL | Age: 79
Setting detail: HOSPITAL OUTPATIENT SURGERY
Discharge: HOME | End: 2024-12-20
Attending: PHYSICAL MEDICINE & REHABILITATION | Admitting: PHYSICAL MEDICINE & REHABILITATION
Payer: MEDICARE

## 2024-12-20 VITALS
BODY MASS INDEX: 37.21 KG/M2 | OXYGEN SATURATION: 95 % | HEIGHT: 63 IN | SYSTOLIC BLOOD PRESSURE: 98 MMHG | WEIGHT: 210 LBS | HEART RATE: 82 BPM | DIASTOLIC BLOOD PRESSURE: 55 MMHG | RESPIRATION RATE: 24 BRPM

## 2024-12-20 LAB — SARS-COV-2 RNA RESP QL NAA+PROBE: NOT DETECTED

## 2024-12-20 PROCEDURE — 64454 NJX AA&/STRD GNCLR NRV BRNCH: CPT | Performed by: PHYSICAL MEDICINE & REHABILITATION

## 2024-12-20 PROCEDURE — 3E0T3BZ INTRODUCTION OF ANESTHETIC AGENT INTO PERIPHERAL NERVES AND PLEXI, PERCUTANEOUS APPROACH: ICD-10-PCS | Performed by: PHYSICAL MEDICINE & REHABILITATION

## 2024-12-20 PROCEDURE — 3E0T33Z INTRODUCTION OF ANTI-INFLAMMATORY INTO PERIPHERAL NERVES AND PLEXI, PERCUTANEOUS APPROACH: ICD-10-PCS | Performed by: PHYSICAL MEDICINE & REHABILITATION

## 2024-12-20 RX ORDER — LIDOCAINE HYDROCHLORIDE 10 MG/ML
INJECTION, SOLUTION EPIDURAL; INFILTRATION; INTRACAUDAL; PERINEURAL
Status: DISCONTINUED | OUTPATIENT
Start: 2024-12-20 | End: 2024-12-20

## 2024-12-20 RX ORDER — BUPIVACAINE HYDROCHLORIDE 5 MG/ML
INJECTION, SOLUTION EPIDURAL; INTRACAUDAL
Status: DISCONTINUED | OUTPATIENT
Start: 2024-12-20 | End: 2024-12-20

## 2024-12-20 RX ORDER — IOPAMIDOL 408 MG/ML
INJECTION, SOLUTION INTRATHECAL
Status: DISCONTINUED | OUTPATIENT
Start: 2024-12-20 | End: 2024-12-20

## 2024-12-20 NOTE — DISCHARGE SUMMARY
Outpatient Surgery Brief Discharge Summary         Patient ID:  Adore Covington  [id number]  79 year old  1/30/1945    Discharge Diagnoses: Patellofemoral pain syndrome, unspecified laterality, left knee pain, left total knee replacement    Procedures: Left knee genicular nerve block under fluoroscopic guidance and local anesthesia    Discharged Condition: stable    Disposition: home    Patient Instructions: Follow-up with Alex Behar, MD in 1-2 weeks.    Diet: regular diet  Activity: As tolerated    Alex B. Behar MD, [facility]  Physical Medicine and Rehabilitation/Sports Medicine  [facility]

## 2024-12-20 NOTE — H&P
[facility]  part of [facility]    History & Physical    Adore Covington Patient Status:  Hospital Outpatient Surgery    1945 MRN [id number]   Location [facility] ENDOSCOPY LAB SUITES Attending Behar, Alex, MD   Hosp Day # 0 PCP Maral Huffman MD     Date of Admission:  2024    History of Present Illness:  Adore Covington is a(n) 79 year old  female presents for complaints of left knee pain after a fall 3 months ago.  She does have a left knee replacement with a revision about 7 years ago.  The CT scan of the left knee does not demonstrate any issues with the prosthesis or fractures.  I am recommending a left knee genicular nerve block under fluoroscopy and local anesthesia to determine if a genicular nerve ablation would be beneficial.  She should continue Tylenol as well as Lidoderm patches.  If the block is positive, then we will proceed with the radiofrequency ablation.  She should use ice 3-4 times per day as well.       History:  Past Medical History:    Arrhythmia    Atrial fibrillation (HCC)    Congestive heart disease (HCC)    COPD (chronic obstructive pulmonary disease) (HCC)    Essential hypertension    High blood pressure    High cholesterol    Morbid obesity with BMI of 50.0-59.9, adult (HCC)    Muscle weakness    Neuropathy    hands and feet    Osteoarthritis    Peripheral vascular disease (HCC)    Sleep apnea    uses CPAP    Visual impairment    contact lens in right eye     Past Surgical History:   Procedure Laterality Date    Cataract      Hysterectomy      partial    Knee surgery Bilateral     ; revision of left knee 2016     History reviewed. No pertinent family history.   reports that she quit smoking about 41 years ago. Her smoking use included cigarettes. She has never been exposed to tobacco smoke. She has never used smokeless tobacco. She reports current alcohol use. She reports that she does not use drugs.    Allergies:  Allergies[1]    Home  Medications:  Prescriptions Prior to Admission[2]    Physical Exam:   General: Alert, orientated x3.  Cooperative.  No apparent distress.  Vital Signs:  Blood pressure 98/50, pulse 89, resp. rate 24, height 63\", weight 210 lb (95.3 kg), SpO2 98%.  HEENT: Exam is unremarkable.  Pupils are equal and round.    Lungs: no labored breathing.  Cardiac: Regular rate and rhythm.  Abdomen:  Soft, non-distended  Extremities:  No lower extremity edema noted.    Skin: Normal texture and turgor.  Neurologic:     Impression and Plan:  Patient Active Problem List   Diagnosis    SUSAN on CPAP    Primary osteoarthritis involving multiple joints    Dyslipidemia    Morbid obesity with BMI of 50.0-59.9, adult ([facility])    Atrial fibrillation with rapid ventricular response ([facility])    Hypernatremia    Hypokalemia    Hyperglycemia    Goals of care, counseling/discussion    Advance care planning    Palliative care by specialist    Cellulitis    Septic shock ([facility])    Left leg cellulitis    History of MRSA infection    History of septic shock    Cellulitis of left lower leg       OK to proceed with LEFT genicular nerve block under local    Alex B. Behar MD, [facility] & [facility]  Physical Medicine and Rehabilitation/Sports Medicine  [facility]         [1]   Allergies  Allergen Reactions    Cefepime RASH     Generalized   [2]   Medications Prior to Admission   Medication Sig Dispense Refill Last Dose/Taking    bumetanide 1 MG Oral Tab Take 0.5 tablets (0.5 mg total) by mouth daily.   12/20/2024    dofetilide 250 MCG Oral Cap Take 1 capsule (250 mcg total) by mouth in the morning and 1 capsule (250 mcg total) before bedtime. 180 capsule 0 12/20/2024    metoprolol succinate  MG Oral Tablet 24 Hr Take 0.5 tablets (50 mg total) by mouth daily.   12/20/2024    acetaminophen 325 MG Oral Tab Take 2 tablets (650 mg total) by mouth every 6 (six) hours as needed for Pain.   12/20/2024    polyethylene glycol, PEG 3350, 17 g Oral Powd Pack  Take 17 g by mouth daily as needed. 10 each 0 Taking As Needed    spironolactone 25 MG Oral Tab Take 0.5 tablets (12.5 mg total) by mouth daily.   12/20/2024    cholecalciferol 50 MCG (2000 UT) Oral Cap Take 1 capsule (2,000 Units total) by mouth daily. Patient takes 2000 units daily   12/20/2024    cyanocobalamin 1000 MCG Oral Tab Take 1 tablet (1,000 mcg total) by mouth daily.   12/20/2024    pantoprazole 40 MG Oral Tab EC Take 1 tablet (40 mg total) by mouth every morning before breakfast. 60 tablet 0 12/20/2024    apixaban 5 MG Oral Tab Take 1 tablet (5 mg total) by mouth 2 (two) times daily.   12/20/2024    cycloSPORINE 0.05 % Ophthalmic Emulsion Place 1 drop into both eyes 2 (two) times daily.   12/20/2024    gabapentin 100 MG Oral Cap Take 3 capsules (300 mg total) by mouth 2 (two) times daily.   11/4/2024

## 2024-12-20 NOTE — DISCHARGE INSTRUCTIONS
[facility] ENDOSCOPY LAB SUITES  [address]  [facility] [address]  Dept: [phone]  Loc: [phone]    No name on file       Post Injection/Procedure Instructions    PAIN:  Your usual pain should gradually decrease in 2-3 days, but relief may sometimes take up to two weeks after your procedure.  A temporary flare-up of pain for 1-2 days after a procedure is also normal.  Please take your usual pain medicines if this happens.      ACTIVITY LEVELS:  Day of Procedure:  Take it easy.  We recommend no new activities or heavy work.  Avoid sitting or standing for long periods of time and change your position as needed.  Day 2:  You may return to normal activities within reason.  If your physician gives you specific instructions, please follow these.  You may return to physical therapy.      DIET:  Return to your normal diet as tolerated.    MEDICATIONS:  Aspirin and Blood-thinners:  Follow specific instructions your doctor gave you.  Otherwise, refrain from taking aspirin and other blood-thinning medications for 6 hours after your injection.  Then resume your next scheduled dose.    Resume your other medications the day of the injection.    BANDAGE:  Remove after 24 hours.    SHOWERING:  You may shower the following day.  No bathing, swimming, or hot tub for 2 days after the procedure to reduce the risk of infection.    COLD PACKS:  You may use ice compresses over the injection site - 20 minutes on, then 40 minutes off as needed.  To avoid skin injury, place a thin cloth between cold pack and the skin.  Do not apply cold packs to numb areas following injection.    [facility] immediately if you experience any of the following:  Fever (over 100 degrees F), chills, drainage, excessive swelling or redness from the injection site, problems with bowel or bladder control, or new weakness or new severe pain.  If you cannot reach your physician, please go to the nearest  emergency room.      COMMON SIDE EFFECTS:  Numbness for 4 to 8 hours due to the local anesthetic.  Minor pain at the injection site.  A small amount of bleeding at the injection site.  If a steroid medication was used during the procedure, possible side effects include redness of the face, headache, trouble sleeping, indigestion, increased appetite and/or a low grade fever (less that 100 degrees F).  All side effects should disappear within 1 to 3 days after the procedure.    POST-PROCEDURAL HEADACHE:  Mild headaches may be a normal reaction after a steroid injection.  Lie down as much as possible for the first 24 hours.  You can take Tylenol up to 3 grams per day in doses of 1 gram every 8 hours.  Drink plenty of caffeinated beverages.  If you continue to have headaches after 24 hours following the procedure, or experience severe headaches, please contact our office.

## 2024-12-20 NOTE — OPERATIVE REPORT
[facility]  Genicular Nerve Block Procedure Note     CHIEF COMPLAINT:  No chief complaint on file.        PROCEDURE PERFORMED: Left genicular nerve blocks under fluoroscopic guidance     Preprocedure diagnosis: Left knee pain  Postprocedure diagnosis: Left knee pain     PRIMARY PROCEDURALIST:  Alex Behar, MD     INFORMED CONSENT & TIME OUT:   As documented in the Time Out and Pre-Procedure Check Lists.  Verbal consent was obtained       Vitals: [unfilled]  Labs (document last wbc, plts, hgb, and PT/INR):    Admission on 10/07/2024, Discharged on 10/10/2024   Component Date Value Ref Range Status    Ventricular rate 10/07/2024 93  BPM Final    QRS Duration 10/07/2024 88  ms Final    Q-T Interval 10/07/2024 358  ms Final    QTC Calculation (Bezet) 10/07/2024 445  ms Final    R Axis 10/07/2024 30  degrees Final    T Axis 10/07/2024 50  degrees Final    Magnesium 10/07/2024 1.6  1.6 - 2.6 mg/dL Final    Ventricular rate 10/08/2024 94  BPM Final    QRS Duration 10/08/2024 80  ms Final    Q-T Interval 10/08/2024 372  ms Final    QTC Calculation (Bezet) 10/08/2024 465  ms Final    R Axis 10/08/2024 55  degrees Final    T Axis 10/08/2024 63  degrees Final    Glucose 10/07/2024 103 (H)  70 - 99 mg/dL Final    Sodium 10/07/2024 139  136 - 145 mmol/L Final    Potassium 10/07/2024 3.0 (L)  3.5 - 5.1 mmol/L Final    Chloride 10/07/2024 106  98 - 112 mmol/L Final    CO2 10/07/2024 27.0  21.0 - 32.0 mmol/L Final    Anion Gap 10/07/2024 6  0 - 18 mmol/L Final    BUN 10/07/2024 17  9 - 23 mg/dL Final    Creatinine 10/07/2024 0.94  0.55 - 1.02 mg/dL Final    BUN/CREA Ratio 10/07/2024 18.1  10.0 - 20.0 Final    Calcium, Total 10/07/2024 8.9  8.7 - 10.4 mg/dL Final    Calculated Osmolality 10/07/2024 290  275 - 295 mOsm/kg Final    eGFR-Cr 10/07/2024 62  >=60 mL/min/1.73m2 Final    Glucose 10/08/2024 89  70 - 99 mg/dL Final    Sodium 10/08/2024 140  136 - 145 mmol/L Final    Potassium  10/08/2024 4.4  3.5 - 5.1 mmol/L Final    Chloride 10/08/2024 108  98 - 112 mmol/L Final    CO2 10/08/2024 28.0  21.0 - 32.0 mmol/L Final    Anion Gap 10/08/2024 4  0 - 18 mmol/L Final    BUN 10/08/2024 16  9 - 23 mg/dL Final    Creatinine 10/08/2024 0.81  0.55 - 1.02 mg/dL Final    BUN/CREA Ratio 10/08/2024 19.8  10.0 - 20.0 Final    Calcium, Total 10/08/2024 9.4  8.7 - 10.4 mg/dL Final    Calculated Osmolality 10/08/2024 291  275 - 295 mOsm/kg Final    eGFR-Cr 10/08/2024 75  >=60 mL/min/1.73m2 Final    Magnesium 10/08/2024 1.8  1.6 - 2.6 mg/dL Final    Potassium 10/08/2024 4.5  3.5 - 5.1 mmol/L Final    Ventricular rate 10/08/2024 104  BPM Final    QRS Duration 10/08/2024 84  ms Final    Q-T Interval 10/08/2024 356  ms Final    QTC Calculation (Bezet) 10/08/2024 468  ms Final    R Axis 10/08/2024 45  degrees Final    T Axis 10/08/2024 53  degrees Final    Hold Lavender 10/08/2024 Auto Resulted   Final    Ventricular rate 10/08/2024 81  BPM Final    Atrial rate 10/08/2024 81  BPM Final    P-R Interval 10/08/2024 176  ms Final    QRS Duration 10/08/2024 80  ms Final    Q-T Interval 10/08/2024 362  ms Final    QTC Calculation (Bezet) 10/08/2024 420  ms Final    P Axis 10/08/2024 74  degrees Final    R Axis 10/08/2024 54  degrees Final    T Axis 10/08/2024 79  degrees Final    Glucose 10/09/2024 100 (H)  70 - 99 mg/dL Final    Sodium 10/09/2024 138  136 - 145 mmol/L Final    Potassium 10/09/2024 3.6  3.5 - 5.1 mmol/L Final    Chloride 10/09/2024 106  98 - 112 mmol/L Final    CO2 10/09/2024 28.0  21.0 - 32.0 mmol/L Final    Anion Gap 10/09/2024 4  0 - 18 mmol/L Final    BUN 10/09/2024 17  9 - 23 mg/dL Final    Creatinine 10/09/2024 0.76  0.55 - 1.02 mg/dL Final    BUN/CREA Ratio 10/09/2024 22.4 (H)  10.0 - 20.0 Final    Calcium, Total 10/09/2024 8.9  8.7 - 10.4 mg/dL Final    Calculated Osmolality 10/09/2024 288  275 - 295 mOsm/kg Final    eGFR-Cr 10/09/2024 81  >=60 mL/min/1.73m2 Final    Magnesium 10/09/2024 1.7  1.6  - 2.6 mg/dL Final    Hold Lavender 10/09/2024 Auto Resulted   Final    Ventricular rate 10/08/2024 70  BPM Final    Atrial rate 10/08/2024 70  BPM Final    P-R Interval 10/08/2024 188  ms Final    QRS Duration 10/08/2024 84  ms Final    Q-T Interval 10/08/2024 440  ms Final    QTC Calculation (Bezet) 10/08/2024 475  ms Final    P Axis 10/08/2024 66  degrees Final    R Axis 10/08/2024 30  degrees Final    T Axis 10/08/2024 43  degrees Final    Ventricular rate 10/09/2024 73  BPM Final    Atrial rate 10/09/2024 73  BPM Final    P-R Interval 10/09/2024 194  ms Final    QRS Duration 10/09/2024 86  ms Final    Q-T Interval 10/09/2024 424  ms Final    QTC Calculation (Bezet) 10/09/2024 467  ms Final    P Axis 10/09/2024 70  degrees Final    R Axis 10/09/2024 41  degrees Final    T Axis 10/09/2024 47  degrees Final    Potassium 10/09/2024 5.3 (H)  3.5 - 5.1 mmol/L Final    Ventricular rate 10/09/2024 72  BPM Final    Atrial rate 10/09/2024 72  BPM Final    P-R Interval 10/09/2024 200  ms Final    QRS Duration 10/09/2024 66  ms Final    Q-T Interval 10/09/2024 424  ms Final    QTC Calculation (Bezet) 10/09/2024 464  ms Final    P Axis 10/09/2024 65  degrees Final    R Axis 10/09/2024 36  degrees Final    T Axis 10/09/2024 49  degrees Final    Ventricular rate 10/09/2024 73  BPM Final    Atrial rate 10/09/2024 73  BPM Final    P-R Interval 10/09/2024 184  ms Final    QRS Duration 10/09/2024 82  ms Final    Q-T Interval 10/09/2024 400  ms Final    QTC Calculation (Bezet) 10/09/2024 440  ms Final    P Axis 10/09/2024 72  degrees Final    R Axis 10/09/2024 31  degrees Final    T Axis 10/09/2024 33  degrees Final    Magnesium 10/10/2024 1.9  1.6 - 2.6 mg/dL Final    Glucose 10/10/2024 94  70 - 99 mg/dL Final    Sodium 10/10/2024 139  136 - 145 mmol/L Final    Potassium 10/10/2024 4.4  3.5 - 5.1 mmol/L Final    Chloride 10/10/2024 105  98 - 112 mmol/L Final    CO2 10/10/2024 28.0  21.0 - 32.0 mmol/L Final    Anion Gap 10/10/2024  6  0 - 18 mmol/L Final    BUN 10/10/2024 12  9 - 23 mg/dL Final    Creatinine 10/10/2024 0.71  0.55 - 1.02 mg/dL Final    BUN/CREA Ratio 10/10/2024 16.9  10.0 - 20.0 Final    Calcium, Total 10/10/2024 8.9  8.7 - 10.4 mg/dL Final    Calculated Osmolality 10/10/2024 288  275 - 295 mOsm/kg Final    eGFR-Cr 10/10/2024 88  >=60 mL/min/1.73m2 Final   Admission on 06/27/2024, Discharged on 07/02/2024   Component Date Value Ref Range Status    Hold Lavender 06/27/2024 Auto Resulted   Final    Hold Lt Green 06/27/2024 Auto Resulted   Final    Hold Blue 06/27/2024 Auto Resulted   Final    Glucose 06/27/2024 108 (H)  70 - 99 mg/dL Final    Sodium 06/27/2024 141  136 - 145 mmol/L Final    Potassium 06/27/2024 4.4  3.5 - 5.1 mmol/L Final    Chloride 06/27/2024 108  98 - 112 mmol/L Final    CO2 06/27/2024 26.0  21.0 - 32.0 mmol/L Final    Anion Gap 06/27/2024 7  0 - 18 mmol/L Final    BUN 06/27/2024 25 (H)  9 - 23 mg/dL Final    Creatinine 06/27/2024 1.01  0.55 - 1.02 mg/dL Final    BUN/CREA Ratio 06/27/2024 24.8 (H)  10.0 - 20.0 Final    Calcium, Total 06/27/2024 9.5  8.7 - 10.4 mg/dL Final    Calculated Osmolality 06/27/2024 297 (H)  275 - 295 mOsm/kg Final    eGFR-Cr 06/27/2024 57 (L)  >=60 mL/min/1.73m2 Final    ALT 06/27/2024 10  10 - 49 U/L Final    AST 06/27/2024 17  <=34 U/L Final    Alkaline Phosphatase 06/27/2024 76  55 - 142 U/L Final    Bilirubin, Total 06/27/2024 0.5  0.2 - 1.1 mg/dL Final    Total Protein 06/27/2024 7.7  5.7 - 8.2 g/dL Final    Albumin 06/27/2024 4.5  3.2 - 4.8 g/dL Final    Globulin  06/27/2024 3.2  2.0 - 3.5 g/dL Final    A/G Ratio 06/27/2024 1.4  1.0 - 2.0 Final    WBC 06/27/2024 6.5  4.0 - 11.0 x10(3) uL Final    RBC 06/27/2024 4.15  3.80 - 5.30 x10(6)uL Final    HGB 06/27/2024 12.7  12.0 - 16.0 g/dL Final    HCT 06/27/2024 38.8  35.0 - 48.0 % Final    MCV 06/27/2024 93.5  80.0 - 100.0 fL Final    MCH 06/27/2024 30.6  26.0 - 34.0 pg Final    MCHC 06/27/2024 32.7  31.0 - 37.0 g/dL Final    RDW-SD  06/27/2024 52.7 (H)  35.1 - 46.3 fL Final    RDW 06/27/2024 15.3 (H)  11.0 - 15.0 % Final    PLT 06/27/2024 332.0  150.0 - 450.0 10(3)uL Final    Neutrophil Absolute Prelim 06/27/2024 4.16  1.50 - 7.70 x10 (3) uL Final    Neutrophil Absolute 06/27/2024 4.16  1.50 - 7.70 x10(3) uL Final    Lymphocyte Absolute 06/27/2024 1.24  1.00 - 4.00 x10(3) uL Final    Monocyte Absolute 06/27/2024 0.77  0.10 - 1.00 x10(3) uL Final    Eosinophil Absolute 06/27/2024 0.29  0.00 - 0.70 x10(3) uL Final    Basophil Absolute 06/27/2024 0.05  0.00 - 0.20 x10(3) uL Final    Immature Granulocyte Absolute 06/27/2024 0.02  0.00 - 1.00 x10(3) uL Final    Neutrophil % 06/27/2024 63.7  % Final    Lymphocyte % 06/27/2024 19.0  % Final    Monocyte % 06/27/2024 11.8  % Final    Eosinophil % 06/27/2024 4.4  % Final    Basophil % 06/27/2024 0.8  % Final    Immature Granulocyte % 06/27/2024 0.3  % Final    Blood Culture Result 06/27/2024 No Growth 5 Days   Final    Blood Culture Result 06/27/2024 No Growth 5 Days   Final    Lactic Acid 06/27/2024 1.2  0.5 - 2.0 mmol/L Final    MRSA Screen By PCR 06/27/2024 Positive (A)  Negative Final    Anaerobic Culture 06/28/2024 No Anaerobes isolated   Final    Aerobic Culture Result 06/28/2024 Mixture of organisms suggestive of normal skin kaitlyn   Final    Aerobic Smear 06/28/2024 No WBCs seen   Final    Aerobic Smear 06/28/2024 No organisms seen   Final    Procalcitonin 06/27/2024 0.04  <0.05 ng/mL Final    Beta Natriuretic Peptide 06/28/2024 84  <100 pg/mL Final    Hold Lt Green 06/28/2024 Auto Resulted   Final    Hold Lavender 06/28/2024 Auto Resulted   Final    C. Difficile Toxin B Gene 06/29/2024 Negative  Negative Final    Magnesium 06/29/2024 1.8  1.6 - 2.6 mg/dL Final    Glucose 06/29/2024 90  70 - 99 mg/dL Final    Sodium 06/29/2024 143  136 - 145 mmol/L Final    Potassium 06/29/2024 3.7  3.5 - 5.1 mmol/L Final    Chloride 06/29/2024 109  98 - 112 mmol/L Final    CO2 06/29/2024 29.0  21.0 - 32.0 mmol/L  Final    Anion Gap 06/29/2024 5  0 - 18 mmol/L Final    BUN 06/29/2024 16  9 - 23 mg/dL Final    Creatinine 06/29/2024 0.81  0.55 - 1.02 mg/dL Final    BUN/CREA Ratio 06/29/2024 19.8  10.0 - 20.0 Final    Calcium, Total 06/29/2024 8.8  8.7 - 10.4 mg/dL Final    Calculated Osmolality 06/29/2024 297 (H)  275 - 295 mOsm/kg Final    eGFR-Cr 06/29/2024 75  >=60 mL/min/1.73m2 Final    WBC 06/29/2024 5.7  4.0 - 11.0 x10(3) uL Final    RBC 06/29/2024 3.57 (L)  3.80 - 5.30 x10(6)uL Final    HGB 06/29/2024 11.1 (L)  12.0 - 16.0 g/dL Final    HCT 06/29/2024 33.0 (L)  35.0 - 48.0 % Final    MCV 06/29/2024 92.4  80.0 - 100.0 fL Final    MCH 06/29/2024 31.1  26.0 - 34.0 pg Final    MCHC 06/29/2024 33.6  31.0 - 37.0 g/dL Final    RDW-SD 06/29/2024 52.5 (H)  35.1 - 46.3 fL Final    RDW 06/29/2024 15.4 (H)  11.0 - 15.0 % Final    PLT 06/29/2024 252.0  150.0 - 450.0 10(3)uL Final    Neutrophil Absolute Prelim 06/29/2024 3.09  1.50 - 7.70 x10 (3) uL Final    Neutrophil Absolute 06/29/2024 3.09  1.50 - 7.70 x10(3) uL Final    Lymphocyte Absolute 06/29/2024 1.51  1.00 - 4.00 x10(3) uL Final    Monocyte Absolute 06/29/2024 0.68  0.10 - 1.00 x10(3) uL Final    Eosinophil Absolute 06/29/2024 0.31  0.00 - 0.70 x10(3) uL Final    Basophil Absolute 06/29/2024 0.05  0.00 - 0.20 x10(3) uL Final    Immature Granulocyte Absolute 06/29/2024 0.02  0.00 - 1.00 x10(3) uL Final    Neutrophil % 06/29/2024 54.5  % Final    Lymphocyte % 06/29/2024 26.7  % Final    Monocyte % 06/29/2024 12.0  % Final    Eosinophil % 06/29/2024 5.5  % Final    Basophil % 06/29/2024 0.9  % Final    Immature Granulocyte % 06/29/2024 0.4  % Final    Glucose 06/30/2024 93  70 - 99 mg/dL Final    Sodium 06/30/2024 143  136 - 145 mmol/L Final    Potassium 06/30/2024 4.1  3.5 - 5.1 mmol/L Final    Chloride 06/30/2024 111  98 - 112 mmol/L Final    CO2 06/30/2024 26.0  21.0 - 32.0 mmol/L Final    Anion Gap 06/30/2024 6  0 - 18 mmol/L Final    BUN 06/30/2024 13  9 - 23 mg/dL Final     Creatinine 06/30/2024 0.72  0.55 - 1.02 mg/dL Final    BUN/CREA Ratio 06/30/2024 18.1  10.0 - 20.0 Final    Calcium, Total 06/30/2024 9.1  8.7 - 10.4 mg/dL Final    Calculated Osmolality 06/30/2024 296 (H)  275 - 295 mOsm/kg Final    eGFR-Cr 06/30/2024 86  >=60 mL/min/1.73m2 Final    Potassium 06/30/2024 4.1  3.5 - 5.1 mmol/L Final    Magnesium 06/30/2024 1.9  1.6 - 2.6 mg/dL Final    Vancomycin Peak 06/30/2024 17.7 (L)  30.0 - 50.0 ug/mL Final    Vancomycin Trough 06/30/2024 9.7 (L)  10.0 - 20.0 ug/mL Final    WBC 06/30/2024 5.2  4.0 - 11.0 x10(3) uL Final    RBC 06/30/2024 3.07 (L)  3.80 - 5.30 x10(6)uL Final    HGB 06/30/2024 9.4 (L)  12.0 - 16.0 g/dL Final    HCT 06/30/2024 29.2 (L)  35.0 - 48.0 % Final    MCV 06/30/2024 95.1  80.0 - 100.0 fL Final    MCH 06/30/2024 30.6  26.0 - 34.0 pg Final    MCHC 06/30/2024 32.2  31.0 - 37.0 g/dL Final    RDW-SD 06/30/2024 54.6 (H)  35.1 - 46.3 fL Final    RDW 06/30/2024 15.5 (H)  11.0 - 15.0 % Final    PLT 06/30/2024 215.0  150.0 - 450.0 10(3)uL Final    Neutrophil Absolute Prelim 06/30/2024 2.93  1.50 - 7.70 x10 (3) uL Final    Neutrophil Absolute 06/30/2024 2.93  1.50 - 7.70 x10(3) uL Final    Lymphocyte Absolute 06/30/2024 1.21  1.00 - 4.00 x10(3) uL Final    Monocyte Absolute 06/30/2024 0.60  0.10 - 1.00 x10(3) uL Final    Eosinophil Absolute 06/30/2024 0.33  0.00 - 0.70 x10(3) uL Final    Basophil Absolute 06/30/2024 0.07  0.00 - 0.20 x10(3) uL Final    Immature Granulocyte Absolute 06/30/2024 0.03  0.00 - 1.00 x10(3) uL Final    Neutrophil % 06/30/2024 56.6  % Final    Lymphocyte % 06/30/2024 23.4  % Final    Monocyte % 06/30/2024 11.6  % Final    Eosinophil % 06/30/2024 6.4  % Final    Basophil % 06/30/2024 1.4  % Final    Immature Granulocyte % 06/30/2024 0.6  % Final    HGB 06/30/2024 11.4 (L)  12.0 - 16.0 g/dL Final    Iron 06/30/2024 54  50 - 170 ug/dL Final    Transferrin 06/30/2024 204 (L)  250 - 380 mg/dL Final    Total Iron Binding Capacity 06/30/2024 304   250 - 425 ug/dL Final    % Saturation 06/30/2024 18  15 - 50 % Final    Ferritin 06/30/2024 56.4  18.0 - 340.0 ng/mL Final    Haptoglobin 06/30/2024 185.0  30.0 - 200.0 mg/dL Final    ABO BLOOD TYPE 06/30/2024 O   Final    RH BLOOD TYPE 06/30/2024 Positive   Final    Antibody Screen 06/30/2024 Negative   Final    ABO BLOOD TYPE 06/29/2024 O   Final    RH BLOOD TYPE 06/29/2024 Positive   Final    Glucose 07/01/2024 90  70 - 99 mg/dL Final    Sodium 07/01/2024 142  136 - 145 mmol/L Final    Potassium 07/01/2024 4.2  3.5 - 5.1 mmol/L Final    Chloride 07/01/2024 111  98 - 112 mmol/L Final    CO2 07/01/2024 26.0  21.0 - 32.0 mmol/L Final    Anion Gap 07/01/2024 5  0 - 18 mmol/L Final    BUN 07/01/2024 12  9 - 23 mg/dL Final    Creatinine 07/01/2024 0.69  0.55 - 1.02 mg/dL Final    BUN/CREA Ratio 07/01/2024 17.4  10.0 - 20.0 Final    Calcium, Total 07/01/2024 9.0  8.7 - 10.4 mg/dL Final    Calculated Osmolality 07/01/2024 293  275 - 295 mOsm/kg Final    eGFR-Cr 07/01/2024 89  >=60 mL/min/1.73m2 Final    WBC 07/01/2024 6.2  4.0 - 11.0 x10(3) uL Final    RBC 07/01/2024 3.46 (L)  3.80 - 5.30 x10(6)uL Final    HGB 07/01/2024 10.6 (L)  12.0 - 16.0 g/dL Final    HCT 07/01/2024 33.1 (L)  35.0 - 48.0 % Final    MCV 07/01/2024 95.7  80.0 - 100.0 fL Final    MCH 07/01/2024 30.6  26.0 - 34.0 pg Final    MCHC 07/01/2024 32.0  31.0 - 37.0 g/dL Final    RDW-SD 07/01/2024 53.1 (H)  35.1 - 46.3 fL Final    RDW 07/01/2024 15.3 (H)  11.0 - 15.0 % Final    PLT 07/01/2024 246.0  150.0 - 450.0 10(3)uL Final    Neutrophil Absolute Prelim 07/01/2024 3.73  1.50 - 7.70 x10 (3) uL Final    Neutrophil Absolute 07/01/2024 3.73  1.50 - 7.70 x10(3) uL Final    Lymphocyte Absolute 07/01/2024 1.27  1.00 - 4.00 x10(3) uL Final    Monocyte Absolute 07/01/2024 0.65  0.10 - 1.00 x10(3) uL Final    Eosinophil Absolute 07/01/2024 0.42  0.00 - 0.70 x10(3) uL Final    Basophil Absolute 07/01/2024 0.06  0.00 - 0.20 x10(3) uL Final    Immature Granulocyte  Absolute 07/01/2024 0.03  0.00 - 1.00 x10(3) uL Final    Neutrophil % 07/01/2024 60.5  % Final    Lymphocyte % 07/01/2024 20.6  % Final    Monocyte % 07/01/2024 10.6  % Final    Eosinophil % 07/01/2024 6.8  % Final    Basophil % 07/01/2024 1.0  % Final    Immature Granulocyte % 07/01/2024 0.5  % Final   Admission on 06/19/2024, Discharged on 06/22/2024   Component Date Value Ref Range Status    Glucose 06/19/2024 89  70 - 99 mg/dL Final    Sodium 06/19/2024 143  136 - 145 mmol/L Final    Potassium 06/19/2024 4.6  3.5 - 5.1 mmol/L Final    Chloride 06/19/2024 108  98 - 112 mmol/L Final    CO2 06/19/2024 28.0  21.0 - 32.0 mmol/L Final    Anion Gap 06/19/2024 7  0 - 18 mmol/L Final    BUN 06/19/2024 16  9 - 23 mg/dL Final    Creatinine 06/19/2024 0.96  0.55 - 1.02 mg/dL Final    BUN/CREA Ratio 06/19/2024 16.7  10.0 - 20.0 Final    Calcium, Total 06/19/2024 9.0  8.7 - 10.4 mg/dL Final    Calculated Osmolality 06/19/2024 297 (H)  275 - 295 mOsm/kg Final    eGFR-Cr 06/19/2024 61  >=60 mL/min/1.73m2 Final    Blood Culture Result 06/19/2024 No Growth 5 Days   Final    Blood Culture Result 06/19/2024 No Growth 5 Days   Final    Lactic Acid 06/19/2024 0.8  0.5 - 2.0 mmol/L Final    WBC 06/19/2024 6.5  4.0 - 11.0 x10(3) uL Final    RBC 06/19/2024 4.01  3.80 - 5.30 x10(6)uL Final    HGB 06/19/2024 12.3  12.0 - 16.0 g/dL Final    HCT 06/19/2024 38.6  35.0 - 48.0 % Final    MCV 06/19/2024 96.3  80.0 - 100.0 fL Final    MCH 06/19/2024 30.7  26.0 - 34.0 pg Final    MCHC 06/19/2024 31.9  31.0 - 37.0 g/dL Final    RDW-SD 06/19/2024 55.2 (H)  35.1 - 46.3 fL Final    RDW 06/19/2024 15.6 (H)  11.0 - 15.0 % Final    PLT 06/19/2024 290.0  150.0 - 450.0 10(3)uL Final    Neutrophil Absolute Prelim 06/19/2024 4.37  1.50 - 7.70 x10 (3) uL Final    Neutrophil Absolute 06/19/2024 4.37  1.50 - 7.70 x10(3) uL Final    Lymphocyte Absolute 06/19/2024 1.09  1.00 - 4.00 x10(3) uL Final    Monocyte Absolute 06/19/2024 0.59  0.10 - 1.00 x10(3) uL Final     Eosinophil Absolute 06/19/2024 0.40  0.00 - 0.70 x10(3) uL Final    Basophil Absolute 06/19/2024 0.04  0.00 - 0.20 x10(3) uL Final    Immature Granulocyte Absolute 06/19/2024 0.03  0.00 - 1.00 x10(3) uL Final    Neutrophil % 06/19/2024 67.1  % Final    Lymphocyte % 06/19/2024 16.7  % Final    Monocyte % 06/19/2024 9.0  % Final    Eosinophil % 06/19/2024 6.1  % Final    Basophil % 06/19/2024 0.6  % Final    Immature Granulocyte % 06/19/2024 0.5  % Final    Glucose 06/20/2024 83  70 - 99 mg/dL Final    Sodium 06/20/2024 143  136 - 145 mmol/L Final    Potassium 06/20/2024 3.9  3.5 - 5.1 mmol/L Final    Chloride 06/20/2024 110  98 - 112 mmol/L Final    CO2 06/20/2024 27.0  21.0 - 32.0 mmol/L Final    Anion Gap 06/20/2024 6  0 - 18 mmol/L Final    BUN 06/20/2024 12  9 - 23 mg/dL Final    Creatinine 06/20/2024 0.80  0.55 - 1.02 mg/dL Final    BUN/CREA Ratio 06/20/2024 15.0  10.0 - 20.0 Final    Calcium, Total 06/20/2024 8.3 (L)  8.7 - 10.4 mg/dL Final    Calculated Osmolality 06/20/2024 295  275 - 295 mOsm/kg Final    eGFR-Cr 06/20/2024 76  >=60 mL/min/1.73m2 Final    WBC 06/20/2024 5.3  4.0 - 11.0 x10(3) uL Final    RBC 06/20/2024 3.46 (L)  3.80 - 5.30 x10(6)uL Final    HGB 06/20/2024 10.9 (L)  12.0 - 16.0 g/dL Final    HCT 06/20/2024 32.9 (L)  35.0 - 48.0 % Final    MCV 06/20/2024 95.1  80.0 - 100.0 fL Final    MCH 06/20/2024 31.5  26.0 - 34.0 pg Final    MCHC 06/20/2024 33.1  31.0 - 37.0 g/dL Final    RDW 06/20/2024 15.7 (H)  11.0 - 15.0 % Final    RDW-SD 06/20/2024 54.3 (H)  35.1 - 46.3 fL Final    PLT 06/20/2024 230.0  150.0 - 450.0 10(3)uL Final    Procalcitonin 06/20/2024 <0.04  <0.05 ng/mL Final    WBC 06/21/2024 5.4  4.0 - 11.0 x10(3) uL Final    RBC 06/21/2024 3.56 (L)  3.80 - 5.30 x10(6)uL Final    HGB 06/21/2024 11.1 (L)  12.0 - 16.0 g/dL Final    HCT 06/21/2024 33.4 (L)  35.0 - 48.0 % Final    MCV 06/21/2024 93.8  80.0 - 100.0 fL Final    MCH 06/21/2024 31.2  26.0 - 34.0 pg Final    MCHC 06/21/2024 33.2   31.0 - 37.0 g/dL Final    RDW 06/21/2024 15.5 (H)  11.0 - 15.0 % Final    RDW-SD 06/21/2024 53.4 (H)  35.1 - 46.3 fL Final    PLT 06/21/2024 223.0  150.0 - 450.0 10(3)uL Final    Glucose 06/21/2024 98  70 - 99 mg/dL Final    Sodium 06/21/2024 141  136 - 145 mmol/L Final    Potassium 06/21/2024 3.6  3.5 - 5.1 mmol/L Final    Chloride 06/21/2024 108  98 - 112 mmol/L Final    CO2 06/21/2024 30.0  21.0 - 32.0 mmol/L Final    Anion Gap 06/21/2024 3  0 - 18 mmol/L Final    BUN 06/21/2024 10  9 - 23 mg/dL Final    Creatinine 06/21/2024 0.73  0.55 - 1.02 mg/dL Final    BUN/CREA Ratio 06/21/2024 13.7  10.0 - 20.0 Final    Calcium, Total 06/21/2024 8.7  8.7 - 10.4 mg/dL Final    Calculated Osmolality 06/21/2024 291  275 - 295 mOsm/kg Final    eGFR-Cr 06/21/2024 85  >=60 mL/min/1.73m2 Final    Glucose 06/22/2024 87  70 - 99 mg/dL Final    Sodium 06/22/2024 142  136 - 145 mmol/L Final    Potassium 06/22/2024 3.9  3.5 - 5.1 mmol/L Final    Chloride 06/22/2024 109  98 - 112 mmol/L Final    CO2 06/22/2024 26.0  21.0 - 32.0 mmol/L Final    Anion Gap 06/22/2024 7  0 - 18 mmol/L Final    BUN 06/22/2024 8 (L)  9 - 23 mg/dL Final    Creatinine 06/22/2024 0.74  0.55 - 1.02 mg/dL Final    BUN/CREA Ratio 06/22/2024 10.8  10.0 - 20.0 Final    Calcium, Total 06/22/2024 8.8  8.7 - 10.4 mg/dL Final    Calculated Osmolality 06/22/2024 292  275 - 295 mOsm/kg Final    eGFR-Cr 06/22/2024 83  >=60 mL/min/1.73m2 Final    Hold Lavender 06/22/2024 Auto Resulted   Final   ]    PROCEDURE:  The procedure, risks, benefits, and alternatives were discussed with the patient.  The patient verbalized understanding and gave verbal consent.      Next, the LEFT  knee medial femoral condyle and tibial plateau were prepped and draped in the sterile fashion using Chloroprep. The left lateral femoral condyle was also prepped and draped in the sterile fashion using chloroprep.  Using a 27-gauge 1-1/2 inch needle, 3 to 4 cc of 1% lidocaine was injected at each site  for soft tissue and skin anesthesia.  Under fluoroscopic guidance, a 22-gauge 3-1/2 inch needles were placed at the locations of the genicular nerves surrounding the knee.  Once identified, 1 cc of 0.5% Marcaine was injected in the area.  The needle was then removed, Hemostasis was obtained and a Band-Aid was applied.  The patient tolerated the procedure well was able to ambulate independently.       The Following nerves where injected:  Left superolateral genicular nerve  Left supermedial genicular nerve  Left inferomedial genicular nerve      Patient verbalized understanding of assessment and plan.  Patient is in agreement with the plan.  All questions were answered.  No barriers to learning identified. Permanent pictures were saved.       INSTRUCTIONS GIVEN TO PATIENT:    \"You will see an effect in the next 2-3 days.  Please contact me if you have fevers, worsening swelling, worsening pain, decreased range of motion, increased redness, chills, or anything that makes you concerned about how the joint we injected feels/looks.  If you do not reach me in a reasonable time, please report directly to the emergency room for further evaluation\"    The patient was instructed to call me in 24 hours and let me know if she has any improvement, what percentage she has improved, and how long the medication lasted.  At that point we can decide if we will be performing a genicular nerve radiofrequency ablation.    Alex B. Behar MD, [facility]  Physical Medicine and Rehabilitation/Sports Medicine  [facility]

## 2024-12-23 ENCOUNTER — TELEPHONE (OUTPATIENT)
Dept: PHYSICAL MEDICINE AND REHAB | Facility: CLINIC | Age: 79
End: 2024-12-23

## 2024-12-23 DIAGNOSIS — M25.562 ACUTE PAIN OF LEFT KNEE: ICD-10-CM

## 2024-12-23 DIAGNOSIS — M22.2X9 PATELLOFEMORAL PAIN SYNDROME, UNSPECIFIED LATERALITY: Primary | ICD-10-CM

## 2024-12-23 DIAGNOSIS — Z96.653 HISTORY OF BILATERAL KNEE REPLACEMENT: ICD-10-CM

## 2024-12-23 NOTE — TELEPHONE ENCOUNTER
Patient had Left knee genicular nerve block under fluoroscopic guidance and local anesthesia on 12/20/24.  -70% relief    Patient stated she did not receive any relief the 1st 24 hours, but after that she did start to get relief and it provided her with approximately 70% relief.    Per patient the pain has diminished. She has noticed her stability is worsen towards the end of the day.     Per Dr.Behar at last office visit 11/18/24: \"I am recommending a left knee genicular nerve block under fluoroscopy and local anesthesia to determine if a genicular nerve ablation would be beneficial. She should continue Tylenol as well as Lidoderm patches. If the block is positive, then we will proceed with the radiofrequency ablation. \"    Patient update forwarded on to Dr.Behar.   Ablation order pended for review/approval.

## 2025-01-02 ENCOUNTER — TELEPHONE (OUTPATIENT)
Dept: PHYSICAL MEDICINE AND REHAB | Facility: CLINIC | Age: 80
End: 2025-01-02

## 2025-01-02 NOTE — TELEPHONE ENCOUNTER
Per CMS Guidelines- no authorization is required for Left genicular nerve radiofrequency ablation under local/IVCS. Status: authorization is not required based on medical necessity however may by subject to review once claim is submitted.   E-verified

## 2025-01-06 NOTE — TELEPHONE ENCOUNTER
Spoke with patient would like MD/RN to giver her a call prior to scheduling appointment to discuss current symptoms.

## 2025-01-08 ENCOUNTER — TELEPHONE (OUTPATIENT)
Dept: PHYSICAL MEDICINE AND REHAB | Facility: CLINIC | Age: 80
End: 2025-01-08

## 2025-01-08 DIAGNOSIS — Z96.653 HISTORY OF BILATERAL KNEE REPLACEMENT: ICD-10-CM

## 2025-01-08 DIAGNOSIS — I48.91 ATRIAL FIBRILLATION WITH RAPID VENTRICULAR RESPONSE (HCC): ICD-10-CM

## 2025-01-08 DIAGNOSIS — M22.2X9 PATELLOFEMORAL PAIN SYNDROME, UNSPECIFIED LATERALITY: ICD-10-CM

## 2025-01-08 DIAGNOSIS — M25.562 ACUTE PAIN OF LEFT KNEE: Primary | ICD-10-CM

## 2025-01-08 NOTE — TELEPHONE ENCOUNTER
Spoke with patient who stated she feels that she has lost muscle mass in her legs as she is now having to use a wheelchair.     Left genicular RFA is scheduled for 2/21/25.    Patient is requesting In home PT be ordered to help with the weakness and she would like to start this before her upcoming procedure.    Patient would like to use [facility] for the in home PT as [phone]    Home health order pended to Dr.Behar for completion.    Once order is placed will need to fax to [facility] Fax #: [phone].

## 2025-01-14 ENCOUNTER — TELEPHONE (OUTPATIENT)
Dept: PHYSICAL MEDICINE AND REHAB | Facility: CLINIC | Age: 80
End: 2025-01-14

## 2025-01-14 NOTE — TELEPHONE ENCOUNTER
[facility] is looking for notes from Nov 2024 to be faxed to [phone]  regarding her visit.

## 2025-01-14 NOTE — TELEPHONE ENCOUNTER
Faxed note via [employer]ax to number as requested. See communications tab for further details if needed.

## 2025-01-14 NOTE — TELEPHONE ENCOUNTER
PT has been faxed to [phone]. Requesting for them to contact her to get schedule.    Notified via [employer].

## 2025-01-22 ENCOUNTER — OFFICE VISIT (OUTPATIENT)
Dept: PHYSICAL MEDICINE AND REHAB | Facility: CLINIC | Age: 80
End: 2025-01-22
Payer: MEDICARE

## 2025-01-22 DIAGNOSIS — M25.562 ACUTE PAIN OF LEFT KNEE: Primary | ICD-10-CM

## 2025-01-22 DIAGNOSIS — M22.2X9 PATELLOFEMORAL PAIN SYNDROME, UNSPECIFIED LATERALITY: ICD-10-CM

## 2025-01-22 DIAGNOSIS — I48.91 ATRIAL FIBRILLATION WITH RAPID VENTRICULAR RESPONSE (HCC): ICD-10-CM

## 2025-01-22 DIAGNOSIS — Z96.653 HISTORY OF BILATERAL KNEE REPLACEMENT: ICD-10-CM

## 2025-01-22 PROCEDURE — 99214 OFFICE O/P EST MOD 30 MIN: CPT | Performed by: PHYSICAL MEDICINE & REHABILITATION

## 2025-01-22 NOTE — PATIENT INSTRUCTIONS
1) continue plan for physical therapy  2) Try Voltaren gel over the counter NSAIDS gel  3) Tylenol 500-1000 mg every 6-8 hours as needed for pain.  No more than 3000 mg daily.  4)  Ice 20 minutes at a time 3-4 times per day  5) I do not believe you need an ablation as your genicular nerve block was not positive.   6) Follow up with me in about 2-3 months

## 2025-01-22 NOTE — PROGRESS NOTES
[facility]  Progress Note    CHIEF COMPLAINT:    Chief Complaint   Patient presents with    Follow - Up     24 Left knee genicular nerve block. 24 LOV. Patient is here because she has some questions on her procedure on 25.        History of Present Illness:  The patient is a 79 year old right-handed female with a significant past medical history of atrial fibrillation, COPD, hypertension, hyperlipidemia, morbid obesity, peripheral vascular disease who presents for follow-up of her left knee pain status post left knee genicular nerve block on 2024 without significant improvement on the initial day of treatment.  She started to have improvement a couple of days after the procedure.  She is having mostly lateral knee pain.    PAST MEDICAL HISTORY:  Past Medical History:    Arrhythmia    Atrial fibrillation (HCC)    Congestive heart disease (HCC)    COPD (chronic obstructive pulmonary disease) (HCC)    Essential hypertension    High blood pressure    High cholesterol    Morbid obesity with BMI of 50.0-59.9, adult (HCC)    Muscle weakness    Neuropathy    hands and feet    Osteoarthritis    Peripheral vascular disease (HCC)    Sleep apnea    uses CPAP    Visual impairment    contact lens in right eye       SURGICAL HISTORY:  Past Surgical History:   Procedure Laterality Date    Cataract      Hysterectomy      partial    Knee surgery Bilateral     ; revision of left knee 2016       SOCIAL HISTORY:   Social History     Occupational History    Not on file   Tobacco Use    Smoking status: Former     Current packs/day: 0.00     Types: Cigarettes     Quit date:      Years since quittin.0     Passive exposure: Never    Smokeless tobacco: Never   Vaping Use    Vaping status: Never Used   Substance and Sexual Activity    Alcohol use: Yes     Comment: 1-2 drinks daily    Drug use: Never    Sexual activity: Not on file       FAMILY HISTORY:    History reviewed. No pertinent family history.    CURRENT MEDICATIONS:   Current Outpatient Medications   Medication Sig Dispense Refill    bumetanide 1 MG Oral Tab Take 0.5 tablets (0.5 mg total) by mouth daily.      metoprolol succinate  MG Oral Tablet 24 Hr Take 0.5 tablets (50 mg total) by mouth daily.      gabapentin 100 MG Oral Cap Take 3 capsules (300 mg total) by mouth 2 (two) times daily.      acetaminophen 325 MG Oral Tab Take 2 tablets (650 mg total) by mouth every 6 (six) hours as needed for Pain.      polyethylene glycol, PEG 3350, 17 g Oral Powd Pack Take 17 g by mouth daily as needed. 10 each 0    spironolactone 25 MG Oral Tab Take 0.5 tablets (12.5 mg total) by mouth daily.      cholecalciferol 50 MCG (2000 UT) Oral Cap Take 1 capsule (2,000 Units total) by mouth daily. Patient takes 2000 units daily      cyanocobalamin 1000 MCG Oral Tab Take 1 tablet (1,000 mcg total) by mouth daily.      pantoprazole 40 MG Oral Tab EC Take 1 tablet (40 mg total) by mouth every morning before breakfast. 60 tablet 0    apixaban 5 MG Oral Tab Take 1 tablet (5 mg total) by mouth 2 (two) times daily.      cycloSPORINE 0.05 % Ophthalmic Emulsion Place 1 drop into both eyes 2 (two) times daily.         ALLERGIES:   Allergies[1]    REVIEW OF SYSTEMS:   Review of Systems   Constitutional: Negative.    HENT: Negative.    Eyes: Negative.    Respiratory: Negative.    Cardiovascular: Negative.    Gastrointestinal: Negative.    Genitourinary: Negative.    Musculoskeletal: As per HPI  Skin: Negative.    Neurological: As per HPI  Endo/Heme/Allergies: Negative.    Psychiatric/Behavioral: Negative.      All other systems reviewed and are negative. Pertinent positives and negatives noted in the HPI.        PHYSICAL EXAM:   There were no vitals taken for this visit.    There is no height or weight on file to calculate BMI.      General: No immediate distress  Head: Normocephalic/ Atraumatic  Eyes: Extra-occular movements  intact.   Ears: No auricular hematoma or deformities  Mouth: No lesions or ulcerations  Heart: peripheral pulses intact. Normal capillary refill.   Lungs: Non-labored respirations  Abdomen: No abdominal guarding  Extremities: No lower extremity edema bilaterally   Skin: No lesions noted.   Cognition: alert & oriented x 3, attentive, able to follow 2 step commands, comprehention intact, spontaneous speech intact  Motor:    Musculoskeletal:        Data  Admission on 12/20/2024, Discharged on 12/20/2024   Component Date Value Ref Range Status    Rapid SARS-CoV-2 by PCR 12/20/2024 Not Detected  Not Detected Final   Admission on 10/07/2024, Discharged on 10/10/2024   Component Date Value Ref Range Status    Ventricular rate 10/07/2024 93  BPM Final    QRS Duration 10/07/2024 88  ms Final    Q-T Interval 10/07/2024 358  ms Final    QTC Calculation (Bezet) 10/07/2024 445  ms Final    R Axis 10/07/2024 30  degrees Final    T Axis 10/07/2024 50  degrees Final    Magnesium 10/07/2024 1.6  1.6 - 2.6 mg/dL Final    Ventricular rate 10/08/2024 94  BPM Final    QRS Duration 10/08/2024 80  ms Final    Q-T Interval 10/08/2024 372  ms Final    QTC Calculation (Bezet) 10/08/2024 465  ms Final    R Axis 10/08/2024 55  degrees Final    T Axis 10/08/2024 63  degrees Final    Glucose 10/07/2024 103 (H)  70 - 99 mg/dL Final    Sodium 10/07/2024 139  136 - 145 mmol/L Final    Potassium 10/07/2024 3.0 (L)  3.5 - 5.1 mmol/L Final    Chloride 10/07/2024 106  98 - 112 mmol/L Final    CO2 10/07/2024 27.0  21.0 - 32.0 mmol/L Final    Anion Gap 10/07/2024 6  0 - 18 mmol/L Final    BUN 10/07/2024 17  9 - 23 mg/dL Final    Creatinine 10/07/2024 0.94  0.55 - 1.02 mg/dL Final    BUN/CREA Ratio 10/07/2024 18.1  10.0 - 20.0 Final    Calcium, Total 10/07/2024 8.9  8.7 - 10.4 mg/dL Final    Calculated Osmolality 10/07/2024 290  275 - 295 mOsm/kg Final    eGFR-Cr 10/07/2024 62  >=60 mL/min/1.73m2 Final    Glucose 10/08/2024 89  70 - 99 mg/dL Final     Sodium 10/08/2024 140  136 - 145 mmol/L Final    Potassium 10/08/2024 4.4  3.5 - 5.1 mmol/L Final    Chloride 10/08/2024 108  98 - 112 mmol/L Final    CO2 10/08/2024 28.0  21.0 - 32.0 mmol/L Final    Anion Gap 10/08/2024 4  0 - 18 mmol/L Final    BUN 10/08/2024 16  9 - 23 mg/dL Final    Creatinine 10/08/2024 0.81  0.55 - 1.02 mg/dL Final    BUN/CREA Ratio 10/08/2024 19.8  10.0 - 20.0 Final    Calcium, Total 10/08/2024 9.4  8.7 - 10.4 mg/dL Final    Calculated Osmolality 10/08/2024 291  275 - 295 mOsm/kg Final    eGFR-Cr 10/08/2024 75  >=60 mL/min/1.73m2 Final    Magnesium 10/08/2024 1.8  1.6 - 2.6 mg/dL Final    Potassium 10/08/2024 4.5  3.5 - 5.1 mmol/L Final    Ventricular rate 10/08/2024 104  BPM Final    QRS Duration 10/08/2024 84  ms Final    Q-T Interval 10/08/2024 356  ms Final    QTC Calculation (Bezet) 10/08/2024 468  ms Final    R Axis 10/08/2024 45  degrees Final    T Axis 10/08/2024 53  degrees Final    Hold Lavender 10/08/2024 Auto Resulted   Final    Ventricular rate 10/08/2024 81  BPM Final    Atrial rate 10/08/2024 81  BPM Final    P-R Interval 10/08/2024 176  ms Final    QRS Duration 10/08/2024 80  ms Final    Q-T Interval 10/08/2024 362  ms Final    QTC Calculation (Bezet) 10/08/2024 420  ms Final    P Axis 10/08/2024 74  degrees Final    R Axis 10/08/2024 54  degrees Final    T Axis 10/08/2024 79  degrees Final    Glucose 10/09/2024 100 (H)  70 - 99 mg/dL Final    Sodium 10/09/2024 138  136 - 145 mmol/L Final    Potassium 10/09/2024 3.6  3.5 - 5.1 mmol/L Final    Chloride 10/09/2024 106  98 - 112 mmol/L Final    CO2 10/09/2024 28.0  21.0 - 32.0 mmol/L Final    Anion Gap 10/09/2024 4  0 - 18 mmol/L Final    BUN 10/09/2024 17  9 - 23 mg/dL Final    Creatinine 10/09/2024 0.76  0.55 - 1.02 mg/dL Final    BUN/CREA Ratio 10/09/2024 22.4 (H)  10.0 - 20.0 Final    Calcium, Total 10/09/2024 8.9  8.7 - 10.4 mg/dL Final    Calculated Osmolality 10/09/2024 288  275 - 295 mOsm/kg Final    eGFR-Cr 10/09/2024 81   >=60 mL/min/1.73m2 Final    Magnesium 10/09/2024 1.7  1.6 - 2.6 mg/dL Final    Hold Lavender 10/09/2024 Auto Resulted   Final    Ventricular rate 10/08/2024 70  BPM Final    Atrial rate 10/08/2024 70  BPM Final    P-R Interval 10/08/2024 188  ms Final    QRS Duration 10/08/2024 84  ms Final    Q-T Interval 10/08/2024 440  ms Final    QTC Calculation (Bezet) 10/08/2024 475  ms Final    P Axis 10/08/2024 66  degrees Final    R Axis 10/08/2024 30  degrees Final    T Axis 10/08/2024 43  degrees Final    Ventricular rate 10/09/2024 73  BPM Final    Atrial rate 10/09/2024 73  BPM Final    P-R Interval 10/09/2024 194  ms Final    QRS Duration 10/09/2024 86  ms Final    Q-T Interval 10/09/2024 424  ms Final    QTC Calculation (Bezet) 10/09/2024 467  ms Final    P Axis 10/09/2024 70  degrees Final    R Axis 10/09/2024 41  degrees Final    T Axis 10/09/2024 47  degrees Final    Potassium 10/09/2024 5.3 (H)  3.5 - 5.1 mmol/L Final    Ventricular rate 10/09/2024 72  BPM Final    Atrial rate 10/09/2024 72  BPM Final    P-R Interval 10/09/2024 200  ms Final    QRS Duration 10/09/2024 66  ms Final    Q-T Interval 10/09/2024 424  ms Final    QTC Calculation (Bezet) 10/09/2024 464  ms Final    P Axis 10/09/2024 65  degrees Final    R Axis 10/09/2024 36  degrees Final    T Axis 10/09/2024 49  degrees Final    Ventricular rate 10/09/2024 73  BPM Final    Atrial rate 10/09/2024 73  BPM Final    P-R Interval 10/09/2024 184  ms Final    QRS Duration 10/09/2024 82  ms Final    Q-T Interval 10/09/2024 400  ms Final    QTC Calculation (Bezet) 10/09/2024 440  ms Final    P Axis 10/09/2024 72  degrees Final    R Axis 10/09/2024 31  degrees Final    T Axis 10/09/2024 33  degrees Final    Magnesium 10/10/2024 1.9  1.6 - 2.6 mg/dL Final    Glucose 10/10/2024 94  70 - 99 mg/dL Final    Sodium 10/10/2024 139  136 - 145 mmol/L Final    Potassium 10/10/2024 4.4  3.5 - 5.1 mmol/L Final    Chloride 10/10/2024 105  98 - 112 mmol/L Final    CO2  10/10/2024 28.0  21.0 - 32.0 mmol/L Final    Anion Gap 10/10/2024 6  0 - 18 mmol/L Final    BUN 10/10/2024 12  9 - 23 mg/dL Final    Creatinine 10/10/2024 0.71  0.55 - 1.02 mg/dL Final    BUN/CREA Ratio 10/10/2024 16.9  10.0 - 20.0 Final    Calcium, Total 10/10/2024 8.9  8.7 - 10.4 mg/dL Final    Calculated Osmolality 10/10/2024 288  275 - 295 mOsm/kg Final    eGFR-Cr 10/10/2024 88  >=60 mL/min/1.73m2 Final   ]    Radiology Imaging:  I reviewed with the patient her X-ray and CT of the knees left   CT KNEE LEFT (CPT=73700); THREE-DIMENSIONAL RECONSTRUCTION     CLINICAL INDICATION: Chronic knee pain after total replacement of left knee joint.     COMPARISON STUDY: 20 August 2024 radiograph.     TECHNIQUE: Direct axial images were obtained through the left knee without the use of intravenous   contrast. The data was utilized for maximum intensity projection reconstruction into the coronal and   sagittal planes and spin and tumble 3D images were produced.     ADVERSE REACTION: None.     Automated exposure control and ALARA manual techniques for patient specific dose reduction were   followed while maintaining the necessary diagnostic image quality.     FINDINGS:   OSSEOUS STRUCTURES:   The visualized osseous structures are without acute fracture, dislocation or subluxation. There is   no significant osseous lytic or blastic lesion.     JOINT SPACES:   Total knee prosthetic device is identified. The femoral and tibial components are within normal   limits. There is no evidence of periprosthetic lucency and there is no evidence of visualized   medullary or cortical defect to suggest fracture.   Soft tissue windows does appear to demonstrate relatively hypodense material suggesting joint   effusion in the suprapatellar region with significant associated thickening and heterogeneity of the   quadriceps tendon with cutaneous linear abnormality that likely represents postoperative change. The   fluid within the joint spaces  measures just greater than in density greater than simple water on   this noncontrast exam.     MYOTENDINOUS STRUCTURES:   The visualized myotendinous structures are not optimized on noncontrast CT but grossly homogeneous   on the current exam. There are no discrete solid or cystic masses.     NEUROVASCULAR/REMAINING SOFT TISSUES:   The subcutaneous and neurovascular structures are without focal abnormality.     =====   IMPRESSION:   1. Left knee prosthetic appears appropriate without current CT osseous complication.   2. Mildly complex joint effusion. Correlation with surgical history is recommended and findings   could represent aging hemarthrosis. If there is concern for septic arthritis, aspiration with   culture and sensitivity is recommended.   3. Mild relative thickening and prominence of the quadriceps tendon as it inserts on the patella may   be postoperative in nature. If there is concern for tendinosis or strain, dedicated ultrasound   examination may be helpful.       ASSESSMENT AND PLAN:  Adore is a pleasant 79-year-old female presents for follow-up of her left knee pain status post left knee genicular nerve block on 12/20/2024 without improvement from a diagnostic perspective.  I am recommending she continue working with physical therapy at home.  She can also use Tylenol and Voltaren gel as well as ice.  I do not believe she should have the genicular nerve ablation as her genicular nerve block was not positive.  I will follow-up with her in about 2 to 3 months.       RTC in 2 to 3 months  Discharge Instructions were provided as documented in AVS summary.  The patient was in agreement with the assessment and plan.  All questions were answered.  There were no barriers to learning.         1. Acute pain of left knee    2. History of bilateral knee replacement    3. Patellofemoral pain syndrome, unspecified laterality    4. Atrial fibrillation with rapid ventricular response (HCC)        Alex B. Behar  MD  Physical Medicine and Rehabilitation/Sports Medicine  [facility]      [employer] Cures Act Notice to Patient: Medical documents like this are made available to patients in the interest of transparency. However, be advised this is a medical document and it is intended as [id number] communication between your medical providers. This medical document may contain abbreviations, assessments, medical data, and results or other terms that are unfamiliar. Medical documents are intended to carry relevant information, facts as evident, and the clinical opinion of the practitioner. As such, this medical document may be written in language that appears blunt or direct. You are encouraged to contact your medical provider and/or [facility] Patient Experience if you have any questions about this medical document.        [1]   Allergies  Allergen Reactions    Cefepime RASH     Generalized

## 2025-01-26 ENCOUNTER — HOSPITAL ENCOUNTER (INPATIENT)
Facility: HOSPITAL | Age: 80
LOS: 5 days | Discharge: NURSING, REHAB OR LONG-TERM CARE | End: 2025-01-31
Attending: EMERGENCY MEDICINE | Admitting: INTERNAL MEDICINE
Payer: MEDICARE

## 2025-01-26 ENCOUNTER — APPOINTMENT (OUTPATIENT)
Dept: CT IMAGING | Facility: HOSPITAL | Age: 80
End: 2025-01-26
Attending: EMERGENCY MEDICINE
Payer: MEDICARE

## 2025-01-26 ENCOUNTER — APPOINTMENT (OUTPATIENT)
Dept: GENERAL RADIOLOGY | Facility: HOSPITAL | Age: 80
End: 2025-01-26
Attending: EMERGENCY MEDICINE
Payer: MEDICARE

## 2025-01-26 DIAGNOSIS — R53.1 WEAKNESS GENERALIZED: Primary | ICD-10-CM

## 2025-01-26 DIAGNOSIS — J18.1 RIGHT LOWER LOBE CONSOLIDATION: ICD-10-CM

## 2025-01-26 DIAGNOSIS — L03.116 LEFT LEG CELLULITIS: ICD-10-CM

## 2025-01-26 DIAGNOSIS — U07.1 COVID-19: ICD-10-CM

## 2025-01-26 LAB
ALBUMIN SERPL-MCNC: 2.8 G/DL (ref 3.2–4.8)
ALBUMIN/GLOB SERPL: 1 {RATIO} (ref 1–2)
ALP LIVER SERPL-CCNC: 160 U/L
ALT SERPL-CCNC: 39 U/L
ANION GAP SERPL CALC-SCNC: 10 MMOL/L (ref 0–18)
AST SERPL-CCNC: 89 U/L (ref ?–34)
BASOPHILS # BLD AUTO: 0.03 X10(3) UL (ref 0–0.2)
BASOPHILS NFR BLD AUTO: 0.4 %
BILIRUB SERPL-MCNC: 1 MG/DL (ref 0.2–1.1)
BILIRUB UR QL: NEGATIVE
BUN BLD-MCNC: 11 MG/DL (ref 9–23)
BUN/CREAT SERPL: 13.1 (ref 10–20)
CALCIUM BLD-MCNC: 8.6 MG/DL (ref 8.7–10.4)
CHLORIDE SERPL-SCNC: 107 MMOL/L (ref 98–112)
CLARITY UR: CLEAR
CO2 SERPL-SCNC: 27 MMOL/L (ref 21–32)
COLOR UR: YELLOW
CREAT BLD-MCNC: 0.84 MG/DL
DEPRECATED RDW RBC AUTO: 64.4 FL (ref 35.1–46.3)
EGFRCR SERPLBLD CKD-EPI 2021: 72 ML/MIN/1.73M2 (ref 60–?)
EOSINOPHIL # BLD AUTO: 0.04 X10(3) UL (ref 0–0.7)
EOSINOPHIL NFR BLD AUTO: 0.5 %
ERYTHROCYTE [DISTWIDTH] IN BLOOD BY AUTOMATED COUNT: 17.5 % (ref 11–15)
FLUAV + FLUBV RNA SPEC NAA+PROBE: NEGATIVE
FLUAV + FLUBV RNA SPEC NAA+PROBE: NEGATIVE
GLOBULIN PLAS-MCNC: 2.8 G/DL (ref 2–3.5)
GLUCOSE BLD-MCNC: 98 MG/DL (ref 70–99)
GLUCOSE UR-MCNC: NORMAL MG/DL
HCT VFR BLD AUTO: 37.1 %
HGB BLD-MCNC: 12.1 G/DL
HGB UR QL STRIP.AUTO: NEGATIVE
IMM GRANULOCYTES # BLD AUTO: 0.04 X10(3) UL (ref 0–1)
IMM GRANULOCYTES NFR BLD: 0.5 %
KETONES UR-MCNC: NEGATIVE MG/DL
LACTATE SERPL-SCNC: 1.8 MMOL/L (ref 0.5–2)
LEUKOCYTE ESTERASE UR QL STRIP.AUTO: NEGATIVE
LYMPHOCYTES # BLD AUTO: 0.89 X10(3) UL (ref 1–4)
LYMPHOCYTES NFR BLD AUTO: 10.6 %
MCH RBC QN AUTO: 32.2 PG (ref 26–34)
MCHC RBC AUTO-ENTMCNC: 32.6 G/DL (ref 31–37)
MCV RBC AUTO: 98.7 FL
MONOCYTES # BLD AUTO: 0.49 X10(3) UL (ref 0.1–1)
MONOCYTES NFR BLD AUTO: 5.9 %
NEUTROPHILS # BLD AUTO: 6.87 X10 (3) UL (ref 1.5–7.7)
NEUTROPHILS # BLD AUTO: 6.87 X10(3) UL (ref 1.5–7.7)
NEUTROPHILS NFR BLD AUTO: 82.1 %
NITRITE UR QL STRIP.AUTO: NEGATIVE
NT-PROBNP SERPL-MCNC: 1372 PG/ML (ref ?–450)
OSMOLALITY SERPL CALC.SUM OF ELEC: 297 MOSM/KG (ref 275–295)
PH UR: 6 [PH] (ref 5–8)
PLATELET # BLD AUTO: 351 10(3)UL (ref 150–450)
POTASSIUM SERPL-SCNC: 3 MMOL/L (ref 3.5–5.1)
PROT SERPL-MCNC: 5.6 G/DL (ref 5.7–8.2)
PROT UR-MCNC: 20 MG/DL
RBC # BLD AUTO: 3.76 X10(6)UL
RSV RNA SPEC NAA+PROBE: NEGATIVE
SARS-COV-2 RNA RESP QL NAA+PROBE: DETECTED
SODIUM SERPL-SCNC: 144 MMOL/L (ref 136–145)
SP GR UR STRIP: 1.03 (ref 1–1.03)
TSI SER-ACNC: 1.65 UIU/ML (ref 0.55–4.78)
UROBILINOGEN UR STRIP-ACNC: NORMAL
WBC # BLD AUTO: 8.4 X10(3) UL (ref 4–11)

## 2025-01-26 PROCEDURE — 87186 SC STD MICRODIL/AGAR DIL: CPT | Performed by: INTERNAL MEDICINE

## 2025-01-26 PROCEDURE — 99285 EMERGENCY DEPT VISIT HI MDM: CPT

## 2025-01-26 PROCEDURE — 71045 X-RAY EXAM CHEST 1 VIEW: CPT | Performed by: EMERGENCY MEDICINE

## 2025-01-26 PROCEDURE — 87077 CULTURE AEROBIC IDENTIFY: CPT | Performed by: INTERNAL MEDICINE

## 2025-01-26 PROCEDURE — 84443 ASSAY THYROID STIM HORMONE: CPT | Performed by: EMERGENCY MEDICINE

## 2025-01-26 PROCEDURE — 72125 CT NECK SPINE W/O DYE: CPT | Performed by: EMERGENCY MEDICINE

## 2025-01-26 PROCEDURE — 70450 CT HEAD/BRAIN W/O DYE: CPT | Performed by: EMERGENCY MEDICINE

## 2025-01-26 PROCEDURE — 81001 URINALYSIS AUTO W/SCOPE: CPT | Performed by: EMERGENCY MEDICINE

## 2025-01-26 PROCEDURE — 93010 ELECTROCARDIOGRAM REPORT: CPT

## 2025-01-26 PROCEDURE — 83880 ASSAY OF NATRIURETIC PEPTIDE: CPT | Performed by: EMERGENCY MEDICINE

## 2025-01-26 PROCEDURE — 96374 THER/PROPH/DIAG INJ IV PUSH: CPT

## 2025-01-26 PROCEDURE — 80053 COMPREHEN METABOLIC PANEL: CPT

## 2025-01-26 PROCEDURE — 87075 CULTR BACTERIA EXCEPT BLOOD: CPT | Performed by: INTERNAL MEDICINE

## 2025-01-26 PROCEDURE — 87040 BLOOD CULTURE FOR BACTERIA: CPT | Performed by: EMERGENCY MEDICINE

## 2025-01-26 PROCEDURE — 83605 ASSAY OF LACTIC ACID: CPT | Performed by: EMERGENCY MEDICINE

## 2025-01-26 PROCEDURE — 93005 ELECTROCARDIOGRAM TRACING: CPT

## 2025-01-26 PROCEDURE — 36415 COLL VENOUS BLD VENIPUNCTURE: CPT

## 2025-01-26 PROCEDURE — 87070 CULTURE OTHR SPECIMN AEROBIC: CPT | Performed by: INTERNAL MEDICINE

## 2025-01-26 PROCEDURE — 87641 MR-STAPH DNA AMP PROBE: CPT | Performed by: INTERNAL MEDICINE

## 2025-01-26 PROCEDURE — 85025 COMPLETE CBC W/AUTO DIFF WBC: CPT

## 2025-01-26 PROCEDURE — 87205 SMEAR GRAM STAIN: CPT | Performed by: INTERNAL MEDICINE

## 2025-01-26 PROCEDURE — 0241U SARS-COV-2/FLU A AND B/RSV BY PCR (GENEXPERT): CPT

## 2025-01-26 RX ORDER — SENNOSIDES 8.6 MG
17.2 TABLET ORAL NIGHTLY PRN
Status: DISCONTINUED | OUTPATIENT
Start: 2025-01-26 | End: 2025-01-31

## 2025-01-26 RX ORDER — VANCOMYCIN HYDROCHLORIDE
1250 EVERY 24 HOURS
Status: DISCONTINUED | OUTPATIENT
Start: 2025-01-27 | End: 2025-01-30

## 2025-01-26 RX ORDER — POLYETHYLENE GLYCOL 3350 17 G/17G
17 POWDER, FOR SOLUTION ORAL DAILY PRN
Status: DISCONTINUED | OUTPATIENT
Start: 2025-01-26 | End: 2025-01-31

## 2025-01-26 RX ORDER — ATORVASTATIN CALCIUM 10 MG/1
5 TABLET, FILM COATED ORAL NIGHTLY
Status: DISCONTINUED | OUTPATIENT
Start: 2025-01-26 | End: 2025-01-31

## 2025-01-26 RX ORDER — SIMVASTATIN 10 MG
10 TABLET ORAL NIGHTLY
COMMUNITY

## 2025-01-26 RX ORDER — ACETAMINOPHEN 500 MG
500 TABLET ORAL EVERY 4 HOURS PRN
Status: DISCONTINUED | OUTPATIENT
Start: 2025-01-26 | End: 2025-01-31

## 2025-01-26 RX ORDER — ONDANSETRON 2 MG/ML
4 INJECTION INTRAMUSCULAR; INTRAVENOUS EVERY 6 HOURS PRN
Status: DISCONTINUED | OUTPATIENT
Start: 2025-01-26 | End: 2025-01-26

## 2025-01-26 RX ORDER — MULTIVITAMIN WITH IRON
1000 TABLET ORAL DAILY
Status: DISCONTINUED | OUTPATIENT
Start: 2025-01-26 | End: 2025-01-31

## 2025-01-26 RX ORDER — CHOLECALCIFEROL (VITAMIN D3) 25 MCG
2000 TABLET ORAL DAILY
Status: DISCONTINUED | OUTPATIENT
Start: 2025-01-26 | End: 2025-01-31

## 2025-01-26 RX ORDER — METOCLOPRAMIDE HYDROCHLORIDE 5 MG/ML
5 INJECTION INTRAMUSCULAR; INTRAVENOUS EVERY 8 HOURS PRN
Status: DISCONTINUED | OUTPATIENT
Start: 2025-01-26 | End: 2025-01-31

## 2025-01-26 RX ORDER — BUMETANIDE 0.5 MG/1
0.5 TABLET ORAL DAILY
Status: DISCONTINUED | OUTPATIENT
Start: 2025-01-27 | End: 2025-01-31

## 2025-01-26 RX ORDER — FUROSEMIDE 10 MG/ML
20 INJECTION INTRAMUSCULAR; INTRAVENOUS ONCE
Status: COMPLETED | OUTPATIENT
Start: 2025-01-26 | End: 2025-01-26

## 2025-01-26 RX ORDER — BISACODYL 10 MG
10 SUPPOSITORY, RECTAL RECTAL
Status: DISCONTINUED | OUTPATIENT
Start: 2025-01-26 | End: 2025-01-31

## 2025-01-26 RX ORDER — EPLERENONE 25 MG/1
12.5 TABLET, FILM COATED ORAL DAILY
COMMUNITY

## 2025-01-26 RX ORDER — DOFETILIDE 0.25 MG/1
250 CAPSULE ORAL 2 TIMES DAILY
Status: DISCONTINUED | OUTPATIENT
Start: 2025-01-26 | End: 2025-01-31

## 2025-01-26 RX ORDER — SODIUM PHOSPHATE, DIBASIC AND SODIUM PHOSPHATE, MONOBASIC 7; 19 G/230ML; G/230ML
1 ENEMA RECTAL ONCE AS NEEDED
Status: DISCONTINUED | OUTPATIENT
Start: 2025-01-26 | End: 2025-01-31

## 2025-01-26 RX ORDER — POTASSIUM CHLORIDE 1500 MG/1
40 TABLET, EXTENDED RELEASE ORAL ONCE
Status: COMPLETED | OUTPATIENT
Start: 2025-01-26 | End: 2025-01-26

## 2025-01-26 RX ORDER — EPLERENONE 25 MG/1
12.5 TABLET, FILM COATED ORAL DAILY
Status: DISCONTINUED | OUTPATIENT
Start: 2025-01-26 | End: 2025-01-31

## 2025-01-26 NOTE — ED QUICK NOTES
Orders for admission, patient is aware of plan and ready to go upstairs. Any questions, please call ED RN donato  at extension [phone].   Chief Complaint   Patient presents with    Fall    Numbness Weakness       Patient AO x 3  Ambulation: with walker/wheelchair  Belongings: accompanying patient  Medications: see mar   IV: 20g rac  Language: enlgish  COVID-19 suspicion level/status: +covid  CIWA SCORE: na  NIH: na  Other pertinent information:  na

## 2025-01-26 NOTE — H&P
DM Hospitalist H&P       CC:   Chief Complaint   Patient presents with    Fall    Numbness Weakness        PCP: Maral Huffman MD    History of Present Illness: Patient is a 79 year old female with PMH sig for COPD, A- Fib on Eliquis, CHF, HTN, HLD, PAD, Neuropathy, SUSAN,  prior hx of septic shock 2/2 cellulitis with admission 6/19-6/22 with LLE cellulitis  discharged on oral levaquin and doxy seen by ID on 6/27 and found to have worsening swelling and drainage of her left lower extremity 6/27, sent for admission for IV abx in July 2024 . Patient was discharged on Vanco and cefepime until July 10th with home infusions.  Patient now returns with generalized weakness and fall.  She had a fall right before Christmas and hurt her left knee at that time she followed up with orthopedics with some fracture or dislocations seen she will send independent living and started physical therapy.  She was in contact with PT and some neighbors but denies being in contact with anyone else.  Over the last several days she started feeling gradually more weak.  Today when she went to sit on her recliner she was so weak that she fell onto the carpet and hit her head.  She denies any chest pain or shortness of breath, with no abdominal pain, nausea or vomiting fever or chills with no dysuria.    In the ER patient is normotensive, afebrile on room air.  Glucose 98, sodium 144, potassium 3, AST 89  BNP 1372, lactic acid 1.8, TSH 1.6, WBC 8.4, hemoglobin 12.1  CXR Small bilateral pleural effusions.  Mild hazy opacity right lung base which may reflect atelectasis with or without superimposed pneumonia.   CTH and C spine with no acute process.     PMH  Past Medical History:    Arrhythmia    Atrial fibrillation (HCC)    Congestive heart disease (HCC)    COPD (chronic obstructive pulmonary disease) (HCC)    Essential hypertension    High blood pressure    High cholesterol    Morbid obesity with BMI of 50.0-59.9, adult (HCC)    Muscle weakness     Neuropathy    hands and feet    Osteoarthritis    Peripheral vascular disease (HCC)    Sleep apnea    uses CPAP    Visual impairment    contact lens in right eye        PSH  Past Surgical History:   Procedure Laterality Date    Cataract      Hysterectomy      partial    Knee surgery Bilateral     ; revision of left knee 2016        ALL:  Allergies[1]     Home Medications:  Medications Taking[2]      Soc Hx  Social History     Tobacco Use    Smoking status: Former     Current packs/day: 0.00     Types: Cigarettes     Quit date:      Years since quittin.0     Passive exposure: Never    Smokeless tobacco: Never   Substance Use Topics    Alcohol use: Yes     Comment: 1-2 drinks daily        Fam Hx  No family history on file.    Review of Systems  Comprehensive ROS reviewed and negative except for what's stated above.     OBJECTIVE:  /70   Pulse 87   Temp 97.8 °F (36.6 °C) (Oral)   Resp 18   SpO2 95%   General: Alert, no acute distress  HEENT: oral mucosa normal   Neck: non tender, no adenopathy   Lungs: good air movement, good effort   Heart: Regular rate and rhythm  Abdomen: soft, non tender, non distended   Extremities: b/l LE edema, b/l leg redness, left knee with wound and discharge   Skin: no new rash, normal color  Neuro: 5/5 strength in bilateral extremities, normal sensations  Psych: appropriate affect   Diagnostic Data:    CBC/Chem  Recent Labs   Lab 25  1253   WBC 8.4   HGB 12.1   MCV 98.7   .0       Recent Labs   Lab 25  1253      K 3.0*      CO2 27.0   BUN 11   CREATSERUM 0.84   GLU 98   CA 8.6*       Recent Labs   Lab 25  1253   ALT 39   AST 89*   ALB 2.8*       No results for input(s): \"TROP\" in the last 168 hours.    Additional Diagnostics: ECG: PACs with T wave changes, Qtc 413    CXR: image personally reviewed     Radiology: XR CHEST AP PORTABLE  (CPT=71045)    Result Date: 2025  CONCLUSION:   Small bilateral pleural effusions.  Mild  hazy opacity right lung base which may reflect atelectasis with or without superimposed pneumonia.    Dictated by (CST): Jose Lopez MD on 1/26/2025 at 1:35 PM     Finalized by (CST): Jose Lopez MD on 1/26/2025 at 1:36 PM             ASSESSMENT / PLAN:   Patient is a 79 year old female with PMH sig for COPD, A- Fib on Eliquis, CHF, HTN, HLD, PAD, Neuropathy, SUSAN,  prior hx of septic shock 2/2 cellulitis with admission 6/19-6/22 with LLE cellulitis  discharged on oral levaquin and doxy seen by ID on 6/27 and found to have worsening swelling and drainage of her left lower extremity 6/27, sent for admission for IV abx in July 2024 . Patient was discharged on Vanco and cefepime until July 10th with home infusions.  Patient now returns with generalized weakness and fall.  Admitted for COVID and worsening lower extremity cellulitis.    Fall  - generalized weakness  - head trauma on Eliquis   - CTH and C spine with no acute process  - ECG: PACs with T wave changes  - tsh normal   - likely 2/2 COVID   - PT/OT     B/L LE Cellulitis   Left Knee Wound   Recurrent LLE Cellulitis  Chronic Stasis Dermatitis   Hx MRSA + and Pseudomonas  -6/19 discharged on levaquin and doxy, seen by ID 6/27 w/ worsening swelling and drainage  and sent in for IV abx  -in July patient was discharged on Vanco and cefepime until July 10th with home infusions  -afebrile. No leukocytosis. La negative on admission    -had a fall on the left knee in December, seen ortho   -check MRSA   -blood  cx   -wound cx   -wound care  -zosyn and vanco started   -ID consult     COVID   -afebrile. No leukocytosis. La negative  -no RA  -CXR Small bilateral pleural effusions.  Mild hazy opacity right lung base which may reflect atelectasis with or without superimposed pneumonia  -will cover with vanc/zosyn   -AST elevated, will trend     PaFib S/P CV 1/2024  Secondary Hypercoagulable state   S/p Cardioversion   HTN  HL  Chronic Diastolic CHF  -5/2024 echo  with normal EF 55%, mild as, mild ai   -continue  home eliquis, Tikosyn, bumex, eplerenone and pravastatin  -home lopressor/Toprol held with lower bp   -BNP 1,372, b/l LE edema   -CXR Small bilateral pleural effusions  -will given 20mg iv lasix x1 today   -on RA       Hypokalemia   - lyte potocol     COPD not on inhalers   SUSAN  - cpap as tolerated      PAD  -continue statin.   -not on asa 2/2 eliquis      Neuropathy   - off gabapentin      GERD  - off PPI     GOC  - DNR/Select-confirmed with pt      FN:  - IVF: hold  - Diet: general     DVT Prophy: SCDs Eliquis   Atrophy: Ambulate PT/OT  Lines: Piv    MA/ACO Reach  -ER Visits 2025: 1  -Admissions 2025: 1  -Re- Entry: no  -Consults: ID   -Discharge Needs:TBD  -Appointments: [ ] PCP Maral Huffman                            [ ] ID Rk                             [ ] outpt wound     Dispo: pending clinical course    Outpatient records or previous hospital records reviewed.     Further recommendations pending patient's clinical course.  [facility] hospitalist to continue to follow patient while in house    Patient and/or patient's family given opportunity to ask questions and note understanding and agreeing with therapeutic plan as outlined    Thank You,  Norman Luo MD    [facility]  Answering Service number: [phone]         [1]   Allergies  Allergen Reactions    Cefepime RASH     Generalized   [2]   No outpatient medications have been marked as taking for the 1/26/25 encounter (Hospital Encounter).

## 2025-01-26 NOTE — ED QUICK NOTES
Rn rounded on patient  Awaiting lab/imaging results  Patient straight cathed, urine sent, elimination assistance provided   Will continue to monitor    Patient continues to be on nibp cardiac and spo2 monitors call light within reach

## 2025-01-26 NOTE — PROGRESS NOTES
[facility] Pharmacy Dosing Service      Initial Pharmacokinetic Consult for Vancomycin Dosing     Adore Covington is a 79 year old female who is being initiated on vancomycin therapy for cellulitis.  Pharmacy has been asked to dose vancomycin by Dr Luo.  The initial treatment and monitoring approach will be steady state AUC strategy.        Weight and Temperature:    Wt Readings from Last 1 Encounters:   25 91 kg (200 lb 9.9 oz)        Temp Readings from Last 1 Encounters:   25 97.5 °F (36.4 °C) (Oral)      Labs:   Recent Labs   Lab 25  1253   CREATSERUM 0.84      Estimated Creatinine Clearance: 44.4 mL/min (based on SCr of 0.84 mg/dL).     Recent Labs   Lab 25  1253   WBC 8.4          The Pharmacokinetic Target is:     to 600 mg-h/L and trough <=15 mg/L    Renal Dosing Considerations:    None     Assessment/Plan:   Initial/Loading dose: Will receive 2250 mg IV (25 mg/kg, capped at 2250 mg) x 1 loading dose.      Maintenance dose: Pharmacy will dose vancomycin at 1250 mg IV every 24 hours    Monitorin) Plan for vancomycin peak and trough to be obtained in approximately 72 hours    2) Pharmacy will order SCr as clinically indicated to assess renal function.    3) Pharmacy will monitor for toxicity and efficacy, adjust vancomycin dose and/or frequency, and order vancomycin levels as appropriate per the Pharmacy and Therapeutics Committee approved protocol until discontinuation of the medication.       We appreciate the opportunity to assist in the care of this patient.     Sarah Stevens, PharmD  2025  5:38 PM  [facility]  Pharmacy Extension: [phone]

## 2025-01-26 NOTE — ED PROVIDER NOTES
Patient Seen in: [facility] Emergency Department    History     Chief Complaint   Patient presents with    Fall    Numbness Weakness     Stated Complaint: weakness     HPI    78 yo F with PMH afib on eliquis, COPD, HTN, HL, obesity presenting via EMS with acute on chronic generalized weakness with ongoing left prepatellar knee wound; patient with recent transition from spironolactone to eplerenone in setting of recent nausea/hypotension. Patient with several days of worsening generalized weakness/cough in setting of sick contacts now with fall and secondary occipital head injury. No vomiting/diarrhea, no urinary change. LLE redness/swelling without overt trauma.    Past Medical History:    Arrhythmia    Atrial fibrillation (HCC)    Congestive heart disease (HCC)    COPD (chronic obstructive pulmonary disease) (HCC)    Essential hypertension    High blood pressure    High cholesterol    Morbid obesity with BMI of 50.0-59.9, adult (HCC)    Muscle weakness    Neuropathy    hands and feet    Osteoarthritis    Peripheral vascular disease (HCC)    Sleep apnea    uses CPAP    Visual impairment    contact lens in right eye       Past Surgical History:   Procedure Laterality Date    Cataract      Hysterectomy      partial    Knee surgery Bilateral     ; revision of left knee 2016            No family history on file.    Social History     Socioeconomic History    Marital status:    Tobacco Use    Smoking status: Former     Current packs/day: 0.00     Types: Cigarettes     Quit date:      Years since quittin.0     Passive exposure: Never    Smokeless tobacco: Never   Vaping Use    Vaping status: Never Used   Substance and Sexual Activity    Alcohol use: Yes     Comment: 1-2 drinks daily    Drug use: Never     Social Drivers of Health     Financial Resource Strain: Unknown (2021)    Received from [facility] Interview Master, [facility]    Overall Financial Resource Strain (CARDIA)     Difficulty of  Paying Living Expenses: Patient declined   Food Insecurity: No Food Insecurity (10/7/2024)    Food Insecurity     Food Insecurity: Never true   Transportation Needs: No Transportation Needs (10/7/2024)    Transportation Needs     Lack of Transportation: No   Housing Stability: Low Risk  (10/7/2024)    Housing Stability     Housing Instability: No       Review of Systems :  Constitutional: As per HPI   Respiratory: (+) cough.  Gastrointestinal: Negative for vomiting and abdominal pain.   Genitourinary: Negative for dysuria and hematuria.     Positive for stated complaint: weakness  Other systems are as noted in HPI.  Constitutional and vital signs reviewed.      All other systems reviewed and negative except as noted above.    PSFH elements reviewed from today and agreed except as otherwise stated in HPI.    Physical Exam     ED Triage Vitals [01/26/25 1240]   /70   Pulse 87   Resp 18   Temp 97.8 °F (36.6 °C)   Temp src Oral   SpO2 95 %   O2 Device None (Room air)       Current:/70   Pulse 87   Temp 97.8 °F (36.6 °C) (Oral)   Resp 18   SpO2 95%         Physical Exam   Constitutional: No distress. Obese, nontoxic.   HEENT: MMM.  Head: Normocephalic. Atraumatic.  Neck: No midline c-spine tenderness/stepoff/deformity.  Eyes: No injection.   Cardiovascular: RRR. BLE with intact DP/PT pulses.   Pulmonary/Chest: Effort normal. CTAB.  Abdominal: Soft. Nontender.  Musculoskeletal: No gross deformity. BLE with lymphedema and left prepatellar erythema/warmth with full/intact ROM.  Neurological: Alert. CN II-XII grossly intact. BUE/BLE proximally and distally with 5/5 strength.  Skin: Skin is warm.   Psychiatric: Cooperative.  Nursing note and vitals reviewed.        ED Course     Labs Reviewed   CBC WITH DIFFERENTIAL WITH PLATELET   COMP METABOLIC PANEL (14)   URINALYSIS, ROUTINE   TSH W REFLEX TO FREE T4   LACTIC ACID, PLASMA   RAINBOW DRAW LAVENDER   RAINBOW DRAW LIGHT GREEN   RAINBOW DRAW BLUE    SARS-COV-2/FLU A AND B/RSV BY PCR (GENEXPERT)   BLOOD CULTURE   BLOOD CULTURE     EKG    Rate, intervals and axes as noted on EKG Report.  Rate: NSR  Rhythm: Sinus Rhythm  Reading: NSR 75 wihtout SHO as independently interpreted by myself           CT SPINE CERVICAL (CPT=72125)    Result Date: 1/26/2025  PROCEDURE: CT SPINE CERVICAL (CPT=72125)  COMPARISON: None.  INDICATIONS: Neck pain post fall.  TECHNIQUE:   Multi-planar CT images were obtained without intravenous contrast material.  Automated exposure control for dose reduction was used. Adjustment of the mA and/or kV was done based on the patient's size. Use of iterative reconstruction technique for dose reduction was used.  Dose information is transmitted to the ACR (American College of Radiology) NRDR (National Radiology Data Registry) which includes the Dose Index Registry.   FINDINGS: CRANIOCERVICAL AREA:   Normal foramen magnum with no Chiari malformation. PARASPINAL AREA: No visible mass. BONES: No acute fracture or malalignment.  There is marginal osteophyte formation and facet arthropathy. ALIGNMENT: No significant subluxation. DISCS: Mild degenerative disc disease without significant central canal narrowing. PARASPINAL AREA: Unremarkable. OTHER:   Negative.           CONCLUSION:   No acute fracture or malalignment cervical spine.  Mild cervical spine degenerative change.    Dictated by (CST): Jose Lopez MD on 1/26/2025 at 3:14 PM     Finalized by (CST): Jose Lopez MD on 1/26/2025 at 3:16 PM          CT BRAIN OR HEAD (CPT=70450)    Result Date: 1/26/2025  PROCEDURE: CT BRAIN OR HEAD (CPT=70450)  COMPARISON: [facility], CT BRAIN OR HEAD (CPT=70450), 3/01/2023, 5:08 PM.  INDICATIONS: Weakness.  TECHNIQUE: CT images were obtained without contrast material.  Automated exposure control for dose reduction was used.  Dose information is transmitted to the ACR (American College of Radiology) NRDR (National Radiology Data  Registry) which includes the Dose Index Registry.  FINDINGS:  CSF SPACES: Ventricles, cisterns, and sulci are appropriate for age.  No hydrocephalus, subarachnoid hemorrhage, or mass.  No midline shift.  CEREBRUM: No edema, hemorrhage, mass, acute infarction, or significant atrophy.  WHITE MATTER: Mild chronic microvascular white matter ischemia. CEREBELLUM: No edema, hemorrhage, mass, acute infarction, or significant atrophy.  BRAINSTEM: No edema, hemorrhage, mass, acute infarction, or significant atrophy.  CALVARIUM: No apparent fracture, mass, or other significant visible lesion.  SINUSES: Limited views demonstrate no significant mucosal thickening or fluid.  ORBITS: Prior bilateral cataract surgery.  OTHER: Negative.          CONCLUSION:   No acute intracranial abnormality.    Dictated by (CST): Jose Lopez MD on 1/26/2025 at 3:12 PM     Finalized by (CST): Jose Lopez MD on 1/26/2025 at 3:13 PM          XR CHEST AP PORTABLE  (CPT=71045)    Result Date: 1/26/2025  PROCEDURE: XR CHEST AP PORTABLE  (CPT=71045) TIME: 1303 hours.   COMPARISON: [facility], XR CHEST AP PORTABLE (CPT=71045), 7/01/2024, 12:07 PM.  [facility], XR CHEST AP PORTABLE (CPT=71045), 5/03/2024, 6:58 PM.  [facility], XR CHEST AP PORTABLE (CPT=71045), 12/11/2023, 6:17 AM.  INDICATIONS: Generalized weakness and fall today.  TECHNIQUE:   Single view.   FINDINGS:  CARDIAC/MEDIAST: Borderline cardiomegaly.  No vascular congestion.  LUNGS/PLEURA:  Small bilateral pleural effusions.  Mild opacity right lung base.  No pneumothorax. OTHER:  Degenerative change osseous structures.         CONCLUSION:   Small bilateral pleural effusions.  Mild hazy opacity right lung base which may reflect atelectasis with or without superimposed pneumonia.    Dictated by (CST): Jose Lopez MD on 1/26/2025 at 1:35 PM     Finalized by (CST): Jose Lopez MD on 1/26/2025 at 1:36 PM           ED Course as  of 01/26/25 1516  ------------------------------------------------------------  Time: 01/26 1458  Comment: Case d/w ID Dr. Beckman, recommending vanc/azithro.       [facility]   DIFFERENTIAL DIAGNOSIS: After history and physical exam differential diagnosis includes but is not limited to sepsis, bacteremia, cellulitis, PNA, UTI.    Pulse ox: 95%:Normal on RA, as independently interpreted by myself    Cardiac Monitor Interpretation:   Pulse Readings from Last 1 Encounters:   01/26/25 87   , sinus,      Medical Decision Making  Evaluation for generalized weakness with cough and acute/chronic LLE swelling - labs/CXR as noted; cultures/lactic noted with vanc/azithro initiated after discussion with ID Dr. Beckman, case d/w [facility] hospitalist Dr. Luo for admission.    Problems Addressed:  COVID-19: acute illness or injury  Left leg cellulitis: acute illness or injury  Right lower lobe consolidation (HCC): acute illness or injury  Weakness generalized: acute illness or injury    Amount and/or Complexity of Data Reviewed  Independent Historian: EMS     Details: Collateral history obtained from EMS  External Data Reviewed: labs, radiology, ECG and notes.     Details: 7/2024 discharge summary, 12/2024 BMP,  10/9/2024 EKG, 5/2024 TTE reviewed  Labs: ordered. Decision-making details documented in ED Course.  Radiology: ordered and independent interpretation performed. Decision-making details documented in ED Course.     Details: CXR without obvious pneumothorax as independently interpreted by myself    ECG/medicine tests: ordered and independent interpretation performed. Decision-making details documented in ED Course.  Discussion of management or test interpretation with external provider(s): Case d/w [facility] hospitalist Dr. Luo for admission, ID Dr. Alexandre in consultation    Risk  Prescription drug management.  Decision regarding hospitalization.        I was wearing at minimum a facemask and eye protection throughout this encounter  with handwashing performed prior and after patient evaluation without personal hand/facial/oropharyngeal contact and gloves worn throughout encounter. See note and/or contact this provider for further PPE details.    Disposition and Plan     Clinical Impression:  1. Weakness generalized    2. COVID-19    3. Right lower lobe consolidation (HCC)    4. Left leg cellulitis        Disposition:  Admit    Follow-up:  No follow-up provider specified.    Medications Prescribed:  Current Discharge Medication List

## 2025-01-26 NOTE — ED INITIAL ASSESSMENT (HPI)
Patient to ed via ems 911 called from senior living facility, co of fall x today + head injury. +blood thinners denies dizziness co of posterior headache. Patient reports has been feeling weaker x few days

## 2025-01-27 ENCOUNTER — APPOINTMENT (OUTPATIENT)
Dept: GENERAL RADIOLOGY | Facility: HOSPITAL | Age: 80
End: 2025-01-27
Attending: HOSPITALIST
Payer: MEDICARE

## 2025-01-27 ENCOUNTER — APPOINTMENT (OUTPATIENT)
Dept: CT IMAGING | Facility: HOSPITAL | Age: 80
End: 2025-01-27
Attending: INTERNAL MEDICINE
Payer: MEDICARE

## 2025-01-27 LAB
ANION GAP SERPL CALC-SCNC: 7 MMOL/L (ref 0–18)
ATRIAL RATE: 75 BPM
BASOPHILS # BLD AUTO: 0.05 X10(3) UL (ref 0–0.2)
BASOPHILS NFR BLD AUTO: 0.7 %
BUN BLD-MCNC: 10 MG/DL (ref 9–23)
BUN/CREAT SERPL: 13.7 (ref 10–20)
CALCIUM BLD-MCNC: 7.8 MG/DL (ref 8.7–10.4)
CHLORIDE SERPL-SCNC: 110 MMOL/L (ref 98–112)
CO2 SERPL-SCNC: 29 MMOL/L (ref 21–32)
CREAT BLD-MCNC: 0.73 MG/DL
DEPRECATED RDW RBC AUTO: 64.3 FL (ref 35.1–46.3)
EGFRCR SERPLBLD CKD-EPI 2021: 85 ML/MIN/1.73M2 (ref 60–?)
EOSINOPHIL # BLD AUTO: 0.22 X10(3) UL (ref 0–0.7)
EOSINOPHIL NFR BLD AUTO: 3.1 %
ERYTHROCYTE [DISTWIDTH] IN BLOOD BY AUTOMATED COUNT: 17.5 % (ref 11–15)
GLUCOSE BLD-MCNC: 91 MG/DL (ref 70–99)
HCT VFR BLD AUTO: 32.3 %
HGB BLD-MCNC: 11 G/DL
IMM GRANULOCYTES # BLD AUTO: 0.03 X10(3) UL (ref 0–1)
IMM GRANULOCYTES NFR BLD: 0.4 %
LYMPHOCYTES # BLD AUTO: 1.46 X10(3) UL (ref 1–4)
LYMPHOCYTES NFR BLD AUTO: 20.6 %
MAGNESIUM SERPL-MCNC: 1.8 MG/DL (ref 1.6–2.6)
MCH RBC QN AUTO: 33.8 PG (ref 26–34)
MCHC RBC AUTO-ENTMCNC: 34.1 G/DL (ref 31–37)
MCV RBC AUTO: 99.4 FL
MONOCYTES # BLD AUTO: 0.76 X10(3) UL (ref 0.1–1)
MONOCYTES NFR BLD AUTO: 10.7 %
MRSA DNA SPEC QL NAA+PROBE: NEGATIVE
NEUTROPHILS # BLD AUTO: 4.57 X10 (3) UL (ref 1.5–7.7)
NEUTROPHILS # BLD AUTO: 4.57 X10(3) UL (ref 1.5–7.7)
NEUTROPHILS NFR BLD AUTO: 64.5 %
OSMOLALITY SERPL CALC.SUM OF ELEC: 301 MOSM/KG (ref 275–295)
P AXIS: 58 DEGREES
P-R INTERVAL: 154 MS
PLATELET # BLD AUTO: 285 10(3)UL (ref 150–450)
POTASSIUM SERPL-SCNC: 4 MMOL/L (ref 3.5–5.1)
Q-T INTERVAL: 370 MS
QRS DURATION: 70 MS
QTC CALCULATION (BEZET): 413 MS
R AXIS: 72 DEGREES
RBC # BLD AUTO: 3.25 X10(6)UL
SODIUM SERPL-SCNC: 146 MMOL/L (ref 136–145)
T AXIS: 71 DEGREES
VENTRICULAR RATE: 75 BPM
WBC # BLD AUTO: 7.1 X10(3) UL (ref 4–11)

## 2025-01-27 PROCEDURE — 73562 X-RAY EXAM OF KNEE 3: CPT | Performed by: HOSPITALIST

## 2025-01-27 PROCEDURE — 99213 OFFICE O/P EST LOW 20 MIN: CPT

## 2025-01-27 PROCEDURE — 80048 BASIC METABOLIC PNL TOTAL CA: CPT | Performed by: INTERNAL MEDICINE

## 2025-01-27 PROCEDURE — XW033E5 INTRODUCTION OF REMDESIVIR ANTI-INFECTIVE INTO PERIPHERAL VEIN, PERCUTANEOUS APPROACH, NEW TECHNOLOGY GROUP 5: ICD-10-PCS | Performed by: INTERNAL MEDICINE

## 2025-01-27 PROCEDURE — 97166 OT EVAL MOD COMPLEX 45 MIN: CPT

## 2025-01-27 PROCEDURE — 97162 PT EVAL MOD COMPLEX 30 MIN: CPT

## 2025-01-27 PROCEDURE — 73701 CT LOWER EXTREMITY W/DYE: CPT | Performed by: INTERNAL MEDICINE

## 2025-01-27 PROCEDURE — 85025 COMPLETE CBC W/AUTO DIFF WBC: CPT | Performed by: INTERNAL MEDICINE

## 2025-01-27 PROCEDURE — 83735 ASSAY OF MAGNESIUM: CPT | Performed by: INTERNAL MEDICINE

## 2025-01-27 PROCEDURE — 97530 THERAPEUTIC ACTIVITIES: CPT

## 2025-01-27 PROCEDURE — 97535 SELF CARE MNGMENT TRAINING: CPT

## 2025-01-27 RX ORDER — MAGNESIUM OXIDE 400 MG/1
400 TABLET ORAL ONCE
Status: COMPLETED | OUTPATIENT
Start: 2025-01-27 | End: 2025-01-27

## 2025-01-27 NOTE — CONSULTS
[facility]  part of [facility] Infectious Disease  Report of Consultation    Adore Covington Patient Status:  Inpatient    1945 MRN [id number]   Location [facility] 5SW/SE Attending Gabby Boudreaux MD   Hosp Day # 1 PCP Maral Huffman MD     Date of Admission:  2025  Date of Consult:  2025    Reason for Consultation:  L lateral knee wound, COVID    History of Present Illness:  Adore Covington is a a(n) 79 year old female being seen at your request regarding a complicated L lateral knee wound, COVID.  Patient is well known to our service with a h/o admissions for LLE wounds and cellulitis int he past.  Patient was admitted in 2024 with LLE cellulitis and failed outpatient p.o. levaquin and doxycyline.  She was ultimately admitted for IV Rx and completed home vancomycin and cefepime in 2024.      Patient sustained a fall around [place] with injury to her L knee.  She was participating in PT but began having worsening weakness.  Patient then sustained a fall onto the carpet, striking her head on the day of admission.    On exam she was noted to have a wound to her L knee with concurrent LLE cellulitis.  Cultures were obtained and she was started on IV vancomycin and zosyn.      Additionally patient found to have acute COVID+ status.  CXR with mild hazy RLL infiltrate.  Antibiotics as above will cover.  Patient is breathing well on RA.  We are asked to see and assist.    History:  Past Medical History:    Arrhythmia    Atrial fibrillation (HCC)    Congestive heart disease (HCC)    COPD (chronic obstructive pulmonary disease) (HCC)    Essential hypertension    High blood pressure    High cholesterol    Morbid obesity with BMI of 50.0-59.9, adult (HCC)    Muscle weakness    Neuropathy    hands and feet    Osteoarthritis    Peripheral vascular disease    Sleep apnea    uses CPAP    Visual impairment    contact lens in right eye      Past Surgical History:   Procedure Laterality Date    Cataract      Hysterectomy      partial    Knee surgery Bilateral     2002; revision of left knee 2016     No family history on file.   reports that she quit smoking about 42 years ago. Her smoking use included cigarettes. She has never been exposed to tobacco smoke. She has never used smokeless tobacco. She reports current alcohol use. She reports that she does not use drugs.    Allergies:  Allergies[1]    Medications:    Current Facility-Administered Medications:     piperacillin-tazobactam (Zosyn) 4.5 g in dextrose 5% 100 mL IVPB-ADDV, 4.5 g, Intravenous, Q8H    acetaminophen (Tylenol Extra Strength) tab 500 mg, 500 mg, Oral, Q4H PRN    melatonin tab 3 mg, 3 mg, Oral, Nightly PRN    polyethylene glycol (PEG 3350) (Miralax) 17 g oral packet 17 g, 17 g, Oral, Daily PRN    sennosides (Senokot) tab 17.2 mg, 17.2 mg, Oral, Nightly PRN    bisacodyl (Dulcolax) 10 MG rectal suppository 10 mg, 10 mg, Rectal, Daily PRN    fleet enema (Fleet) rectal enema 133 mL, 1 enema, Rectal, Once PRN    metoclopramide (Reglan) 5 mg/mL injection 5 mg, 5 mg, Intravenous, Q8H PRN    apixaban (Eliquis) tab 5 mg, 5 mg, Oral, BID    bumetanide (Bumex) tab 0.5 mg, 0.5 mg, Oral, Daily    cholecalciferol (Vitamin D3) tab 2,000 Units, 2,000 Units, Oral, Daily    cyanocobalamin (Vitamin B12) tab 1,000 mcg, 1,000 mcg, Oral, Daily    glycerin-hypromellose- (Artificial Tears) 0.2-0.2-1 % ophthalmic solution 1 drop, 1 drop, Both Eyes, BID    dofetilide (Tikosyn) cap 250 mcg, 250 mcg, Oral, BID    eplerenone (Inspra) tab 12.5 mg, 12.5 mg, Oral, Daily    atorvastatin (Lipitor) tab 5 mg, 5 mg, Oral, Nightly    vancomycin (Vancocin) 1.25 g in sodium chloride 0.9% 250mL IVPB premix, 1,250 mg, Intravenous, Q24H    Review of Systems:    Constitutional:  No fevers, chills, diaphoresis, weight changes.   HEENT:  No visual changes, oral ulcers, sore throat, difficulty swallowing.   Respiratory:  Negative for cough, sputum, hemoptysis, chest pain, wheezing, dyspnea on exertion, or stridor.   Cardiovascular: Negative for chest pain, palpitations, irregular heart beats.   Gastrointestinal:  No abdominal pain, nausea, vomiting, diarrhea, or constipation.   Genitourinary:  No dysuria, hematuria, urine urgency or frequency.   Integument/breast: Negative for rash, skin lesions, and pruritus.   Hematologic/lymphatic: Negative for easy bruising, bleeding, and lymphadenopathy.   Musculoskeletal: L knee wound.  Cellulitis LEs.   Neurological: Weakness, falls.   Psych:  No h/o anxiety, depression, other psych d/o.   Endocrine: No history of of diabetes, thyroid disorder.    Remainder of 12 point review of systems otherwise negative.    Vital signs in last 24 hours:  Patient Vitals for the past 24 hrs:   BP Temp Temp src Pulse Resp SpO2 Height Weight   01/27/25 1047 (!) 84/58 -- -- (!) 121 -- -- -- --   01/27/25 0948 101/53 98 °F (36.7 °C) Oral 83 18 97 % -- --   01/27/25 0522 (!) 85/51 98.5 °F (36.9 °C) Oral 79 18 94 % -- --   01/26/25 2206 (!) 83/52 97.5 °F (36.4 °C) Oral 84 18 97 % -- --   01/26/25 2015 -- -- -- 102 -- -- -- --   01/26/25 1804 95/51 -- -- 89 16 99 % -- --   01/26/25 1758 -- -- -- 87 -- -- -- --   01/26/25 1612 97/53 97.5 °F (36.4 °C) Oral 92 18 97 % -- --   01/26/25 1609 -- -- -- -- -- -- 5' 2\" (1.575 m) 200 lb 9.9 oz (91 kg)   01/26/25 1240 103/70 97.8 °F (36.6 °C) Oral 87 18 95 % -- --       Intake/Output:  No intake/output data recorded.    Physical Exam:   General: Awake, alert, non-tox and in NAD.   Head: Normocephalic, without obvious abnormality, atraumatic.   Eyes: Conjunctivae/corneas clear.  No scleral icterus.  No conjunctival     hemorrhage.   Nose: Nares normal.   Throat:  Oropharynx clear, MMs moist.   Neck: Trachea ML, no masses.   Lungs: CTA b/l no rhonchi, rales, wheezes.   Chest wall: No tenderness or deformity.   Heart: Regular rate and rhythm, normal S1S2, no murmurs.   Abdomen:  Soft, NT/ND.  Bowel sounds present.  No organomegaly.   Extremity: L lateral knee wound as below:     Skin: No rashes or lesions.   Neurological: No focal neurologic deficits.    Lab Data Review:  Lab Results   Component Value Date    WBC 7.1 01/27/2025    HGB 11.0 01/27/2025    HCT 32.3 01/27/2025    .0 01/27/2025    CREATSERUM 0.73 01/27/2025    BUN 10 01/27/2025     01/27/2025    K 4.0 01/27/2025     01/27/2025    CO2 29.0 01/27/2025    GLU 91 01/27/2025    CA 7.8 01/27/2025    ALB 2.8 01/26/2025    ALKPHO 160 01/26/2025    BILT 1.0 01/26/2025    TP 5.6 01/26/2025    AST 89 01/26/2025    ALT 39 01/26/2025    TSH 1.647 01/26/2025    MG 1.8 01/27/2025      Cultures:   H/o MRSA+ carriage    Blood cultures negative    Wound cultures pending    COVID+    Radiology:  CONCLUSION:      Small bilateral pleural effusions.  Mild hazy opacity right lung base which may reflect atelectasis with or without superimposed pneumonia.     Assessment and Plan:    Syndrome of weakness and fall prior to admission which appears to be more likely related to acute COVID+ status vs. Other  - No known sick contacts  - CXR with mild hazy infiltrate  - No high fevers  - Breathing well on RA  - Empiric vancomycin and zosyn started    2.  H/o admissions for recurrent LLE cellulitis with long course IV Rx required June/July 2024  - Currently with injury to her L knee and wound present  - Cultures obtained and pending  - Will get CT of the knee to evaluate for deeper seated process  - IV vancomycin and zosyn as above    3.  Disposition - inpatient.  Supportive care ongoing.  Given advanced age and several medical comorbidities, patient would benefit from remdesivir non-hypoxic 3 day protocol.  Will Rx.  Continue IV vancomycin and zosyn for now but hope to streamline soon pending further culture data and clinical course.  Check CT LLE as above.  Local wound care ongoing.  Will follow.    Georgina Rice DO, [facility]  and Care Infectious Disease  [phone]    1/27/2025  11:38 AM         [1]   Allergies  Allergen Reactions    Cefepime RASH     Generalized

## 2025-01-27 NOTE — PLAN OF CARE
Problem: Patient Centered Care  Goal: Patient preferences are identified and integrated in the patient's plan of care  Description: Interventions:  - What would you like us to know as we care for you?   - Provide timely, complete, and accurate information to patient/family  - Incorporate patient and family knowledge, values, beliefs, and cultural backgrounds into the planning and delivery of care  - Encourage patient/family to participate in care and decision-making at the level they choose  - Honor patient and family perspectives and choices  Outcome: Progressing     Problem: SKIN/TISSUE INTEGRITY - ADULT  Goal: Skin integrity remains intact  Description: INTERVENTIONS  - Assess and document risk factors for pressure ulcer development  - Assess and document skin integrity  - Monitor for areas of redness and/or skin breakdown  - Initiate interventions, skin care algorithm/standards of care as needed  Outcome: Progressing     Problem: MUSCULOSKELETAL - ADULT  Goal: Return mobility to safest level of function  Description: INTERVENTIONS:  - Assess patient stability and activity tolerance for standing, transferring and ambulating w/ or w/o assistive devices  - Assist with transfers and ambulation using safe patient handling equipment as needed  - Ensure adequate protection for wounds/incisions during mobilization  - Obtain PT/OT consults as needed  - Advance activity as appropriate  - Communicate ordered activity level and limitations with patient/family  Outcome: Progressing     Problem: Impaired Functional Mobility  Goal: Achieve highest/safest level of mobility/gait  Description: Interventions:  - Assess patient's functional ability and stability  - Promote increasing activity/tolerance for mobility and gait  - Educate and engage patient/family in tolerated activity level and precautions    Outcome: Progressing     Problem: Impaired Activities of Daily Living  Goal: Achieve highest/safest level of independence in  self care  Description: Interventions:  - Assess ability and encourage patient to participate in ADLs to maximize function  - Promote sitting position while performing ADLs such as feeding, grooming, and bathing  - Educate and encourage patient/family in tolerated functional activity level and precautions during self-care    Outcome: Progressing

## 2025-01-27 NOTE — PHYSICAL THERAPY NOTE
PHYSICAL THERAPY EVALUATION - INPATIENT     Room Number: 543/543-A  Evaluation Date: 1/27/2025  Type of Evaluation: Initial   Physician Order: PT Eval and Treat    Presenting Problem: generalized weakness  Co-Morbidities : PMH afib on eliquis, COPD, HTN, HL, obesity  Reason for Therapy: Mobility Dysfunction and Discharge Planning    PHYSICAL THERAPY ASSESSMENT   Patient is a 79 year old female admitted 1/26/2025 for generalized weakness with COVID.  Prior to admission, patient's baseline is independent with intermittent DME use, has been needing RW for the past few weeks due to L knee pain.  Patient is currently functioning below baseline with bed mobility, transfers, and gait.  Patient is requiring moderate assist and maximum assist as a result of the following impairments: decreased functional strength, decreased endurance/aerobic capacity, pain, and medical status.  Physical Therapy will continue to follow for duration of hospitalization.    Patient will benefit from continued skilled PT Services to promote return to prior level of function and safety with continuous assistance and gradual rehabilitative therapy .    PLAN DURING HOSPITALIZATION  Nursing Mobility Recommendation : Lift Equipment     PT Treatment Plan: Bed mobility;Body mechanics;Don/doff brace;Endurance;Family education;Gait training;Range of motion;Stoop training;Transfer training;Balance training  Rehab Potential : Fair  Frequency (Obs): 5x/week     PHYSICAL THERAPY MEDICAL/SOCIAL HISTORY   History related to current admission:cellulitis and weakness     Problem List  Principal Problem:    Weakness generalized  Active Problems:    Left leg cellulitis    COVID-19    Right lower lobe consolidation (HCC)      HOME SITUATION  Type of Home: Independent living facility  Home Layout: One level  Stairs to Enter : 0        Stairs to Bedroom: 0                  Patient Regularly Uses: Rolling walker (has been using all the time since getting sick)      Prior Level of [place]: independent     SUBJECTIVE  \"I was going to get my nerve injected but I decided against it since it permanently kills the nerve.\"     PHYSICAL THERAPY EXAMINATION   OBJECTIVE  Precautions: Bed/chair alarm  Fall Risk: High fall risk    WEIGHT BEARING RESTRICTION       PAIN ASSESSMENT     Location: body aches       COGNITION  Overall Cognitive Status:  WFL - within functional limits    BALANCE  Static Sitting: Fair -  Dynamic Sitting: Fair -  Static Standing: Not tested  Dynamic Standing: Not tested    ACTIVITY TOLERANCE  Pulse: (!) 121  Heart Rate Source: Monitor     BP: (!) 84/58  BP Location: Left arm  BP Method: Automatic  Patient Position: Sitting    O2 WALK  Oxygen Therapy  SPO2% on Room Air at Rest: 94  SPO2% Ambulation on Room Air: 92    AM-PAC '6-Clicks' INPATIENT SHORT FORM - BASIC MOBILITY  How much difficulty does the patient currently have...  Patient Difficulty: Turning over in bed (including adjusting bedclothes, sheets and blankets)?: A Little   Patient Difficulty: Sitting down on and standing up from a chair with arms (e.g., wheelchair, bedside commode, etc.): A Lot   Patient Difficulty: Moving from lying on back to sitting on the side of the bed?: A Lot   How much help from another person does the patient currently need...   Help from Another: Moving to and from a bed to a chair (including a wheelchair)?: A Lot   Help from Another: Need to walk in hospital room?: Total   Help from Another: Climbing 3-5 steps with a railing?: Total     AM-PAC Score:  Raw Score: 11   Approx Degree of Impairment: 72.57%   Standardized Score (AM-PAC Scale): 33.86   CMS Modifier (G-Code): CL    FUNCTIONAL ABILITY STATUS  Functional Mobility/Gait Assessment  Gait Assistance: Not tested  Rolling: minimal assist  Supine to Sit: moderate assist  Sit to Supine: maximum assist    Exercise/Education Provided:  Bed mobility  Body mechanics  Functional activity tolerated  Posture  ROM    The  patient's Approx Degree of Impairment: 72.57% has been calculated based on documentation in the [facility] '6 clicks' Inpatient Basic Mobility Short Form.  Research supports that patients with this level of impairment may benefit from SHARYN.  Final disposition will be made by interdisciplinary medical team.    Patient End of Session: Up in chair;Needs met;Call light within reach;RN aware of session/findings;All patient questions and concerns addressed;Hospital anti-slip socks;Discussed recommendations with /    CURRENT GOALS  Goals to be met by: 2/20  Patient Goal Patient's self-stated goal is: to walk without needing a walker   Goal #1 Patient is able to demonstrate supine - sit EOB @ level: supervision     Goal #1   Current Status    Goal #2 Patient is able to demonstrate transfers Sit to/from Stand at assistance level: supervision with walker - rolling     Goal #2  Current Status    Goal #3 Patient is able to ambulate 25 feet with assist device: walker - rolling at assistance level: minimum assistance   Goal #3   Current Status            Goal #5 Patient to demonstrate independence with home activity/exercise instructions provided to patient in preparation for discharge.   Goal #5   Current Status    Goal #6    Goal #6  Current Status      Patient Evaluation Complexity Level:  History Moderate - 1 or 2 personal factors and/or co-morbidities   Examination of body systems Moderate - addressing a total of 3 or more elements   Clinical Presentation  Moderate - Evolving   Clinical Decision Making  Moderate Complexity     Therapeutic Activity:  10 minutes

## 2025-01-27 NOTE — RESPIRATORY THERAPY NOTE
CPAP/BIPAP EVALUATION: Done    NOTES: Patient was evaluated for SUSAN and stated that she has been diagnosed with SUSAN but hasn't use her CPAP in a month d/t equipment issue She refused CPAP during this admission.

## 2025-01-27 NOTE — PROGRESS NOTES
[facility]  part of [facility]     [facility] Hospitalist Progress Note     PCP: Maral Huffman MD    CC: Follow up       Assessment/Plan:     Patient is a 79 year old female with PMH sig for COPD, A- Fib on Eliquis, CHF, HTN, HLD, PAD, Neuropathy, SUSAN,  prior hx of septic shock 2/2 cellulitis with admission 6/19-6/22 with LLE cellulitis  discharged on oral levaquin and doxy seen by ID on 6/27 and found to have worsening swelling and drainage of her left lower extremity 6/27, sent for admission for IV abx in July 2024 . Patient was discharged on Vanco and cefepime until July 10th with home infusions.  Patient now returns with generalized weakness and fall.  Admitted for COVID and worsening lower extremity cellulitis.     Fall  - generalized weakness  - head trauma on Eliquis   - CTH and C spine with no acute process  - ECG: PACs with T wave changes  - tsh normal   - likely 2/2 COVID   - PT/OT     B/L LE Cellulitis, left worse than right, open sore on left lateral knee without corresponding trauma  Left Knee Wound   Recurrent LLE Cellulitis  Chronic Stasis Dermatitis   Hx MRSA + and Pseudomonas  Left knee replacement x 2 in [place], most recently approximately 6 years ago  -6/19 discharged on levaquin and doxy, seen by ID 6/27 w/ worsening swelling and drainage  and sent in for IV abx  -in July patient was discharged on Vanco and cefepime until July 10th with home infusions  -afebrile. No leukocytosis. La negative on admission    -had a fall on the left knee in December, seen ortho   -Also had nerve block done on 12/20/2024 but did not proceed with ablation per patient preference  -check MRSA   -blood  cx   -wound cx   -wound care  -zosyn and vanco started   -ID consult   -Left lateral leg wound present starting on Tuesday, no corresponding trauma, draining at home, actively draining here.  Await cultures.  X-ray ordered and ID to evaluate.  Continue antibiotics as above.     COVID   -afebrile. No  leukocytosis. La negative  -no RA  -CXR Small bilateral pleural effusions.  Mild hazy opacity right lung base which may reflect atelectasis with or without superimposed pneumonia  -will cover with vanc/zosyn   -AST elevated, will trend   -Clinically not symptomatic, no indication for steroids or remdesivir at this time.     PaFib S/P CV 1/2024  Secondary Hypercoagulable state   S/p Cardioversion   HTN  HL  Chronic Diastolic CHF  -5/2024 echo with normal EF 55%, mild as, mild ai   -continue  home eliquis, Tikosyn, bumex, eplerenone and pravastatin  -home lopressor/Toprol held with lower bp   -BNP 1,372, b/l LE edema   -CXR Small bilateral pleural effusions  -will given 20mg iv lasix x1 on admission, overall improved from prior  -on RA       Hypokalemia   - lyte potocol      COPD not on inhalers   SSUAN  - cpap as tolerated      PAD  -continue statin.   -not on asa 2/2 eliquis      Neuropathy   - off gabapentin      GERD  - off PPI     GOC  - DNR/Select-confirmed with pt      FN:  - IVF: hold  - Diet: general      DVT Prophy: SCDs Eliquis -> Dose morning of 1/27/2025, will hold for now until Ortho evaluation complete, if needed will consider heparin drip.  Currently in sinus  Atrophy: Ambulate PT/OT  Lines: Piv     MA/ACO Reach  -ER Visits 2025: 1  -Admissions 2025: 1  -Re- Entry: no  -Consults: ID   -Discharge Needs:TBD  -Appointments: [ ] PCP Maral Huffman                            [ ] ID Rk                             [ ] outpt wound      Dispo: pending clinical course    Questions/concerns were discussed with patient and/or family by bedside.    Note: This chart was prepared using voice recognition software and may contain unintended word substitution errors.       Thank You,  Gabby Boudreaux M.D.  [facility] Hospitalist  Answering Service: [phone]        Subjective     Tells me that the wound seemed to pop out of nowhere on Tuesday.  Lots of drainage at home was soaking her pants.  Seems more open today  but size is about the same.  No acute change in her chronic left knee pain.  No CP, SOB, or N/V.      Objective     OBJECTIVE:  Temp:  [97.5 °F (36.4 °C)-98.5 °F (36.9 °C)] 98.5 °F (36.9 °C)  Pulse:  [] 79  Resp:  [16-18] 18  BP: ()/(51-70) 85/51  SpO2:  [94 %-99 %] 94 %    Intake/Output:    Intake/Output Summary (Last 24 hours) at 1/27/2025 0856  Last data filed at 1/27/2025 0528  Gross per 24 hour   Intake 500 ml   Output 1070 ml   Net -570 ml       Last 3 Weights   01/26/25 1609 200 lb 9.9 oz (91 kg)   12/17/24 1214 210 lb (95.3 kg)   10/09/24 0415 218 lb 3.2 oz (99 kg)   10/08/24 0215 218 lb 12.8 oz (99.2 kg)   10/07/24 1849 217 lb 1.6 oz (98.5 kg)       Exam  General: Alert, no acute distress  HEENT: oral mucosa normal   Neck: non tender, no adenopathy   Lungs: good air movement, good effort   Heart: Regular rate and rhythm  Abdomen: soft, non tender, non distended   Extremities: b/l LE edema, b/l leg redness left worse than right for both, left knee with wound and discharge, no acute tenderness to palpation directly over knee, no direct warmth over knee, active drainage from lateral left knee wound.  Skin: no new rash, normal color  Neuro: 5/5 strength in bilateral extremities, normal sensations  Psych: appropriate affect     Medications      magnesium oxide  400 mg Oral Once    piperacillin-tazobactam  4.5 g Intravenous Q8H    apixaban  5 mg Oral BID    bumetanide  0.5 mg Oral Daily    cholecalciferol  2,000 Units Oral Daily    cyanocobalamin  1,000 mcg Oral Daily    glycerin-hypromellose-  1 drop Both Eyes BID    dofetilide  250 mcg Oral BID    eplerenone  12.5 mg Oral Daily    atorvastatin  5 mg Oral Nightly    vancomycin  1,250 mg Intravenous Q24H         acetaminophen    melatonin    polyethylene glycol (PEG 3350)    sennosides    bisacodyl    fleet enema    metoclopramide    Data Review:       Labs:     Recent Labs   Lab 01/26/25  1253 01/27/25  0827   WBC 8.4 7.1   HGB 12.1 11.0*    MCV 98.7 99.4   .0 285.0       Recent Labs   Lab 01/26/25  1253 01/27/25  0620    146*   K 3.0* 4.0    110   CO2 27.0 29.0   BUN 11 10   CREATSERUM 0.84 0.73   CA 8.6* 7.8*   MG  --  1.8   GLU 98 91       Recent Labs   Lab 01/26/25  1253   ALT 39   AST 89*   ALB 2.8*       No results for input(s): \"PGLU\" in the last 168 hours.    No results for input(s): \"TROP\" in the last 168 hours.    Imaging:  CT SPINE CERVICAL (CPT=72125)    Result Date: 1/26/2025  PROCEDURE: CT SPINE CERVICAL (CPT=72125)  COMPARISON: None.  INDICATIONS: Neck pain post fall.  TECHNIQUE:   Multi-planar CT images were obtained without intravenous contrast material.  Automated exposure control for dose reduction was used. Adjustment of the mA and/or kV was done based on the patient's size. Use of iterative reconstruction technique for dose reduction was used.  Dose information is transmitted to the ACR (American College of Radiology) NRDR (National Radiology Data Registry) which includes the Dose Index Registry.   FINDINGS: CRANIOCERVICAL AREA:   Normal foramen magnum with no Chiari malformation. PARASPINAL AREA: No visible mass. BONES: No acute fracture or malalignment.  There is marginal osteophyte formation and facet arthropathy. ALIGNMENT: No significant subluxation. DISCS: Mild degenerative disc disease without significant central canal narrowing. PARASPINAL AREA: Unremarkable. OTHER:   Negative.           CONCLUSION:   No acute fracture or malalignment cervical spine.  Mild cervical spine degenerative change.    Dictated by (CST): Jose Lopez MD on 1/26/2025 at 3:14 PM     Finalized by (CST): Jose Lopez MD on 1/26/2025 at 3:16 PM          CT BRAIN OR HEAD (CPT=70450)    Result Date: 1/26/2025  PROCEDURE: CT BRAIN OR HEAD (CPT=70450)  COMPARISON: [facility], CT BRAIN OR HEAD (CPT=70450), 3/01/2023, 5:08 PM.  INDICATIONS: Weakness.  TECHNIQUE: CT images were obtained without contrast material.   Automated exposure control for dose reduction was used.  Dose information is transmitted to the ACR (American College of Radiology) NRDR (National Radiology Data Registry) which includes the Dose Index Registry.  FINDINGS:  CSF SPACES: Ventricles, cisterns, and sulci are appropriate for age.  No hydrocephalus, subarachnoid hemorrhage, or mass.  No midline shift.  CEREBRUM: No edema, hemorrhage, mass, acute infarction, or significant atrophy.  WHITE MATTER: Mild chronic microvascular white matter ischemia. CEREBELLUM: No edema, hemorrhage, mass, acute infarction, or significant atrophy.  BRAINSTEM: No edema, hemorrhage, mass, acute infarction, or significant atrophy.  CALVARIUM: No apparent fracture, mass, or other significant visible lesion.  SINUSES: Limited views demonstrate no significant mucosal thickening or fluid.  ORBITS: Prior bilateral cataract surgery.  OTHER: Negative.          CONCLUSION:   No acute intracranial abnormality.    Dictated by (CST): Jose Lopez MD on 1/26/2025 at 3:12 PM     Finalized by (CST): Jose Lopez MD on 1/26/2025 at 3:13 PM          XR CHEST AP PORTABLE  (CPT=71045)    Result Date: 1/26/2025  PROCEDURE: XR CHEST AP PORTABLE  (CPT=71045) TIME: 1303 hours.   COMPARISON: [facility], XR CHEST AP PORTABLE (CPT=71045), 7/01/2024, 12:07 PM.  [facility], XR CHEST AP PORTABLE (CPT=71045), 5/03/2024, 6:58 PM.  [facility], XR CHEST AP PORTABLE (CPT=71045), 12/11/2023, 6:17 AM.  INDICATIONS: Generalized weakness and fall today.  TECHNIQUE:   Single view.   FINDINGS:  CARDIAC/MEDIAST: Borderline cardiomegaly.  No vascular congestion.  LUNGS/PLEURA:  Small bilateral pleural effusions.  Mild opacity right lung base.  No pneumothorax. OTHER:  Degenerative change osseous structures.         CONCLUSION:   Small bilateral pleural effusions.  Mild hazy opacity right lung base which may reflect atelectasis with or without superimposed  pneumonia.    Dictated by (CST): Jose Lopez MD on 1/26/2025 at 1:35 PM     Finalized by (CST): Jose oLpez MD on 1/26/2025 at 1:36 PM

## 2025-01-27 NOTE — PROGRESS NOTES
WOUND CARE NOTE    History:  Past Medical History:    Arrhythmia    Atrial fibrillation (HCC)    Congestive heart disease (HCC)    COPD (chronic obstructive pulmonary disease) (HCC)    Essential hypertension    High blood pressure    High cholesterol    Morbid obesity with BMI of 50.0-59.9, adult (HCC)    Muscle weakness    Neuropathy    hands and feet    Osteoarthritis    Peripheral vascular disease    Sleep apnea    uses CPAP    Visual impairment    contact lens in right eye     Past Surgical History:   Procedure Laterality Date    Cataract      Hysterectomy      partial    Knee surgery Bilateral     ; revision of left knee 2016      Social History     Socioeconomic History    Marital status:    Tobacco Use    Smoking status: Former     Current packs/day: 0.00     Types: Cigarettes     Quit date:      Years since quittin.1     Passive exposure: Never    Smokeless tobacco: Never   Vaping Use    Vaping status: Never Used   Substance and Sexual Activity    Alcohol use: Yes     Comment: 1-2 drinks daily    Drug use: Never     Social Drivers of Health     Financial Resource Strain: Unknown (2021)    [facility] from [employer]    Overall Financial Resource Strain (CARDIA)     Difficulty of Paying Living Expenses: Patient declined   Food Insecurity: No Food Insecurity (2025)    Food Insecurity     Food Insecurity: Never true   Transportation Needs: No Transportation Needs (2025)    Transportation Needs     Lack of Transportation: No   Housing Stability: Low Risk  (2025)    Housing Stability     Housing Instability: No         PLAN   Recommendations:  Recommend Ortho to assess left lateral knee wound that is indurated with moderate exudate  ID, Pt and PT are  currently on consult  To prevent sliding: decrease head of bed and elevate foot of bed as medical condition tolerates  Glucose control to help promote wound healing    Wound(s)  Location: Left lateral  knee  Cleansing  Saline   Dressings Silver alginate  Secure with Border foam or Kerlix roll and tape  Frequency daily and prn if saturated   Location: Right elbow  Cleansing  Saline   Dressings Xeroform  Secure with gauze, border foam  Frequency daily     Discharge Recommendations: The pt. may need assist at home, s/p fall at home yesterday      OBJECTIVE   MD Consult new left knee and right elbow      ASSESSMENT   Janes Score:  Janes Scale Score: 18    Chart Reviewed: yes    Wound(s):  The pt. is sitting up in bed, Covid Isolation maintained, see the wound assessments, per the pt. she fell at home yesterday, skin  tear to right elbow, she is s/p nerve block 12/20/24 by , Behar to the left knee. Per the pt. she has been scratching  her left lateral knee, moderate amt of yellow serous exudate with induration. I will notify the MD with recommendations, All questions answered and spoke with the nurse.          Allergies: Cefepime    Labs:   Lab Results   Component Value Date    WBC 7.1 01/27/2025    HGB 11.0 (L) 01/27/2025    HCT 32.3 (L) 01/27/2025    .0 01/27/2025    CREATSERUM 0.73 01/27/2025    BUN 10 01/27/2025     (H) 01/27/2025    K 4.0 01/27/2025     01/27/2025    CO2 29.0 01/27/2025    GLU 91 01/27/2025    CA 7.8 (L) 01/27/2025    ALB 2.8 (L) 01/26/2025    ALKPHO 160 (H) 01/26/2025    BILT 1.0 01/26/2025    TP 5.6 (L) 01/26/2025    AST 89 (H) 01/26/2025    ALT 39 01/26/2025    MG 1.8 01/27/2025     No results found for: \"PREALBUMIN\"      Time Spent 30 Minutes.    Prerna Gottlieb RN  [facility]  [facility]  [phone]     01/27/25 1013   Wound 01/26/25 Knee Left;Lateral   Date First Assessed/Time First Assessed: 01/26/25 1529   Present on Original Admission: Yes  Primary Wound Type: Old surgical  Location: Knee  Wound Location Orientation: Left;Lateral  Wound Description (Comments): x3   s/p injection 12/20/24   Wound Image    Site Assessment  Moist;Fragile;Pink;Red  (bumpy)   Drainage Amount Moderate   Drainage Description Yellow;Serous   Treatments Cleansed;Saline;Site Care   Dressing Aquacel Foam;Gauze  (piece of  6x6 foam for exudate)   Dressing Changed Changed   Dressing Status Clean;Dry;Intact;Dressing Changed   Wound Depth (cm) 0.1 cm   Non-staged Wound Description Full thickness   Kamryn-wound Assessment Fragile;Induration;Pink;Red   State of Healing Non-healing   Wound Odor None   Wound 01/27/25 Elbow Posterior;Right   Date First Assessed/Time First Assessed: 01/27/25 1012   Present on Original Admission: Yes  Primary Wound Type: Skin Tear  Location: Elbow  Wound Location Orientation: Posterior;Right   Wound Image    Site Assessment Moist;Pink;Fragile   Drainage Amount None   Treatments Cleansed;Site Care   Dressing 2x2s;Aquacel Foam;Xeroform   Dressing Changed Changed   Dressing Status Clean;Dry;Intact;Dressing Changed   Wound Depth (cm) 0.1 cm   Non-staged Wound Description Partial thickness   Kamryn-wound Assessment Fragile;Intact;Pink   Wound Granulation Tissue Pink   Wound Bed Granulation (%) 100 %   State of Healing Early/partial granulation   Wound Odor None   Wound Follow Up   Follow up needed Yes

## 2025-01-27 NOTE — CONSULTS
Patient type: New patient (not seen by myself or practice partner over the past 3 years)    Problem(s), Ilness(es), Injury(ies): Left knee pain, wound, possible infection    Number and complexity of Problems Addressed: 1 or more chronic illnesses with exacerbation, progression, or side effects of treatment    NURSING INTAKE COMMENTS:   Chief Complaint   Patient presents with    Fall    Numbness Weakness       HPI: This 79 year old female presents today with complaints of left knee pain and stiffness and swelling.  Admitted due to weakness and a fall, + COVID.  She noticed some itching on the lateral aspect of the knee and now a wound developed.  Denies increased pain, as she has had longstanding pain and swelling.     Past Medical History:    Arrhythmia    Atrial fibrillation (HCC)    Congestive heart disease (HCC)    COPD (chronic obstructive pulmonary disease) (HCC)    Essential hypertension    High blood pressure    High cholesterol    Morbid obesity with BMI of 50.0-59.9, adult (HCC)    Muscle weakness    Neuropathy    hands and feet    Osteoarthritis    Peripheral vascular disease    Sleep apnea    uses CPAP    Visual impairment    contact lens in right eye       Past Surgical History:   Procedure Laterality Date    Cataract      Hysterectomy      partial    Knee surgery Bilateral     ; revision of left knee        Medications Ordered Prior to Encounter[1]    Allergies[2]    No family history on file.    Social History     Socioeconomic History    Marital status:      Spouse name: Not on file    Number of children: Not on file    Years of education: Not on file    Highest education level: Not on file   Occupational History    Not on file   Tobacco Use    Smoking status: Former     Current packs/day: 0.00     Types: Cigarettes     Quit date:      Years since quittin.1     Passive exposure: Never    Smokeless tobacco: Never   Vaping Use    Vaping status: Never Used   Substance and Sexual  Activity    Alcohol use: Yes     Comment: 1-2 drinks daily    Drug use: Never    Sexual activity: Not on file   Other Topics Concern    Not on file   Social History Narrative    Not on file     Social Drivers of Health     Financial Resource Strain: Unknown (12/14/2021)    [facility]    Overall Financial Resource Strain (CARDIA)     Difficulty of Paying Living Expenses: Patient declined   Food Insecurity: No Food Insecurity (1/26/2025)    Food Insecurity     Food Insecurity: Never true   Transportation Needs: No Transportation Needs (1/26/2025)    Transportation Needs     Lack of Transportation: No     Car Seat: Not on file   Physical Activity: Not on file   Stress: Not on file   Social Connections: Not on file   Housing Stability: Low Risk  (1/26/2025)    Housing Stability     Housing Instability: No     Housing Instability Emergency: Not on file     Crib or Bassinette: Not on file       Physical Examination:  Body mass index is 36.69 kg/m².,   Wt Readings from Last 6 Encounters:   01/26/25 200 lb 9.9 oz (91 kg)   12/17/24 210 lb (95.3 kg)   10/09/24 218 lb 3.2 oz (99 kg)   08/01/24 251 lb 15.8 oz (114.3 kg)   06/27/24 251 lb 14.4 oz (114.3 kg)   06/20/24 255 lb 12.8 oz (116 kg)       This is a pleasant 79 year old female in no acute distress.      Heart, lung, and abdomen are normal: The patient has good peripheral perfusion, non-labored breathing, and a soft abdomen.    Wound on lateral aspect of the upper tibia adjacent to just below the lateral joint line.  No gross purulence, no obvious tunneling, wheeping blood tinged serous fluid.  Does not appear to have any additional swelling or effusion to the knee itself according to patient.    Radiographs/Imaging/Tests reviewed if applicable:   XR KNEE (3 VIEWS), LEFT (CPT=73562)  Narrative: PROCEDURE: XR KNEE ROUTINE (3 VIEWS), LEFT (CPT=73562)     COMPARISON: [facility], XR PAIN CLINIC FLUOROSCOPY - N/C,  12/20/2024, 3:26 PM.     INDICATIONS: Left drain wound prior tka.     TECHNIQUE: 3 views were obtained.       FINDINGS:   BONES: Left knee arthroplasty is present with elongated tibial and femoral stems.  Gross anatomic alignment of the hardware without evidence for loosening or fracture along the margin of the prosthesis.  Chronic bone remodeling involving periosteal   thickening and heterotopic calcification along the margins of the visualized distal femur extending to the femoral condyles.  No is acute osseous fracture or suspicious bone lesion.    SOFT TISSUES: No visible soft tissue swelling.   EFFUSION: Small effusion.  OTHER: Negative.               Impression: CONCLUSION: Post left knee arthroplasty without complication.           Dictated by (CST): Juancho Oropeza MD on 1/27/2025 at 4:37 PM       Finalized by (CST): Juancho Oropeza MD on 1/27/2025 at 4:38 PM            Review of prior external note(s) if applicable: N/A  Assessment requiring an independent historian if applicable: N/A  Discussion of management or test interpretation with another physician if applicable: N/A  Independent Interpretation of tests performed by another physician if applicable: N/A    [unfilled]    Assessment: Painful revision total knee, wound adjacent, unsure if from a septic joint process or more superficial, but appears more of the latter.  Xrays satisfactory.  On Vanc and Zosyn.  Will continue to monitor.  CT pending, ordered by Dr. Rice.        Dragon speech recognition software was used to prepare this note. If a word or phrase is confusing, it is likely due to a failure of recognition. Please contact me with any questions or clarifications.          [1]   No current facility-administered medications on file prior to encounter.     Current Outpatient Medications on File Prior to Encounter   Medication Sig Dispense Refill    simvastatin 10 MG Oral Tab Take 1 tablet (10 mg total) by mouth nightly.      bumetanide 1 MG Oral  Tab Take 0.5 tablets (0.5 mg total) by mouth daily. (Patient taking differently: Take 0.5 tablets (0.5 mg total) by mouth daily. ON HOLD UNTIL )      [] dofetilide 250 MCG Oral Cap Take 1 capsule (250 mcg total) by mouth in the morning and 1 capsule (250 mcg total) before bedtime. 180 capsule 0    metoprolol succinate  MG Oral Tablet 24 Hr Take 0.5 tablets (50 mg total) by mouth daily.      acetaminophen 325 MG Oral Tab Take 2 tablets (650 mg total) by mouth every 6 (six) hours as needed for Pain.      polyethylene glycol, PEG 3350, 17 g Oral Powd Pack Take 17 g by mouth daily as needed. 10 each 0    cholecalciferol 50 MCG (2000 UT) Oral Cap Take 1 capsule (2,000 Units total) by mouth daily. Patient takes 2000 units daily      cyanocobalamin 1000 MCG Oral Tab Take 1 tablet (1,000 mcg total) by mouth daily.      apixaban 5 MG Oral Tab Take 1 tablet (5 mg total) by mouth 2 (two) times daily.      cycloSPORINE 0.05 % Ophthalmic Emulsion Place 1 drop into both eyes 2 (two) times daily.      eplerenone 25 MG Oral Tab Take 0.5 tablets (12.5 mg total) by mouth daily. New script- patient stated she has not taken first dose yet      gabapentin 100 MG Oral Cap Take 3 capsules (300 mg total) by mouth 2 (two) times daily. (Patient not taking: Reported on 2025)      spironolactone 25 MG Oral Tab Take 0.5 tablets (12.5 mg total) by mouth daily. (Patient not taking: Reported on 2025)      pantoprazole 40 MG Oral Tab EC Take 1 tablet (40 mg total) by mouth every morning before breakfast. (Patient not taking: Reported on 2025) 60 tablet 0   [2]   Allergies  Allergen Reactions    Cefepime RASH     Generalized

## 2025-01-27 NOTE — PLAN OF CARE
Problem: Patient Centered Care  Goal: Patient preferences are identified and integrated in the patient's plan of care  Description: Interventions:  - What would you like us to know as we care for you? I live at [facility]  Problem: MUSCULOSKELETAL - ADULT  Goal: Return mobility to safest level of function  Description: INTERVENTIONS:  - Assess patient stability and activity tolerance for standing, transferring and ambulating w/ or w/o assistive devices  - Assist with transfers and ambulation using safe patient handling equipment as needed  - Ensure adequate protection for wounds/incisions during mobilization  - Obtain PT/OT consults as needed  - Advance activity as appropriate  - Communicate ordered activity level and limitations with patient/family  Outcome: Progressing     Problem: Impaired Functional Mobility  Goal: Achieve highest/safest level of mobility/gait  Description: Interventions:  - Assess patient's functional ability and stability  - Promote increasing activity/tolerance for mobility and gait  - Educate and engage patient/family in tolerated activity level and precautions  Outcome: Progressing     Problem: Impaired Activities of Daily Living  Goal: Achieve highest/safest level of independence in self care  Description: Interventions:  - Assess ability and encourage patient to participate in ADLs to maximize function  - Promote sitting position while performing ADLs such as feeding, grooming, and bathing  - Educate and encourage patient/family in tolerated functional activity level and precautions during self-care  Outcome: Progressing     - Provide timely, complete, and accurate information to patient/family  - Incorporate patient and family knowledge, values, beliefs, and cultural backgrounds into the planning and delivery of care  - Encourage patient/family to participate in care and decision-making at the level they choose  - Honor patient and family perspectives and  choices  Outcome: Progressing

## 2025-01-27 NOTE — OCCUPATIONAL THERAPY NOTE
OCCUPATIONAL THERAPY EVALUATION - INPATIENT     Room Number: 543/543-A  Evaluation Date: 1/27/2025  Type of Evaluation: Initial       Physician Order: IP Consult to Occupational Therapy  Reason for Therapy: ADL/IADL Dysfunction and Discharge Planning    OCCUPATIONAL THERAPY ASSESSMENT   RN cleared pt for participation in OT session, which was completed in collaboration with PT. Upon arrival, pt was supine in bed and agreeable to activity. No visitors present during session. Pt was left in bed and alarm was activated. Call light and all needs left in reach. Handoff given to RN.    Patient is a 79 year old female admitted 1/26/2025 for weakness, fall.  Prior to admission, pt was independent.  Patient is currently requiring up to total A for ADLs overall along with max A for supine to sit, CGA for sitting at EOB, and max A for sit to supine. Pt sat at EOB for 10 minutes. Orthostatic; minimal dizziness. Pt has the following impairments: decreased functional strength, decreased functional reach, decreased endurance, pain, and medical status.    ACTIVITY TOLERANCE  Pulse: (!) 121        BP: (!) 84/58           Oxygen Therapy  SPO2% on Room Air at Rest: 94  SPO2% Ambulation on Room Air: 92    Education provided  Educated pt about role of OT and hospital therapy process as well as proper safety techniques including proper hand placement, body mechanics, safety techniques . Pt verbalized/demonstrated fair carryover.    Patient will benefit from continued skilled OT Services to promote return to prior level of function and safety with continuous assistance and gradual rehabilitative therapy    PLAN  OT Treatment Plan: Balance activities;Energy conservation/work simplification techniques;Continued evaluation;Compensatory technique education;Fine motor coordination activities;Neuromuscluar reeducation;Equipment eval/education;Patient/Family training;Patient/Family education;Cognitive reorientation;Endurance training;UE  strengthening/ROM;Functional transfer training;Visual perceptual training;IADL training;ADL training      OCCUPATIONAL THERAPY MEDICAL/SOCIAL HISTORY     Problem List  Principal Problem:    Weakness generalized  Active Problems:    Left leg cellulitis    COVID-19    Right lower lobe consolidation (HCC)      Past Medical History  Past Medical History:    Arrhythmia    Atrial fibrillation (HCC)    Congestive heart disease (HCC)    COPD (chronic obstructive pulmonary disease) (HCC)    Essential hypertension    High blood pressure    High cholesterol    Morbid obesity with BMI of 50.0-59.9, adult (HCC)    Muscle weakness    Neuropathy    hands and feet    Osteoarthritis    Peripheral vascular disease    Sleep apnea    uses CPAP    Visual impairment    contact lens in right eye       Past Surgical History  Past Surgical History:   Procedure Laterality Date    Cataract      Hysterectomy      partial    Knee surgery Bilateral     2002; revision of left knee 2016       HOME SITUATION  Type of Home: Independent living facility  Home Layout: One level  Lives With: Alone                         Patient Regularly Uses: Rolling walker (has been using all the time since getting sick)    SUBJECTIVE  \"I want to do more but I can't with this knee.\"    OCCUPATIONAL THERAPY EXAMINATION      OBJECTIVE  Precautions: Bed/chair alarm  Fall Risk: High fall risk    PAIN ASSESSMENT  Rating: Unable to rate  Location: L knee        RANGE OF MOTION   Upper extremity ROM is within functional limits     STRENGTH ASSESSMENT  Upper extremity strength is within functional limits     COORDINATION  Gross Motor: WFL   Fine Motor: WFL     ACTIVITIES OF DAILY LIVING ASSESSMENT  AM-PAC ‘6-Clicks’ Inpatient Daily Activity Short Form  How much help from another person does the patient currently need…  -   Putting on and taking off regular lower body clothing?: A Lot  -   Bathing (including washing, rinsing, drying)?: A Lot  -   Toileting, which includes  using toilet, bedpan or urinal? : A Lot  -   Putting on and taking off regular upper body clothing?: A Little  -   Taking care of personal grooming such as brushing teeth?: A Little  -   Eating meals?: A Little    AM-PAC Score:  Score: 15  Approx Degree of Impairment: 56.46%  Standardized Score (AM-PAC Scale): 34.69  CMS Modifier (G-Code): CK      FUNCTIONAL TRANSFER ASSESSMENT  Max A for bed mobility    FUNCTIONAL ADL ASSESSMENT  Overall total A    The patient's Approx Degree of Impairment: 56.46% has been calculated based on documentation in the [facility] '6 clicks' Inpatient Daily Activity Short Form.  Research supports that patients with this level of impairment may benefit from GR. Final disposition will be made by interdisciplinary medical team.    OT Goals  Patients self stated goal is: to get stronger     Patient will complete functional transfer with min A  Comment:     Patient will complete toileting with min A  Comment:     Patient will tolerate standing for 2 minutes in prep for adls with min A   Comment:              Goals  on:   Frequency: 3-5x/wk    Patient Evaluation Complexity Level:   Occupational Profile/Medical History MODERATE - Expanded review of history including review of medical or therapy record   Specific performance deficits impacting engagement in ADL/IADL MODERATE  3 - 5 performance deficits   Client Assessment/Performance Deficits MODERATE - Comorbidities and min to mod modifications of tasks    Clinical Decision Making MODERATE - Analysis of occupational profile, detailed assessments, several treatment options    Overall Complexity MODERATE     OT Session Time: 20 minutes  Self-Care Home Management: 10 minutes           Guillermina Vazquez OT  [facility]  Inpatient Rehabilitation  Occupational Therapy  [phone]

## 2025-01-27 NOTE — PLAN OF CARE
No acute changes. IV lasix given. IV antibiotics infusing. Safety precautions in place.     Problem: Patient Centered Care  Goal: Patient preferences are identified and integrated in the patient's plan of care  Description: Interventions:  - What would you like us to know as we care for you? From [facility]  - Provide timely, complete, and accurate information to patient/family  - Incorporate patient and family knowledge, values, beliefs, and cultural backgrounds into the planning and delivery of care  - Encourage patient/family to participate in care and decision-making at the level they choose  - Honor patient and family perspectives and choices  Outcome: Progressing     Problem: SKIN/TISSUE INTEGRITY - ADULT  Goal: Skin integrity remains intact  Description: INTERVENTIONS  - Assess and document risk factors for pressure ulcer development  - Assess and document skin integrity  - Monitor for areas of redness and/or skin breakdown  - Initiate interventions, skin care algorithm/standards of care as needed  Outcome: Progressing     Problem: MUSCULOSKELETAL - ADULT  Goal: Return mobility to safest level of function  Description: INTERVENTIONS:  - Assess patient stability and activity tolerance for standing, transferring and ambulating w/ or w/o assistive devices  - Assist with transfers and ambulation using safe patient handling equipment as needed  - Ensure adequate protection for wounds/incisions during mobilization  - Obtain PT/OT consults as needed  - Advance activity as appropriate  - Communicate ordered activity level and limitations with patient/family  Outcome: Progressing     Problem: Impaired Functional Mobility  Goal: Achieve highest/safest level of mobility/gait  Description: Interventions:  - Assess patient's functional ability and stability  - Promote increasing activity/tolerance for mobility and gait  - Educate and engage patient/family in tolerated activity level and  precautions  Outcome: Progressing     Problem: Impaired Activities of Daily Living  Goal: Achieve highest/safest level of independence in self care  Description: Interventions:  - Assess ability and encourage patient to participate in ADLs to maximize function  - Promote sitting position while performing ADLs such as feeding, grooming, and bathing  - Educate and encourage patient/family in tolerated functional activity level and precautions during self-care  Outcome: Progressing

## 2025-01-28 LAB
ANION GAP SERPL CALC-SCNC: 6 MMOL/L (ref 0–18)
BASOPHILS # BLD AUTO: 0.03 X10(3) UL (ref 0–0.2)
BASOPHILS NFR BLD AUTO: 0.6 %
BUN BLD-MCNC: 9 MG/DL (ref 9–23)
BUN/CREAT SERPL: 14.1 (ref 10–20)
CALCIUM BLD-MCNC: 7.7 MG/DL (ref 8.7–10.4)
CHLORIDE SERPL-SCNC: 109 MMOL/L (ref 98–112)
CO2 SERPL-SCNC: 28 MMOL/L (ref 21–32)
CREAT BLD-MCNC: 0.64 MG/DL
DEPRECATED RDW RBC AUTO: 62.5 FL (ref 35.1–46.3)
EGFRCR SERPLBLD CKD-EPI 2021: 91 ML/MIN/1.73M2 (ref 60–?)
EOSINOPHIL # BLD AUTO: 0.2 X10(3) UL (ref 0–0.7)
EOSINOPHIL NFR BLD AUTO: 3.7 %
ERYTHROCYTE [DISTWIDTH] IN BLOOD BY AUTOMATED COUNT: 17.2 % (ref 11–15)
GLUCOSE BLD-MCNC: 91 MG/DL (ref 70–99)
HCT VFR BLD AUTO: 28.2 %
HGB BLD-MCNC: 9.7 G/DL
IMM GRANULOCYTES # BLD AUTO: 0.02 X10(3) UL (ref 0–1)
IMM GRANULOCYTES NFR BLD: 0.4 %
LYMPHOCYTES # BLD AUTO: 0.98 X10(3) UL (ref 1–4)
LYMPHOCYTES NFR BLD AUTO: 18.1 %
MAGNESIUM SERPL-MCNC: 1.7 MG/DL (ref 1.6–2.6)
MCH RBC QN AUTO: 33.7 PG (ref 26–34)
MCHC RBC AUTO-ENTMCNC: 34.4 G/DL (ref 31–37)
MCV RBC AUTO: 97.9 FL
MONOCYTES # BLD AUTO: 0.64 X10(3) UL (ref 0.1–1)
MONOCYTES NFR BLD AUTO: 11.8 %
NEUTROPHILS # BLD AUTO: 3.55 X10 (3) UL (ref 1.5–7.7)
NEUTROPHILS # BLD AUTO: 3.55 X10(3) UL (ref 1.5–7.7)
NEUTROPHILS NFR BLD AUTO: 65.4 %
OSMOLALITY SERPL CALC.SUM OF ELEC: 294 MOSM/KG (ref 275–295)
PLATELET # BLD AUTO: 235 10(3)UL (ref 150–450)
POTASSIUM SERPL-SCNC: 3.4 MMOL/L (ref 3.5–5.1)
RBC # BLD AUTO: 2.88 X10(6)UL
SODIUM SERPL-SCNC: 143 MMOL/L (ref 136–145)
WBC # BLD AUTO: 5.4 X10(3) UL (ref 4–11)

## 2025-01-28 PROCEDURE — 80048 BASIC METABOLIC PNL TOTAL CA: CPT | Performed by: INTERNAL MEDICINE

## 2025-01-28 PROCEDURE — 83735 ASSAY OF MAGNESIUM: CPT | Performed by: INTERNAL MEDICINE

## 2025-01-28 PROCEDURE — 85025 COMPLETE CBC W/AUTO DIFF WBC: CPT | Performed by: INTERNAL MEDICINE

## 2025-01-28 RX ORDER — MAGNESIUM OXIDE 400 MG/1
400 TABLET ORAL ONCE
Status: COMPLETED | OUTPATIENT
Start: 2025-01-28 | End: 2025-01-28

## 2025-01-28 RX ORDER — POTASSIUM CHLORIDE 1500 MG/1
40 TABLET, EXTENDED RELEASE ORAL ONCE
Status: COMPLETED | OUTPATIENT
Start: 2025-01-28 | End: 2025-01-28

## 2025-01-28 NOTE — CONGREGATE LIVING REVIEW
[facility] Living Authorization    The [facility] Review Committee has reviewed this case and the patient IS APPROVED for discharge to a facility for Short Term Skilled once the following procedure is followed:     - The physician discharge instructions (contained within the [facility] note for SNF) must inlcude the below appropriate and approved COVID instructions to the facility    For questions regarding CLRC approval process, please contact the CM assigned to the case.  For questions regarding RN discharge workflow, please contact the unit Clinical Leader.

## 2025-01-28 NOTE — CM/SW NOTE
01/28/25 1000   CM/SW Referral Data   Referral Source Social Work (self-referral)   Reason for Referral Discharge planning   Informant Patient;EMR;Clinical Staff Member   Medical Hx   Does patient have an established PCP? Yes  (Dr. Maral Huffman)   Significant Past Medical/Mental Health Hx [facility]US[facility]   Patient Info   Advanced directives? Yes   Patient's Current Mental Status at Time of Assessment Alert;Oriented   Patient's Home Environment Independent Living   Number of Levels in Home 1   Patient lives with Alone   Patient Status Prior to Admission   Independent with ADLs and Mobility No   Pt. requires assistance with Driving;Ambulating   Services in place prior to admission DME/Supplies at home   Type of DME/Supplies Wheeled Walker;Wheelchair;Commode;Shower Chair;Raised Toilet Seat;Grab Bars   Discharge Needs   Anticipated D/C needs Home health care;Subacute rehab   Services Requested   Submitted to [facility] Yes   PASRR Level 1 Submitted Yes   PMR Consult Requested Not clinically appropriate at this time   Choice of Post-Acute Provider   Informed patient of right to choose their preferred provider Yes     SW received MD order for [facility] risk patient.    Pt admitted after a fall at her facility.    COVID +- started on 3 day course of remdesevir.    AMMON met with pt bedside to complete assessment.    Pt confirmed she is from [place] at [facility].    Pt confirmed PCP on file.    At baseline, pt is ambulatory with a walker and is able to self-propel with a wheelchair for longer distances.    Pt is independent with ADLs.    Anticipated therapy need: Gradual Rehabilitative Therapy.    Pt is a retired speech therapist and is familiar with [facility] Medicare benefit.    Pt has hx with [facility]- did not have the best experience but is open to looking at [facility] options.  She would like to look beyond [facility].    Pt stated her preference is to return back to [facility] w/home health care.  Pt stated she has the ability  to hire private duty CG if needed to assist at [facility].    Pt requesting a therapy reevaluation- left VM with therapy office.    SW sent tentative [facility] referrals in A[place].  Pt requesting private room only.    [facility] approved.  PASRR completed and uploaded to referral.    SW stated she will continue to follow pt's case and watch for progress.  Pt v/u.    PLAN: DC to [facility] pending list/choice vs. Home w/HHC pending clinical course    / to remain available for support and/or discharge planning.     Emily NAVARRO, LSW  Discharge Planner

## 2025-01-28 NOTE — PLAN OF CARE
Problem: Patient Centered Care  Goal: Patient preferences are identified and integrated in the patient's plan of care  Description: Interventions:  - What would you like us to know as we care for you? From [facility]   - Provide timely, complete, and accurate information to patient/family  - Incorporate patient and family knowledge, values, beliefs, and cultural backgrounds into the planning and delivery of care  - Encourage patient/family to participate in care and decision-making at the level they choose  - Honor patient and family perspectives and choices  Outcome: Progressing     Problem: Patient/Family Goals  Goal: Patient/Family Long Term Goal  Description: Patient's Long Term Goal: Discharge from the hospital    Interventions:  - Monitor vital signs  - Monitor appropriate labs  - Pain management  - Administer medications per order  - Follow MD orders  - Diagnostics per order  - Update / inform patient and family on plan of care  - Discharge planning  - See additional Care Plan goals for specific interventions  Outcome: Progressing  Goal: Patient/Family Short Term Goal  Description: Patient's Short Term Goal: Improve weakness    Interventions:   -  Monitor vital signs  - Monitor appropriate labs  - Pain management  - Administer medications per order  - Follow MD orders  - Diagnostics per order  - Update / inform patient and family on plan of care  - See additional Care Plan goals for specific interventions  Outcome: Progressing     Problem: SKIN/TISSUE INTEGRITY - ADULT  Goal: Skin integrity remains intact  Description: INTERVENTIONS  - Assess and document risk factors for pressure ulcer development  - Assess and document skin integrity  - Monitor for areas of redness and/or skin breakdown  - Initiate interventions, skin care algorithm/standards of care as needed  Outcome: Progressing  Goal: Incision(s), wounds(s) or drain site(s) healing without S/S of infection  Description:  INTERVENTIONS:  - Assess and document risk factors for pressure ulcer development  - Assess and document skin integrity  - Assess and document dressing/incision, wound bed, drain sites and surrounding tissue  - Implement wound care per orders  - Initiate isolation precautions as appropriate  - Initiate Pressure Ulcer prevention bundle as indicated  Outcome: Progressing     Problem: MUSCULOSKELETAL - ADULT  Goal: Return mobility to safest level of function  Description: INTERVENTIONS:  - Assess patient stability and activity tolerance for standing, transferring and ambulating w/ or w/o assistive devices  - Assist with transfers and ambulation using safe patient handling equipment as needed  - Ensure adequate protection for wounds/incisions during mobilization  - Obtain PT/OT consults as needed  - Advance activity as appropriate  - Communicate ordered activity level and limitations with patient/family  Outcome: Progressing     Problem: Impaired Functional Mobility  Goal: Achieve highest/safest level of mobility/gait  Description: Interventions:  - Assess patient's functional ability and stability  - Promote increasing activity/tolerance for mobility and gait  - Educate and engage patient/family in tolerated activity level and precautions  - Recommend use of sit-stand lift for transfers  - Recommend use of  RW for transfers and ambulation  - Recommend patient transfer to bedside chair toward strongest side  - When transferring patient, block weaker knee for safety  - Recommend use of chair position in bed 3 times per day  - Encourage attention to left side  Outcome: Progressing     Problem: Impaired Activities of Daily Living  Goal: Achieve highest/safest level of independence in self care  Description: Interventions:  - Assess ability and encourage patient to participate in ADLs to maximize function  - Promote sitting position while performing ADLs such as feeding, grooming, and bathing  - Educate and encourage  patient/family in tolerated functional activity level and precautions during self-care  Outcome: Progressing     Problem: PAIN - ADULT  Goal: Verbalizes/displays adequate comfort level or patient's stated pain goal  Description: INTERVENTIONS:  - Encourage pt to monitor pain and request assistance  - Assess pain using appropriate pain scale  - Administer analgesics based on type and severity of pain and evaluate response  - Implement non-pharmacological measures as appropriate and evaluate response  - Consider cultural and social influences on pain and pain management  - Manage/alleviate anxiety  - Utilize distraction and/or relaxation techniques  - Monitor for opioid side effects  - Notify MD/LIP if interventions unsuccessful or patient reports new pain  - Anticipate increased pain with activity and pre-medicate as appropriate  Outcome: Progressing     Problem: SAFETY ADULT - FALL  Goal: Free from fall injury  Description: INTERVENTIONS:  - Assess pt frequently for physical needs  - Identify cognitive and physical deficits and behaviors that affect risk of falls.  - Lindsay fall precautions as indicated by assessment.  - Educate pt/family on patient safety including physical limitations  - Instruct pt to call for assistance with activity based on assessment  - Modify environment to reduce risk of injury  - Provide assistive devices as appropriate  - Consider OT/PT consult to assist with strengthening/mobility  - Encourage toileting schedule  Outcome: Progressing     Problem: DISCHARGE PLANNING  Goal: Discharge to home or other facility with appropriate resources  Description: INTERVENTIONS:  - Identify barriers to discharge w/pt and caregiver  - Include patient/family/discharge partner in discharge planning  - Arrange for needed discharge resources and transportation as appropriate  - Identify discharge learning needs (meds, wound care, etc)  - Arrange for interpreters to assist at discharge as needed  - Consider  post-discharge preferences of patient/family/discharge partner  - Complete POLST form as appropriate  - Assess patient's ability to be responsible for managing their own health  - Refer to Case Management Department for coordinating discharge planning if the patient needs post-hospital services based on physician/LIP order or complex needs related to functional status, cognitive ability or social support system  Outcome: Progressing

## 2025-01-28 NOTE — PROGRESS NOTES
[facility]  part of [facility] Infectious Disease Progress Note    Adore Covington Patient Status:  Inpatient    1945 MRN [id number]   Location [facility] 5SW/SE Attending Gabby Boudreaux MD   Hosp Day # 2 PCP Maral Huffman MD     Subjective:  Chart reviewed, no acute events.  Pt seen bedside.  She reports her leg/knee feel fine when she is lying flat in bed.  She does have discomfort when weight bearing and bending knee.   She reports that she had a fair amount of drainage from knee wound initially;  states it was thin drainage that would soak through her pants.  This drainage decreased and now scant amounts.   Seen by ortho, no surgery recommended.  She denies any respiratory symptoms and is on RA.  No fevers.     Objective:    Allergies:  Allergies[1]    Medications:    Current Facility-Administered Medications:     sodium chloride 0.9 % IV bolus 250 mL, 250 mL, Intravenous, Once    remdesivir (Veklury) 100 mg in sodium chloride 0.9% 270 mL IVPB, 100 mg, Intravenous, Q24H    piperacillin-tazobactam (Zosyn) 4.5 g in dextrose 5% 100 mL IVPB-ADDV, 4.5 g, Intravenous, Q8H    acetaminophen (Tylenol Extra Strength) tab 500 mg, 500 mg, Oral, Q4H PRN    melatonin tab 3 mg, 3 mg, Oral, Nightly PRN    polyethylene glycol (PEG 3350) (Miralax) 17 g oral packet 17 g, 17 g, Oral, Daily PRN    sennosides (Senokot) tab 17.2 mg, 17.2 mg, Oral, Nightly PRN    bisacodyl (Dulcolax) 10 MG rectal suppository 10 mg, 10 mg, Rectal, Daily PRN    fleet enema (Fleet) rectal enema 133 mL, 1 enema, Rectal, Once PRN    metoclopramide (Reglan) 5 mg/mL injection 5 mg, 5 mg, Intravenous, Q8H PRN    [Held by provider] apixaban (Eliquis) tab 5 mg, 5 mg, Oral, BID    [Held by provider] bumetanide (Bumex) tab 0.5 mg, 0.5 mg, Oral, Daily    cholecalciferol (Vitamin D3) tab 2,000 Units, 2,000 Units, Oral, Daily    cyanocobalamin (Vitamin B12) tab 1,000 mcg, 1,000 mcg, Oral, Daily     glycerin-hypromellose- (Artificial Tears) 0.2-0.2-1 % ophthalmic solution 1 drop, 1 drop, Both Eyes, BID    dofetilide (Tikosyn) cap 250 mcg, 250 mcg, Oral, BID    eplerenone (Inspra) tab 12.5 mg, 12.5 mg, Oral, Daily    atorvastatin (Lipitor) tab 5 mg, 5 mg, Oral, Nightly    vancomycin (Vancocin) 1.25 g in sodium chloride 0.9% 250mL IVPB premix, 1,250 mg, Intravenous, Q24H    Physical Exam:  General: Alert, orientated x3.  Cooperative.  No apparent distress.  Vital Signs:  Blood pressure (!) 73/44, pulse 87, temperature 98 °F (36.7 °C), temperature source Oral, resp. rate 18, height 5' 2\" (1.575 m), weight 200 lb 9.9 oz (91 kg), SpO2 95%.   Temp (24hrs), Av.2 °F (36.8 °C), Min:98 °F (36.7 °C), Max:98.3 °F (36.8 °C)      HEENT: Exam is unremarkable.  Without scleral icterus.  Mucous membranes are moist. PERRLA.  Oropharynx is clear.  Lungs: Clear to auscultation bilaterally.  Cardiac: Regular rate   Abdomen:  Soft, non-distended, non-tender, with no rebound or guarding.    Extremities:  L knee with +swelling compared to R knee,  mild increased LE swelling as well.    Skin: L knee with small wound noted,  no obvious fluctuance or purulence.  No significant surrounding erythema or induration.  L lower leg with mild erythema, no TTP, no induration  Neurologic: Cranial nerves are grossly intact.      Labs:  Lab Results   Component Value Date    WBC 5.4 2025    HGB 9.7 2025    HCT 28.2 2025    .0 2025    CREATSERUM 0.64 2025    BUN 9 2025     2025    K 3.4 2025     2025    CO2 28.0 2025    GLU 91 2025    CA 7.7 2025    MG 1.7 2025       Radiology:  CXR   CONCLUSION:      Small bilateral pleural effusions.  Mild hazy opacity right lung base which may reflect atelectasis with or without superimposed pneumonia.     CT LLE:  CONCLUSION:   1.  Left knee arthroplasty is present.  The hardware causes beam hardening  artifact, despite metallic hardware suppression techniques.  There is a nondisplaced fracture around the lateral femoral condyle, along the proximal margin of the, which extends   to the lateral margin of the femoral stem in the distal femoral metadiaphysis.   2.  Fluid collection along the superficial margin of the lateral retinaculum measuring 18 x 45 x 54 mm, possibly representing bursitis, seroma, liquified hematoma, or infectious process.  There is overlying skin thickening which may represent changes of   cellulitis, soft tissue injury/bruising, or scar.     Assessment/Plan:    1.  Weakness and fall  -admitted after a fall resulting in hitting her head  -tested positive for COVID upon admission  -CXR with mild hazy infiltrate  -no fever and on RA  -on Remdesivir day #2/3 for non-hypoxic protocol  -on IV Vancomycin and Zosyn    2. Hx of recurrent LLE cellulitis  -with current injury to L knee with open wound  -with hx of L TKA x 2   -cultures thus far with staph, BALTA pending   -MRSA PCR negative   -blood cultures NGTD   -CT showing possible fracture along lateral femoral condyle and small fluid collection along superficial margin of lateral retinaculum, possible bursitis vs seroma vs liquified hematoma vs infectious process.   -Ortho following, no surgical intervention recommended   -on IV Vancomycin and Zosyn     DISPO:  Continue IV Zosyn and Vancomycin, follow up final cultures and BALTA of staph.  Will follow with further recommendations.  Will d/w Dr Rice     If you have any questions or concerns please call [facility] at [phone].     VIVIENNE Rouse  1/28/2025  11:28 AM         [1]   Allergies  Allergen Reactions    Cefepime RASH     Generalized

## 2025-01-28 NOTE — PLAN OF CARE
Problem: Patient Centered Care  Goal: Patient preferences are identified and integrated in the patient's plan of care  Description: Interventions:  - What would you like us to know as we care for you? From [facility]   - Provide timely, complete, and accurate information to patient/family  - Incorporate patient and family knowledge, values, beliefs, and cultural backgrounds into the planning and delivery of care  - Encourage patient/family to participate in care and decision-making at the level they choose  - Honor patient and family perspectives and choices  Outcome: Progressing     Problem: Patient/Family Goals  Goal: Patient/Family Long Term Goal  Description: Patient's Long Term Goal: Discharge from the hospital    Interventions:  - Monitor vital signs  - Monitor appropriate labs  - Pain management  - Administer medications per order  - Follow MD orders  - Diagnostics per order  - Update / inform patient and family on plan of care  - Discharge planning  - See additional Care Plan goals for specific interventions  Outcome: Progressing  Goal: Patient/Family Short Term Goal  Description: Patient's Short Term Goal: Improve weakness    Interventions:   -  Monitor vital signs  - Monitor appropriate labs  - Pain management  - Administer medications per order  - Follow MD orders  - Diagnostics per order  - Update / inform patient and family on plan of care  - See additional Care Plan goals for specific interventions  Outcome: Progressing     Problem: SKIN/TISSUE INTEGRITY - ADULT  Goal: Skin integrity remains intact  Description: INTERVENTIONS  - Assess and document risk factors for pressure ulcer development  - Assess and document skin integrity  - Monitor for areas of redness and/or skin breakdown  - Initiate interventions, skin care algorithm/standards of care as needed  Outcome: Progressing  Goal: Incision(s), wounds(s) or drain site(s) healing without S/S of infection  Description:  INTERVENTIONS:  - Assess and document risk factors for pressure ulcer development  - Assess and document skin integrity  - Assess and document dressing/incision, wound bed, drain sites and surrounding tissue  - Implement wound care per orders  - Initiate isolation precautions as appropriate  - Initiate Pressure Ulcer prevention bundle as indicated  Outcome: Progressing     Problem: MUSCULOSKELETAL - ADULT  Goal: Return mobility to safest level of function  Description: INTERVENTIONS:  - Assess patient stability and activity tolerance for standing, transferring and ambulating w/ or w/o assistive devices  - Assist with transfers and ambulation using safe patient handling equipment as needed  - Ensure adequate protection for wounds/incisions during mobilization  - Obtain PT/OT consults as needed  - Advance activity as appropriate  - Communicate ordered activity level and limitations with patient/family  Outcome: Progressing     Problem: Impaired Functional Mobility  Goal: Achieve highest/safest level of mobility/gait  Description: Interventions:  - Assess patient's functional ability and stability  - Promote increasing activity/tolerance for mobility and gait  - Educate and engage patient/family in tolerated activity level and precautions  - Recommend use of sit-stand lift for transfers  - Recommend use of  RW for transfers and ambulation  - Recommend patient transfer to bedside chair toward strongest side  - When transferring patient, block weaker knee for safety  - Recommend use of chair position in bed 3 times per day  - Encourage attention to left side  Outcome: Progressing     Problem: Impaired Activities of Daily Living  Goal: Achieve highest/safest level of independence in self care  Description: Interventions:  - Assess ability and encourage patient to participate in ADLs to maximize function  - Promote sitting position while performing ADLs such as feeding, grooming, and bathing  - Educate and encourage  patient/family in tolerated functional activity level and precautions during self-care  Outcome: Progressing     Problem: PAIN - ADULT  Goal: Verbalizes/displays adequate comfort level or patient's stated pain goal  Description: INTERVENTIONS:  - Encourage pt to monitor pain and request assistance  - Assess pain using appropriate pain scale  - Administer analgesics based on type and severity of pain and evaluate response  - Implement non-pharmacological measures as appropriate and evaluate response  - Consider cultural and social influences on pain and pain management  - Manage/alleviate anxiety  - Utilize distraction and/or relaxation techniques  - Monitor for opioid side effects  - Notify MD/LIP if interventions unsuccessful or patient reports new pain  - Anticipate increased pain with activity and pre-medicate as appropriate  Outcome: Progressing     Problem: SAFETY ADULT - FALL  Goal: Free from fall injury  Description: INTERVENTIONS:  - Assess pt frequently for physical needs  - Identify cognitive and physical deficits and behaviors that affect risk of falls.  - [place] fall precautions as indicated by assessment.  - Educate pt/family on patient safety including physical limitations  - Instruct pt to call for assistance with activity based on assessment  - Modify environment to reduce risk of injury  - Provide assistive devices as appropriate  - Consider OT/PT consult to assist with strengthening/mobility  - Encourage toileting schedule  Outcome: Progressing     Problem: DISCHARGE PLANNING  Goal: Discharge to home or other facility with appropriate resources  Description: INTERVENTIONS:  - Identify barriers to discharge w/pt and caregiver  - Include patient/family/discharge partner in discharge planning  - Arrange for needed discharge resources and transportation as appropriate  - Identify discharge learning needs (meds, wound care, etc)  - Arrange for interpreters to assist at discharge as needed  - Consider  post-discharge preferences of patient/family/discharge partner  - Complete POLST form as appropriate  - Assess patient's ability to be responsible for managing their own health  - Refer to Case Management Department for coordinating discharge planning if the patient needs post-hospital services based on physician/LIP order or complex needs related to functional status, cognitive ability or social support system  Outcome: Progressing      Monitoring vital signs- see flowsheets. No acute changes noted at this moment. Safety and fall precautions maintained- bed alarm on, bed locked in lowest position, call light within reach. Frequent rounding by nursing staff.

## 2025-01-28 NOTE — PROGRESS NOTES
Patient seen and examined.  Stable from yesterday, perhaps pain a bit better.      Wound is also stable, no gross purulence but a more bloody discharge today.    CT LOWER EXT LEFT (W IV)(CPT=73701)  Narrative: PROCEDURE: CT LOWER EXT LEFT (W IV) EM (CPT=73701)     Impression: CONCLUSION:   1.  Left knee arthroplasty is present.  The hardware causes beam hardening artifact, despite metallic hardware suppression techniques.  There is a nondisplaced fracture around the lateral femoral condyle, along the proximal margin of the, which extends   to the lateral margin of the femoral stem in the distal femoral metadiaphysis.  2.  Fluid collection along the superficial margin of the lateral retinaculum measuring 18 x 45 x 54 mm, possibly representing bursitis, seroma, liquified hematoma, or infectious process.  There is overlying skin thickening which may represent changes of   cellulitis, soft tissue injury/bruising, or scar.           Dictated by (CST): Juancho Oropeza MD on 1/27/2025 at 6:00 PM       Finalized by (CST): Juancho Oropeza MD on 1/27/2025 at 6:07 PM                  CT shows possible lateral femoral condyle fracture.  When questioning the patient, the fall that she suffered last Friday does not seem like a mechanism to cause this type of fracture.  She denies any other recent significant falls.  Her exam reveals diffuse tenderness that is not worse laterally.  I think this is a chronic or subacute injury, perhaps even a after effect of the revision surgery years ago, which can lead to this type of fracture as well.      On the CT scan, there does not appear to be a deep infectious process responsible for the wound and drainage.    PT/OT, WBAT and ROM as tolerated  Local wound care  Antibiotics per ID

## 2025-01-28 NOTE — PROGRESS NOTES
[facility]  part of [facility]     [facility] Hospitalist Progress Note     PCP: Maral Huffman MD    CC: Follow up       Assessment/Plan:     Patient is a 79 year old female with PMH sig for COPD, A- Fib on Eliquis, CHF, HTN, HLD, PAD, Neuropathy, SUSAN,  prior hx of septic shock 2/2 cellulitis with admission 6/19-6/22 with LLE cellulitis  discharged on oral levaquin and doxy seen by ID on 6/27 and found to have worsening swelling and drainage of her left lower extremity 6/27, sent for admission for IV abx in July 2024 . Patient was discharged on Vanco and cefepime until July 10th with home infusions.  Patient now returns with generalized weakness and fall.  Admitted for COVID and worsening lower extremity cellulitis.  CT shows left knee hardware, nondisplaced fracture of the lateral femoral condyle, fluid collection that corresponds to the lateral skin wound.  Per Ortho no plans for surgical intervention, weightbearing as tolerated, follow-up IDs recommendations for antibiotics, cultures so far growing Staph aureus, sensitivities pending.     Fall  - generalized weakness  - head trauma on Eliquis   - CTH and C spine with no acute process  - ECG: PACs with T wave changes  - tsh normal   - likely 2/2 COVID   - PT/OT     B/L LE Cellulitis, left worse than right, open sore on left lateral knee without corresponding trauma  Left Knee Wound   Recurrent LLE Cellulitis  Chronic Stasis Dermatitis   Hx MRSA + and Pseudomonas  Left knee replacement x 2 in [place], most recently approximately 6 years ago  -6/19 discharged on levaquin and doxy, seen by ID 6/27 w/ worsening swelling and drainage  and sent in for IV abx  -in July patient was discharged on Vanco and cefepime until July 10th with home infusions  -afebrile. No leukocytosis. La negative on admission    -had a fall on the left knee in December, seen ortho   -Also had nerve block done on 12/20/2024 but did not proceed with ablation per patient  preference  -check MRSA   -blood  cx   -wound cx   -wound care  -zosyn and vanco started   -ID consult ->follow up final abx recs   -Left lateral leg wound present starting on Tuesday, no corresponding trauma, draining at home, actively draining here.  Culture with Staph aureus, pending sensitivities  -CT shows left knee hardware, nondisplaced fracture of the lateral femoral condyle, fluid collection that corresponds to the lateral skin wound.  Per Ortho no plans for surgical intervention, weightbearing as tolerated    COVID   -afebrile. No leukocytosis. La negative  -no RA  -CXR Small bilateral pleural effusions.  Mild hazy opacity right lung base which may reflect atelectasis with or without superimposed pneumonia  -will cover with vanc/zosyn   -AST elevated, will trend   -Clinically not symptomatic, no indication for steroids remdesivir started per ID     PaFib S/P CV 1/2024  Secondary Hypercoagulable state   S/p Cardioversion   HTN  HL  Chronic Diastolic CHF  -5/2024 echo with normal EF 55%, mild as, mild ai   -continue  home eliquis, Tikosyn, bumex, eplerenone and pravastatin  -home lopressor/Toprol held with lower bp   -BNP 1,372, b/l LE edema   -CXR Small bilateral pleural effusions  -will given 20mg iv lasix x1 on admission, overall improved from prior  -on RA   -Mild hypotension noted on 1/28/2025, patient is asymptomatic, hold Bumex and gentle 250 bolus x 2, blood pressure improved      Hypokalemia   - lyte potocol      COPD not on inhalers   SUSAN  - cpap as tolerated      PAD  -continue statin.   -not on asa 2/2 eliquis      Neuropathy   - off gabapentin      GERD  - off PPI     GOC  - DNR/Select-confirmed with pt      FN:  - IVF: hold  - Diet: general      DVT Prophy: SCDs Eliquis -> Dose morning of 1/27/2025, will hold for now until final ID recs completed for any further intervention, if needed will consider heparin drip.  Currently in sinus  Atrophy: Ambulate PT/OT  Lines: Piv     MA/ACO Reach  -ER  Visits 2025: 1  -Admissions 2025: 1  -Re- Entry: no  -Consults: ID   -Discharge Needs:TBD  -Appointments: [ ] PCP Maral Huffman                            [ ] ID Rk                             [ ] outpt wound      Dispo: pending clinical course    Questions/concerns were discussed with patient and/or family by bedside.    Note: This chart was prepared using voice recognition software and may contain unintended word substitution errors.       Thank You,  Gabby Boudreaux M.D.  [facility] Hospitalist  Answering Service: [phone]        Subjective     Tells me that it is draining less.  Afebrile overnight.  No pain.  Does not feel lightheaded or dizzy with the lower blood pressures, tells me this happens all the time at home.  No CP, SOB, or N/V.      Objective     OBJECTIVE:  Temp:  [98 °F (36.7 °C)-98.3 °F (36.8 °C)] 98.2 °F (36.8 °C)  Pulse:  [77-88] 88  Resp:  [17-18] 18  BP: (73-90)/(44-57) 82/51  SpO2:  [95 %] 95 %    Intake/Output:    Intake/Output Summary (Last 24 hours) at 1/28/2025 1714  Last data filed at 1/28/2025 1253  Gross per 24 hour   Intake 1144 ml   Output 600 ml   Net 544 ml       Last 3 Weights   01/26/25 1609 200 lb 9.9 oz (91 kg)   12/17/24 1214 210 lb (95.3 kg)   10/09/24 0415 218 lb 3.2 oz (99 kg)   10/08/24 0215 218 lb 12.8 oz (99.2 kg)   10/07/24 1849 217 lb 1.6 oz (98.5 kg)       Exam  General: Alert, no acute distress  HEENT: oral mucosa normal   Neck: non tender, no adenopathy   Lungs: good air movement, good effort   Heart: Regular rate and rhythm  Abdomen: soft, non tender, non distended   Extremities: b/l LE edema, b/l leg redness left worse than right for both, left knee with wound and discharge, no acute tenderness to palpation directly over knee, no direct warmth over knee, active drainage from lateral left knee wound is improved  Skin: no new rash, normal color  Neuro: No focal deficits  Psych: appropriate affect     Medications      remdesivir  100 mg Intravenous Q24H     piperacillin-tazobactam  4.5 g Intravenous Q8H    [Held by provider] apixaban  5 mg Oral BID    [Held by provider] bumetanide  0.5 mg Oral Daily    cholecalciferol  2,000 Units Oral Daily    cyanocobalamin  1,000 mcg Oral Daily    glycerin-hypromellose-  1 drop Both Eyes BID    dofetilide  250 mcg Oral BID    eplerenone  12.5 mg Oral Daily    atorvastatin  5 mg Oral Nightly    vancomycin  1,250 mg Intravenous Q24H         acetaminophen    melatonin    polyethylene glycol (PEG 3350)    sennosides    bisacodyl    fleet enema    metoclopramide    Data Review:       Labs:     Recent Labs   Lab 01/26/25  1253 01/27/25  0827 01/28/25  0558   WBC 8.4 7.1 5.4   HGB 12.1 11.0* 9.7*   MCV 98.7 99.4 97.9   .0 285.0 235.0       Recent Labs   Lab 01/26/25  1253 01/27/25  0620 01/28/25  0558    146* 143   K 3.0* 4.0 3.4*    110 109   CO2 27.0 29.0 28.0   BUN 11 10 9   CREATSERUM 0.84 0.73 0.64   CA 8.6* 7.8* 7.7*   MG  --  1.8 1.7   GLU 98 91 91       Recent Labs   Lab 01/26/25  1253   ALT 39   AST 89*   ALB 2.8*       No results for input(s): \"PGLU\" in the last 168 hours.    No results for input(s): \"TROP\" in the last 168 hours.    Imaging:  CT SPINE CERVICAL (CPT=72125)    Result Date: 1/26/2025  PROCEDURE: CT SPINE CERVICAL (CPT=72125)  COMPARISON: None.  INDICATIONS: Neck pain post fall.  TECHNIQUE:   Multi-planar CT images were obtained without intravenous contrast material.  Automated exposure control for dose reduction was used. Adjustment of the mA and/or kV was done based on the patient's size. Use of iterative reconstruction technique for dose reduction was used.  Dose information is transmitted to the ACR (American College of Radiology) NRDR (National Radiology Data Registry) which includes the Dose Index Registry.   FINDINGS: CRANIOCERVICAL AREA:   Normal foramen magnum with no Chiari malformation. PARASPINAL AREA: No visible mass. BONES: No acute fracture or malalignment.  There is marginal  osteophyte formation and facet arthropathy. ALIGNMENT: No significant subluxation. DISCS: Mild degenerative disc disease without significant central canal narrowing. PARASPINAL AREA: Unremarkable. OTHER:   Negative.           CONCLUSION:   No acute fracture or malalignment cervical spine.  Mild cervical spine degenerative change.    Dictated by (CST): Jose oLpez MD on 1/26/2025 at 3:14 PM     Finalized by (CST): Jose Lopez MD on 1/26/2025 at 3:16 PM          CT BRAIN OR HEAD (CPT=70450)    Result Date: 1/26/2025  PROCEDURE: CT BRAIN OR HEAD (CPT=70450)  COMPARISON: [facility], CT BRAIN OR HEAD (CPT=70450), 3/01/2023, 5:08 PM.  INDICATIONS: Weakness.  TECHNIQUE: CT images were obtained without contrast material.  Automated exposure control for dose reduction was used.  Dose information is transmitted to the ACR (American College of Radiology) NRDR (National Radiology Data Registry) which includes the Dose Index Registry.  FINDINGS:  CSF SPACES: Ventricles, cisterns, and sulci are appropriate for age.  No hydrocephalus, subarachnoid hemorrhage, or mass.  No midline shift.  CEREBRUM: No edema, hemorrhage, mass, acute infarction, or significant atrophy.  WHITE MATTER: Mild chronic microvascular white matter ischemia. CEREBELLUM: No edema, hemorrhage, mass, acute infarction, or significant atrophy.  BRAINSTEM: No edema, hemorrhage, mass, acute infarction, or significant atrophy.  CALVARIUM: No apparent fracture, mass, or other significant visible lesion.  SINUSES: Limited views demonstrate no significant mucosal thickening or fluid.  ORBITS: Prior bilateral cataract surgery.  OTHER: Negative.          CONCLUSION:   No acute intracranial abnormality.    Dictated by (CST): Jose Lopez MD on 1/26/2025 at 3:12 PM     Finalized by (CST): Joes Lopez MD on 1/26/2025 at 3:13 PM          XR CHEST AP PORTABLE  (CPT=71045)    Result Date: 1/26/2025  PROCEDURE: XR CHEST AP PORTABLE   (CPT=71045) TIME: 1303 hours.   COMPARISON: [facility], XR CHEST AP PORTABLE (CPT=71045), 7/01/2024, 12:07 PM.  [facility], XR CHEST AP PORTABLE (CPT=71045), 5/03/2024, 6:58 PM.  [facility], XR CHEST AP PORTABLE (CPT=71045), 12/11/2023, 6:17 AM.  INDICATIONS: Generalized weakness and fall today.  TECHNIQUE:   Single view.   FINDINGS:  CARDIAC/MEDIAST: Borderline cardiomegaly.  No vascular congestion.  LUNGS/PLEURA:  Small bilateral pleural effusions.  Mild opacity right lung base.  No pneumothorax. OTHER:  Degenerative change osseous structures.         CONCLUSION:   Small bilateral pleural effusions.  Mild hazy opacity right lung base which may reflect atelectasis with or without superimposed pneumonia.    Dictated by (CST): Jose Lopez MD on 1/26/2025 at 1:35 PM     Finalized by (CST): Jose Lopez MD on 1/26/2025 at 1:36 PM

## 2025-01-28 NOTE — DIETARY NOTE
ADULT NUTRITION INITIAL ASSESSMENT    Pt is at high nutrition risk.  Pt meets severe malnutrition criteria.      CRITERIA FOR MALNUTRITION DIAGNOSIS:  Criteria for severe malnutrition diagnosis: social/environmental related to wt loss greater than 20% in 1 year and energy intake less than 50% for greater than 1 month.    RECOMMENDATIONS TO MD: See Nutrition Intervention    ADMITTING DIAGNOSIS:  Weakness generalized [R53.1]  Left leg cellulitis [L03.116]  Right lower lobe consolidation [J18.1]  COVID-19 [U07.1]  PERTINENT PAST MEDICAL HISTORY:   Past Medical History:    Arrhythmia    Atrial fibrillation (HCC)    Congestive heart disease (HCC)    COPD (chronic obstructive pulmonary disease) (HCC)    Essential hypertension    High blood pressure    High cholesterol    Morbid obesity with BMI of 50.0-59.9, adult (HCC)    Muscle weakness    Neuropathy    hands and feet    Osteoarthritis    Peripheral vascular disease    Sleep apnea    uses CPAP    Visual impairment    contact lens in right eye       PATIENT STATUS: Initial 01/28/25: Pt admitted for generalized weakness, fall at home, +COVID. PMH sig for COPD, A-Fib on Eliquis, CHF, HLD, PAD, Neuropathy, history of septic shock 2/2 cellulitis on LLE and discharged on long course of IV antibiotics in June/July 2024. Pt assessed due to unintentional weight loss and poor PO intake. Chart reviewed. Per ID, pt started on IV vancomycin and zosyn for injury/wound to L knee. Cultures pending. Local wound care ongoing for wound on left knee and right elbow. Patient known to nutrition services from previous admission (11/2023) where was at moderate nutrition risk but did not meet malnutrition criteria. PTA pt was consuming x1 meal/day prepared by [facility]. Each meal consisted of a protein, starch, and vegetable. Recently pt has been struggling to cook any food in the kitchen d/t difficult standing/ambulating. Additionally, pt reports only having a few bites of food  throughout the day before noticing it was time for dinner. Meal tickets reviewed for this admission. Pt ordering full trays when meals are ordered. Discussed adding ONS/snacks to optimize calories/protein to aid in healing wounds. Pt declined. Weight history reviewed. Clinically significant weight loss noted: 51# or 20% wt loss in ~ 7 months (251# 2/27/24); 76# or 27% wt loss in 12 months (276# 1/23/24). Pt reports UBW of 275# which is reflected in EMR. Pt acknowledged that the weight loss was unintentional but was happy with the results. Pt reports wanting to continue to lose weight with a goal weight of 150#. Discussed the importance of gradual wt loss with adequate protein sources and small/frequent meals throughout the day. Pt receptive to wt loss education. Current diet is appropriate. RD will f/u per protocol.     FOOD/NUTRITION RELATED HISTORY:  Appetite: Poor PTA  Intake: ~38% x4 meals documented since admit  Intake Meeting Needs:  Marginal, pt declined ONS.  Percent Meals Eaten (last 3 days)       Date/Time Percent Meals Eaten (%)    01/27/25 1047 100 %    01/27/25 1300 0 %     Percent Meals Eaten (%): pt doesnt want to order lunch at 01/27/25 1300    01/27/25 1952 50 %    01/27/25 2241 0 %     Percent Meals Eaten (%): offered snack, pt declined at 01/27/25 2241           Food Allergies: No Known Food Allergies (NKFA)  Cultural/Ethnic/Advent Preferences: Not Obtained    GASTROINTESTINAL: +BM 1/25/25 - formed, soft, brown, large and Loss of appetite    MEDICATIONS: reviewed Bumex 0.5 mg PO started 1/27   potassium chloride  40 mEq Oral Once    remdesivir  100 mg Intravenous Q24H    piperacillin-tazobactam  4.5 g Intravenous Q8H    [Held by provider] apixaban  5 mg Oral BID    bumetanide  0.5 mg Oral Daily    cholecalciferol  2,000 Units Oral Daily    cyanocobalamin  1,000 mcg Oral Daily    glycerin-hypromellose-  1 drop Both Eyes BID    dofetilide  250 mcg Oral BID    eplerenone  12.5 mg Oral Daily     atorvastatin  5 mg Oral Nightly    vancomycin  1,250 mg Intravenous Q24H     LABS: K+ 40 mEq scheduled for today.  Non-cardiac electrolyte protocol in place.  Recent Labs     01/26/25  1253 01/27/25  0620 01/28/25  0558   GLU 98 91 91   BUN 11 10 9   CREATSERUM 0.84 0.73 0.64   CA 8.6* 7.8* 7.7*   MG  --  1.8 1.7    146* 143   K 3.0* 4.0 3.4*    110 109   CO2 27.0 29.0 28.0   OSMOCALC 297* 301* 294       NUTRITION RELATED PHYSICAL FINDINGS:  - Nutrition Focused Physical Exam (NFPE): well nourished per visual exam  - Fluid Accumulation: non-pitting Bilateral Lower extremity and Right Foot  and +2 Left Foot  see RN documentation for details  - Skin Integrity: at risk and intact see RN documentation for details    ANTHROPOMETRICS:  HT: 157.5 cm (5' 2\")  WT: 91 kg (200 lb 9.9 oz)   BMI: Body mass index is 36.69 kg/m².  BMI CLASSIFICATION: 35-39.9 kg/m2 - obesity class II  IBW: 110 lbs        182% IBW  Usual Body Wt: 275 lbs January 2024      73% UBW    WEIGHT HISTORY:  Patient Weight(s) for the past 336 hrs:   Weight   01/26/25 1609 91 kg (200 lb 9.9 oz)     Wt Readings from Last 10 Encounters:   01/26/25 91 kg (200 lb 9.9 oz)   12/17/24 95.3 kg (210 lb)   10/09/24 99 kg (218 lb 3.2 oz)   08/01/24 114.3 kg (251 lb 15.8 oz)   06/27/24 114.3 kg (251 lb 14.4 oz)   06/20/24 116 kg (255 lb 12.8 oz)   05/06/24 118.5 kg (261 lb 3.9 oz)   05/04/24 118.5 kg (261 lb 3.9 oz)   04/29/24 118.4 kg (261 lb)   02/23/24 125.9 kg (277 lb 8 oz)     NUTRITION DIAGNOSIS/PROBLEM:   Severe Malnutrition related to Starvation related - Food- and nutrition-related knowledge deficit concerning amount of energy and amount and type of dietary protein as evidenced by wt loss greater than 20% in 1 year, energy intake less than 75% for greater than 1 month, and patient reported diet recall.    NUTRITION INTERVENTION:     NUTRITION PRESCRIPTION:   Estimated Nutrition needs: --dosing wt of 91 kg - wt taken on 1/26/25  Calories: 1274 -1547  calories/day (14-17 calories per kg Dosing wt)  Protein: 65-75 g protein/day (1.3-1.5 g protein/kg Ideal body wt (IBW) 50 kg)    - Diet:       Procedures    Regular/General diet Is Patient on Accuchecks? No      - RD Malnutrition Care Plan: Encouraged increased PO intake and Encouraged small frequent meals with emphasis on high calorie/high protein  - Medical Food Supplements- Patient declined. Rational/use of oral supplements discussed. Pt is a retired SLP and reports understanding the importance of adequate nutrition.   - Vitamin and mineral supplements: vitamin B12 and vitamin D  - Feeding assistance: independent  - Nutrition education: Discussed importance of adequate energy and protein intake with gradual weight loss.    - Coordination of nutrition care: collaboration with other providers  - Discharge and transfer of nutrition care to new setting or provider: monitor plans    MONITOR AND EVALUATE/NUTRITION GOALS:  - Food and Nutrient Intake:      Monitor: adequacy of PO intake and tolerance of PO intake and need for ONS.  - Food and Nutrient Administration:      Monitor: N/A  - Anthropometric Measurement:    Monitor weight  - Nutrition Goals:      long term gradual wt loss, PO greater than 75% of meals, labs within acceptable limits, prevent skin breakdown, and support wound healing    DIETITIAN FOLLOW UP: RD to follow and monitor nutrition status     Mayela Reyes  Dietetic Intern  P: [phone]

## 2025-01-29 LAB
ANION GAP SERPL CALC-SCNC: 6 MMOL/L (ref 0–18)
BASOPHILS # BLD AUTO: 0.02 X10(3) UL (ref 0–0.2)
BASOPHILS NFR BLD AUTO: 0.4 %
BUN BLD-MCNC: 6 MG/DL (ref 9–23)
BUN/CREAT SERPL: 10.2 (ref 10–20)
CALCIUM BLD-MCNC: 7.9 MG/DL (ref 8.7–10.4)
CHLORIDE SERPL-SCNC: 110 MMOL/L (ref 98–112)
CO2 SERPL-SCNC: 26 MMOL/L (ref 21–32)
CREAT BLD-MCNC: 0.59 MG/DL
DEPRECATED RDW RBC AUTO: 62 FL (ref 35.1–46.3)
EGFRCR SERPLBLD CKD-EPI 2021: 93 ML/MIN/1.73M2 (ref 60–?)
EOSINOPHIL # BLD AUTO: 0.27 X10(3) UL (ref 0–0.7)
EOSINOPHIL NFR BLD AUTO: 5 %
ERYTHROCYTE [DISTWIDTH] IN BLOOD BY AUTOMATED COUNT: 17.1 % (ref 11–15)
GLUCOSE BLD-MCNC: 85 MG/DL (ref 70–99)
HCT VFR BLD AUTO: 28.2 %
HGB BLD-MCNC: 9.8 G/DL
IMM GRANULOCYTES # BLD AUTO: 0.01 X10(3) UL (ref 0–1)
IMM GRANULOCYTES NFR BLD: 0.2 %
LYMPHOCYTES # BLD AUTO: 0.93 X10(3) UL (ref 1–4)
LYMPHOCYTES NFR BLD AUTO: 17.3 %
MAGNESIUM SERPL-MCNC: 1.8 MG/DL (ref 1.6–2.6)
MCH RBC QN AUTO: 34.4 PG (ref 26–34)
MCHC RBC AUTO-ENTMCNC: 34.8 G/DL (ref 31–37)
MCV RBC AUTO: 98.9 FL
MONOCYTES # BLD AUTO: 0.62 X10(3) UL (ref 0.1–1)
MONOCYTES NFR BLD AUTO: 11.5 %
NEUTROPHILS # BLD AUTO: 3.53 X10 (3) UL (ref 1.5–7.7)
NEUTROPHILS # BLD AUTO: 3.53 X10(3) UL (ref 1.5–7.7)
NEUTROPHILS NFR BLD AUTO: 65.6 %
OSMOLALITY SERPL CALC.SUM OF ELEC: 291 MOSM/KG (ref 275–295)
PLATELET # BLD AUTO: 238 10(3)UL (ref 150–450)
POTASSIUM SERPL-SCNC: 3.9 MMOL/L (ref 3.5–5.1)
POTASSIUM SERPL-SCNC: 3.9 MMOL/L (ref 3.5–5.1)
RBC # BLD AUTO: 2.85 X10(6)UL
SODIUM SERPL-SCNC: 142 MMOL/L (ref 136–145)
WBC # BLD AUTO: 5.4 X10(3) UL (ref 4–11)

## 2025-01-29 PROCEDURE — 84132 ASSAY OF SERUM POTASSIUM: CPT | Performed by: HOSPITALIST

## 2025-01-29 PROCEDURE — 85025 COMPLETE CBC W/AUTO DIFF WBC: CPT | Performed by: INTERNAL MEDICINE

## 2025-01-29 PROCEDURE — 83735 ASSAY OF MAGNESIUM: CPT | Performed by: INTERNAL MEDICINE

## 2025-01-29 PROCEDURE — 80048 BASIC METABOLIC PNL TOTAL CA: CPT | Performed by: INTERNAL MEDICINE

## 2025-01-29 PROCEDURE — 80202 ASSAY OF VANCOMYCIN: CPT | Performed by: HOSPITALIST

## 2025-01-29 RX ORDER — MAGNESIUM OXIDE 400 MG/1
400 TABLET ORAL ONCE
Status: COMPLETED | OUTPATIENT
Start: 2025-01-29 | End: 2025-01-29

## 2025-01-29 NOTE — PROGRESS NOTES
[facility]  part of [facility]     [facility] Hospitalist Progress Note     PCP: Maral Huffman MD    CC: Follow up       Assessment/Plan:     Patient is a 79 year old female with PMH sig for COPD, A- Fib on Eliquis, CHF, HTN, HLD, PAD, Neuropathy, SUSAN,  prior hx of septic shock 2/2 cellulitis with admission 6/19-6/22 with LLE cellulitis  discharged on oral levaquin and doxy seen by ID on 6/27 and found to have worsening swelling and drainage of her left lower extremity 6/27, sent for admission for IV abx in July 2024 . Patient was discharged on Vanco and cefepime until July 10th with home infusions.  Patient now returns with generalized weakness and fall.  Admitted for COVID and worsening lower extremity cellulitis.  CT shows left knee hardware, nondisplaced fracture of the lateral femoral condyle, fluid collection that corresponds to the lateral skin wound.  Per Ortho no plans for surgical intervention, weightbearing as tolerated, follow-up IDs recommendations for antibiotics, cultures so far growing Staph aureus, MRSA.  Await final ID recommendations.     Fall  - generalized weakness  - head trauma on Eliquis   - CTH and C spine with no acute process  - ECG: PACs with T wave changes  - tsh normal   - likely 2/2 COVID   - PT/OT     B/L LE Cellulitis, left worse than right, open sore on left lateral knee without corresponding trauma  Left Knee Wound   Recurrent LLE Cellulitis  Chronic Stasis Dermatitis   Hx MRSA + and Pseudomonas  Left knee replacement x 2 in [place], most recently approximately 6 years ago  -6/19 discharged on levaquin and doxy, seen by ID 6/27 w/ worsening swelling and drainage  and sent in for IV abx  -in July patient was discharged on Vanco and cefepime until July 10th with home infusions  -afebrile. No leukocytosis. La negative on admission    -had a fall on the left knee in December, seen ortho   -Also had nerve block done on 12/20/2024 but did not proceed with ablation per  patient preference  -check MRSA   -blood  cx   -wound cx   -wound care  -zosyn and vanco started   -ID consult ->follow up final abx recs   -Left lateral leg wound present starting on Tuesday, no corresponding trauma, draining at home, actively draining here.  Culture with Staph aureus, MRSA  -CT shows left knee hardware, nondisplaced fracture of the lateral femoral condyle, fluid collection that corresponds to the lateral skin wound.  Per Ortho no plans for surgical intervention, weightbearing as tolerated  Overall improving, await final ID recommendations, discussed with patient and given her PT eval will likely need rehab.    COVID   -afebrile. No leukocytosis. La negative  -no RA  -CXR Small bilateral pleural effusions.  Mild hazy opacity right lung base which may reflect atelectasis with or without superimposed pneumonia  -will cover with vanc/zosyn   -AST elevated, will trend   -Clinically not symptomatic, no indication for steroids remdesivir started per ID     PaFib S/P CV 1/2024  Secondary Hypercoagulable state   S/p Cardioversion   HTN  HL  Chronic Diastolic CHF  -5/2024 echo with normal EF 55%, mild as, mild ai   -continue  home eliquis, Tikosyn, bumex, eplerenone and pravastatin  -home lopressor/Toprol held with lower bp   -BNP 1,372, b/l LE edema   -CXR Small bilateral pleural effusions  -will given 20mg iv lasix x1 on admission, overall improved from prior  -on RA   -Mild hypotension noted on 1/28/2025, patient is asymptomatic, hold Bumex and gentle 250 bolus x 2, blood pressure improved      Hypokalemia   - lyte potocol      COPD not on inhalers   SUSAN  - cpap as tolerated      PAD  -continue statin.   -not on asa 2/2 eliquis      Neuropathy   - off gabapentin      GERD  - off PPI     GOC  - DNR/Select-confirmed with pt      FN:  - IVF: hold  - Diet: general      DVT Prophy: SCDs Eliquis -> now resumed  Atrophy: Ambulate PT/OT  Lines: Piv     MA/ACO Re[place]  -ER Visits 2025: 1  -Admissions 2025: 1  -Re-  Entry: no  -Consults: ID   -Discharge Needs:TBD  -Appointments: [ ] PCP Maral Huffman                            [ ] ID Hajjiri                             [ ] outpt wound      Dispo: pending clinical course    Questions/concerns were discussed with patient and/or family by bedside.    Note: This chart was prepared using voice recognition software and may contain unintended word substitution errors.       Thank You,  Gabby Boudreaux M.D.  [facility] Hospitalist  Answering Service: [phone]        Subjective     Discussed with the patient culture findings, that we are awaiting ID final recommendations for antibiotics.  No CP, SOB, or N/V.      Objective     OBJECTIVE:  Temp:  [97.4 °F (36.3 °C)-98.2 °F (36.8 °C)] 97.9 °F (36.6 °C)  Pulse:  [73-88] 79  Resp:  [18] 18  BP: ()/(51-63) 102/63  SpO2:  [92 %-95 %] 92 %    Intake/Output:    Intake/Output Summary (Last 24 hours) at 1/29/2025 1434  Last data filed at 1/29/2025 0657  Gross per 24 hour   Intake 1350 ml   Output 500 ml   Net 850 ml       Last 3 Weights   01/26/25 1609 200 lb 9.9 oz (91 kg)   12/17/24 1214 210 lb (95.3 kg)   10/09/24 0415 218 lb 3.2 oz (99 kg)   10/08/24 0215 218 lb 12.8 oz (99.2 kg)   10/07/24 1849 217 lb 1.6 oz (98.5 kg)       Exam  General: Alert, no acute distress  HEENT: oral mucosa normal   Neck: non tender, no adenopathy   Lungs: good air movement, good effort   Heart: Regular rate and rhythm  Abdomen: soft, non tender, non distended   Extremities: b/l LE edema, b/l leg redness left worse than right for both, left knee with wound and discharge, no acute tenderness to palpation directly over knee, no direct warmth over knee, active drainage from lateral left knee wound is improved  Skin: no new rash, normal color  Neuro: No focal deficits  Psych: appropriate affect     Medications      remdesivir  100 mg Intravenous Q24H    apixaban  5 mg Oral BID    [Held by provider] bumetanide  0.5 mg Oral Daily    cholecalciferol  2,000 Units  Oral Daily    cyanocobalamin  1,000 mcg Oral Daily    glycerin-hypromellose-  1 drop Both Eyes BID    dofetilide  250 mcg Oral BID    eplerenone  12.5 mg Oral Daily    atorvastatin  5 mg Oral Nightly    vancomycin  1,250 mg Intravenous Q24H         acetaminophen    melatonin    polyethylene glycol (PEG 3350)    sennosides    bisacodyl    fleet enema    metoclopramide    Data Review:       Labs:     Recent Labs   Lab 01/26/25  1253 01/27/25  0827 01/28/25  0558 01/29/25  0529   WBC 8.4 7.1 5.4 5.4   HGB 12.1 11.0* 9.7* 9.8*   MCV 98.7 99.4 97.9 98.9   .0 285.0 235.0 238.0       Recent Labs   Lab 01/26/25  1253 01/27/25  0620 01/28/25  0558 01/29/25  0529    146* 143 142   K 3.0* 4.0 3.4* 3.9  3.9    110 109 110   CO2 27.0 29.0 28.0 26.0   BUN 11 10 9 6*   CREATSERUM 0.84 0.73 0.64 0.59   CA 8.6* 7.8* 7.7* 7.9*   MG  --  1.8 1.7 1.8   GLU 98 91 91 85       Recent Labs   Lab 01/26/25  1253   ALT 39   AST 89*   ALB 2.8*       No results for input(s): \"PGLU\" in the last 168 hours.    No results for input(s): \"TROP\" in the last 168 hours.    Imaging:  CT SPINE CERVICAL (CPT=72125)    Result Date: 1/26/2025  PROCEDURE: CT SPINE CERVICAL (CPT=72125)  COMPARISON: None.  INDICATIONS: Neck pain post fall.  TECHNIQUE:   Multi-planar CT images were obtained without intravenous contrast material.  Automated exposure control for dose reduction was used. Adjustment of the mA and/or kV was done based on the patient's size. Use of iterative reconstruction technique for dose reduction was used.  Dose information is transmitted to the ACR (American College of Radiology) NRDR (National Radiology Data Registry) which includes the Dose Index Registry.   FINDINGS: CRANIOCERVICAL AREA:   Normal foramen magnum with no Chiari malformation. PARASPINAL AREA: No visible mass. BONES: No acute fracture or malalignment.  There is marginal osteophyte formation and facet arthropathy. ALIGNMENT: No significant subluxation.  DISCS: Mild degenerative disc disease without significant central canal narrowing. PARASPINAL AREA: Unremarkable. OTHER:   Negative.           CONCLUSION:   No acute fracture or malalignment cervical spine.  Mild cervical spine degenerative change.    Dictated by (CST): Jose Lopez MD on 1/26/2025 at 3:14 PM     Finalized by (CST): Jose Lopez MD on 1/26/2025 at 3:16 PM          CT BRAIN OR HEAD (CPT=70450)    Result Date: 1/26/2025  PROCEDURE: CT BRAIN OR HEAD (CPT=70450)  COMPARISON: [facility], CT BRAIN OR HEAD (CPT=70450), 3/01/2023, 5:08 PM.  INDICATIONS: Weakness.  TECHNIQUE: CT images were obtained without contrast material.  Automated exposure control for dose reduction was used.  Dose information is transmitted to the ACR (American College of Radiology) NRDR (National Radiology Data Registry) which includes the Dose Index Registry.  FINDINGS:  CSF SPACES: Ventricles, cisterns, and sulci are appropriate for age.  No hydrocephalus, subarachnoid hemorrhage, or mass.  No midline shift.  CEREBRUM: No edema, hemorrhage, mass, acute infarction, or significant atrophy.  WHITE MATTER: Mild chronic microvascular white matter ischemia. CEREBELLUM: No edema, hemorrhage, mass, acute infarction, or significant atrophy.  BRAINSTEM: No edema, hemorrhage, mass, acute infarction, or significant atrophy.  CALVARIUM: No apparent fracture, mass, or other significant visible lesion.  SINUSES: Limited views demonstrate no significant mucosal thickening or fluid.  ORBITS: Prior bilateral cataract surgery.  OTHER: Negative.          CONCLUSION:   No acute intracranial abnormality.    Dictated by (CST): Jose Lopez MD on 1/26/2025 at 3:12 PM     Finalized by (CST): Jose Lopez MD on 1/26/2025 at 3:13 PM          XR CHEST AP PORTABLE  (CPT=71045)    Result Date: 1/26/2025  PROCEDURE: XR CHEST AP PORTABLE  (CPT=71045) TIME: 1303 hours.   COMPARISON: [facility], XR CHEST AP  PORTABLE (CPT=71045), 7/01/2024, 12:07 PM.  [facility], XR CHEST AP PORTABLE (CPT=71045), 5/03/2024, 6:58 PM.  [facility], XR CHEST AP PORTABLE (CPT=71045), 12/11/2023, 6:17 AM.  INDICATIONS: Generalized weakness and fall today.  TECHNIQUE:   Single view.   FINDINGS:  CARDIAC/MEDIAST: Borderline cardiomegaly.  No vascular congestion.  LUNGS/PLEURA:  Small bilateral pleural effusions.  Mild opacity right lung base.  No pneumothorax. OTHER:  Degenerative change osseous structures.         CONCLUSION:   Small bilateral pleural effusions.  Mild hazy opacity right lung base which may reflect atelectasis with or without superimposed pneumonia.    Dictated by (CST): Jose Lopez MD on 1/26/2025 at 1:35 PM     Finalized by (CST): Jose Lopez MD on 1/26/2025 at 1:36 PM

## 2025-01-29 NOTE — PLAN OF CARE
Problem: Patient Centered Care  Goal: Patient preferences are identified and integrated in the patient's plan of care  Description: Interventions:  - What would you like us to know as we care for you? From [facility]   - Provide timely, complete, and accurate information to patient/family  - Incorporate patient and family knowledge, values, beliefs, and cultural backgrounds into the planning and delivery of care  - Encourage patient/family to participate in care and decision-making at the level they choose  - Honor patient and family perspectives and choices  Outcome: Progressing     Problem: Patient/Family Goals  Goal: Patient/Family Long Term Goal  Description: Patient's Long Term Goal: Discharge from the hospital    Interventions:  - Monitor vital signs  - Monitor appropriate labs  - Pain management  - Administer medications per order  - Follow MD orders  - Diagnostics per order  - Update / inform patient and family on plan of care  - Discharge planning  - See additional Care Plan goals for specific interventions  Outcome: Progressing  Goal: Patient/Family Short Term Goal  Description: Patient's Short Term Goal: Improve weakness    Interventions:   -  Monitor vital signs  - Monitor appropriate labs  - Pain management  - Administer medications per order  - Follow MD orders  - Diagnostics per order  - Update / inform patient and family on plan of care  - See additional Care Plan goals for specific interventions  Outcome: Progressing     Problem: SKIN/TISSUE INTEGRITY - ADULT  Goal: Skin integrity remains intact  Description: INTERVENTIONS  - Assess and document risk factors for pressure ulcer development  - Assess and document skin integrity  - Monitor for areas of redness and/or skin breakdown  - Initiate interventions, skin care algorithm/standards of care as needed  Outcome: Progressing  Goal: Incision(s), wounds(s) or drain site(s) healing without S/S of infection  Description:  INTERVENTIONS:  - Assess and document risk factors for pressure ulcer development  - Assess and document skin integrity  - Assess and document dressing/incision, wound bed, drain sites and surrounding tissue  - Implement wound care per orders  - Initiate isolation precautions as appropriate  - Initiate Pressure Ulcer prevention bundle as indicated  Outcome: Progressing     Problem: MUSCULOSKELETAL - ADULT  Goal: Return mobility to safest level of function  Description: INTERVENTIONS:  - Assess patient stability and activity tolerance for standing, transferring and ambulating w/ or w/o assistive devices  - Assist with transfers and ambulation using safe patient handling equipment as needed  - Ensure adequate protection for wounds/incisions during mobilization  - Obtain PT/OT consults as needed  - Advance activity as appropriate  - Communicate ordered activity level and limitations with patient/family  Outcome: Progressing     Problem: Impaired Functional Mobility  Goal: Achieve highest/safest level of mobility/gait  Description: Interventions:  - Assess patient's functional ability and stability  - Promote increasing activity/tolerance for mobility and gait  - Educate and engage patient/family in tolerated activity level and precautions  - Recommend use of sit-stand lift for transfers  - Recommend use of  RW for transfers and ambulation  - Recommend patient transfer to bedside chair toward strongest side  - When transferring patient, block weaker knee for safety  - Recommend use of chair position in bed 3 times per day  - Encourage attention to left side  Outcome: Progressing     Problem: Impaired Activities of Daily Living  Goal: Achieve highest/safest level of independence in self care  Description: Interventions:  - Assess ability and encourage patient to participate in ADLs to maximize function  - Promote sitting position while performing ADLs such as feeding, grooming, and bathing  - Educate and encourage  patient/family in tolerated functional activity level and precautions during self-care  Outcome: Progressing     Problem: PAIN - ADULT  Goal: Verbalizes/displays adequate comfort level or patient's stated pain goal  Description: INTERVENTIONS:  - Encourage pt to monitor pain and request assistance  - Assess pain using appropriate pain scale  - Administer analgesics based on type and severity of pain and evaluate response  - Implement non-pharmacological measures as appropriate and evaluate response  - Consider cultural and social influences on pain and pain management  - Manage/alleviate anxiety  - Utilize distraction and/or relaxation techniques  - Monitor for opioid side effects  - Notify MD/LIP if interventions unsuccessful or patient reports new pain  - Anticipate increased pain with activity and pre-medicate as appropriate  Outcome: Progressing     Problem: SAFETY ADULT - FALL  Goal: Free from fall injury  Description: INTERVENTIONS:  - Assess pt frequently for physical needs  - Identify cognitive and physical deficits and behaviors that affect risk of falls.  - [place] fall precautions as indicated by assessment.  - Educate pt/family on patient safety including physical limitations  - Instruct pt to call for assistance with activity based on assessment  - Modify environment to reduce risk of injury  - Provide assistive devices as appropriate  - Consider OT/PT consult to assist with strengthening/mobility  - Encourage toileting schedule  Outcome: Progressing     Problem: DISCHARGE PLANNING  Goal: Discharge to home or other facility with appropriate resources  Description: INTERVENTIONS:  - Identify barriers to discharge w/pt and caregiver  - Include patient/family/discharge partner in discharge planning  - Arrange for needed discharge resources and transportation as appropriate  - Identify discharge learning needs (meds, wound care, etc)  - Arrange for interpreters to assist at discharge as needed  - Consider  post-discharge preferences of patient/family/discharge partner  - Complete POLST form as appropriate  - Assess patient's ability to be responsible for managing their own health  - Refer to Case Management Department for coordinating discharge planning if the patient needs post-hospital services based on physician/LIP order or complex needs related to functional status, cognitive ability or social support system  Outcome: Progressing

## 2025-01-29 NOTE — PROGRESS NOTES
[facility]  part of [facility] Infectious Disease  Progress Note    Adore Covington Patient Status:  Inpatient    1945 MRN [id number]   Location [facility] 5SW/SE Attending Gabby Boudreaux MD   Hosp Day # 3 PCP Maral Huffman MD     Subjective:  Patient seen/examined.  Clinical course reviewed since my last exam.  Orthopedics input appreciated - no immediate plans for surgery.    Objective:  Blood pressure 102/63, pulse 79, temperature 97.9 °F (36.6 °C), temperature source Oral, resp. rate 18, height 5' 2\" (1.575 m), weight 200 lb 9.9 oz (91 kg), SpO2 92%.    Intake/Output:    Intake/Output Summary (Last 24 hours) at 2025 1143  Last data filed at 2025 0657  Gross per 24 hour   Intake 1590 ml   Output 500 ml   Net 1090 ml       Physical Exam:  General: Awake, alert, non-tox, NAD.  HEENT:  Oropharynx clear, trachea ML.  Heart: RRR S1S2 no murmurs.  Lungs: Essentially CTA b/l, no rhonchi, rales, wheezes.  Abdomen: Soft, NT/ND.  BS present.  No organomegaly.  Extremity: L lateral knee-area wound.  Neurological: No focal deficits.  Derm:  Warm, dry, free from rashes.    Lab Data Review:  Lab Results   Component Value Date    WBC 5.4 2025    HGB 9.8 2025    HCT 28.2 2025    .0 2025    CREATSERUM 0.59 2025    BUN 6 2025     2025    K 3.9 2025    K 3.9 2025     2025    CO2 26.0 2025    GLU 85 2025    CA 7.9 2025    MG 1.8 2025        Cultures:   H/o MRSA+ carriage     Blood cultures negative     Wound cultures pending     COVID+    Radiology:  CONCLUSION:      Small bilateral pleural effusions.  Mild hazy opacity right lung base which may reflect atelectasis with or without superimposed pneumonia.      Assessment and Plan:     Syndrome of weakness and fall prior to admission which appears to be more likely related to acute COVID+ status vs. Other  -  No known sick contacts  - CXR with mild hazy infiltrate  - No high fevers  - Breathing well on RA  - Remdesivir day #3/3     2.  H/o admissions for recurrent LLE cellulitis with long course IV Rx required June/July 2024  - Currently with injury to her L knee and wound present  - Cultures with MRSA  - CT with fracture of uncertain chronicity and fluid collection which appears superficial per orthopedics  - IV vancomycin and zosyn as above, discontinue zosyn     3.  Disposition - inpatient.  Remdesivir for COVID completes today.  Remains concerning that there is a fluid collection inferior to the site of drainage although difficult to ascertain if there is a true deeper seated process.  At present orthopedics has no plans for surgery.  If there is a protected focus of infection (ie infected hardware), will expect a likely re-flare in infectious symptoms after discontinuation of antibiotics.  For now plan for PICC and ~2 weeks of IV Rx at discharge with plans for ECF.  Patient wishes to speak with social work and formalize ECF plans prior to PICC placement.   D/w patient.    Georgina Rice DO, [facility] Infectious Disease  [phone]    1/29/2025  11:43 AM

## 2025-01-29 NOTE — PLAN OF CARE
Problem: Patient Centered Care  Goal: Patient preferences are identified and integrated in the patient's plan of care  Description: Interventions:  - What would you like us to know as we care for you? From [facility]   - Provide timely, complete, and accurate information to patient/family  - Incorporate patient and family knowledge, values, beliefs, and cultural backgrounds into the planning and delivery of care  - Encourage patient/family to participate in care and decision-making at the level they choose  - Honor patient and family perspectives and choices  Outcome: Progressing     Problem: Patient/Family Goals  Goal: Patient/Family Long Term Goal  Description: Patient's Long Term Goal: Discharge from the hospital    Interventions:  - Monitor vital signs  - Monitor appropriate labs  - Pain management  - Administer medications per order  - Follow MD orders  - Diagnostics per order  - Update / inform patient and family on plan of care  - Discharge planning  - See additional Care Plan goals for specific interventions  Outcome: Progressing  Goal: Patient/Family Short Term Goal  Description: Patient's Short Term Goal: Improve weakness    Interventions:   -  Monitor vital signs  - Monitor appropriate labs  - Pain management  - Administer medications per order  - Follow MD orders  - Diagnostics per order  - Update / inform patient and family on plan of care  - See additional Care Plan goals for specific interventions  Outcome: Progressing     Problem: SKIN/TISSUE INTEGRITY - ADULT  Goal: Skin integrity remains intact  Description: INTERVENTIONS  - Assess and document risk factors for pressure ulcer development  - Assess and document skin integrity  - Monitor for areas of redness and/or skin breakdown  - Initiate interventions, skin care algorithm/standards of care as needed  Outcome: Progressing  Goal: Incision(s), wounds(s) or drain site(s) healing without S/S of infection  Description:  INTERVENTIONS:  - Assess and document risk factors for pressure ulcer development  - Assess and document skin integrity  - Assess and document dressing/incision, wound bed, drain sites and surrounding tissue  - Implement wound care per orders  - Initiate isolation precautions as appropriate  - Initiate Pressure Ulcer prevention bundle as indicated  Outcome: Progressing     Problem: MUSCULOSKELETAL - ADULT  Goal: Return mobility to safest level of function  Description: INTERVENTIONS:  - Assess patient stability and activity tolerance for standing, transferring and ambulating w/ or w/o assistive devices  - Assist with transfers and ambulation using safe patient handling equipment as needed  - Ensure adequate protection for wounds/incisions during mobilization  - Obtain PT/OT consults as needed  - Advance activity as appropriate  - Communicate ordered activity level and limitations with patient/family  Outcome: Progressing     Problem: Impaired Functional Mobility  Goal: Achieve highest/safest level of mobility/gait  Description: Interventions:  - Assess patient's functional ability and stability  - Promote increasing activity/tolerance for mobility and gait  - Educate and engage patient/family in tolerated activity level and precautions  - Recommend use of sit-stand lift for transfers  - Recommend use of  RW for transfers and ambulation  - Recommend patient transfer to bedside chair toward strongest side  - When transferring patient, block weaker knee for safety  - Recommend use of chair position in bed 3 times per day  - Encourage attention to left side  Outcome: Progressing     Problem: Impaired Activities of Daily Living  Goal: Achieve highest/safest level of independence in self care  Description: Interventions:  - Assess ability and encourage patient to participate in ADLs to maximize function  - Promote sitting position while performing ADLs such as feeding, grooming, and bathing  - Educate and encourage  patient/family in tolerated functional activity level and precautions during self-care  Outcome: Progressing     Problem: PAIN - ADULT  Goal: Verbalizes/displays adequate comfort level or patient's stated pain goal  Description: INTERVENTIONS:  - Encourage pt to monitor pain and request assistance  - Assess pain using appropriate pain scale  - Administer analgesics based on type and severity of pain and evaluate response  - Implement non-pharmacological measures as appropriate and evaluate response  - Consider cultural and social influences on pain and pain management  - Manage/alleviate anxiety  - Utilize distraction and/or relaxation techniques  - Monitor for opioid side effects  - Notify MD/LIP if interventions unsuccessful or patient reports new pain  - Anticipate increased pain with activity and pre-medicate as appropriate  Outcome: Progressing     Problem: SAFETY ADULT - FALL  Goal: Free from fall injury  Description: INTERVENTIONS:  - Assess pt frequently for physical needs  - Identify cognitive and physical deficits and behaviors that affect risk of falls.  - [place] fall precautions as indicated by assessment.  - Educate pt/family on patient safety including physical limitations  - Instruct pt to call for assistance with activity based on assessment  - Modify environment to reduce risk of injury  - Provide assistive devices as appropriate  - Consider OT/PT consult to assist with strengthening/mobility  - Encourage toileting schedule  Outcome: Progressing     Problem: DISCHARGE PLANNING  Goal: Discharge to home or other facility with appropriate resources  Description: INTERVENTIONS:  - Identify barriers to discharge w/pt and caregiver  - Include patient/family/discharge partner in discharge planning  - Arrange for needed discharge resources and transportation as appropriate  - Identify discharge learning needs (meds, wound care, etc)  - Arrange for interpreters to assist at discharge as needed  - Consider  post-discharge preferences of patient/family/discharge partner  - Complete POLST form as appropriate  - Assess patient's ability to be responsible for managing their own health  - Refer to Case Management Department for coordinating discharge planning if the patient needs post-hospital services based on physician/LIP order or complex needs related to functional status, cognitive ability or social support system  Outcome: Progressing

## 2025-01-29 NOTE — CM/SW NOTE
01/28/25 1000   Choice of Post-Acute Provider   Informed patient of right to choose their preferred provider Yes   List of appropriate post-acute services provided to patient/family with quality data Yes     AMMON followed up on discharge planning.    AMMON met with pt bedside.    SW received call from Nga faye/[facility]- stated they are current with the patient.  Nga aware pt will DC to [facility].    Pt will now need a PICC line- MRSA + wound culture.    Pt is agreeable to [facility]- preference is [facility].    AMMON explained [facility] can accommodate clinically but needs to confirm day of DC if [facility] room is available.    SW provided pt list of [facility] facilities w/Medicare quality data.    PLAN: DC to [facility] pending choice/[facility] room availability    / to remain available for support and/or discharge planning.     Emily NAVARRO, KIERANW  Discharge Planner

## 2025-01-30 LAB
ANION GAP SERPL CALC-SCNC: 5 MMOL/L (ref 0–18)
BASOPHILS # BLD AUTO: 0.02 X10(3) UL (ref 0–0.2)
BASOPHILS NFR BLD AUTO: 0.4 %
BUN BLD-MCNC: 8 MG/DL (ref 9–23)
BUN/CREAT SERPL: 14.5 (ref 10–20)
CALCIUM BLD-MCNC: 7.7 MG/DL (ref 8.7–10.4)
CHLORIDE SERPL-SCNC: 107 MMOL/L (ref 98–112)
CO2 SERPL-SCNC: 29 MMOL/L (ref 21–32)
CREAT BLD-MCNC: 0.55 MG/DL
DEPRECATED RDW RBC AUTO: 60.6 FL (ref 35.1–46.3)
EGFRCR SERPLBLD CKD-EPI 2021: 94 ML/MIN/1.73M2 (ref 60–?)
EOSINOPHIL # BLD AUTO: 0.29 X10(3) UL (ref 0–0.7)
EOSINOPHIL NFR BLD AUTO: 5.8 %
ERYTHROCYTE [DISTWIDTH] IN BLOOD BY AUTOMATED COUNT: 16.8 % (ref 11–15)
GLUCOSE BLD-MCNC: 86 MG/DL (ref 70–99)
HCT VFR BLD AUTO: 29.7 %
HGB BLD-MCNC: 9.8 G/DL
IMM GRANULOCYTES # BLD AUTO: 0.02 X10(3) UL (ref 0–1)
IMM GRANULOCYTES NFR BLD: 0.4 %
LYMPHOCYTES # BLD AUTO: 1.02 X10(3) UL (ref 1–4)
LYMPHOCYTES NFR BLD AUTO: 20.4 %
MAGNESIUM SERPL-MCNC: 1.7 MG/DL (ref 1.6–2.6)
MCH RBC QN AUTO: 32.2 PG (ref 26–34)
MCHC RBC AUTO-ENTMCNC: 33 G/DL (ref 31–37)
MCV RBC AUTO: 97.7 FL
MONOCYTES # BLD AUTO: 0.65 X10(3) UL (ref 0.1–1)
MONOCYTES NFR BLD AUTO: 13 %
NEUTROPHILS # BLD AUTO: 3 X10 (3) UL (ref 1.5–7.7)
NEUTROPHILS # BLD AUTO: 3 X10(3) UL (ref 1.5–7.7)
NEUTROPHILS NFR BLD AUTO: 60 %
OSMOLALITY SERPL CALC.SUM OF ELEC: 290 MOSM/KG (ref 275–295)
PLATELET # BLD AUTO: 252 10(3)UL (ref 150–450)
POTASSIUM SERPL-SCNC: 4 MMOL/L (ref 3.5–5.1)
RBC # BLD AUTO: 3.04 X10(6)UL
SODIUM SERPL-SCNC: 141 MMOL/L (ref 136–145)
VANCOMYCIN PEAK SERPL-MCNC: 31.2 UG/ML (ref 30–50)
VANCOMYCIN TROUGH SERPL-MCNC: 15.4 UG/ML (ref 10–20)
WBC # BLD AUTO: 5 X10(3) UL (ref 4–11)

## 2025-01-30 PROCEDURE — 80202 ASSAY OF VANCOMYCIN: CPT | Performed by: HOSPITALIST

## 2025-01-30 PROCEDURE — 85025 COMPLETE CBC W/AUTO DIFF WBC: CPT | Performed by: HOSPITALIST

## 2025-01-30 PROCEDURE — 97535 SELF CARE MNGMENT TRAINING: CPT

## 2025-01-30 PROCEDURE — 80048 BASIC METABOLIC PNL TOTAL CA: CPT | Performed by: HOSPITALIST

## 2025-01-30 PROCEDURE — 97530 THERAPEUTIC ACTIVITIES: CPT

## 2025-01-30 PROCEDURE — 83735 ASSAY OF MAGNESIUM: CPT | Performed by: HOSPITALIST

## 2025-01-30 RX ORDER — VANCOMYCIN HYDROCHLORIDE
1.25 EVERY 24 HOURS
Qty: 4000 ML | Refills: 0 | Status: SHIPPED | OUTPATIENT
Start: 2025-01-31 | End: 2025-02-10

## 2025-01-30 RX ORDER — MAGNESIUM OXIDE 400 MG/1
400 TABLET ORAL ONCE
Status: COMPLETED | OUTPATIENT
Start: 2025-01-30 | End: 2025-01-30

## 2025-01-30 NOTE — PHYSICAL THERAPY NOTE
PHYSICAL THERAPY TREATMENT NOTE - INPATIENT     Room Number: 543/543-A       Presenting Problem: generalized weakness  Co-Morbidities : PMH afib on eliquis, COPD, HTN, HL, obesity    Problem List  Principal Problem:    Weakness generalized  Active Problems:    Left leg cellulitis    COVID-19    Right lower lobe consolidation      PHYSICAL THERAPY ASSESSMENT   Patient demonstrates fair progress this session, goals  remain in progress.      Patient is requiring moderate assist and maximum assist as a result of the following impairments: decreased functional strength, decreased endurance/aerobic capacity, and medical status.     Patient continues to function below baseline with bed mobility, transfers, and gait.  Next session anticipate patient to progress bed mobility, transfers, and gait.  Physical Therapy will continue to follow patient for duration of hospitalization.    Patient continues to benefit from continued skilled PT services: to promote return to prior level of function and safety with continuous assistance and gradual rehabilitative therapy .    PLAN DURING HOSPITALIZATION  Nursing Mobility Recommendation : Lift Equipment     PT Treatment Plan: Bed mobility;Body mechanics;Don/doff brace;Endurance;Family education;Gait training;Range of motion;Stoop training;Transfer training;Balance training  Frequency (Obs): 3-5x/week     SUBJECTIVE  \"I can't stand for more than 30 seconds.\"     OBJECTIVE  Precautions: Bed/chair alarm    WEIGHT BEARING RESTRICTION       PAIN ASSESSMENT      Location: body aches       BALANCE  Static Sitting: Fair -  Dynamic Sitting: Fair -  Static Standing: Poor +  Dynamic Standing: Poor +    AM-PAC '6-Clicks' INPATIENT SHORT FORM - BASIC MOBILITY  How much difficulty does the patient currently have...  Patient Difficulty: Turning over in bed (including adjusting bedclothes, sheets and blankets)?: A Little   Patient Difficulty: Sitting down on and standing up from a chair with arms  (e.g., wheelchair, bedside commode, etc.): A Lot   Patient Difficulty: Moving from lying on back to sitting on the side of the bed?: A Lot   How much help from another person does the patient currently need...   Help from Another: Moving to and from a bed to a chair (including a wheelchair)?: A Lot   Help from Another: Need to walk in hospital room?: Total   Help from Another: Climbing 3-5 steps with a railing?: Total     AM-PAC Score:  Raw Score: 11   Approx Degree of Impairment: 72.57%   Standardized Score (AM-PAC Scale): 33.86   CMS Modifier (G-Code): CL    FUNCTIONAL ABILITY STATUS  Functional Mobility/Gait Assessment  Gait Assistance: Dependent assistance (unable to trial)  Rolling: moderate assist  Supine to Sit: moderate assist  Sit to Supine: moderate assist  Sit to Stand: maximum assist, x 2 reps of STS at EOB  Sitting EOB ~20 min, elizabeth to chair, seated ankle pumps at EOB and lateral weight shift tasks unsupported     The patient's Approx Degree of Impairment: 72.57% has been calculated based on documentation in the [facility] '6 clicks' Inpatient Daily Activity Short Form.  Research supports that patients with this level of impairment may benefit from LTAC.  Final disposition will be made by interdisciplinary medical team.    Patient End of Session: Up in chair;Needs met;Call light within reach;RN aware of session/findings;All patient questions and concerns addressed;Alarm set;Family present;Discussed recommendations with /    CURRENT GOALS   Goals to be met by: 2/20  Patient Goal Patient's self-stated goal is: to walk without needing a walker   Goal #1 Patient is able to demonstrate supine - sit EOB @ level: supervision      Goal #1   Current Status     Goal #2 Patient is able to demonstrate transfers Sit to/from Stand at assistance level: supervision with walker - rolling      Goal #2  Current Status     Goal #3 Patient is able to ambulate 25 feet with assist device: walker - rolling  at assistance level: minimum assistance   Goal #3   Current Status                 Goal #5 Patient to demonstrate independence with home activity/exercise instructions provided to patient in preparation for discharge.   Goal #5   Current Status     Goal #6     Goal #6  Current Status       Therapeutic Activity: 40 minutes

## 2025-01-30 NOTE — VASCULAR ACCESS
Pt for PICC isnertion for long term IV abx per ID.  Per THIEN Moore, pt prefers to have PICC placed tomorrow. Will schedule in am.

## 2025-01-30 NOTE — PROGRESS NOTES
[facility]  part of [facility]     [facility] Hospitalist Progress Note     PCP: Maral Huffman MD    CC: Follow up       Assessment/Plan:     Patient is a 79 year old female with PMH sig for COPD, A- Fib on Eliquis, CHF, HTN, HLD, PAD, Neuropathy, SUSAN,  prior hx of septic shock 2/2 cellulitis with admission 6/19-6/22 with LLE cellulitis  discharged on oral levaquin and doxy seen by ID on 6/27 and found to have worsening swelling and drainage of her left lower extremity 6/27, sent for admission for IV abx in July 2024 . Patient was discharged on Vanco and cefepime until July 10th with home infusions.  Patient now returns with generalized weakness and fall.  Admitted for COVID and worsening lower extremity cellulitis.  CT shows left knee hardware, nondisplaced fracture of the lateral femoral condyle, fluid collection that corresponds to the lateral skin wound.  Per Ortho no plans for surgical intervention, weightbearing as tolerated, follow-up IDs recommendations for antibiotics, cultures so far growing Staph aureus, MRSA. PICC, IV abx, ortho to review again      Fall  - generalized weakness  - head trauma on Eliquis   - CTH and C spine with no acute process  - ECG: PACs with T wave changes  - tsh normal   - likely 2/2 COVID   - PT/OT     B/L LE Cellulitis, left worse than right, open sore on left lateral knee without corresponding trauma  Left Knee Wound   Recurrent LLE Cellulitis  Chronic Stasis Dermatitis   Hx MRSA + and Pseudomonas  Left knee replacement x 2 in [place], most recently approximately 6 years ago  -6/19 discharged on levaquin and doxy, seen by ID 6/27 w/ worsening swelling and drainage  and sent in for IV abx  -in July patient was discharged on Vanco and cefepime until July 10th with home infusions  -afebrile. No leukocytosis. La negative on admission    -had a fall on the left knee in December, seen ortho   -Also had nerve block done on 12/20/2024 but did not proceed with ablation  per patient preference  -check MRSA   -blood  cx   -wound cx   -wound care  -zosyn and vanco started   -ID consult ->follow up final abx recs   -Left lateral leg wound present starting on Tuesday, no corresponding trauma, draining at home, actively draining here.  Culture with Staph aureus, MRSA  -CT shows left knee hardware, nondisplaced fracture of the lateral femoral condyle, fluid collection that corresponds to the lateral skin wound.  Per Ortho no plans for surgical intervention, weightbearing as tolerated  Overall improving, await final ID recommendations, discussed with patient and given her PT eval will likely need rehab  -per ID wanted surgery eval again, no increase in drainage noted on exam, serosang fluid only, very little     COVID   -afebrile. No leukocytosis. La negative  -no RA  -CXR Small bilateral pleural effusions.  Mild hazy opacity right lung base which may reflect atelectasis with or without superimposed pneumonia  -will cover with vanc/zosyn   -AST elevated, will trend   -Clinically not symptomatic, no indication for steroids remdesivir started per ID     PaFib S/P CV 1/2024  Secondary Hypercoagulable state   S/p Cardioversion   HTN  HL  Chronic Diastolic CHF  -5/2024 echo with normal EF 55%, mild as, mild ai   -continue  home eliquis, Tikosyn, bumex, eplerenone and pravastatin  -home lopressor/Toprol held with lower bp   -BNP 1,372, b/l LE edema   -CXR Small bilateral pleural effusions  -will given 20mg iv lasix x1 on admission, overall improved from prior  -on RA   -Mild hypotension noted on 1/28/2025, patient is asymptomatic, hold Bumex and gentle 250 bolus x 2, blood pressure improved      Hypokalemia   - lyte potocol      COPD not on inhalers   SUSAN  - cpap as tolerated      PAD  -continue statin.   -not on asa 2/2 eliquis      Neuropathy   - off gabapentin      GERD  - off PPI     GOC  - DNR/Select-confirmed with pt      FN:  - IVF: hold  - Diet: general      DVT Prophy: SCDs Eliquis ->  now resumed->may need to hold pending ortho eval   Atrophy: Ambulate PT/OT  Lines: Piv     MA/ACO Reach  -ER Visits 2025: 1  -Admissions 2025: 1  -Re- Entry: no  -Consults: ID   -Discharge Needs:TBD  -Appointments: [ ] PCP Maral Huffman                            [ ] ID Rk                             [ ] outpt wound      Dispo: pending clinical course    Questions/concerns were discussed with patient and/or family by bedside.    Note: This chart was prepared using voice recognition software and may contain unintended word substitution errors.       Thank You,  Gabby Boudreaux M.D.  [facility] Hospitalist  Answering Service: [phone]        Subjective     Decreased drainage. Discussed ortho re-eval,  No CP, SOB, or N/V.      Objective     OBJECTIVE:  Temp:  [97.5 °F (36.4 °C)-98.1 °F (36.7 °C)] 98.1 °F (36.7 °C)  Pulse:  [80-98] 87  Resp:  [16-18] 16  BP: ()/(56-67) 100/67  SpO2:  [92 %-95 %] 95 %    Intake/Output:    Intake/Output Summary (Last 24 hours) at 1/30/2025 1510  Last data filed at 1/30/2025 0149  Gross per 24 hour   Intake 610 ml   Output 300 ml   Net 310 ml       Last 3 Weights   01/26/25 1609 200 lb 9.9 oz (91 kg)   12/17/24 1214 210 lb (95.3 kg)   10/09/24 0415 218 lb 3.2 oz (99 kg)   10/08/24 0215 218 lb 12.8 oz (99.2 kg)   10/07/24 1849 217 lb 1.6 oz (98.5 kg)       Exam  General: Alert, no acute distress  HEENT: oral mucosa normal   Neck: non tender, no adenopathy   Lungs: good air movement, good effort   Heart: Regular rate and rhythm  Abdomen: soft, non tender, non distended   Extremities: b/l LE edema, b/l leg redness left worse than right for both, left knee with wound and discharge, no acute tenderness to palpation directly over knee, no direct warmth over knee, active drainage from lateral left knee wound is improved, no purulent draingage, clear to serosang  Skin: no new rash, normal color  Neuro: No focal deficits  Psych: appropriate affect     Medications      apixaban  5 mg  Oral BID    [Held by provider] bumetanide  0.5 mg Oral Daily    cholecalciferol  2,000 Units Oral Daily    cyanocobalamin  1,000 mcg Oral Daily    glycerin-hypromellose-  1 drop Both Eyes BID    dofetilide  250 mcg Oral BID    eplerenone  12.5 mg Oral Daily    atorvastatin  5 mg Oral Nightly    vancomycin  1,250 mg Intravenous Q24H         acetaminophen    melatonin    polyethylene glycol (PEG 3350)    sennosides    bisacodyl    fleet enema    metoclopramide    Data Review:       Labs:     Recent Labs   Lab 01/26/25  1253 01/27/25  0827 01/28/25  0558 01/29/25  0529 01/30/25  0547   WBC 8.4 7.1 5.4 5.4 5.0   HGB 12.1 11.0* 9.7* 9.8* 9.8*   MCV 98.7 99.4 97.9 98.9 97.7   .0 285.0 235.0 238.0 252.0       Recent Labs   Lab 01/26/25  1253 01/27/25  0620 01/28/25  0558 01/29/25  0529 01/30/25  0547    146* 143 142 141   K 3.0* 4.0 3.4* 3.9  3.9 4.0    110 109 110 107   CO2 27.0 29.0 28.0 26.0 29.0   BUN 11 10 9 6* 8*   CREATSERUM 0.84 0.73 0.64 0.59 0.55   CA 8.6* 7.8* 7.7* 7.9* 7.7*   MG  --  1.8 1.7 1.8 1.7   GLU 98 91 91 85 86       Recent Labs   Lab 01/26/25  1253   ALT 39   AST 89*   ALB 2.8*       No results for input(s): \"PGLU\" in the last 168 hours.    No results for input(s): \"TROP\" in the last 168 hours.    Imaging:  CT SPINE CERVICAL (CPT=72125)    Result Date: 1/26/2025  PROCEDURE: CT SPINE CERVICAL (CPT=72125)  COMPARISON: None.  INDICATIONS: Neck pain post fall.  TECHNIQUE:   Multi-planar CT images were obtained without intravenous contrast material.  Automated exposure control for dose reduction was used. Adjustment of the mA and/or kV was done based on the patient's size. Use of iterative reconstruction technique for dose reduction was used.  Dose information is transmitted to the ACR (American College of Radiology) NRDR (National Radiology Data Registry) which includes the Dose Index Registry.   FINDINGS: CRANIOCERVICAL AREA:   Normal foramen magnum with no Chiari malformation.  PARASPINAL AREA: No visible mass. BONES: No acute fracture or malalignment.  There is marginal osteophyte formation and facet arthropathy. ALIGNMENT: No significant subluxation. DISCS: Mild degenerative disc disease without significant central canal narrowing. PARASPINAL AREA: Unremarkable. OTHER:   Negative.           CONCLUSION:   No acute fracture or malalignment cervical spine.  Mild cervical spine degenerative change.    Dictated by (CST): Jose Lopez MD on 1/26/2025 at 3:14 PM     Finalized by (CST): Jose Lopez MD on 1/26/2025 at 3:16 PM          CT BRAIN OR HEAD (CPT=70450)    Result Date: 1/26/2025  PROCEDURE: CT BRAIN OR HEAD (CPT=70450)  COMPARISON: [facility], CT BRAIN OR HEAD (CPT=70450), 3/01/2023, 5:08 PM.  INDICATIONS: Weakness.  TECHNIQUE: CT images were obtained without contrast material.  Automated exposure control for dose reduction was used.  Dose information is transmitted to the ACR (American College of Radiology) NRDR (National Radiology Data Registry) which includes the Dose Index Registry.  FINDINGS:  CSF SPACES: Ventricles, cisterns, and sulci are appropriate for age.  No hydrocephalus, subarachnoid hemorrhage, or mass.  No midline shift.  CEREBRUM: No edema, hemorrhage, mass, acute infarction, or significant atrophy.  WHITE MATTER: Mild chronic microvascular white matter ischemia. CEREBELLUM: No edema, hemorrhage, mass, acute infarction, or significant atrophy.  BRAINSTEM: No edema, hemorrhage, mass, acute infarction, or significant atrophy.  CALVARIUM: No apparent fracture, mass, or other significant visible lesion.  SINUSES: Limited views demonstrate no significant mucosal thickening or fluid.  ORBITS: Prior bilateral cataract surgery.  OTHER: Negative.          CONCLUSION:   No acute intracranial abnormality.    Dictated by (CST): Jose Lopez MD on 1/26/2025 at 3:12 PM     Finalized by (CST): Jose Lopez MD on 1/26/2025 at 3:13 PM          XR  CHEST AP PORTABLE  (CPT=71045)    Result Date: 1/26/2025  PROCEDURE: XR CHEST AP PORTABLE  (CPT=71045) TIME: 1303 hours.   COMPARISON: [facility], XR CHEST AP PORTABLE (CPT=71045), 7/01/2024, 12:07 PM.  [facility], XR CHEST AP PORTABLE (CPT=71045), 5/03/2024, 6:58 PM.  [facility], XR CHEST AP PORTABLE (CPT=71045), 12/11/2023, 6:17 AM.  INDICATIONS: Generalized weakness and fall today.  TECHNIQUE:   Single view.   FINDINGS:  CARDIAC/MEDIAST: Borderline cardiomegaly.  No vascular congestion.  LUNGS/PLEURA:  Small bilateral pleural effusions.  Mild opacity right lung base.  No pneumothorax. OTHER:  Degenerative change osseous structures.         CONCLUSION:   Small bilateral pleural effusions.  Mild hazy opacity right lung base which may reflect atelectasis with or without superimposed pneumonia.    Dictated by (CST): Jose Lopez MD on 1/26/2025 at 1:35 PM     Finalized by (CST): Jose Lopez MD on 1/26/2025 at 1:36 PM

## 2025-01-30 NOTE — PROGRESS NOTES
[facility]  Progress Note    Adore Covington Patient Status:  Inpatient    1945 MRN [id number]   Location [facility] 5SW/SE Attending Gabby Boudreaux MD   Hosp Day # 4 PCP Maral Huffman MD     SUBJECTIVE:  INTERVAL HISTORY:  80 year old female with recurrent left lower extremity cellulitis, open wound, MRSA+ to the left lateral lower leg and knee pain in the setting of chronically painful revised TKA. CT scan showed possible lateral femoral condyle fracture though likely a chronic or subacute finding. Awaiting PICC line and discharge to rehab    OBJECTIVE:  Patient Vitals for the past 24 hrs:   BP Temp Temp src Pulse Resp SpO2   25 0900 100/67 98.1 °F (36.7 °C) Oral -- 16 95 %   25 0420 100/64 97.7 °F (36.5 °C) Oral 87 16 94 %   25 0300 -- -- -- 88 -- --   25 (!) 88/61 -- -- -- -- --   25 90/59 97.5 °F (36.4 °C) Oral 87 16 93 %   25 1900 -- -- -- 98 -- --       ORTHO EXAM:   Patient sitting comfortably in bed. Superficial wound to the left lateral lower leg with quarter sized area of fluctuance, surrounding mild erythema, with mild bloody discharge. No gross purulence. No tenderness to palpation.       LABORATORY:  Recent Labs   Lab 25  0547   RBC 3.04*   HGB 9.8*   HCT 29.7*   MCV 97.7   MCH 32.2   MCHC 33.0   RDW 16.8*   NEPRELIM 3.00   WBC 5.0   .0      No results for input(s): \"PTP\", \"INR\" in the last 168 hours.      ASSESSMENT/PLAN:  PT/OT, WBAT, ROM as tolerated of the left knee  Antibiotics per ID, observation of wound at this time with local wound care and dressing changes as needed      Tania Sanches PA-C  2025  5:27 PM      Discussed with PA, agree with above, observation for now

## 2025-01-30 NOTE — OCCUPATIONAL THERAPY NOTE
OCCUPATIONAL THERAPY TREATMENT NOTE - INPATIENT        Room Number: 543/543-A          Problem List  Principal Problem:    Weakness generalized  Active Problems:    Left leg cellulitis    COVID-19    Right lower lobe consolidation      OCCUPATIONAL THERAPY ASSESSMENT   Patient demonstrates limited progress this session, goals remain in progress.    Patient is requiring maximum assist as a result of the following impairments: decreased functional strength, decreased functional reach, decreased endurance, pain, decreased muscular endurance, cognitive deficits (pt demo increase anxious behaviors with impaired carry over of instruction, problem solving), decreased insight to deficits, and decreased safety awareness.    Patient continues to function below baseline with ADLs and functional mobility. Occupational Therapy will continue to follow patient for duration of hospitalization.    Patient continues to benefit from continued skilled OT services: to promote return to prior level of function and safety with continuous assistance and gradual rehabilitative therapy.     PLAN DURING HOSPITALIZATION           SUBJECTIVE  \"Are you going to leave me here?\"    OBJECTIVE  Precautions: Bed/chair alarm    WEIGHT BEARING RESTRICTION     PAIN ASSESSMENT  Rating: Unable to rate  Location: labia  Management Techniques: Repositioning    ACTIVITY TOLERANCE  Limited with increased anxious behaviors, fatigues quickly with limited stand tolerance (~35 seconds)      ACTIVITIES OF DAILY LIVING ASSESSMENT  AM-[facility] ‘6-Clicks’ Inpatient Daily Activity Short Form  How much help from another person does the patient currently need…  -   Putting on and taking off regular lower body clothing?: A Lot  -   Bathing (including washing, rinsing, drying)?: A Lot  -   Toileting, which includes using toilet, bedpan or urinal? : Total  -   Putting on and taking off regular upper body clothing?: A Lot  -   Taking care of personal grooming such as brushing  teeth?: None  -   Eating meals?: None    AM-PAC Score:  Score: 15  Approx Degree of Impairment: 56.46%  Standardized Score (AM-PAC Scale): 34.69  CMS Modifier (G-Code): CK    FUNCTIONAL TRANSFER ASSESSMENT  Sit to Stand: Edge of Bed  Edge of Bed: -- (Max A x2)    BED MOBILITY  Rolling: Maximum Assist  Supine to Sit : Maximum Assist  Sit to Supine (OT): Maximum Assist    FUNCTIONAL ADL ASSESSMENT  Grooming: set up from sitting  LE dressing: Max A  Toileting: dependent, incontinent of soft stool    Skilled Therapy Provided: Pt received in bed. Pt benefits from redefining of therapy roles and plan of care. Pt 's anxious behaviors increase as session progresses, increase with mobility tasks. Pt demo poor carry over of instruction, poor problem solving.     EDUCATION PROVIDED  Patient Education : Role of Occupational Therapy; Functional Transfer Techniques; Fall Prevention  Patient's Response to Education: Demonstrates Poor Carry Over to Information; Requires Further Education    The patient's Approx Degree of Impairment: 56.46% has been calculated based on documentation in the [facility] '6 clicks' Inpatient Daily Activity Short Form.  Research supports that patients with this level of impairment may benefit from SHARYN.  Final disposition will be made by interdisciplinary medical team.    Patient End of Session: Up in chair;Needs met;Call light within reach;RN aware of session/findings;All patient questions and concerns addressed;Hospital anti-slip socks;Alarm set    OT G   Patient will complete functional transfer with min A  Comment: Not Met    Patient will complete toileting with min A  Comment: Not Met    Patient will tolerate standing for 2 minutes in prep for adls with min A   Comment: Not Met              Goals  on:   Frequency: 3-5x/wks    Self-Care Home Management: 45 minutes

## 2025-01-30 NOTE — PROGRESS NOTES
[facility]  part of [facility] Infectious Disease Progress Note    Adore Covington Patient Status:  Inpatient    1945 MRN [id number]   Location [facility] 5SW/SE Attending Gabby Boudreaux MD   Hosp Day # 4 PCP Maral Huffman MD     Subjective:  Chart reviewed, no acute events.  Pt seen bedside.  She reports she is doing ok.   States her left knee still has area of swelling and she noted drainage yesterday but none today.   Still feeling weak overall and difficulty moving around. No fevers     Objective:    Allergies:  Allergies[1]    Medications:    Current Facility-Administered Medications:     acetaminophen (Tylenol Extra Strength) tab 500 mg, 500 mg, Oral, Q4H PRN    melatonin tab 3 mg, 3 mg, Oral, Nightly PRN    polyethylene glycol (PEG 3350) (Miralax) 17 g oral packet 17 g, 17 g, Oral, Daily PRN    sennosides (Senokot) tab 17.2 mg, 17.2 mg, Oral, Nightly PRN    bisacodyl (Dulcolax) 10 MG rectal suppository 10 mg, 10 mg, Rectal, Daily PRN    fleet enema (Fleet) rectal enema 133 mL, 1 enema, Rectal, Once PRN    metoclopramide (Reglan) 5 mg/mL injection 5 mg, 5 mg, Intravenous, Q8H PRN    apixaban (Eliquis) tab 5 mg, 5 mg, Oral, BID    [Held by provider] bumetanide (Bumex) tab 0.5 mg, 0.5 mg, Oral, Daily    cholecalciferol (Vitamin D3) tab 2,000 Units, 2,000 Units, Oral, Daily    cyanocobalamin (Vitamin B12) tab 1,000 mcg, 1,000 mcg, Oral, Daily    glycerin-hypromellose- (Artificial Tears) 0.2-0.2-1 % ophthalmic solution 1 drop, 1 drop, Both Eyes, BID    dofetilide (Tikosyn) cap 250 mcg, 250 mcg, Oral, BID    eplerenone (Inspra) tab 12.5 mg, 12.5 mg, Oral, Daily    atorvastatin (Lipitor) tab 5 mg, 5 mg, Oral, Nightly    vancomycin (Vancocin) 1.25 g in sodium chloride 0.9% 250mL IVPB premix, 1,250 mg, Intravenous, Q24H    Physical Exam:  General: Alert, orientated x3.  Cooperative.  No apparent distress.  Vital Signs:  Blood pressure 100/64, pulse 87,  temperature 97.7 °F (36.5 °C), temperature source Oral, resp. rate 16, height 5' 2\" (1.575 m), weight 200 lb 9.9 oz (91 kg), SpO2 94%.   Temp (24hrs), Av.8 °F (36.6 °C), Min:97.5 °F (36.4 °C), Max:98 °F (36.7 °C)      HEENT: Exam is unremarkable.  Without scleral icterus.  Mucous membranes are moist. PERRLA.  Oropharynx is clear.  Lungs: Clear to auscultation bilaterally.  Cardiac: Regular rate   Abdomen:  Soft, non-distended, non-tender, with no rebound or guarding.    Extremities:  L knee with +swelling compared to R knee  Skin: L knee with small wound noted,  no drainage today and area is fluctuant/bulging compared to previous.  Fluid noted under area but unable to express any drainage with pressure over area   Neurologic: Cranial nerves are grossly intact.      Labs:  Lab Results   Component Value Date    WBC 5.0 2025    HGB 9.8 2025    HCT 29.7 2025    .0 2025    CREATSERUM 0.55 2025    BUN 8 2025     2025    K 4.0 2025     2025    CO2 29.0 2025    GLU 86 2025    CA 7.7 2025    MG 1.7 2025         Radiology:  CXR   CONCLUSION:      Small bilateral pleural effusions.  Mild hazy opacity right lung base which may reflect atelectasis with or without superimposed pneumonia.     CT LLE:  CONCLUSION:   1.  Left knee arthroplasty is present.  The hardware causes beam hardening artifact, despite metallic hardware suppression techniques.  There is a nondisplaced fracture around the lateral femoral condyle, along the proximal margin of the, which extends   to the lateral margin of the femoral stem in the distal femoral metadiaphysis.   2.  Fluid collection along the superficial margin of the lateral retinaculum measuring 18 x 45 x 54 mm, possibly representing bursitis, seroma, liquified hematoma, or infectious process.  There is overlying skin thickening which may represent changes of   cellulitis, soft tissue  injury/bruising, or scar.     Assessment/Plan:    1.  Weakness and fall  -admitted after a fall resulting in hitting her head  -tested positive for COVID upon admission  -CXR with mild hazy infiltrate  -no fevers and on RA  -s/p 3 days of IV Remdesivir   -on IV Vancomycin and Zosyn    2. Hx of recurrent LLE cellulitis  -with current injury to L knee with open wound  -with hx of L TKA x 2   -cultures with MRSA   -MRSA PCR negative   -blood cultures NGTD   -CT showing possible fracture along lateral femoral condyle and small fluid collection along superficial margin of lateral retinaculum, possible bursitis vs seroma vs liquified hematoma vs infectious process.   -Ortho following, no surgical intervention recommended   -on IV Vancomycin and s/p IV Zosyn     DISPO:  Continue IV Vancomycin, PICC ordered.   Area of fluctuance noted again today and drainage has stopped, suggest re evaluation for bedside I&D to express any further drainage.   For now will leave rx for IV Vancomycin 1.25g daily with pharm to dose, to complete a 2 week course.  This course may be adjusted pending any further changes or interventions.  D/w patient, d/w Dr Rice.     If you have any questions or concerns please call [facility] at [phone].     VIVIENNE Rouse  1/28/2025  11:28 AM         [1]   Allergies  Allergen Reactions    Cefepime RASH     Generalized

## 2025-01-30 NOTE — PLAN OF CARE
Hypotensive, MD notified, no new orders. Continue to monitor.    Problem: Patient Centered Care  Goal: Patient preferences are identified and integrated in the patient's plan of care  Description: Interventions:  - What would you like us to know as we care for you? From [facility]   - Provide timely, complete, and accurate information to patient/family  - Incorporate patient and family knowledge, values, beliefs, and cultural backgrounds into the planning and delivery of care  - Encourage patient/family to participate in care and decision-making at the level they choose  - Honor patient and family perspectives and choices  Outcome: Progressing     Problem: SKIN/TISSUE INTEGRITY - ADULT  Goal: Skin integrity remains intact  Description: INTERVENTIONS  - Assess and document risk factors for pressure ulcer development  - Assess and document skin integrity  - Monitor for areas of redness and/or skin breakdown  - Initiate interventions, skin care algorithm/standards of care as needed  Outcome: Progressing  Goal: Incision(s), wounds(s) or drain site(s) healing without S/S of infection  Description: INTERVENTIONS:  - Assess and document risk factors for pressure ulcer development  - Assess and document skin integrity  - Assess and document dressing/incision, wound bed, drain sites and surrounding tissue  - Implement wound care per orders  - Initiate isolation precautions as appropriate  - Initiate Pressure Ulcer prevention bundle as indicated  Outcome: Progressing     Problem: MUSCULOSKELETAL - ADULT  Goal: Return mobility to safest level of function  Description: INTERVENTIONS:  - Assess patient stability and activity tolerance for standing, transferring and ambulating w/ or w/o assistive devices  - Assist with transfers and ambulation using safe patient handling equipment as needed  - Ensure adequate protection for wounds/incisions during mobilization  - Obtain PT/OT consults as needed  -  Advance activity as appropriate  - Communicate ordered activity level and limitations with patient/family  Outcome: Progressing     Problem: Impaired Functional Mobility  Goal: Achieve highest/safest level of mobility/gait  Description: Interventions:  - Assess patient's functional ability and stability  - Promote increasing activity/tolerance for mobility and gait  - Educate and engage patient/family in tolerated activity level and precautions  - Recommend use of sit-stand lift for transfers  - Recommend use of  RW for transfers and ambulation  - Recommend patient transfer to bedside chair toward strongest side  - When transferring patient, block weaker knee for safety  - Recommend use of chair position in bed 3 times per day  - Encourage attention to left side  Outcome: Progressing     Problem: Impaired Activities of Daily Living  Goal: Achieve highest/safest level of independence in self care  Description: Interventions:  - Assess ability and encourage patient to participate in ADLs to maximize function  - Promote sitting position while performing ADLs such as feeding, grooming, and bathing  - Educate and encourage patient/family in tolerated functional activity level and precautions during self-care  Outcome: Progressing     Problem: PAIN - ADULT  Goal: Verbalizes/displays adequate comfort level or patient's stated pain goal  Description: INTERVENTIONS:  - Encourage pt to monitor pain and request assistance  - Assess pain using appropriate pain scale  - Administer analgesics based on type and severity of pain and evaluate response  - Implement non-pharmacological measures as appropriate and evaluate response  - Consider cultural and social influences on pain and pain management  - Manage/alleviate anxiety  - Utilize distraction and/or relaxation techniques  - Monitor for opioid side effects  - Notify MD/LIP if interventions unsuccessful or patient reports new pain  - Anticipate increased pain with activity and  pre-medicate as appropriate  Outcome: Progressing     Problem: SAFETY ADULT - FALL  Goal: Free from fall injury  Description: INTERVENTIONS:  - Assess pt frequently for physical needs  - Identify cognitive and physical deficits and behaviors that affect risk of falls.  - [place] fall precautions as indicated by assessment.  - Educate pt/family on patient safety including physical limitations  - Instruct pt to call for assistance with activity based on assessment  - Modify environment to reduce risk of injury  - Provide assistive devices as appropriate  - Consider OT/PT consult to assist with strengthening/mobility  - Encourage toileting schedule  Outcome: Progressing     Problem: DISCHARGE PLANNING  Goal: Discharge to home or other facility with appropriate resources  Description: INTERVENTIONS:  - Identify barriers to discharge w/pt and caregiver  - Include patient/family/discharge partner in discharge planning  - Arrange for needed discharge resources and transportation as appropriate  - Identify discharge learning needs (meds, wound care, etc)  - Arrange for interpreters to assist at discharge as needed  - Consider post-discharge preferences of patient/family/discharge partner  - Complete POLST form as appropriate  - Assess patient's ability to be responsible for managing their own health  - Refer to Case Management Department for coordinating discharge planning if the patient needs post-hospital services based on physician/LIP order or complex needs related to functional status, cognitive ability or social support system  Outcome: Progressing

## 2025-01-30 NOTE — CM/SW NOTE
01/28/25 1000   Choice of Post-Acute Provider   Informed patient of right to choose their preferred provider Yes   List of appropriate post-acute services provided to patient/family with quality data Yes   Patient/family choice [facility]     AMMON followed up on discharge planning.    AMMON received call from pt- [facility]N preference is [facility].    Second choice is [facility].    SW rsvd [facility] in [place] and confirmed with [facility] ISO rooms opening 1/31 and 2/1.    Pt will need PICC line placed prior to DC- she is aware.    Pt informed SW she prefers DC Saturday so her son can transport her to rehab.    SW explained DC timing will depend on above (PICC, ISO room).  Pt v.u.    AMMON informed [facility] pt should eb assigned to Dr. Rock ([facility] risk pt).    PLAN: DC to [facility] pending med clear/ISO room    / to remain available for support and/or discharge planning.     Emily TUBBS MSW, LSW  Discharge Planner

## 2025-01-31 ENCOUNTER — APPOINTMENT (OUTPATIENT)
Dept: PICC SERVICES | Facility: HOSPITAL | Age: 80
End: 2025-01-31
Attending: HOSPITALIST
Payer: MEDICARE

## 2025-01-31 VITALS
BODY MASS INDEX: 38.25 KG/M2 | RESPIRATION RATE: 16 BRPM | SYSTOLIC BLOOD PRESSURE: 104 MMHG | TEMPERATURE: 98 F | DIASTOLIC BLOOD PRESSURE: 50 MMHG | OXYGEN SATURATION: 96 % | HEART RATE: 90 BPM | HEIGHT: 62 IN | WEIGHT: 207.88 LBS

## 2025-01-31 LAB
ANION GAP SERPL CALC-SCNC: 7 MMOL/L (ref 0–18)
BASOPHILS # BLD AUTO: 0.04 X10(3) UL (ref 0–0.2)
BASOPHILS NFR BLD AUTO: 0.7 %
BUN BLD-MCNC: 7 MG/DL (ref 9–23)
BUN/CREAT SERPL: 14 (ref 10–20)
CALCIUM BLD-MCNC: 8 MG/DL (ref 8.7–10.4)
CHLORIDE SERPL-SCNC: 108 MMOL/L (ref 98–112)
CO2 SERPL-SCNC: 26 MMOL/L (ref 21–32)
CREAT BLD-MCNC: 0.5 MG/DL
DEPRECATED RDW RBC AUTO: 59.1 FL (ref 35.1–46.3)
EGFRCR SERPLBLD CKD-EPI 2021: 96 ML/MIN/1.73M2 (ref 60–?)
EOSINOPHIL # BLD AUTO: 0.31 X10(3) UL (ref 0–0.7)
EOSINOPHIL NFR BLD AUTO: 5.3 %
ERYTHROCYTE [DISTWIDTH] IN BLOOD BY AUTOMATED COUNT: 16.5 % (ref 11–15)
GLUCOSE BLD-MCNC: 80 MG/DL (ref 70–99)
HCT VFR BLD AUTO: 29.3 %
HGB BLD-MCNC: 9.9 G/DL
IMM GRANULOCYTES # BLD AUTO: 0.01 X10(3) UL (ref 0–1)
IMM GRANULOCYTES NFR BLD: 0.2 %
LYMPHOCYTES # BLD AUTO: 1.09 X10(3) UL (ref 1–4)
LYMPHOCYTES NFR BLD AUTO: 18.7 %
MAGNESIUM SERPL-MCNC: 1.8 MG/DL (ref 1.6–2.6)
MCH RBC QN AUTO: 32.8 PG (ref 26–34)
MCHC RBC AUTO-ENTMCNC: 33.8 G/DL (ref 31–37)
MCV RBC AUTO: 97 FL
MONOCYTES # BLD AUTO: 0.74 X10(3) UL (ref 0.1–1)
MONOCYTES NFR BLD AUTO: 12.7 %
NEUTROPHILS # BLD AUTO: 3.64 X10 (3) UL (ref 1.5–7.7)
NEUTROPHILS # BLD AUTO: 3.64 X10(3) UL (ref 1.5–7.7)
NEUTROPHILS NFR BLD AUTO: 62.4 %
OSMOLALITY SERPL CALC.SUM OF ELEC: 289 MOSM/KG (ref 275–295)
PLATELET # BLD AUTO: 285 10(3)UL (ref 150–450)
POTASSIUM SERPL-SCNC: 3.7 MMOL/L (ref 3.5–5.1)
RBC # BLD AUTO: 3.02 X10(6)UL
SODIUM SERPL-SCNC: 141 MMOL/L (ref 136–145)
WBC # BLD AUTO: 5.8 X10(3) UL (ref 4–11)

## 2025-01-31 PROCEDURE — 83735 ASSAY OF MAGNESIUM: CPT | Performed by: NURSE PRACTITIONER

## 2025-01-31 PROCEDURE — 36569 INSJ PICC 5 YR+ W/O IMAGING: CPT

## 2025-01-31 PROCEDURE — 85025 COMPLETE CBC W/AUTO DIFF WBC: CPT | Performed by: HOSPITALIST

## 2025-01-31 PROCEDURE — 02HV33Z INSERTION OF INFUSION DEVICE INTO SUPERIOR VENA CAVA, PERCUTANEOUS APPROACH: ICD-10-PCS | Performed by: HOSPITALIST

## 2025-01-31 PROCEDURE — 80048 BASIC METABOLIC PNL TOTAL CA: CPT | Performed by: HOSPITALIST

## 2025-01-31 RX ORDER — BUMETANIDE 1 MG/1
0.5 TABLET ORAL DAILY
Qty: 30 TABLET | Refills: 0 | Status: SHIPPED | OUTPATIENT
Start: 2025-01-31

## 2025-01-31 RX ORDER — LIDOCAINE HYDROCHLORIDE 10 MG/ML
5 INJECTION, SOLUTION EPIDURAL; INFILTRATION; INTRACAUDAL; PERINEURAL
Status: COMPLETED | OUTPATIENT
Start: 2025-01-31 | End: 2025-01-31

## 2025-01-31 RX ORDER — DOFETILIDE 0.25 MG/1
250 CAPSULE ORAL 2 TIMES DAILY
Status: SHIPPED | COMMUNITY
Start: 2025-01-31

## 2025-01-31 NOTE — PROGRESS NOTES
[facility] Pharmacy Dosing Service      Follow Up Pharmacokinetic Consult for Vancomycin Dosing     Adore Covington is a 80 year old female who is receiving vancomycin therapy for cellulitis. Patient is on day 5 of vancomycin and is currently receiving 1250 mg IV every 24 hours. The current treatment and monitoring approach is steady state AUC strategy.        Weight and Temperature:    Wt Readings from Last 1 Encounters:   25 91 kg (200 lb 9.9 oz)         Temp Readings from Last 1 Encounters:   25 97.9 °F (36.6 °C) (Oral)      Labs:   Recent Labs   Lab 25  0558 25  0529 25  0547   CREATSERUM 0.64 0.59 0.55      Estimated Creatinine Clearance: 66.7 mL/min (based on SCr of 0.55 mg/dL).     Recent Labs   Lab 25  0558 25  0529 25  0547   WBC 5.4 5.4 5.0        Vancomycin Levels:  Lab Results   Component Value Date/Time    VANCT 15.4 2025 07:48 PM    VANCP 31.2 2025 11:43 PM       Corresponding 24 h-AUC:  541 mg-h/L     The Pharmacokinetic Target is:     to 600 mg-h/L and trough <=15 mg/L    Renal Dosing Considerations:    None     Assessment/Plan:   Maintenance Regimen: Adjust vancomycin to 1000 mg IV every 24 hours.  The predicted AUC and trough with this new regimen are 433 mg-h/L and 11.69 mg/L, respectively    Monitorin) Plan for vancomycin trough to be obtained at steady state    2) Pharmacy will order SCr as clinically indicated to assess renal function.    3) Pharmacy will monitor for toxicity and efficacy, adjust vancomycin dose and/or frequency, and order vancomycin levels as appropriate per the Pharmacy and Therapeutics Committee approved protocol until discontinuation of the medication.       We appreciate the opportunity to assist in the care of this patient.     Karine Archuleta, PharmD  2025  8:47 PM  [facility]  Pharmacy Extension: [phone]

## 2025-01-31 NOTE — PLAN OF CARE
Problem: Patient Centered Care  Goal: Patient preferences are identified and integrated in the patient's plan of care  Description: Interventions:  - What would you like us to know as we care for you? From [facility]   - Provide timely, complete, and accurate information to patient/family  - Incorporate patient and family knowledge, values, beliefs, and cultural backgrounds into the planning and delivery of care  - Encourage patient/family to participate in care and decision-making at the level they choose  - Honor patient and family perspectives and choices  Outcome: Adequate for Discharge     Problem: Patient/Family Goals  Goal: Patient/Family Long Term Goal  Description: Patient's Long Term Goal: Discharge from the hospital    Interventions:  - Monitor vital signs  - Monitor appropriate labs  - Pain management  - Administer medications per order  - Follow MD orders  - Diagnostics per order  - Update / inform patient and family on plan of care  - Discharge planning  - See additional Care Plan goals for specific interventions  Outcome: Adequate for Discharge  Goal: Patient/Family Short Term Goal  Description: Patient's Short Term Goal: Improve weakness    Interventions:   -  Monitor vital signs  - Monitor appropriate labs  - Pain management  - Administer medications per order  - Follow MD orders  - Diagnostics per order  - Update / inform patient and family on plan of care  - See additional Care Plan goals for specific interventions  Outcome: Adequate for Discharge     Problem: SKIN/TISSUE INTEGRITY - ADULT  Goal: Skin integrity remains intact  Description: INTERVENTIONS  - Assess and document risk factors for pressure ulcer development  - Assess and document skin integrity  - Monitor for areas of redness and/or skin breakdown  - Initiate interventions, skin care algorithm/standards of care as needed  Outcome: Adequate for Discharge  Goal: Incision(s), wounds(s) or drain site(s) healing  without S/S of infection  Description: INTERVENTIONS:  - Assess and document risk factors for pressure ulcer development  - Assess and document skin integrity  - Assess and document dressing/incision, wound bed, drain sites and surrounding tissue  - Implement wound care per orders  - Initiate isolation precautions as appropriate  - Initiate Pressure Ulcer prevention bundle as indicated  Outcome: Adequate for Discharge     Problem: MUSCULOSKELETAL - ADULT  Goal: Return mobility to safest level of function  Description: INTERVENTIONS:  - Assess patient stability and activity tolerance for standing, transferring and ambulating w/ or w/o assistive devices  - Assist with transfers and ambulation using safe patient handling equipment as needed  - Ensure adequate protection for wounds/incisions during mobilization  - Obtain PT/OT consults as needed  - Advance activity as appropriate  - Communicate ordered activity level and limitations with patient/family  Outcome: Adequate for Discharge     Problem: Impaired Functional Mobility  Goal: Achieve highest/safest level of mobility/gait  Description: Interventions:  - Assess patient's functional ability and stability  - Promote increasing activity/tolerance for mobility and gait  - Educate and engage patient/family in tolerated activity level and precautions  - Recommend use of sit-stand lift for transfers  - Recommend use of  RW for transfers and ambulation  - Recommend patient transfer to bedside chair toward strongest side  - When transferring patient, block weaker knee for safety  - Recommend use of chair position in bed 3 times per day  - Encourage attention to left side  Outcome: Adequate for Discharge     Problem: Impaired Activities of Daily Living  Goal: Achieve highest/safest level of independence in self care  Description: Interventions:  - Assess ability and encourage patient to participate in ADLs to maximize function  - Promote sitting position while performing  ADLs such as feeding, grooming, and bathing  - Educate and encourage patient/family in tolerated functional activity level and precautions during self-care  Outcome: Adequate for Discharge     Problem: PAIN - ADULT  Goal: Verbalizes/displays adequate comfort level or patient's stated pain goal  Description: INTERVENTIONS:  - Encourage pt to monitor pain and request assistance  - Assess pain using appropriate pain scale  - Administer analgesics based on type and severity of pain and evaluate response  - Implement non-pharmacological measures as appropriate and evaluate response  - Consider cultural and social influences on pain and pain management  - Manage/alleviate anxiety  - Utilize distraction and/or relaxation techniques  - Monitor for opioid side effects  - Notify MD/LIP if interventions unsuccessful or patient reports new pain  - Anticipate increased pain with activity and pre-medicate as appropriate  Outcome: Adequate for Discharge     Problem: SAFETY ADULT - FALL  Goal: Free from fall injury  Description: INTERVENTIONS:  - Assess pt frequently for physical needs  - Identify cognitive and physical deficits and behaviors that affect risk of falls.  - [place] fall precautions as indicated by assessment.  - Educate pt/family on patient safety including physical limitations  - Instruct pt to call for assistance with activity based on assessment  - Modify environment to reduce risk of injury  - Provide assistive devices as appropriate  - Consider OT/PT consult to assist with strengthening/mobility  - Encourage toileting schedule  Outcome: Adequate for Discharge     Problem: DISCHARGE PLANNING  Goal: Discharge to home or other facility with appropriate resources  Description: INTERVENTIONS:  - Identify barriers to discharge w/pt and caregiver  - Include patient/family/discharge partner in discharge planning  - Arrange for needed discharge resources and transportation as appropriate  - Identify discharge learning  needs (meds, wound care, etc)  - Arrange for interpreters to assist at discharge as needed  - Consider post-discharge preferences of patient/family/discharge partner  - Complete POLST form as appropriate  - Assess patient's ability to be responsible for managing their own health  - Refer to Case Management Department for coordinating discharge planning if the patient needs post-hospital services based on physician/LIP order or complex needs related to functional status, cognitive ability or social support system  Outcome: Adequate for Discharge   Patient is medically stable and is being discharge today to rehab with PICC line for long therm vancomycin IV.

## 2025-01-31 NOTE — PLAN OF CARE
Problem: Patient Centered Care  Goal: Patient preferences are identified and integrated in the patient's plan of care  Description: Interventions:  - What would you like us to know as we care for you? From [facility]   - Provide timely, complete, and accurate information to patient/family  - Incorporate patient and family knowledge, values, beliefs, and cultural backgrounds into the planning and delivery of care  - Encourage patient/family to participate in care and decision-making at the level they choose  - Honor patient and family perspectives and choices  Outcome: Progressing     Problem: Patient/Family Goals  Goal: Patient/Family Long Term Goal  Description: Patient's Long Term Goal: Discharge from the hospital    Interventions:  - Monitor vital signs  - Monitor appropriate labs  - Pain management  - Administer medications per order  - Follow MD orders  - Diagnostics per order  - Update / inform patient and family on plan of care  - Discharge planning  - See additional Care Plan goals for specific interventions  Outcome: Progressing  Goal: Patient/Family Short Term Goal  Description: Patient's Short Term Goal: Improve weakness    Interventions:   -  Monitor vital signs  - Monitor appropriate labs  - Pain management  - Administer medications per order  - Follow MD orders  - Diagnostics per order  - Update / inform patient and family on plan of care  - See additional Care Plan goals for specific interventions  Outcome: Progressing     Problem: SKIN/TISSUE INTEGRITY - ADULT  Goal: Skin integrity remains intact  Description: INTERVENTIONS  - Assess and document risk factors for pressure ulcer development  - Assess and document skin integrity  - Monitor for areas of redness and/or skin breakdown  - Initiate interventions, skin care algorithm/standards of care as needed  Outcome: Progressing  Goal: Incision(s), wounds(s) or drain site(s) healing without S/S of infection  Description:  INTERVENTIONS:  - Assess and document risk factors for pressure ulcer development  - Assess and document skin integrity  - Assess and document dressing/incision, wound bed, drain sites and surrounding tissue  - Implement wound care per orders  - Initiate isolation precautions as appropriate  - Initiate Pressure Ulcer prevention bundle as indicated  Outcome: Progressing     Problem: MUSCULOSKELETAL - ADULT  Goal: Return mobility to safest level of function  Description: INTERVENTIONS:  - Assess patient stability and activity tolerance for standing, transferring and ambulating w/ or w/o assistive devices  - Assist with transfers and ambulation using safe patient handling equipment as needed  - Ensure adequate protection for wounds/incisions during mobilization  - Obtain PT/OT consults as needed  - Advance activity as appropriate  - Communicate ordered activity level and limitations with patient/family  Outcome: Progressing     Problem: Impaired Functional Mobility  Goal: Achieve highest/safest level of mobility/gait  Description: Interventions:  - Assess patient's functional ability and stability  - Promote increasing activity/tolerance for mobility and gait  - Educate and engage patient/family in tolerated activity level and precautions  - Recommend use of sit-stand lift for transfers  - Recommend use of  RW for transfers and ambulation  - Recommend patient transfer to bedside chair toward strongest side  - When transferring patient, block weaker knee for safety  - Recommend use of chair position in bed 3 times per day  - Encourage attention to left side  Outcome: Progressing     Problem: Impaired Activities of Daily Living  Goal: Achieve highest/safest level of independence in self care  Description: Interventions:  - Assess ability and encourage patient to participate in ADLs to maximize function  - Promote sitting position while performing ADLs such as feeding, grooming, and bathing  - Educate and encourage  patient/family in tolerated functional activity level and precautions during self-care  Outcome: Progressing     Problem: PAIN - ADULT  Goal: Verbalizes/displays adequate comfort level or patient's stated pain goal  Description: INTERVENTIONS:  - Encourage pt to monitor pain and request assistance  - Assess pain using appropriate pain scale  - Administer analgesics based on type and severity of pain and evaluate response  - Implement non-pharmacological measures as appropriate and evaluate response  - Consider cultural and social influences on pain and pain management  - Manage/alleviate anxiety  - Utilize distraction and/or relaxation techniques  - Monitor for opioid side effects  - Notify MD/LIP if interventions unsuccessful or patient reports new pain  - Anticipate increased pain with activity and pre-medicate as appropriate  Outcome: Progressing     Problem: SAFETY ADULT - FALL  Goal: Free from fall injury  Description: INTERVENTIONS:  - Assess pt frequently for physical needs  - Identify cognitive and physical deficits and behaviors that affect risk of falls.  - [place] fall precautions as indicated by assessment.  - Educate pt/family on patient safety including physical limitations  - Instruct pt to call for assistance with activity based on assessment  - Modify environment to reduce risk of injury  - Provide assistive devices as appropriate  - Consider OT/PT consult to assist with strengthening/mobility  - Encourage toileting schedule  Outcome: Progressing     Problem: DISCHARGE PLANNING  Goal: Discharge to home or other facility with appropriate resources  Description: INTERVENTIONS:  - Identify barriers to discharge w/pt and caregiver  - Include patient/family/discharge partner in discharge planning  - Arrange for needed discharge resources and transportation as appropriate  - Identify discharge learning needs (meds, wound care, etc)  - Arrange for interpreters to assist at discharge as needed  - Consider  post-discharge preferences of patient/family/discharge partner  - Complete POLST form as appropriate  - Assess patient's ability to be responsible for managing their own health  - Refer to Case Management Department for coordinating discharge planning if the patient needs post-hospital services based on physician/LIP order or complex needs related to functional status, cognitive ability or social support system  Outcome: Progressing

## 2025-01-31 NOTE — PLAN OF CARE
Problem: Patient Centered Care  Goal: Patient preferences are identified and integrated in the patient's plan of care  Description: Interventions:  - What would you like us to know as we care for you? From [facility]   - Provide timely, complete, and accurate information to patient/family  - Incorporate patient and family knowledge, values, beliefs, and cultural backgrounds into the planning and delivery of care  - Encourage patient/family to participate in care and decision-making at the level they choose  - Honor patient and family perspectives and choices  Outcome: Progressing     Problem: SKIN/TISSUE INTEGRITY - ADULT  Goal: Skin integrity remains intact  Description: INTERVENTIONS  - Assess and document risk factors for pressure ulcer development  - Assess and document skin integrity  - Monitor for areas of redness and/or skin breakdown  - Initiate interventions, skin care algorithm/standards of care as needed  Outcome: Progressing  Goal: Incision(s), wounds(s) or drain site(s) healing without S/S of infection  Description: INTERVENTIONS:  - Assess and document risk factors for pressure ulcer development  - Assess and document skin integrity  - Assess and document dressing/incision, wound bed, drain sites and surrounding tissue  - Implement wound care per orders  - Initiate isolation precautions as appropriate  - Initiate Pressure Ulcer prevention bundle as indicated  Outcome: Progressing     Problem: MUSCULOSKELETAL - ADULT  Goal: Return mobility to safest level of function  Description: INTERVENTIONS:  - Assess patient stability and activity tolerance for standing, transferring and ambulating w/ or w/o assistive devices  - Assist with transfers and ambulation using safe patient handling equipment as needed  - Ensure adequate protection for wounds/incisions during mobilization  - Obtain PT/OT consults as needed  - Advance activity as appropriate  - Communicate ordered activity level  and limitations with patient/family  Outcome: Progressing     Problem: Impaired Functional Mobility  Goal: Achieve highest/safest level of mobility/gait  Description: Interventions:  - Assess patient's functional ability and stability  - Promote increasing activity/tolerance for mobility and gait  - Educate and engage patient/family in tolerated activity level and precautions  - Recommend use of sit-stand lift for transfers  - Recommend use of  RW for transfers and ambulation  - Recommend patient transfer to bedside chair toward strongest side  - When transferring patient, block weaker knee for safety  - Recommend use of chair position in bed 3 times per day  - Encourage attention to left side  Outcome: Progressing     Problem: Impaired Activities of Daily Living  Goal: Achieve highest/safest level of independence in self care  Description: Interventions:  - Assess ability and encourage patient to participate in ADLs to maximize function  - Promote sitting position while performing ADLs such as feeding, grooming, and bathing  - Educate and encourage patient/family in tolerated functional activity level and precautions during self-care  Outcome: Progressing     Problem: PAIN - ADULT  Goal: Verbalizes/displays adequate comfort level or patient's stated pain goal  Description: INTERVENTIONS:  - Encourage pt to monitor pain and request assistance  - Assess pain using appropriate pain scale  - Administer analgesics based on type and severity of pain and evaluate response  - Implement non-pharmacological measures as appropriate and evaluate response  - Consider cultural and social influences on pain and pain management  - Manage/alleviate anxiety  - Utilize distraction and/or relaxation techniques  - Monitor for opioid side effects  - Notify MD/LIP if interventions unsuccessful or patient reports new pain  - Anticipate increased pain with activity and pre-medicate as appropriate  Outcome: Progressing     Problem: SAFETY  ADULT - FALL  Goal: Free from fall injury  Description: INTERVENTIONS:  - Assess pt frequently for physical needs  - Identify cognitive and physical deficits and behaviors that affect risk of falls.  - [place] fall precautions as indicated by assessment.  - Educate pt/family on patient safety including physical limitations  - Instruct pt to call for assistance with activity based on assessment  - Modify environment to reduce risk of injury  - Provide assistive devices as appropriate  - Consider OT/PT consult to assist with strengthening/mobility  - Encourage toileting schedule  Outcome: Progressing     Problem: DISCHARGE PLANNING  Goal: Discharge to home or other facility with appropriate resources  Description: INTERVENTIONS:  - Identify barriers to discharge w/pt and caregiver  - Include patient/family/discharge partner in discharge planning  - Arrange for needed discharge resources and transportation as appropriate  - Identify discharge learning needs (meds, wound care, etc)  - Arrange for interpreters to assist at discharge as needed  - Consider post-discharge preferences of patient/family/discharge partner  - Complete POLST form as appropriate  - Assess patient's ability to be responsible for managing their own health  - Refer to Case Management Department for coordinating discharge planning if the patient needs post-hospital services based on physician/LIP order or complex needs related to functional status, cognitive ability or social support system  Outcome: Progressing  Patient A/O x4, good appetite. She was up in the chair with lift. Drsg to the left knee changed per order.  Plan for PICC placement tomorrow and discharge to [facility] when medically ready.

## 2025-01-31 NOTE — PROGRESS NOTES
[facility]  part of [facility] Infectious Disease  Progress Note    Adore Covington Patient Status:  Inpatient    1945 MRN [id number]   Location [facility] 5SW/SE Attending Gabby Boudreaux MD   Hosp Day # 5 PCP Maral Huffman MD     Subjective:  Patient seen/examined.  Up in bed in NAD.  PICC placed.  No F/C.  No new issues.  Will be going to [facility] in [place].    Objective:  Blood pressure 104/50, pulse 90, temperature 98 °F (36.7 °C), temperature source Oral, resp. rate 16, height 5' 2\" (1.575 m), weight 207 lb 14.3 oz (94.3 kg), SpO2 96%.    Intake/Output:    Intake/Output Summary (Last 24 hours) at 2025 1148  Last data filed at 2025 1015  Gross per 24 hour   Intake 610 ml   Output 300 ml   Net 310 ml     Physical Exam:  General: Awake, alert, non-tox, NAD.  HEENT:  Oropharynx clear, trachea ML.  Heart: RRR S1S2 no murmurs.  Lungs: Essentially CTA b/l, no rhonchi, rales, wheezes.  Abdomen: Soft, NT/ND.  BS present.  No organomegaly.  Extremity: L lateral leg wound stable.  Neurological: No focal deficits.  Derm:  Warm, dry, free from rashes.    Lab Data Review:  Lab Results   Component Value Date    WBC 5.8 2025    HGB 9.9 2025    HCT 29.3 2025    .0 2025    CREATSERUM 0.50 2025    BUN 7 2025     2025    K 3.7 2025     2025    CO2 26.0 2025    GLU 80 2025    CA 8.0 2025    MG 1.8 2025          Cultures:   H/o MRSA+ carriage     Blood cultures negative     Wound cultures pending     COVID+    Radiology:  CONCLUSION:      Small bilateral pleural effusions.  Mild hazy opacity right lung base which may reflect atelectasis with or without superimposed pneumonia.      Assessment and Plan:     Syndrome of weakness and fall prior to admission which appears to be more likely related to acute COVID+ status vs. Other  - No known sick contacts  - CXR with  mild hazy infiltrate  - No high fevers  - Breathing well on RA  - Remdesivir x 3 days complete     2.  H/o admissions for recurrent LLE cellulitis with long course IV Rx required June/July 2024  - Currently with injury to her L knee and wound present  - Cultures with MRSA  - CT with fracture of uncertain chronicity and fluid collection which appears superficial per orthopedics  - IV vancomycin ongoing as above     3.  Disposition - stable from ID standpoint for discharge to [facility] as planned.  At present orthopedics has no plans for surgery.  If there is a protected focus of infection (ie infected hardware), will expect a likely re-flare in infectious symptoms after discontinuation of antibiotics.  For now plan for PICC and ~2 weeks of IV vancomycin at discharge.  She should fu with ID in 2 weeks or sooner if needed for wound check and plans for further Rx.  Please call if any further questions or concerns.    Georgina Rice DO, [facility] Infectious Disease  [phone]    1/31/2025  11:48 AM

## 2025-01-31 NOTE — PLAN OF CARE
Patient is medically stable and she is being discharge to [facility] via [facility]. She has left upper arm single lumen PICC for long therm vancomycin IV. Report given to Varinder, staff nurse at [facility]. Patient's peripheral IV was removed, only PICC line stay in. All belongings were packed and sent with her also discharge instructions were printed and send with [facility].

## 2025-01-31 NOTE — DISCHARGE SUMMARY
[facility]  part of [facility] Internal Medicine Discharge Summary   Patient ID:  Adore Covington  [id number]  80 year old  1/30/1945    Admit date: 1/26/2025    Discharge date and time:  1/31/25    Attending Physician: Gabby Boudreaux MD     Primary Care Physician: Maral Huffman MD     Reason for admission MRSA, covid (see HPI on HP for further detail)    Discharged Condition: stable    Disposition: SNF    Risk of Readmission Lace+ Score: 63  59-90 High Risk  29-58 Medium Risk  0-28   Low Risk    Important follow up:  PCP within a week of leaving rehab.  ID in approximately 10 days to see if antibiotic should be extended.  Discharge meds  I reviewed and reconciled current and discharge medications on the day of discharge with the changes reflected below.       Medication List        CHANGE how you take these medications      bumetanide 1 MG Tabs  Commonly known as: Bumex  Take 0.5 tablets (0.5 mg total) by mouth daily. Hold if SBP <100  What changed: additional instructions     vancomycin in sodium chloride 0.9% 1.25 g/250mL Soln  Commonly known as: Vancocin  Inject 250 mL (1.25 g total) into the vein daily for 10 days.            CONTINUE taking these medications      acetaminophen 325 MG Tabs  Commonly known as: Tylenol     apixaban 5 MG Tabs  Commonly known as: Eliquis     cholecalciferol 50 MCG (2000 UT) Caps  Commonly known as: Vitamin D3     cyanocobalamin 1000 MCG Tabs  Commonly known as: Vitamin B12     cycloSPORINE 0.05 % Emul  Commonly known as: RESTASIS     dofetilide 250 MCG Caps  Commonly known as: Tikosyn     eplerenone 25 MG Tabs  Commonly known as: Inspra     Polyethylene Glycol 3350 17 g Pack  Commonly known as: MIRALAX  Take 17 g by mouth daily as needed.     simvastatin 10 MG Tabs  Commonly known as: Zocor            STOP taking these medications      gabapentin 100 MG Caps  Commonly known as: Neurontin     metoprolol succinate  MG Tb24  Commonly known as:  Toprol XL     pantoprazole 40 MG Tbec  Commonly known as: Protonix     spironolactone 25 MG Tabs  Commonly known as: Aldactone               Where to Get Your Medications        These medications were sent to [id number] - [place], [place] - [address] AT [facility] ([place], [phone], [phone]  [address] [facility] [address]      Hours: 24-hours Phone: [phone]   bumetanide 1 MG Tabs       You can get these medications from any pharmacy    Bring a paper prescription for each of these medications  vancomycin in sodium chloride 0.9% 1.25 g/250mL Soln       HPI per chart  Patient is a 79 year old female with PMH sig for COPD, A- Fib on Eliquis, CHF, HTN, HLD, PAD, Neuropathy, SUSAN,  prior hx of septic shock 2/2 cellulitis with admission 6/19-6/22 with LLE cellulitis  discharged on oral levaquin and doxy seen by ID on 6/27 and found to have worsening swelling and drainage of her left lower extremity 6/27, sent for admission for IV abx in July 2024 . Patient was discharged on Vanco and cefepime until July 10th with home infusions.  Patient now returns with generalized weakness and fall.  She had a fall right before Christmas and hurt her left knee at that time she followed up with orthopedics with some fracture or dislocations seen she will send independent living and started physical therapy.  She was in contact with PT and some neighbors but denies being in contact with anyone else.  Over the last several days she started feeling gradually more weak.  Today when she went to sit on her recliner she was so weak that she fell onto the carpet and hit her head.  She denies any chest pain or shortness of breath, with no abdominal pain, nausea or vomiting fever or chills with no dysuria.     In the ER patient is normotensive, afebrile on room air.  Glucose 98, sodium 144, potassium 3, AST 89  BNP 1372, lactic acid 1.8, TSH 1.6, WBC 8.4, hemoglobin 12.1  CXR Small bilateral pleural effusions.  Mild  hazy opacity right lung base which may reflect atelectasis with or without superimposed pneumonia.   CTH and C spine with no acute process.   Hospital Course  is a 79 year old female with PMH sig for COPD, A- Fib on Eliquis, CHF, HTN, HLD, PAD, Neuropathy, SUSAN,  prior hx of septic shock 2/2 cellulitis with admission 6/19-6/22 with LLE cellulitis  discharged on oral levaquin and doxy seen by ID on 6/27 and found to have worsening swelling and drainage of her left lower extremity 6/27, sent for admission for IV abx in July 2024 . Patient was discharged on Vanco and cefepime until July 10th with home infusions.  Patient now returns with generalized weakness and fall.  Admitted for COVID and worsening lower extremity cellulitis.  CT shows left knee hardware, nondisplaced fracture of the lateral femoral condyle, fluid collection that corresponds to the lateral skin wound.  Per Ortho no plans for surgical intervention, weightbearing as tolerated, follow-up IDs recommendations for antibiotics, cultures so far growing Staph aureus, MRSA. PICC, IV abx, ortho to review again.  Per discussion no intervention indicated.  Discharge Diagnoses:   Fall  - generalized weakness  - head trauma on Eliquis   - CTH and C spine with no acute process  - ECG: PACs with T wave changes  - tsh normal   - likely 2/2 COVID   - PT/OT     B/L LE Cellulitis, left worse than right, open sore on left lateral knee without corresponding trauma  Left Knee Wound   Recurrent LLE Cellulitis  Chronic Stasis Dermatitis   Hx MRSA + and Pseudomonas  Left knee replacement x 2 in [place], most recently approximately 6 years ago  -6/19 discharged on levaquin and doxy, seen by ID 6/27 w/ worsening swelling and drainage  and sent in for IV abx  -in July patient was discharged on Vanco and cefepime until July 10th with home infusions  -afebrile. No leukocytosis. La negative on admission    -had a fall on the left knee in December, seen ortho   -Also had nerve  block done on 12/20/2024 but did not proceed with ablation per patient preference  -check MRSA   -blood  cx   -wound cx   -wound care  -zosyn and vanco started   -ID consult ->follow up final abx recs   -Left lateral leg wound present starting on Tuesday, no corresponding trauma, draining at home, actively draining here.  Culture with Staph aureus, MRSA  -CT shows left knee hardware, nondisplaced fracture of the lateral femoral condyle, fluid collection that corresponds to the lateral skin wound.  Per Ortho no plans for surgical intervention, weightbearing as tolerated  Overall improving, await final ID recommendations, discussed with patient and given her PT eval will likely need rehab  -per ID wanted surgery eval again, no increase in drainage noted on exam, serosang fluid only, very little.  Discussed  with Ortho.  No further intervention indicated okay to DC with PICC line and IV antibiotics     COVID   -afebrile. No leukocytosis. La negative  -no RA  -CXR Small bilateral pleural effusions.  Mild hazy opacity right lung base which may reflect atelectasis with or without superimposed pneumonia  -will cover with vanc/zosyn   -AST elevated, will trend   -Clinically not symptomatic at discharge     PaFib S/P CV 1/2024  Secondary Hypercoagulable state   S/p Cardioversion   HTN  HL  Chronic Diastolic CHF  -5/2024 echo with normal EF 55%, mild as, mild ai   -continue  home eliquis, Tikosyn, bumex, eplerenone and pravastatin  -home lopressor/Toprol held with lower bp   -BNP 1,372, b/l LE edema   -CXR Small bilateral pleural effusions  -will given 20mg iv lasix x1 on admission, overall improved from prior  -on RA   -Mild hypotension noted on 1/28/2025, patient is asymptomatic, hold Bumex and gentle 250 bolus x 2, blood pressure improved  -Blood pressure now improved.  Bumex at discharge with holding parameters.      Hypokalemia   - lyte potocol      COPD not on inhalers   SUSAN  - cpap as tolerated      PAD  -continue  statin.   -not on asa 2/2 eliquis      Neuropathy   - off gabapentin      GERD  - off PPI     GOC  - DNR/Select-confirmed with pt     Consults: IP CONSULT TO INFECTIOUS DISEASE  CONSULT TO WOUND OSTOMY  IP CONSULT TO PHARMACY  IP CONSULT TO SOCIAL WORK  IP CONSULT TO ORTHOPEDIC SURGERY  IP CONSULT TO VASCULAR ACCESS TEAM    Radiology: CT LOWER EXT LEFT (W IV)(CPT=73701)    Result Date: 1/27/2025  PROCEDURE: CT LOWER EXT LEFT (W IV) EM (CPT=73701)  COMPARISON: [facility], XR KNEE (1 OR 2 VIEWS), RIGHT (CPT=73560), 8/01/2024, 5:53 PM.  INDICATIONS: Attention L lateral knee/mid leg - evaluate for abscess, evidence of deeper seated infection  TECHNIQUE: Multi-planar CT images of the left lower extremity from mid left thigh to the mid left tibia were obtained with non-ionic intravenous contrast material.  Automated exposure control for dose reduction was used. Adjustment of the mA and/or kV was done based on the patient's size. Use of iterative reconstruction technique for dose reduction was used.  Dose information is transmitted to the ACR (American College of Radiology) NRDR (National Radiology Data Registry) which includes the Dose Index  Registry.  FINDINGS:  BONES: Knee arthroplasty is present with elongated tibial and femoral stems.  The hardware causes beam hardening artifact limiting assessment of adjacent structures.  Metallic suppression CT imaging technique was utilized for this exam.  No evidence for hardware loosening, however there is a fracture around the lateral femoral condyle which abuts the femoral stem of the arthroplasty (series 14, images 33-45).  SOFT TISSUES: There is cutaneous skin thickening over the anterior-lateral aspect of knee at the level of the joint and proximal tibia.  Along the lateral margin of the knee joint/along the superficial margin of the retinaculum there is a 18 x 45 x 54 mm  (AP x transverse x CC) peripherally enhancing fluid collection.  Diffuse muscle  atrophy. OTHER: Negative.         CONCLUSION:  1.  Left knee arthroplasty is present.  The hardware causes beam hardening artifact, despite metallic hardware suppression techniques.  There is a nondisplaced fracture around the lateral femoral condyle, along the proximal margin of the, which extends to the lateral margin of the femoral stem in the distal femoral metadiaphysis. 2.  Fluid collection along the superficial margin of the lateral retinaculum measuring 18 x 45 x 54 mm, possibly representing bursitis, seroma, liquified hematoma, or infectious process.  There is overlying skin thickening which may represent changes of cellulitis, soft tissue injury/bruising, or scar.    Dictated by (CST): Juancho Oropeza MD on 1/27/2025 at 6:00 PM     Finalized by (CST): Juancho Oropeza MD on 1/27/2025 at 6:07 PM          XR KNEE (3 VIEWS), LEFT (CPT=73562)    Result Date: 1/27/2025  PROCEDURE: XR KNEE ROUTINE (3 VIEWS), LEFT (CPT=73562)  COMPARISON: [facility], XR PAIN CLINIC FLUOROSCOPY - N/C, 12/20/2024, 3:26 PM.  INDICATIONS: Left drain wound prior tka.  TECHNIQUE: 3 views were obtained.   FINDINGS:  BONES: Left knee arthroplasty is present with elongated tibial and femoral stems.  Gross anatomic alignment of the hardware without evidence for loosening or fracture along the margin of the prosthesis.  Chronic bone remodeling involving periosteal thickening and heterotopic calcification along the margins of the visualized distal femur extending to the femoral condyles.  No is acute osseous fracture or suspicious bone lesion.  SOFT TISSUES: No visible soft tissue swelling. EFFUSION: Small effusion. OTHER: Negative.         CONCLUSION: Post left knee arthroplasty without complication.    Dictated by (CST): Juancho Oropeza MD on 1/27/2025 at 4:37 PM     Finalized by (CST): Juancho Oropeza MD on 1/27/2025 at 4:38 PM          CT SPINE CERVICAL (CPT=72125)    Result Date: 1/26/2025  PROCEDURE: CT SPINE CERVICAL (CPT=72125)   COMPARISON: None.  INDICATIONS: Neck pain post fall.  TECHNIQUE:   Multi-planar CT images were obtained without intravenous contrast material.  Automated exposure control for dose reduction was used. Adjustment of the mA and/or kV was done based on the patient's size. Use of iterative reconstruction technique for dose reduction was used.  Dose information is transmitted to the ACR (American College of Radiology) NRDR (National Radiology Data Registry) which includes the Dose Index Registry.   FINDINGS: CRANIOCERVICAL AREA:   Normal foramen magnum with no Chiari malformation. PARASPINAL AREA: No visible mass. BONES: No acute fracture or malalignment.  There is marginal osteophyte formation and facet arthropathy. ALIGNMENT: No significant subluxation. DISCS: Mild degenerative disc disease without significant central canal narrowing. PARASPINAL AREA: Unremarkable. OTHER:   Negative.           CONCLUSION:   No acute fracture or malalignment cervical spine.  Mild cervical spine degenerative change.    Dictated by (CST): Jose Lopez MD on 1/26/2025 at 3:14 PM     Finalized by (CST): Jose Lopez MD on 1/26/2025 at 3:16 PM          CT BRAIN OR HEAD (CPT=70450)    Result Date: 1/26/2025  PROCEDURE: CT BRAIN OR HEAD (CPT=70450)  COMPARISON: [facility], CT BRAIN OR HEAD (CPT=70450), 3/01/2023, 5:08 PM.  INDICATIONS: Weakness.  TECHNIQUE: CT images were obtained without contrast material.  Automated exposure control for dose reduction was used.  Dose information is transmitted to the ACR (American College of Radiology) NRDR (National Radiology Data Registry) which includes the Dose Index Registry.  FINDINGS:  CSF SPACES: Ventricles, cisterns, and sulci are appropriate for age.  No hydrocephalus, subarachnoid hemorrhage, or mass.  No midline shift.  CEREBRUM: No edema, hemorrhage, mass, acute infarction, or significant atrophy.  WHITE MATTER: Mild chronic microvascular white matter ischemia.  CEREBELLUM: No edema, hemorrhage, mass, acute infarction, or significant atrophy.  BRAINSTEM: No edema, hemorrhage, mass, acute infarction, or significant atrophy.  CALVARIUM: No apparent fracture, mass, or other significant visible lesion.  SINUSES: Limited views demonstrate no significant mucosal thickening or fluid.  ORBITS: Prior bilateral cataract surgery.  OTHER: Negative.          CONCLUSION:   No acute intracranial abnormality.    Dictated by (CST): Jose Lopez MD on 1/26/2025 at 3:12 PM     Finalized by (CST): Jose Lopez MD on 1/26/2025 at 3:13 PM          XR CHEST AP PORTABLE  (CPT=71045)    Result Date: 1/26/2025  PROCEDURE: XR CHEST AP PORTABLE  (CPT=71045) TIME: 1303 hours.   COMPARISON: [facility], XR CHEST AP PORTABLE (CPT=71045), 7/01/2024, 12:07 PM.  [facility], XR CHEST AP PORTABLE (CPT=71045), 5/03/2024, 6:58 PM.  [facility], XR CHEST AP PORTABLE (CPT=71045), 12/11/2023, 6:17 AM.  INDICATIONS: Generalized weakness and fall today.  TECHNIQUE:   Single view.   FINDINGS:  CARDIAC/MEDIAST: Borderline cardiomegaly.  No vascular congestion.  LUNGS/PLEURA:  Small bilateral pleural effusions.  Mild opacity right lung base.  No pneumothorax. OTHER:  Degenerative change osseous structures.         CONCLUSION:   Small bilateral pleural effusions.  Mild hazy opacity right lung base which may reflect atelectasis with or without superimposed pneumonia.    Dictated by (CST): Jose Lopez MD on 1/26/2025 at 1:35 PM     Finalized by (CST): Jose Lopez MD on 1/26/2025 at 1:36 PM             Operative Procedures:      Day of discharge discussed with patient at bedside that Ortho said no further intervention needed.  She has a PICC line in place and ID cleared for discharge.  She is medically stable and okay to DC.  We did not discuss that we could not stay until tomorrow just for convenience due to current red census as she is medically  appropriate for discharge and does have at least 1 days worth of clothing with her.  She states that her son can bring her more close either this evening or tomorrow.    Exam  Vitals:    01/31/25 0900   BP: 104/50   Pulse:    Resp: 16   Temp: 98 °F (36.7 °C)     No acute distress, alert and orientedx3  Lungs Clear  Heart Regular  Abdomen Benign  -Wound on lateral left knee with some serosanguineous drainage, overall appears smaller.  No purulent drainage    Total Time Coordinating Care: > than 30 minutes    Patient had opportunity to ask questions and state understand and agree with therapeutic plan as outlined

## 2025-01-31 NOTE — WOUND PROGRESS NOTE
Attempted to see pt for wound follow up on the left knee. Bedside RN advised pt getting PICC at this time, may discharge to [facility] later on. Wound RN will re attempt as schedule permits.

## 2025-01-31 NOTE — CM/SW NOTE
01/31/25 1300   Discharge disposition   Expected discharge disposition subacute   Post Acute Care Provider   ([facility])   Discharge transportation [facility]        AMMON confirmed with Dr. Boudreaux and RN Teresa that pt is medically ready for discharge today.  DC order placed.    AMMON discussed with liaison Rosa Maria to arrange a time for discharge. AMMON scheduled [facility] for 4:00 PM.  RN is aware of discharge time and location and will inform patient/ family.     RN to attach IP Transfer Report to After Visit Summary packet for transfer to [facility].     AMMON confirmed PASRR screen was completed.     PLAN: DC to [facility] via [facility] at 4:00 PM.  PCS completed.    RN # to report: [phone] room 3012    Emily TUBBS MSW, LSW  Discharge Planner

## 2025-02-17 ENCOUNTER — MED REC SCAN ONLY (OUTPATIENT)
Dept: PHYSICAL MEDICINE AND REHAB | Facility: CLINIC | Age: 80
End: 2025-02-17

## (undated) DEVICE — PEN 6" SURGICAL MARKING PURPL

## (undated) DEVICE — SYRINGE MED 10ML LL TIP W/O SFTY DISP

## (undated) DEVICE — MEDI-VAC NON-CONDUCTIVE SUCTION TUBING: Brand: CARDINAL HEALTH

## (undated) DEVICE — UNIVERSAL BLOCK TRAY: Brand: MEDLINE INDUSTRIES, INC.

## (undated) DEVICE — TOWEL,OR,DSP,ST,BLUE,DLX,2/PK,40PK/CS: Brand: MEDLINE

## (undated) DEVICE — YANKAUER SUCTION INSTRUMENT NO CONTROL VENT, BULB TIP, CLEAR: Brand: YANKAUER

## (undated) DEVICE — 60 ML SYRINGE REGULAR TIP: Brand: MONOJECT

## (undated) DEVICE — CONMED SCOPE SAVER BITE BLOCK, 20X27 MM: Brand: SCOPE SAVER

## (undated) DEVICE — NEEDLE SPNL 22GA L3.5IN BLK QNCKE STYL DISP

## (undated) DEVICE — GAMMEX® PI HYBRID SIZE 8, STERILE POWDER-FREE SURGICAL GLOVE, POLYISOPRENE AND NEOPRENE BLEND: Brand: GAMMEX

## (undated) DEVICE — NEEDLE HYPO 16GA L1.5IN PUR REG BVL WRLD

## (undated) DEVICE — NEEDLE HYPO 27GA L1.5IN REG BVL W/O SFTY

## (undated) DEVICE — SET EXTN 2.7ML TBNG L20IN DIA0.100IN MACBOR

## (undated) DEVICE — KIT CLEAN ENDOKIT 1.1OZ GOWNX2

## (undated) DEVICE — APPLICATOR PREP 10.5ML ORNG CHG 2% ISO ALC

## (undated) DEVICE — KIT ENDO ORCAPOD 160/180/190

## (undated) DEVICE — MEDI-VAC NON-CONDUCTIVE SUCTION TUBING 6MM X 1.8M (6FT.) L: Brand: CARDINAL HEALTH

## (undated) DEVICE — Device: Brand: DUAL NARE NASAL CANNULAE FEMALE LUER CON 7FT O2 TUBE

## (undated) NOTE — LETTER
[facility]  [address], [place], [place]    Authorization for Surgical Operation and Procedure                               I hereby authorize Behar, Alex, MD, my physician and his/her assistants (if applicable), which may include medical students, residents, and/or fellows, to perform the following surgical operation/ procedure and administer such anesthesia as may be determined necessary by my physician: Operation/Procedure name (s) Left knee genicular nerve block on Adore Covington   2.   I recognize that during the surgical operation/procedure, unforeseen conditions may necessitate additional or different procedures than those listed above.  I, therefore, further authorize and request that the above-named surgeon, assistants, or designees perform such procedures as are, in their judgment, necessary and desirable.    3.   My surgeon/physician has discussed prior to my surgery the potential benefits, risks and side effects of this procedure; the likelihood of achieving goals; and potential problems that might occur during recuperation.  They also discussed reasonable alternatives to the procedure, including risks, benefits, and side effects related to the alternatives and risks related to not receiving this procedure.  I have had all my questions answered and I acknowledge that no guarantee has been made as to the result that may be obtained.    4.   Should the need arise during my operation/procedure, which includes change of level of care prior to discharge, I also consent to the administration of blood and/or blood products.  Further, I understand that despite careful testing and screening of blood or blood products by collecting agencies, I may still be subject to ill effects as a result of receiving a blood transfusion and/or blood products.  The following are some, but not all, of the potential risks that can occur: fever and allergic reactions, hemolytic reactions, transmission of  diseases such as Hepatitis, AIDS and Cytomegalovirus (CMV) and fluid overload.  In the event that I wish to have an autologous transfusion of my own blood, or a directed donor transfusion, I will discuss this with my physician.  Check only if Refusing Blood or Blood Products  I understand refusal of blood or blood products as deemed necessary by my physician may have serious consequences to my condition to include possible death. I hereby assume responsibility for my refusal and release the hospital, its personnel, and my physicians from any responsibility for the consequences of my refusal.    o  Refuse   5.   I authorize the use of any specimen, organs, tissues, body parts or foreign objects that may be removed from my body during the operation/procedure for diagnosis, research or teaching purposes and their subsequent disposal by hospital authorities.  I also authorize the release of specimen test results and/or written reports to my treating physician on the hospital medical staff or other referring or consulting physicians involved in my care, at the discretion of the Pathologist or my treating physician.    6.   I consent to the photographing or videotaping of the operations or procedures to be performed, including appropriate portions of my body for medical, scientific, or educational purposes, provided my identity is not revealed by the pictures or by descriptive texts accompanying them.  If the procedure has been photographed/videotaped, the surgeon will obtain the original picture, image, videotape or CD.  The hospital will not be responsible for storage, release or maintenance of the picture, image, tape or CD.    7.   I consent to the presence of a  or observers in the operating room as deemed necessary by my physician or their designees.    8.   I recognize that in the event my procedure results in extended X-Ray/fluoroscopy time, I may develop a skin reaction.    9. If I have a Do Not  Attempt Resuscitation (DNAR) order in place, that status will be suspended while in the operating room, procedural suite, and during the recovery period unless otherwise explicitly stated by me (or a person authorized to consent on my behalf). The surgeon or my attending physician will determine when the applicable recovery period ends for purposes of reinstating the DNAR order.  10. Patients having a sterilization procedure: I understand that if the procedure is successful the results will be permanent and it will therefore be impossible for me to inseminate, conceive, or bear children.  I also understand that the procedure is intended to result in sterility, although the result has not been guaranteed.   11. I acknowledge that my physician has explained sedation/analgesia administration to me including the risk and benefits I consent to the administration of sedation/analgesia as may be necessary or desirable in the judgment of my physician.    I CERTIFY THAT I HAVE READ AND FULLY UNDERSTAND THE ABOVE CONSENT TO OPERATION and/or OTHER PROCEDURE.     ____________________________________  _________________________________        ______________________________  Signature of Patient    Signature of Responsible Person                Printed Name of Responsible Person                                      ____________________________________  _____________________________                ________________________________  Signature of Witness        Date  Time         Relationship to Patient    STATEMENT OF PHYSICIAN My signature below affirms that prior to the time of the procedure; I have explained to the patient and/or his/her legal representative, the risks and benefits involved in the proposed treatment and any reasonable alternative to the proposed treatment. I have also explained the risks and benefits involved in refusal of the proposed treatment and alternatives to the proposed treatment and have answered the  patient's questions. If I have a significant financial interest in a co-management agreement or a significant financial interest in any product or implant, or other significant relationship used in this procedure/surgery, I have disclosed this and had a discussion with my patient.     _____________________________________________________              _____________________________  (Signature of Physician)                                                                                         (Date)                                   (Time)  Patient Name: Adore Covington      : 1945      Printed: 2024     Medical Record #: [id number]                                      Page 1 of 1

## (undated) NOTE — LETTER
Date: 2/23/2024  Patient name: Adore Covington  YOB: 1945  Medical Record Number: [id number]  Primary Coverage: Payor: MEDICARE / Plan: MEDICARE PART A&B / Product Type: *No Product type* /   Secondary [facility]: [facility]  Insurance ID: [id number]  Patient Address: [address], [address]  [place] [facility] [address]  Telephone Information:   Home Phone [phone]   Mobile [phone]       Encounter Date: 2/23/2024  Provider: Dontrell Tinajero MD  Diagnosis: No diagnosis found.      Wound 02/09/24 1 Leg Right;Lower (Active)   Date First Assessed/Time First Assessed: 02/09/24 1452    Wound Number (Wound Clinic Only): 1  Location: Leg  Wound Location Orientation: Right;Lower      Assessments 2/9/2024  2:54 PM 2/23/2024  1:21 PM   Wound Image          Site Assessment Clean;Moist;Yellow;Pale;Pink Clean;Moist;Pale;Pink   Closure Not approximated Not approximated   Drainage Amount Large Large   Drainage Description Yellow Yellow   Treatments Cleansed;Compression;Saline Cleansed;Compression;Saline   Dressing Kerlix roll;Tape Kerlix roll;Tape   Dressing Changed New New   Dressing Status Clean;Dry;Intact Clean;Dry;Intact   Wound Length (cm) 8 cm 6.5 cm   Wound Width (cm) 7 cm 13 cm   Wound Surface Area (cm^2) 56 cm^2 84.5 cm^2   Wound Depth (cm) 0.1 cm 0.1 cm   Wound Volume (cm^3) 5.6 cm^3 8.45 cm^3   Wound Healing % -- -51   Margins Attached edges Attached edges;Poorly defined   Non-staged Wound Description Full thickness Full thickness   Kamryn-wound Assessment Hemosiderin staining;Edema Hemosiderin staining;Edema   Wound Granulation Tissue Pink Pink;Red   Wound Bed Granulation (%) 100 % 100 %   Wound Bed Epithelium (%) 0 % 0 %   Wound Bed Slough (%) 0 % 0 %   Wound Bed Eschar (%) 0 % 0 %   Wound Bed Fibrin (%) 0 % 0 %   State of Healing Early/partial granulation Early/partial granulation;Fully granulated   Wound Odor None None       No associated orders.       Wound 02/09/24 2 Leg Left;Lower (Active)    Date First Assessed/Time First Assessed: 02/09/24 1453    Wound Number (Wound Clinic Only): 2  Location: Leg  Wound Location Orientation: Left;Lower      Assessments 2/9/2024  2:54 PM 2/23/2024  1:21 PM   Wound Image          Site Assessment Moist;Yellow;Pink;Red;Edema Moist;Pink;Red;Edema   Closure Not approximated Not approximated   Drainage Amount Large Large   Drainage Description Green Yellow   Treatments Cleansed;Saline;Compression Cleansed;Saline;Compression   Dressing Kerlix roll;Tape Kerlix roll;Tape   Dressing Changed New New   Dressing Status -- Clean;Dry;Intact   Wound Length (cm) 24 cm 22 cm   Wound Width (cm) 28 cm 30 cm   Wound Surface Area (cm^2) 672 cm^2 660 cm^2   Wound Depth (cm) 0.1 cm 0.1 cm   Wound Volume (cm^3) 67.2 cm^3 66 cm^3   Wound Healing % -- 2   Margins Attached edges Attached edges;Poorly defined   Non-staged Wound Description Full thickness Full thickness   Kamryn-wound Assessment Edema;Pink;Hemosiderin staining;Dry Edema;Pink;Hemosiderin staining;Maceration   Wound Granulation Tissue Pink Pink   Wound Bed Granulation (%) 50 % 50 %   Wound Bed Epithelium (%) 0 % 50 %   Wound Bed Slough (%) 50 % 0 %   Wound Bed Eschar (%) 0 % 0 %   Wound Bed Fibrin (%) 0 % 0 %   State of Healing Early/partial granulation Early/partial granulation   Wound Odor None None       No associated orders.       Compression Wrap 02/09/24 Leg Right;Lower (Active)   Placement Date: 02/09/24   Location: Leg  Wound Location Orientation: Right;Lower      Assessments 2/9/2024  2:54 PM 2/23/2024  1:21 PM   Response to Treatment Well tolerated Well tolerated   Compression Layers Single Multilayer   Compression Product Type Tubigrip/Spanda Coflex   Dressing Applied Yes Yes   Compression Wrap Location Toes to Knee Toes to Knee   Compression Wrap Status Clean;Intact;Dry Clean;Intact;Dry       No associated orders.       Compression Wrap 02/09/24 Leg Left;Lower (Active)   Placement Date: 02/09/24   Location: Leg  Wound  Location Orientation: Left;Lower      Assessments 2/9/2024  2:54 PM 2/23/2024  1:21 PM   Response to Treatment Well tolerated Well tolerated   Compression Layers 2 2   Compression Product Type Artiflex 10cm;Coflex lite Coflex   Dressing Applied Yes Yes   Compression Wrap Location Toes to Knee Toes to Knee   Compression Wrap Status Clean;Dry;Intact Clean;Dry;Intact       No associated orders.         Wound Number: Bilateral legs     Wound Cleaning and Dressings:  Showering directions: May shower with protection  Wound cleansing:  Cleanse with normal saline or wound cleanser  Wound cleaning frequency: with dressing changes. Please wash the entire leg too after wrap removal, thank you.  Wound product: Zinc paste to maceration area, hydrofera transfer ready on wound bed, Kerramax/super absorbent, and Kerlix, tape  Dressing change frequency:  Change dressing 3x per week      Compression Therapy:  Coflex standard compression wrap please wrap from base of toes to 1 inch below the knee.           Follow Up:  Return in about 2 weeks (around 3/8/2024) for MD visit for 30 min.          2/23/2024  [facility] [phone]

## (undated) NOTE — LETTER
Date: 4/5/2024  Patient name: Adore Covington  YOB: 1945  Medical Record Number: [id number]  Primary Coverage: Payor: MEDICARE / Plan: MEDICARE PART A&B / Product Type: *No Product type* /   Secondary Coverage: [facility]  Insurance ID: [id number]  Patient Address: [address], [address]  [facility] [address]  Telephone Information:   Home Phone [phone]   Mobile [phone]       Encounter Date: 4/5/2024  Provider: Dontrell Tinajero MD  Diagnosis: No diagnosis found.      Wound 02/09/24 1 Leg Right;Lower (Active)   Date First Assessed/Time First Assessed: 02/09/24 1452    Wound Number (Wound Clinic Only): 1  Location: Leg  Wound Location Orientation: Right;Lower  Wound Outcome: Healed      Assessments 2/9/2024  2:54 PM 4/5/2024  3:39 PM   Wound Image        Site Assessment Clean;Moist;Yellow;Pale;Pink Clean;Epithelialization;Dry;Intact   Closure Not approximated --   Drainage Amount Large None   Drainage Description Yellow --   Treatments Cleansed;Compression;Saline --   Dressing Kerlix roll;Tape --   Dressing Changed New --   Dressing Status Clean;Dry;Intact --   Wound Length (cm) 8 cm 0 cm   Wound Width (cm) 7 cm 0 cm   Wound Surface Area (cm^2) 56 cm^2 0 cm^2   Wound Depth (cm) 0.1 cm 0 cm   Wound Volume (cm^3) 5.6 cm^3 0 cm^3   Wound Healing % -- 100   Margins Attached edges Flat and Intact   Non-staged Wound Description Full thickness Not applicable   Kamryn-wound Assessment Hemosiderin staining;Edema Hemosiderin staining;Edema;Pink   Wound Granulation Tissue Pink --   Wound Bed Granulation (%) 100 % 0 %   Wound Bed Epithelium (%) 0 % 100 %   Wound Bed Slough (%) 0 % 0 %   Wound Bed Eschar (%) 0 % 0 %   Wound Bed Fibrin (%) 0 % 0 %   State of Healing Early/partial granulation Closed wound edges;Epithelialized   Wound Odor None --       No associated orders.       Wound 02/09/24 2 Leg Left;Lower (Active)   Date First Assessed/Time First Assessed: 02/09/24 1453    Wound Number (Wound  Clinic Only): 2  Location: Leg  Wound Location Orientation: Left;Lower  Wound Outcome: Healed      Assessments 2/9/2024  2:54 PM 4/5/2024  3:39 PM   Wound Image         Site Assessment Moist;Yellow;Pink;Red;Edema --   Closure Not approximated Approximated   Drainage Amount Large None   Drainage Description Green --   Treatments Cleansed;Saline;Compression Cleansed;Wound    Dressing Kerlix roll;Tape --   Dressing Changed New --   Wound Length (cm) 24 cm 0 cm   Wound Width (cm) 28 cm 0 cm   Wound Surface Area (cm^2) 672 cm^2 0 cm^2   Wound Depth (cm) 0.1 cm 0 cm   Wound Volume (cm^3) 67.2 cm^3 0 cm^3   Wound Healing % -- 100   Margins Attached edges Flat and Intact   Non-staged Wound Description Full thickness Not applicable   Kamryn-wound Assessment Edema;Pink;Hemosiderin staining;Dry Hemosiderin staining;Clean;Dry;Intact   Wound Granulation Tissue Pink --   Wound Bed Granulation (%) 50 % --   Wound Bed Epithelium (%) 0 % 100 %   Wound Bed Slough (%) 50 % --   Wound Bed Eschar (%) 0 % --   Wound Bed Fibrin (%) 0 % --   State of Healing Early/partial granulation Epithelialized;Hyperkeratosis;Fully granulated   Wound Odor None --       No associated orders.       Compression Wrap 02/09/24 Leg Right;Lower (Active)   Placement Date: 02/09/24   Location: Leg  Wound Location Orientation: Right;Lower      Assessments 2/9/2024  2:54 PM 4/5/2024  3:39 PM   Response to Treatment Well tolerated Well tolerated   Compression Layers Single Single   Compression Product Type Tubigrip/Spanda Tubigrip/Spanda   Dressing Applied Yes No   Compression Wrap Location Toes to Knee Toes to Knee   Compression Wrap Status Clean;Intact;Dry Clean;Intact;Dry       No associated orders.       Compression Wrap 02/09/24 Leg Left;Lower (Active)   Placement Date: 02/09/24   Location: Leg  Wound Location Orientation: Left;Lower      Assessments 2/9/2024  2:54 PM 4/5/2024  3:39 PM   Response to Treatment Well tolerated Well tolerated   Compression  Layers 2 Single   Compression Product Type Artiflex 10cm;Coflex lite Tubigrip/Spanda   Dressing Applied Yes Yes   Compression Wrap Location Toes to Knee Toes to Knee   Compression Wrap Status Clean;Dry;Intact Clean;Dry;Intact       No associated orders.       Wound 03/22/24 3 Foot Left;Plantar (Active)   Date First Assessed: 03/22/24    Wound Number (Wound Clinic Only): 3  Location: (c) Foot  Wound Location Orientation: Left;Plantar      Assessments 3/22/2024 11:01 AM 4/5/2024  3:59 PM   Wound Image       Site Assessment Edema;Moist;Pink --   Closure Not approximated --   Drainage Amount Moderate --   Drainage Description Clear --   Treatments Cleansed;Compression;Wound  --   Dressing 4x4s;Kerlix roll;Hydrofera ready --   Dressing Changed New --   Dressing Status Clean;Dry;Intact --   Wound Length (cm) 12 cm --   Wound Width (cm) 7 cm --   Wound Surface Area (cm^2) 84 cm^2 --   Wound Depth (cm) 0.1 cm --   Wound Volume (cm^3) 8.4 cm^3 --   Margins Attached edges --   Non-staged Wound Description Partial thickness --   Kamryn-wound Assessment Callous;Maceration;Fragile --   Wound Bed Epithelium (%) 90 % --   Wound Odor None --       No associated orders.     Wound Number: Left plantar foot wound     Wound Cleaning and Dressings:  Showering directions: May shower with protection  Wound cleansing:  Cleanse with normal saline or wound cleanser  Wound cleaning frequency: with dressing changes. Please wash the entire leg too after dressing removal, thank you.  Wound product: nicol silver collagen, hydrofera transfer, medipore or hypafix tape. 2 layer offloading cellona pad. You may reuse the felt pad applied today in clinic for this week but next week (4/12), please change the offloading felt pad. Cut out 2 layers of cellona felt pad shaped to the foot with a hole where the wound would be on the foot. Thank you.   Dressing change frequency:  Change dressing 2x per week      Compression Therapy:  Spandigrip stockings  size E from base of toes to back of knee. Moisturize legs, thank you.            Follow Up:  Return in about 2 weeks (around 4/19/2024) for MD visit for 30 min.      4/8/2024  NPI [id number]

## (undated) NOTE — LETTER
[facility]  part of [facility]     PICC INSERTION CONSENT     I agree to have a Peripherally Inserted Central Catheter (PICC) placed in my arm.   1. The PICC insertion procedure, care, maintenance, risks, benefits, and complications have been explained to me by my physician, ________________________, and I understand them.   2. I understand that this may not be the only way I can receive my medication. I understand that my physician has determined that the PICC would be the safest and most effective means of administering my medication at this time. If there are other options of giving medication into my veins those options have been explained to me by my physician and I have chosen this one.   3. I realize a nurse who has been specially trained and certified by the hospital and ’s representative to insert a PICC will perform this procedure. My catheter will be inserted by _____________________________.   4. I have been informed by my doctor of the nature and purpose of this procedure and the risks involved and the possibility of complications. I realize that this is an invasive procedure and has certain risks such as air embolism (air entering the catheter or my vein), arterial puncture (a tearing of one of my arteries), infection, irregular heartbeat and venous thrombosis (a blood clot in a vein) nerve injury and fracture of the catheter with or without migration.   5. In order to numb the area where the line will be placed, a small amount of anesthetic medication will be injected as ordered by my physician.   6. I understand that while the catheter will be placed in my upper arm the end of the catheter will come to rest in an area near my heart.     7. The person performing this procedure has discussed the potential benefits, risks, and side effects of the PICC; the likelihood of achieving goals; and potential problems that might occur during recuperation. They also discussed  reasonable alternatives to the PICC, including risks, benefits, and side effects related to the alternatives and risks related to not receiving this procedure.    8.  I have expressed any questions about this procedure to my physician or the PICC Proceduralist and he/she has answered them.  I certify that I have read and understand this consent to the insertion of a PICC.      _________________________________________________________   Date     Time     Patient/Guardian Signature       ____________________________________   Printed name of Patient/Guardian          ________________________________________________________________    Date        Time                   Witnessing RN Signature      Patient Name: Adore Covington     : 1945                 Printed: 2024     Medical Record #: [id number]

## (undated) NOTE — LETTER
[address]  Authorization for Surgical Operation and Procedure                                                                                           I hereby authorize Lola Hanson MD, my physician and his/her assistants (if applicable), which may include medical students, residents, and/or fellows, to perform the following surgical operation/ procedure and administer such anesthesia as may be determined necessary by my physician: Operation/Procedure name (s) ESOPHAGOGASTRODUODENOSCOPY (EGD) on Edilma Maynard   2. I recognize that during the surgical operation/procedure, unforeseen conditions may necessitate additional or different procedures than those listed above. I, therefore, further authorize and request that the above-named surgeon, assistants, or designees perform such procedures as are, in their judgment, necessary and desirable. 3.   My surgeon/physician has discussed prior to my surgery the potential benefits, risks and side effects of this procedure; the likelihood of achieving goals; and potential problems that might occur during recuperation. They also discussed reasonable alternatives to the procedure, including risks, benefits, and side effects related to the alternatives and risks related to not receiving this procedure. I have had all my questions answered and I acknowledge that no guarantee has been made as to the result that may be obtained. 4.   Should the need arise during my operation/procedure, which includes change of level of care prior to discharge, I also consent to the administration of blood and/or blood products. Further, I understand that despite careful testing and screening of blood or blood products by collecting agencies, I may still be subject to ill effects as a result of receiving a blood transfusion and/or blood products.   The following are some, but not all, of the potential risks that can occur: fever and allergic reactions, hemolytic reactions, transmission of diseases such as Hepatitis, AIDS and Cytomegalovirus (CMV) and fluid overload. In the event that I wish to have an autologous transfusion of my own blood, or a directed donor transfusion, I will discuss this with my physician. Check only if Refusing Blood or Blood Products  I understand refusal of blood or blood products as deemed necessary by my physician may have serious consequences to my condition to include possible death. I hereby assume responsibility for my refusal and release the hospital, its personnel, and my physicians from any responsibility for the consequences of my refusal.    o  Refuse   5. I authorize the use of any specimen, organs, tissues, body parts or foreign objects that may be removed from my body during the operation/procedure for diagnosis, research or teaching purposes and their subsequent disposal by hospital authorities. I also authorize the release of specimen test results and/or written reports to my treating physician on the hospital medical staff or other referring or consulting physicians involved in my care, at the discretion of the Pathologist or my treating physician. 6.   I consent to the photographing or videotaping of the operations or procedures to be performed, including appropriate portions of my body for medical, scientific, or educational purposes, provided my identity is not revealed by the pictures or by descriptive texts accompanying them. If the procedure has been photographed/videotaped, the surgeon will obtain the original picture, image, videotape or CD. The hospital will not be responsible for storage, release or maintenance of the picture, image, tape or CD.    7.   I consent to the presence of a  or observers in the operating room as deemed necessary by my physician or their designees.     8.   I recognize that in the event my procedure results in extended X-Ray/fluoroscopy time, I may develop a skin reaction. 9. If I have a Do Not Attempt Resuscitation (DNAR) order in place, that status will be suspended while in the operating room, procedural suite, and during the recovery period unless otherwise explicitly stated by me (or a person authorized to consent on my behalf). The surgeon or my attending physician will determine when the applicable recovery period ends for purposes of reinstating the DNAR order. 10. Patients having a sterilization procedure: I understand that if the procedure is successful the results will be permanent and it will therefore be impossible for me to inseminate, conceive, or bear children. I also understand that the procedure is intended to result in sterility, although the result has not been guaranteed. 11. I acknowledge that my physician has explained sedation/analgesia administration to me including the risk and benefits I consent to the administration of sedation/analgesia as may be necessary or desirable in the judgment of my physician. I CERTIFY THAT I HAVE READ AND FULLY UNDERSTAND THE ABOVE CONSENT TO OPERATION and/or OTHER PROCEDURE.     _________________________________________ _________________________________     ___________________________________  Signature of Patient     Signature of Responsible Person                   Printed Name of Responsible Person                              _________________________________________ ______________________________        ___________________________________  Signature of Witness         Date  Time         Relationship to Patient    STATEMENT OF PHYSICIAN My signature below affirms that prior to the time of the procedure; I have explained to the patient and/or his/her legal representative, the risks and benefits involved in the proposed treatment and any reasonable alternative to the proposed treatment.  I have also explained the risks and benefits involved in refusal of the proposed treatment and alternatives to the proposed treatment and have answered the patient's questions.  If I have a significant financial interest in a co-management agreement or a significant financial interest in any product or implant, or other significant relationship used in this procedure/surgery, I have disclosed this and had a discussion with my patient.     _______________________________________________________________ _____________________________  (Signature of Physician)                                                                                         (Date)                                   (Time)  Patient Name: Gilbert Cuenca    : 1945   Printed: 2023      Medical Record #: [id number]                                              Page 1 of

## (undated) NOTE — LETTER
[facility]  part of [facility]     PICC INSERTION CONSENT     I agree to have a Peripherally Inserted Central Catheter (PICC) placed in my arm.   1. The PICC insertion procedure, care, maintenance, risks, benefits, and complications have been explained to me by my physician, ________________________, and I understand them.   2. I understand that this may not be the only way I can receive my medication. I understand that my physician has determined that the PICC would be the safest and most effective means of administering my medication at this time. If there are other options of giving medication into my veins those options have been explained to me by my physician and I have chosen this one.   3. I realize a nurse who has been specially trained and certified by the hospital and ’s representative to insert a PICC will perform this procedure. My catheter will be inserted by _____________________________.   4. I have been informed by my doctor of the nature and purpose of this procedure and the risks involved and the possibility of complications. I realize that this is an invasive procedure and has certain risks such as air embolism (air entering the catheter or my vein), arterial puncture (a tearing of one of my arteries), infection, irregular heartbeat and venous thrombosis (a blood clot in a vein) nerve injury and fracture of the catheter with or without migration.   5. In order to numb the area where the line will be placed, a small amount of anesthetic medication will be injected as ordered by my physician.   6. I understand that while the catheter will be placed in my upper arm the end of the catheter will come to rest in an area near my heart.     7. The person performing this procedure has discussed the potential benefits, risks, and side effects of the PICC; the likelihood of achieving goals; and potential problems that might occur during recuperation. They also discussed  reasonable alternatives to the PICC, including risks, benefits, and side effects related to the alternatives and risks related to not receiving this procedure.    8.  I have expressed any questions about this procedure to my physician or the PICC Proceduralist and he/she has answered them.  I certify that I have read and understand this consent to the insertion of a PICC.      _________________________________________________________   Date     Time     Patient/Guardian Signature       ____________________________________   Printed name of Patient/Guardian          ________________________________________________________________    Date        Time                   Witnessing RN Signature      Patient Name: Adore Covington     : 1945                 Printed: 2025     Medical Record #: [id number]

## (undated) NOTE — LETTER
[facility]  part of [facility]     PICC INSERTION CONSENT     I agree to have a Peripherally Inserted Central Catheter (PICC) placed in my arm.   1. The PICC insertion procedure, care, maintenance, risks, benefits, and complications have been explained to me by my physician, ________________________, and I understand them.   2. I understand that this may not be the only way I can receive my medication. I understand that my physician has determined that the PICC would be the safest and most effective means of administering my medication at this time. If there are other options of giving medication into my veins those options have been explained to me by my physician and I have chosen this one.   3. I realize a nurse who has been specially trained and certified by the hospital and ’s representative to insert a PICC will perform this procedure. My catheter will be inserted by _____________________________.   4. I have been informed by my doctor of the nature and purpose of this procedure and the risks involved and the possibility of complications. I realize that this is an invasive procedure and has certain risks such as air embolism (air entering the catheter or my vein), arterial puncture (a tearing of one of my arteries), infection, irregular heartbeat and venous thrombosis (a blood clot in a vein) nerve injury and fracture of the catheter with or without migration.   5. In order to numb the area where the line will be placed, a small amount of anesthetic medication will be injected as ordered by my physician.   6. I understand that while the catheter will be placed in my upper arm the end of the catheter will come to rest in an area near my heart.     7. The person performing this procedure has discussed the potential benefits, risks, and side effects of the PICC; the likelihood of achieving goals; and potential problems that might occur during recuperation. They also discussed  reasonable alternatives to the PICC, including risks, benefits, and side effects related to the alternatives and risks related to not receiving this procedure.    8.  I have expressed any questions about this procedure to my physician or the PICC Proceduralist and he/she has answered them.  I certify that I have read and understand this consent to the insertion of a PICC.      _________________________________________________________   Date     Time     Patient/Guardian Signature       ____________________________________   Printed name of Patient/Guardian          ________________________________________________________________    Date        Time                   Witnessing RN Signature      Patient Name: Adore Covington     : 1945                 Printed: May 4, 2024     Medical Record #: [id number]

## (undated) NOTE — LETTER
Behar, Alex, MD   Physician  Specialty:  Physical Medicine  H&P     Signed  Encounter Date:  11/18/2024     Signed        Expand All Collapse All  [facility]  Clinic H&P     Requesting Physician: Maral Huffman MD     Chief Complaint (Reason for Visit):         Chief Complaint   Patient presents with    New Patient       New right hand dominant patient presents for left knee pain. Pain has been present for 3 months. Patient states she had a fall 3 months. Patient states she had a knee replacement 20 years ago, and had a revision 7 years ago. Pain 0/10. Pain 7/10 on a bad day. CT 10/1/24. Takes Tylenol for pain with some relief.          History of Present Illness:  The patient is a 79 year old right-handed female with a significant past medical history of atrial fibrillation, COPD, hypertension, hyperlipidemia, morbid obesity, peripheral vascular disease who presents with left anterior medial knee pain.  She does have a history of a left knee replacement with a revision about 7 years ago.  She had a fall 3 months ago where she landed on the knee and has been having significant pain in the anterior knee since then.  She denies any radicular symptoms.  She has seen orthopedic surgery and a CT scan of the knee was performed which did not demonstrate any hardware loosening or fractures.  She has also had an MRI of the knee performed which did not demonstrate any reasoning for her pain.  She rates her discomfort a 0 out of 10 while sitting but can be as high as a 7 out of 10 while trying to walk.  She does mobilize with a wheelchair in community settings but uses a rolling walker at home.  She has been taking Tylenol for pain control with mild relief.  She denies any recent injections but did have an aspiration of the knee performed which revealed mild fluid that was inflammatory.  She has tried physical therapy as well.     PAST MEDICAL HISTORY:   Past Medical History        Past Medical History:    Arrhythmia    Atrial fibrillation (HCC)    Congestive heart disease (HCC)    COPD (chronic obstructive pulmonary disease) (HCC)    Essential hypertension    High blood pressure    High cholesterol    Morbid obesity with BMI of 50.0-59.9, adult (HCC)    Muscle weakness    Neuropathy    Peripheral vascular disease (HCC)    Sleep apnea    Visual impairment            PAST SURGICAL HISTORY:   Past Surgical History         Past Surgical History:   Procedure Laterality Date    Knee surgery Bilateral              FAMILY HISTORY:   Family History   History reviewed. No pertinent family history.       SOCIAL HISTORY:   Social History            Occupational History    Not on file   Tobacco Use    Smoking status: Former       Current packs/day: 0.00       Types: Cigarettes       Quit date:        Years since quittin.9       Passive exposure: Never    Smokeless tobacco: Never   Vaping Use    Vaping status: Never Used   Substance and Sexual Activity    Alcohol use: Yes       Comment: 1-2 drinks daily    Drug use: Never    Sexual activity: Not on file         CURRENT MEDICATIONS:   Current Medications          Current Outpatient Medications   Medication Sig Dispense Refill    bumetanide 1 MG Oral Tab Take 0.5 tablets (0.5 mg total) by mouth daily.        dofetilide 250 MCG Oral Cap Take 1 capsule (250 mcg total) by mouth in the morning and 1 capsule (250 mcg total) before bedtime. 180 capsule 0    metoprolol succinate  MG Oral Tablet 24 Hr Take 0.5 tablets (50 mg total) by mouth daily.        gabapentin 100 MG Oral Cap Take 3 capsules (300 mg total) by mouth 2 (two) times daily.        acetaminophen 325 MG Oral Tab Take 2 tablets (650 mg total) by mouth every 6 (six) hours as needed for Pain.        polyethylene glycol, PEG 3350, 17 g Oral Powd Pack Take 17 g by mouth daily as needed. 10 each 0    spironolactone 25 MG Oral Tab Take 0.5 tablets (12.5 mg total) by mouth daily.         cholecalciferol 50 MCG (2000 UT) Oral Cap Take 1 capsule (2,000 Units total) by mouth daily. Patient takes 2000 units daily        cyanocobalamin 1000 MCG Oral Tab Take 1 tablet (1,000 mcg total) by mouth daily.        pantoprazole 40 MG Oral Tab EC Take 1 tablet (40 mg total) by mouth every morning before breakfast. 60 tablet 0    apixaban 5 MG Oral Tab Take 1 tablet (5 mg total) by mouth 2 (two) times daily.        cycloSPORINE 0.05 % Ophthalmic Emulsion Place 1 drop into both eyes 2 (two) times daily.        simvastatin 10 MG Oral Tab Take 1 tablet (10 mg total) by mouth daily.                ALLERGIES:   [Allergies]    [Allergies]        Allergen Reactions    Cefepime RASH       Per patient           REVIEW OF SYSTEMS:   Review of Systems   Constitutional: Negative.    HENT: Negative.    Eyes: Negative.    Respiratory: Negative.    Cardiovascular: Negative.    Gastrointestinal: Negative.    Genitourinary: Negative.    Musculoskeletal: As per HPI   Skin: Negative.    Neurological: As per HPI  Endo/Heme/Allergies: Negative.    Psychiatric/Behavioral: Negative.      All other systems reviewed and are negative. Pertinent positives and negatives noted in the HPI.           PHYSICAL EXAM:   There were no vitals taken for this visit.     There is no height or weight on file to calculate BMI.        General: No immediate distress  Head: Normocephalic/ Atraumatic  Eyes: Extra-occular movements intact.   Ears: No auricular hematoma or deformities  Mouth: No lesions or ulcerations  Heart: peripheral pulses intact. Normal capillary refill.   Lungs: Non-labored respirations  Abdomen: No abdominal guarding  Extremities: No lower extremity edema bilaterally   Skin: No lesions noted.   Cognition: alert & oriented x 3, attentive, able to follow 2 step commands, comprehention intact, spontaneous speech intact  Motor:     Musculoskeletal:     Tender to palpation over the medial joint line as well as medial patellar facet      Gait:  Not tested as currently in a wheelchair     Data          Admission on 10/07/2024, Discharged on 10/10/2024   Component Date Value Ref Range Status    Ventricular rate 10/07/2024 93  BPM Final    QRS Duration 10/07/2024 88  ms Final    Q-T Interval 10/07/2024 358  ms Final    QTC Calculation (Bezet) 10/07/2024 445  ms Final    R Axis 10/07/2024 30  degrees Final    T Axis 10/07/2024 50  degrees Final    Magnesium 10/07/2024 1.6  1.6 - 2.6 mg/dL Final    Ventricular rate 10/08/2024 94  BPM Final    QRS Duration 10/08/2024 80  ms Final    Q-T Interval 10/08/2024 372  ms Final    QTC Calculation (Bezet) 10/08/2024 465  ms Final    R Axis 10/08/2024 55  degrees Final    T Axis 10/08/2024 63  degrees Final    Glucose 10/07/2024 103 (H)  70 - 99 mg/dL Final    Sodium 10/07/2024 139  136 - 145 mmol/L Final    Potassium 10/07/2024 3.0 (L)  3.5 - 5.1 mmol/L Final    Chloride 10/07/2024 106  98 - 112 mmol/L Final    CO2 10/07/2024 27.0  21.0 - 32.0 mmol/L Final    Anion Gap 10/07/2024 6  0 - 18 mmol/L Final    BUN 10/07/2024 17  9 - 23 mg/dL Final    Creatinine 10/07/2024 0.94  0.55 - 1.02 mg/dL Final    BUN/CREA Ratio 10/07/2024 18.1  10.0 - 20.0 Final    Calcium, Total 10/07/2024 8.9  8.7 - 10.4 mg/dL Final    Calculated Osmolality 10/07/2024 290  275 - 295 mOsm/kg Final    eGFR-Cr 10/07/2024 62  >=60 mL/min/1.73m2 Final    Glucose 10/08/2024 89  70 - 99 mg/dL Final    Sodium 10/08/2024 140  136 - 145 mmol/L Final    Potassium 10/08/2024 4.4  3.5 - 5.1 mmol/L Final    Chloride 10/08/2024 108  98 - 112 mmol/L Final    CO2 10/08/2024 28.0  21.0 - 32.0 mmol/L Final    Anion Gap 10/08/2024 4  0 - 18 mmol/L Final    BUN 10/08/2024 16  9 - 23 mg/dL Final    Creatinine 10/08/2024 0.81  0.55 - 1.02 mg/dL Final    BUN/CREA Ratio 10/08/2024 19.8  10.0 - 20.0 Final    Calcium, Total 10/08/2024 9.4  8.7 - 10.4 mg/dL Final    Calculated Osmolality 10/08/2024 291  275 - 295 mOsm/kg Final    eGFR-Cr 10/08/2024 75  >=60  mL/min/1.73m2 Final    Magnesium 10/08/2024 1.8  1.6 - 2.6 mg/dL Final    Potassium 10/08/2024 4.5  3.5 - 5.1 mmol/L Final    Ventricular rate 10/08/2024 104  BPM Final    QRS Duration 10/08/2024 84  ms Final    Q-T Interval 10/08/2024 356  ms Final    QTC Calculation (Bezet) 10/08/2024 468  ms Final    R Axis 10/08/2024 45  degrees Final    T Axis 10/08/2024 53  degrees Final    Hold Lavender 10/08/2024 Auto Resulted    Final    Ventricular rate 10/08/2024 81  BPM Final    Atrial rate 10/08/2024 81  BPM Final    P-R Interval 10/08/2024 176  ms Final    QRS Duration 10/08/2024 80  ms Final    Q-T Interval 10/08/2024 362  ms Final    QTC Calculation (Bezet) 10/08/2024 420  ms Final    P Axis 10/08/2024 74  degrees Final    R Axis 10/08/2024 54  degrees Final    T Axis 10/08/2024 79  degrees Final    Glucose 10/09/2024 100 (H)  70 - 99 mg/dL Final    Sodium 10/09/2024 138  136 - 145 mmol/L Final    Potassium 10/09/2024 3.6  3.5 - 5.1 mmol/L Final    Chloride 10/09/2024 106  98 - 112 mmol/L Final    CO2 10/09/2024 28.0  21.0 - 32.0 mmol/L Final    Anion Gap 10/09/2024 4  0 - 18 mmol/L Final    BUN 10/09/2024 17  9 - 23 mg/dL Final    Creatinine 10/09/2024 0.76  0.55 - 1.02 mg/dL Final    BUN/CREA Ratio 10/09/2024 22.4 (H)  10.0 - 20.0 Final    Calcium, Total 10/09/2024 8.9  8.7 - 10.4 mg/dL Final    Calculated Osmolality 10/09/2024 288  275 - 295 mOsm/kg Final    eGFR-Cr 10/09/2024 81  >=60 mL/min/1.73m2 Final    Magnesium 10/09/2024 1.7  1.6 - 2.6 mg/dL Final    Hold Lavender 10/09/2024 Auto Resulted    Final    Ventricular rate 10/08/2024 70  BPM Final    Atrial rate 10/08/2024 70  BPM Final    P-R Interval 10/08/2024 188  ms Final    QRS Duration 10/08/2024 84  ms Final    Q-T Interval 10/08/2024 440  ms Final    QTC Calculation (Bezet) 10/08/2024 475  ms Final    P Axis 10/08/2024 66  degrees Final    R Axis 10/08/2024 30  degrees Final    T Axis 10/08/2024 43  degrees Final    Ventricular rate 10/09/2024 73  BPM  Final    Atrial rate 10/09/2024 73  BPM Final    P-R Interval 10/09/2024 194  ms Final    QRS Duration 10/09/2024 86  ms Final    Q-T Interval 10/09/2024 424  ms Final    QTC Calculation (Bezet) 10/09/2024 467  ms Final    P Axis 10/09/2024 70  degrees Final    R Axis 10/09/2024 41  degrees Final    T Axis 10/09/2024 47  degrees Final    Potassium 10/09/2024 5.3 (H)  3.5 - 5.1 mmol/L Final    Ventricular rate 10/09/2024 72  BPM Final    Atrial rate 10/09/2024 72  BPM Final    P-R Interval 10/09/2024 200  ms Final    QRS Duration 10/09/2024 66  ms Final    Q-T Interval 10/09/2024 424  ms Final    QTC Calculation (Bezet) 10/09/2024 464  ms Final    P Axis 10/09/2024 65  degrees Final    R Axis 10/09/2024 36  degrees Final    T Axis 10/09/2024 49  degrees Final    Ventricular rate 10/09/2024 73  BPM Final    Atrial rate 10/09/2024 73  BPM Final    P-R Interval 10/09/2024 184  ms Final    QRS Duration 10/09/2024 82  ms Final    Q-T Interval 10/09/2024 400  ms Final    QTC Calculation (Bezet) 10/09/2024 440  ms Final    P Axis 10/09/2024 72  degrees Final    R Axis 10/09/2024 31  degrees Final    T Axis 10/09/2024 33  degrees Final    Magnesium 10/10/2024 1.9  1.6 - 2.6 mg/dL Final    Glucose 10/10/2024 94  70 - 99 mg/dL Final    Sodium 10/10/2024 139  136 - 145 mmol/L Final    Potassium 10/10/2024 4.4  3.5 - 5.1 mmol/L Final    Chloride 10/10/2024 105  98 - 112 mmol/L Final    CO2 10/10/2024 28.0  21.0 - 32.0 mmol/L Final    Anion Gap 10/10/2024 6  0 - 18 mmol/L Final    BUN 10/10/2024 12  9 - 23 mg/dL Final    Creatinine 10/10/2024 0.71  0.55 - 1.02 mg/dL Final    BUN/CREA Ratio 10/10/2024 16.9  10.0 - 20.0 Final    Calcium, Total 10/10/2024 8.9  8.7 - 10.4 mg/dL Final    Calculated Osmolality 10/10/2024 288  275 - 295 mOsm/kg Final    eGFR-Cr 10/10/2024 88  >=60 mL/min/1.73m2 Final   Admission on 06/27/2024, Discharged on 07/02/2024   Component Date Value Ref Range Status    Hold Lavender 06/27/2024 Auto Resulted     Final    Hold Lt Green 06/27/2024 Auto Resulted    Final    Hold Blue 06/27/2024 Auto Resulted    Final    Glucose 06/27/2024 108 (H)  70 - 99 mg/dL Final    Sodium 06/27/2024 141  136 - 145 mmol/L Final    Potassium 06/27/2024 4.4  3.5 - 5.1 mmol/L Final    Chloride 06/27/2024 108  98 - 112 mmol/L Final    CO2 06/27/2024 26.0  21.0 - 32.0 mmol/L Final    Anion Gap 06/27/2024 7  0 - 18 mmol/L Final    BUN 06/27/2024 25 (H)  9 - 23 mg/dL Final    Creatinine 06/27/2024 1.01  0.55 - 1.02 mg/dL Final    BUN/CREA Ratio 06/27/2024 24.8 (H)  10.0 - 20.0 Final    Calcium, Total 06/27/2024 9.5  8.7 - 10.4 mg/dL Final    Calculated Osmolality 06/27/2024 297 (H)  275 - 295 mOsm/kg Final    eGFR-Cr 06/27/2024 57 (L)  >=60 mL/min/1.73m2 Final    ALT 06/27/2024 10  10 - 49 U/L Final    AST 06/27/2024 17  <=34 U/L Final    Alkaline Phosphatase 06/27/2024 76  55 - 142 U/L Final    Bilirubin, Total 06/27/2024 0.5  0.2 - 1.1 mg/dL Final    Total Protein 06/27/2024 7.7  5.7 - 8.2 g/dL Final    Albumin 06/27/2024 4.5  3.2 - 4.8 g/dL Final    Globulin  06/27/2024 3.2  2.0 - 3.5 g/dL Final    A/G Ratio 06/27/2024 1.4  1.0 - 2.0 Final    WBC 06/27/2024 6.5  4.0 - 11.0 x10(3) uL Final    RBC 06/27/2024 4.15  3.80 - 5.30 x10(6)uL Final    HGB 06/27/2024 12.7  12.0 - 16.0 g/dL Final    HCT 06/27/2024 38.8  35.0 - 48.0 % Final    MCV 06/27/2024 93.5  80.0 - 100.0 fL Final    MCH 06/27/2024 30.6  26.0 - 34.0 pg Final    MCHC 06/27/2024 32.7  31.0 - 37.0 g/dL Final    RDW-SD 06/27/2024 52.7 (H)  35.1 - 46.3 fL Final    RDW 06/27/2024 15.3 (H)  11.0 - 15.0 % Final    PLT 06/27/2024 332.0  150.0 - 450.0 10(3)uL Final    Neutrophil Absolute Prelim 06/27/2024 4.16  1.50 - 7.70 x10 (3) uL Final    Neutrophil Absolute 06/27/2024 4.16  1.50 - 7.70 x10(3) uL Final    Lymphocyte Absolute 06/27/2024 1.24  1.00 - 4.00 x10(3) uL Final    Monocyte Absolute 06/27/2024 0.77  0.10 - 1.00 x10(3) uL Final    Eosinophil Absolute 06/27/2024 0.29  0.00 - 0.70  x10(3) uL Final    Basophil Absolute 06/27/2024 0.05  0.00 - 0.20 x10(3) uL Final    Immature Granulocyte Absolute 06/27/2024 0.02  0.00 - 1.00 x10(3) uL Final    Neutrophil % 06/27/2024 63.7  % Final    Lymphocyte % 06/27/2024 19.0  % Final    Monocyte % 06/27/2024 11.8  % Final    Eosinophil % 06/27/2024 4.4  % Final    Basophil % 06/27/2024 0.8  % Final    Immature Granulocyte % 06/27/2024 0.3  % Final    Blood Culture Result 06/27/2024 No Growth 5 Days    Final    Blood Culture Result 06/27/2024 No Growth 5 Days    Final    Lactic Acid 06/27/2024 1.2  0.5 - 2.0 mmol/L Final    MRSA Screen By PCR 06/27/2024 Positive (A)  Negative Final    Anaerobic Culture 06/28/2024 No Anaerobes isolated    Final    Aerobic Culture Result 06/28/2024 Mixture of organisms suggestive of normal skin kaitlyn    Final    Aerobic Smear 06/28/2024 No WBCs seen    Final    Aerobic Smear 06/28/2024 No organisms seen    Final    Procalcitonin 06/27/2024 0.04  <0.05 ng/mL Final    Beta Natriuretic Peptide 06/28/2024 84  <100 pg/mL Final    Hold Lt Green 06/28/2024 Auto Resulted    Final    Hold Lavender 06/28/2024 Auto Resulted    Final    C. Difficile Toxin B Gene 06/29/2024 Negative  Negative Final    Magnesium 06/29/2024 1.8  1.6 - 2.6 mg/dL Final    Glucose 06/29/2024 90  70 - 99 mg/dL Final    Sodium 06/29/2024 143  136 - 145 mmol/L Final    Potassium 06/29/2024 3.7  3.5 - 5.1 mmol/L Final    Chloride 06/29/2024 109  98 - 112 mmol/L Final    CO2 06/29/2024 29.0  21.0 - 32.0 mmol/L Final    Anion Gap 06/29/2024 5  0 - 18 mmol/L Final    BUN 06/29/2024 16  9 - 23 mg/dL Final    Creatinine 06/29/2024 0.81  0.55 - 1.02 mg/dL Final    BUN/CREA Ratio 06/29/2024 19.8  10.0 - 20.0 Final    Calcium, Total 06/29/2024 8.8  8.7 - 10.4 mg/dL Final    Calculated Osmolality 06/29/2024 297 (H)  275 - 295 mOsm/kg Final    eGFR-Cr 06/29/2024 75  >=60 mL/min/1.73m2 Final    WBC 06/29/2024 5.7  4.0 - 11.0 x10(3) uL Final    RBC 06/29/2024 3.57 (L)  3.80 -  5.30 x10(6)uL Final    HGB 06/29/2024 11.1 (L)  12.0 - 16.0 g/dL Final    HCT 06/29/2024 33.0 (L)  35.0 - 48.0 % Final    MCV 06/29/2024 92.4  80.0 - 100.0 fL Final    MCH 06/29/2024 31.1  26.0 - 34.0 pg Final    MCHC 06/29/2024 33.6  31.0 - 37.0 g/dL Final    RDW-SD 06/29/2024 52.5 (H)  35.1 - 46.3 fL Final    RDW 06/29/2024 15.4 (H)  11.0 - 15.0 % Final    PLT 06/29/2024 252.0  150.0 - 450.0 10(3)uL Final    Neutrophil Absolute Prelim 06/29/2024 3.09  1.50 - 7.70 x10 (3) uL Final    Neutrophil Absolute 06/29/2024 3.09  1.50 - 7.70 x10(3) uL Final    Lymphocyte Absolute 06/29/2024 1.51  1.00 - 4.00 x10(3) uL Final    Monocyte Absolute 06/29/2024 0.68  0.10 - 1.00 x10(3) uL Final    Eosinophil Absolute 06/29/2024 0.31  0.00 - 0.70 x10(3) uL Final    Basophil Absolute 06/29/2024 0.05  0.00 - 0.20 x10(3) uL Final    Immature Granulocyte Absolute 06/29/2024 0.02  0.00 - 1.00 x10(3) uL Final    Neutrophil % 06/29/2024 54.5  % Final    Lymphocyte % 06/29/2024 26.7  % Final    Monocyte % 06/29/2024 12.0  % Final    Eosinophil % 06/29/2024 5.5  % Final    Basophil % 06/29/2024 0.9  % Final    Immature Granulocyte % 06/29/2024 0.4  % Final    Glucose 06/30/2024 93  70 - 99 mg/dL Final    Sodium 06/30/2024 143  136 - 145 mmol/L Final    Potassium 06/30/2024 4.1  3.5 - 5.1 mmol/L Final    Chloride 06/30/2024 111  98 - 112 mmol/L Final    CO2 06/30/2024 26.0  21.0 - 32.0 mmol/L Final    Anion Gap 06/30/2024 6  0 - 18 mmol/L Final    BUN 06/30/2024 13  9 - 23 mg/dL Final    Creatinine 06/30/2024 0.72  0.55 - 1.02 mg/dL Final    BUN/CREA Ratio 06/30/2024 18.1  10.0 - 20.0 Final    Calcium, Total 06/30/2024 9.1  8.7 - 10.4 mg/dL Final    Calculated Osmolality 06/30/2024 296 (H)  275 - 295 mOsm/kg Final    eGFR-Cr 06/30/2024 86  >=60 mL/min/1.73m2 Final    Potassium 06/30/2024 4.1  3.5 - 5.1 mmol/L Final    Magnesium 06/30/2024 1.9  1.6 - 2.6 mg/dL Final    Vancomycin Peak 06/30/2024 17.7 (L)  30.0 - 50.0 ug/mL Final    Vancomycin  Trough 06/30/2024 9.7 (L)  10.0 - 20.0 ug/mL Final    WBC 06/30/2024 5.2  4.0 - 11.0 x10(3) uL Final    RBC 06/30/2024 3.07 (L)  3.80 - 5.30 x10(6)uL Final    HGB 06/30/2024 9.4 (L)  12.0 - 16.0 g/dL Final    HCT 06/30/2024 29.2 (L)  35.0 - 48.0 % Final    MCV 06/30/2024 95.1  80.0 - 100.0 fL Final    MCH 06/30/2024 30.6  26.0 - 34.0 pg Final    MCHC 06/30/2024 32.2  31.0 - 37.0 g/dL Final    RDW-SD 06/30/2024 54.6 (H)  35.1 - 46.3 fL Final    RDW 06/30/2024 15.5 (H)  11.0 - 15.0 % Final    PLT 06/30/2024 215.0  150.0 - 450.0 10(3)uL Final    Neutrophil Absolute Prelim 06/30/2024 2.93  1.50 - 7.70 x10 (3) uL Final    Neutrophil Absolute 06/30/2024 2.93  1.50 - 7.70 x10(3) uL Final    Lymphocyte Absolute 06/30/2024 1.21  1.00 - 4.00 x10(3) uL Final    Monocyte Absolute 06/30/2024 0.60  0.10 - 1.00 x10(3) uL Final    Eosinophil Absolute 06/30/2024 0.33  0.00 - 0.70 x10(3) uL Final    Basophil Absolute 06/30/2024 0.07  0.00 - 0.20 x10(3) uL Final    Immature Granulocyte Absolute 06/30/2024 0.03  0.00 - 1.00 x10(3) uL Final    Neutrophil % 06/30/2024 56.6  % Final    Lymphocyte % 06/30/2024 23.4  % Final    Monocyte % 06/30/2024 11.6  % Final    Eosinophil % 06/30/2024 6.4  % Final    Basophil % 06/30/2024 1.4  % Final    Immature Granulocyte % 06/30/2024 0.6  % Final    HGB 06/30/2024 11.4 (L)  12.0 - 16.0 g/dL Final    Iron 06/30/2024 54  50 - 170 ug/dL Final    Transferrin 06/30/2024 204 (L)  250 - 380 mg/dL Final    Total Iron Binding Capacity 06/30/2024 304  250 - 425 ug/dL Final    % Saturation 06/30/2024 18  15 - 50 % Final    Ferritin 06/30/2024 56.4  18.0 - 340.0 ng/mL Final    Haptoglobin 06/30/2024 185.0  30.0 - 200.0 mg/dL Final    ABO BLOOD TYPE 06/30/2024 O    Final    RH BLOOD TYPE 06/30/2024 Positive    Final    Antibody Screen 06/30/2024 Negative    Final    ABO BLOOD TYPE 06/29/2024 O    Final    RH BLOOD TYPE 06/29/2024 Positive    Final    Glucose 07/01/2024 90  70 - 99 mg/dL Final    Sodium  07/01/2024 142  136 - 145 mmol/L Final    Potassium 07/01/2024 4.2  3.5 - 5.1 mmol/L Final    Chloride 07/01/2024 111  98 - 112 mmol/L Final    CO2 07/01/2024 26.0  21.0 - 32.0 mmol/L Final    Anion Gap 07/01/2024 5  0 - 18 mmol/L Final    BUN 07/01/2024 12  9 - 23 mg/dL Final    Creatinine 07/01/2024 0.69  0.55 - 1.02 mg/dL Final    BUN/CREA Ratio 07/01/2024 17.4  10.0 - 20.0 Final    Calcium, Total 07/01/2024 9.0  8.7 - 10.4 mg/dL Final    Calculated Osmolality 07/01/2024 293  275 - 295 mOsm/kg Final    eGFR-Cr 07/01/2024 89  >=60 mL/min/1.73m2 Final    WBC 07/01/2024 6.2  4.0 - 11.0 x10(3) uL Final    RBC 07/01/2024 3.46 (L)  3.80 - 5.30 x10(6)uL Final    HGB 07/01/2024 10.6 (L)  12.0 - 16.0 g/dL Final    HCT 07/01/2024 33.1 (L)  35.0 - 48.0 % Final    MCV 07/01/2024 95.7  80.0 - 100.0 fL Final    MCH 07/01/2024 30.6  26.0 - 34.0 pg Final    MCHC 07/01/2024 32.0  31.0 - 37.0 g/dL Final    RDW-SD 07/01/2024 53.1 (H)  35.1 - 46.3 fL Final    RDW 07/01/2024 15.3 (H)  11.0 - 15.0 % Final    PLT 07/01/2024 246.0  150.0 - 450.0 10(3)uL Final    Neutrophil Absolute Prelim 07/01/2024 3.73  1.50 - 7.70 x10 (3) uL Final    Neutrophil Absolute 07/01/2024 3.73  1.50 - 7.70 x10(3) uL Final    Lymphocyte Absolute 07/01/2024 1.27  1.00 - 4.00 x10(3) uL Final    Monocyte Absolute 07/01/2024 0.65  0.10 - 1.00 x10(3) uL Final    Eosinophil Absolute 07/01/2024 0.42  0.00 - 0.70 x10(3) uL Final    Basophil Absolute 07/01/2024 0.06  0.00 - 0.20 x10(3) uL Final    Immature Granulocyte Absolute 07/01/2024 0.03  0.00 - 1.00 x10(3) uL Final    Neutrophil % 07/01/2024 60.5  % Final    Lymphocyte % 07/01/2024 20.6  % Final    Monocyte % 07/01/2024 10.6  % Final    Eosinophil % 07/01/2024 6.8  % Final    Basophil % 07/01/2024 1.0  % Final    Immature Granulocyte % 07/01/2024 0.5  % Final   Admission on 06/19/2024, Discharged on 06/22/2024   Component Date Value Ref Range Status    Glucose 06/19/2024 89  70 - 99 mg/dL Final    Sodium  06/19/2024 143  136 - 145 mmol/L Final    Potassium 06/19/2024 4.6  3.5 - 5.1 mmol/L Final    Chloride 06/19/2024 108  98 - 112 mmol/L Final    CO2 06/19/2024 28.0  21.0 - 32.0 mmol/L Final    Anion Gap 06/19/2024 7  0 - 18 mmol/L Final    BUN 06/19/2024 16  9 - 23 mg/dL Final    Creatinine 06/19/2024 0.96  0.55 - 1.02 mg/dL Final    BUN/CREA Ratio 06/19/2024 16.7  10.0 - 20.0 Final    Calcium, Total 06/19/2024 9.0  8.7 - 10.4 mg/dL Final    Calculated Osmolality 06/19/2024 297 (H)  275 - 295 mOsm/kg Final    eGFR-Cr 06/19/2024 61  >=60 mL/min/1.73m2 Final    Blood Culture Result 06/19/2024 No Growth 5 Days    Final    Blood Culture Result 06/19/2024 No Growth 5 Days    Final    Lactic Acid 06/19/2024 0.8  0.5 - 2.0 mmol/L Final    WBC 06/19/2024 6.5  4.0 - 11.0 x10(3) uL Final    RBC 06/19/2024 4.01  3.80 - 5.30 x10(6)uL Final    HGB 06/19/2024 12.3  12.0 - 16.0 g/dL Final    HCT 06/19/2024 38.6  35.0 - 48.0 % Final    MCV 06/19/2024 96.3  80.0 - 100.0 fL Final    MCH 06/19/2024 30.7  26.0 - 34.0 pg Final    MCHC 06/19/2024 31.9  31.0 - 37.0 g/dL Final    RDW-SD 06/19/2024 55.2 (H)  35.1 - 46.3 fL Final    RDW 06/19/2024 15.6 (H)  11.0 - 15.0 % Final    PLT 06/19/2024 290.0  150.0 - 450.0 10(3)uL Final    Neutrophil Absolute Prelim 06/19/2024 4.37  1.50 - 7.70 x10 (3) uL Final    Neutrophil Absolute 06/19/2024 4.37  1.50 - 7.70 x10(3) uL Final    Lymphocyte Absolute 06/19/2024 1.09  1.00 - 4.00 x10(3) uL Final    Monocyte Absolute 06/19/2024 0.59  0.10 - 1.00 x10(3) uL Final    Eosinophil Absolute 06/19/2024 0.40  0.00 - 0.70 x10(3) uL Final    Basophil Absolute 06/19/2024 0.04  0.00 - 0.20 x10(3) uL Final    Immature Granulocyte Absolute 06/19/2024 0.03  0.00 - 1.00 x10(3) uL Final    Neutrophil % 06/19/2024 67.1  % Final    Lymphocyte % 06/19/2024 16.7  % Final    Monocyte % 06/19/2024 9.0  % Final    Eosinophil % 06/19/2024 6.1  % Final    Basophil % 06/19/2024 0.6  % Final    Immature Granulocyte % 06/19/2024  0.5  % Final    Glucose 06/20/2024 83  70 - 99 mg/dL Final    Sodium 06/20/2024 143  136 - 145 mmol/L Final    Potassium 06/20/2024 3.9  3.5 - 5.1 mmol/L Final    Chloride 06/20/2024 110  98 - 112 mmol/L Final    CO2 06/20/2024 27.0  21.0 - 32.0 mmol/L Final    Anion Gap 06/20/2024 6  0 - 18 mmol/L Final    BUN 06/20/2024 12  9 - 23 mg/dL Final    Creatinine 06/20/2024 0.80  0.55 - 1.02 mg/dL Final    BUN/CREA Ratio 06/20/2024 15.0  10.0 - 20.0 Final    Calcium, Total 06/20/2024 8.3 (L)  8.7 - 10.4 mg/dL Final    Calculated Osmolality 06/20/2024 295  275 - 295 mOsm/kg Final    eGFR-Cr 06/20/2024 76  >=60 mL/min/1.73m2 Final    WBC 06/20/2024 5.3  4.0 - 11.0 x10(3) uL Final    RBC 06/20/2024 3.46 (L)  3.80 - 5.30 x10(6)uL Final    HGB 06/20/2024 10.9 (L)  12.0 - 16.0 g/dL Final    HCT 06/20/2024 32.9 (L)  35.0 - 48.0 % Final    MCV 06/20/2024 95.1  80.0 - 100.0 fL Final    MCH 06/20/2024 31.5  26.0 - 34.0 pg Final    MCHC 06/20/2024 33.1  31.0 - 37.0 g/dL Final    RDW 06/20/2024 15.7 (H)  11.0 - 15.0 % Final    RDW-SD 06/20/2024 54.3 (H)  35.1 - 46.3 fL Final    PLT 06/20/2024 230.0  150.0 - 450.0 10(3)uL Final    Procalcitonin 06/20/2024 <0.04  <0.05 ng/mL Final    WBC 06/21/2024 5.4  4.0 - 11.0 x10(3) uL Final    RBC 06/21/2024 3.56 (L)  3.80 - 5.30 x10(6)uL Final    HGB 06/21/2024 11.1 (L)  12.0 - 16.0 g/dL Final    HCT 06/21/2024 33.4 (L)  35.0 - 48.0 % Final    MCV 06/21/2024 93.8  80.0 - 100.0 fL Final    MCH 06/21/2024 31.2  26.0 - 34.0 pg Final    MCHC 06/21/2024 33.2  31.0 - 37.0 g/dL Final    RDW 06/21/2024 15.5 (H)  11.0 - 15.0 % Final    RDW-SD 06/21/2024 53.4 (H)  35.1 - 46.3 fL Final    PLT 06/21/2024 223.0  150.0 - 450.0 10(3)uL Final    Glucose 06/21/2024 98  70 - 99 mg/dL Final    Sodium 06/21/2024 141  136 - 145 mmol/L Final    Potassium 06/21/2024 3.6  3.5 - 5.1 mmol/L Final    Chloride 06/21/2024 108  98 - 112 mmol/L Final    CO2 06/21/2024 30.0  21.0 - 32.0 mmol/L Final    Anion Gap 06/21/2024 3   0 - 18 mmol/L Final    BUN 06/21/2024 10  9 - 23 mg/dL Final    Creatinine 06/21/2024 0.73  0.55 - 1.02 mg/dL Final    BUN/CREA Ratio 06/21/2024 13.7  10.0 - 20.0 Final    Calcium, Total 06/21/2024 8.7  8.7 - 10.4 mg/dL Final    Calculated Osmolality 06/21/2024 291  275 - 295 mOsm/kg Final    eGFR-Cr 06/21/2024 85  >=60 mL/min/1.73m2 Final    Glucose 06/22/2024 87  70 - 99 mg/dL Final    Sodium 06/22/2024 142  136 - 145 mmol/L Final    Potassium 06/22/2024 3.9  3.5 - 5.1 mmol/L Final    Chloride 06/22/2024 109  98 - 112 mmol/L Final    CO2 06/22/2024 26.0  21.0 - 32.0 mmol/L Final    Anion Gap 06/22/2024 7  0 - 18 mmol/L Final    BUN 06/22/2024 8 (L)  9 - 23 mg/dL Final    Creatinine 06/22/2024 0.74  0.55 - 1.02 mg/dL Final    BUN/CREA Ratio 06/22/2024 10.8  10.0 - 20.0 Final    Calcium, Total 06/22/2024 8.8  8.7 - 10.4 mg/dL Final    Calculated Osmolality 06/22/2024 292  275 - 295 mOsm/kg Final    eGFR-Cr 06/22/2024 83  >=60 mL/min/1.73m2 Final    Hold Lavender 06/22/2024 Auto Resulted    Final   ]        Radiology Imaging:  I reviewed with the patient her X-ray and CT of the knees left   CT KNEE LEFT (CPT=73700); THREE-DIMENSIONAL RECONSTRUCTION     CLINICAL INDICATION: Chronic knee pain after total replacement of left knee joint.     COMPARISON STUDY: 20 August 2024 radiograph.     TECHNIQUE: Direct axial images were obtained through the left knee without the use of intravenous   contrast. The data was utilized for maximum intensity projection reconstruction into the coronal and   sagittal planes and spin and tumble 3D images were produced.     ADVERSE REACTION: None.     Automated exposure control and ALARA manual techniques for patient specific dose reduction were   followed while maintaining the necessary diagnostic image quality.     FINDINGS:   OSSEOUS STRUCTURES:   The visualized osseous structures are without acute fracture, dislocation or subluxation. There is   no significant osseous lytic or blastic  lesion.     JOINT SPACES:   Total knee prosthetic device is identified. The femoral and tibial components are within normal   limits. There is no evidence of periprosthetic lucency and there is no evidence of visualized   medullary or cortical defect to suggest fracture.   Soft tissue windows does appear to demonstrate relatively hypodense material suggesting joint   effusion in the suprapatellar region with significant associated thickening and heterogeneity of the   quadriceps tendon with cutaneous linear abnormality that likely represents postoperative change. The   fluid within the joint spaces measures just greater than in density greater than simple water on   this noncontrast exam.     MYOTENDINOUS STRUCTURES:   The visualized myotendinous structures are not optimized on noncontrast CT but grossly homogeneous   on the current exam. There are no discrete solid or cystic masses.     NEUROVASCULAR/REMAINING SOFT TISSUES:   The subcutaneous and neurovascular structures are without focal abnormality.     =====   IMPRESSION:   1. Left knee prosthetic appears appropriate without current CT osseous complication.   2. Mildly complex joint effusion. Correlation with surgical history is recommended and findings   could represent aging hemarthrosis. If there is concern for septic arthritis, aspiration with   culture and sensitivity is recommended.   3. Mild relative thickening and prominence of the quadriceps tendon as it inserts on the patella may   be postoperative in nature. If there is concern for tendinosis or strain, dedicated ultrasound   examination may be helpful.      ASSESSMENT AND PLAN:  Adore is a pleasant 79-year-old female presents for complaints of left knee pain after a fall 3 months ago.  She does have a left knee replacement with a revision about 7 years ago.  The CT scan of the left knee does not demonstrate any issues with the prosthesis or fractures.  I am recommending a left knee genicular nerve  block under fluoroscopy and local anesthesia to determine if a genicular nerve ablation would be beneficial.  She should continue Tylenol as well as Lidoderm patches.  If the block is positive, then we will proceed with the radiofrequency ablation.  She should use ice 3-4 times per day as well.        RTC in 4 weeks     Discharge Instructions were provided as documented in AVS summary.  The patient was in agreement with the assessment and plan.  All questions were answered.  There were no barriers to learning.                      1. Patellofemoral pain syndrome, unspecified laterality    2. Acute pain of left knee    3. History of bilateral knee replacement    4. Atrial fibrillation with rapid ventricular response (HCC)          Alex B. Behar MD, [facility]  Physical Medicine and Rehabilitation/Sports Medicine  [facility]                     Electronically signed by Behar, Alex, MD at 11/19/2024 10:49 AM

## (undated) NOTE — LETTER
Date: 2/9/2024  Patient name: Adore Covington  YOB: 1945  Medical Record Number: [id number]  Primary Coverage: Payor: MEDICARE / Plan: MEDICARE PART A&B / Product Type: *No Product type* /   Secondary Coverage: [facility]  Insurance ID: [id number]  Patient Address: [address], [address]  [facility] [address]  Telephone Information:   Home Phone [phone]   Mobile [phone]       Encounter Date: 2/9/2024  Provider: Dontrell Tinajero MD  Diagnosis:     ICD-10-CM   1. Chronic venous hypertension (idiopathic) with ulcer and inflammation of bilateral lower extremity ([facility])  I87.333   2. Atrial fibrillation with rapid ventricular response ([facility])  I48.91   3. H/O CHF  Z86.79   4. Class 3 severe obesity due to excess calories with serious comorbidity and body mass index (BMI) of 45.0 to 49.9 in adult ([facility])  E66.01    Z68.42         Wound 02/09/24 1 Leg Right;Lower (Active)   Date First Assessed/Time First Assessed: 02/09/24 1452    Wound Number (Wound Clinic Only): 1  Location: Leg  Wound Location Orientation: Right;Lower      Assessments 2/9/2024  2:54 PM   Wound Image      Site Assessment Clean;Moist;Yellow;Pale;Pink   Closure Not approximated   Drainage Amount Large   Drainage Description Yellow   Treatments Cleansed;Compression;Saline   Dressing Kerlix roll;Tape   Dressing Changed New   Dressing Status Clean;Dry;Intact   Wound Length (cm) 8 cm   Wound Width (cm) 7 cm   Wound Surface Area (cm^2) 56 cm^2   Wound Depth (cm) 0.1 cm   Wound Volume (cm^3) 5.6 cm^3   Margins Attached edges   Non-staged Wound Description Full thickness   Kamryn-wound Assessment Hemosiderin staining;Edema   Wound Granulation Tissue Pink   Wound Bed Granulation (%) 100 %   Wound Bed Epithelium (%) 0 %   Wound Bed Slough (%) 0 %   Wound Bed Eschar (%) 0 %   Wound Bed Fibrin (%) 0 %   State of Healing Early/partial granulation   Wound Odor None       No associated orders.       Wound 02/09/24 2 Leg Left;Lower (Active)    Date First Assessed/Time First Assessed: 02/09/24 1453    Wound Number (Wound Clinic Only): 2  Location: Leg  Wound Location Orientation: Left;Lower      Assessments 2/9/2024  2:54 PM   Wound Image      Site Assessment Moist;Yellow;Pink;Red;Edema   Closure Not approximated   Drainage Amount Large   Drainage Description Green   Treatments Cleansed;Saline;Compression   Dressing Kerlix roll;Tape   Dressing Changed New   Wound Length (cm) 24 cm   Wound Width (cm) 28 cm   Wound Surface Area (cm^2) 672 cm^2   Wound Depth (cm) 0.1 cm   Wound Volume (cm^3) 67.2 cm^3   Margins Attached edges   Non-staged Wound Description Full thickness   Kamryn-wound Assessment Edema;Pink;Hemosiderin staining;Dry   Wound Granulation Tissue Pink   Wound Bed Granulation (%) 50 %   Wound Bed Epithelium (%) 0 %   Wound Bed Slough (%) 50 %   Wound Bed Eschar (%) 0 %   Wound Bed Fibrin (%) 0 %   State of Healing Early/partial granulation   Wound Odor None       No associated orders.       Compression Wrap 02/09/24 Leg Right;Lower (Active)   Placement Date: 02/09/24   Location: Leg  Wound Location Orientation: Right;Lower      Assessments 2/9/2024  2:54 PM   Response to Treatment Well tolerated   Compression Layers Single   Compression Product Type Tubigrip/Spanda   Dressing Applied Yes       No associated orders.       Compression Wrap 02/09/24 Leg Left;Lower (Active)   Placement Date: 02/09/24   Location: Leg  Wound Location Orientation: Left;Lower      Assessments 2/9/2024  2:54 PM   Response to Treatment Well tolerated   Compression Layers 2   Compression Product Type Artiflex 10cm;Coflex lite   Dressing Applied Yes   Compression Wrap Location Toes to Knee   Compression Wrap Status Clean;Dry;Intact       No associated orders.           Wound Number: 1 right lower leg    Wound Cleaning and Dressings:  Showering directions: May shower with protection  Wound cleansing:  Cleanse with normal saline or wound cleanser  Wound cleaning frequency: with  dressing changes  Wound product: Enluxtra humidifiber, Kerramax/super absorbent, and Kerlix, tape  Dressing change frequency:  Change dressing 3x per week    Compression Therapy: Spandagrip /Medigrip E      Wound Number: 2 left lower leg    Wound Cleaning and Dressings:  Showering directions: May shower with protection  Wound cleansing:  Cleanse with normal saline or wound cleanser  Wound cleaning frequency: with dressing changes  Wound product: Enluxtra humidifiber, Kerramax/super absorbent, and Kerlix, tape  Dressing change frequency:  Change dressing 3x per week    Compression Therapy:  Coflex lite    Follow Up:  Return in about 2 weeks (around 2/23/2024) for MD visit x 30 mins x1.          2/11/2024  NPI [id number]

## (undated) NOTE — LETTER
[address] [place] [address]  Authorization for Invasive Procedures  Date: ***           Time: ***    {[facility] ivs consent:62505}

## (undated) NOTE — LETTER
Date: 3/8/2024  Patient name: Adore Covington  YOB: 1945  Medical Record Number: [id number]  Primary Coverage: Payor: MEDICARE / Plan: MEDICARE PART A&B / Product Type: *No Product type* /   Secondary Coverage: [facility]  Insurance ID: [id number]  Patient Address: [address], [address]  [facility] [address]  Telephone Information:   Home Phone [phone]   Mobile [phone]       Encounter Date: 3/8/2024  Provider: Dontrell Tinajero MD  Diagnosis:     ICD-10-CM   1. Chronic venous hypertension (idiopathic) with ulcer and inflammation of bilateral lower extremity ([facility])  I87.333   2. Atrial fibrillation with rapid ventricular response ([facility])  I48.91   3. H/O CHF  Z86.79   4. Class 3 severe obesity due to excess calories with serious comorbidity and body mass index (BMI) of 45.0 to 49.9 in adult ([facility])  E66.01    Z68.42         Wound 02/09/24 1 Leg Right;Lower (Active)   Date First Assessed/Time First Assessed: 02/09/24 1452    Wound Number (Wound Clinic Only): 1  Location: Leg  Wound Location Orientation: Right;Lower      Assessments 2/9/2024  2:54 PM 3/8/2024  1:34 PM   Wound Image           Site Assessment Clean;Moist;Yellow;Pale;Pink Clean;Moist;Pink   Closure Not approximated Not approximated   Drainage Amount Large Large   Drainage Description Yellow Serous;Yellow   Treatments Cleansed;Compression;Saline Cleansed;Compression;Saline   Dressing Kerlix roll;Tape Kerlix roll;Tape   Dressing Changed New New   Dressing Status Clean;Dry;Intact Clean;Dry;Intact   Wound Length (cm) 8 cm 13.5 cm   Wound Width (cm) 7 cm 18 cm   Wound Surface Area (cm^2) 56 cm^2 243 cm^2   Wound Depth (cm) 0.1 cm 0.1 cm   Wound Volume (cm^3) 5.6 cm^3 24.3 cm^3   Wound Healing % -- -334   Margins Attached edges Attached edges;Poorly defined   Non-staged Wound Description Full thickness Full thickness   Kamryn-wound Assessment Hemosiderin staining;Edema Hemosiderin staining;Edema;Pink   Wound Granulation Tissue Pink  Pink;Red   Wound Bed Granulation (%) 100 % 50 %   Wound Bed Epithelium (%) 0 % 50 %   Wound Bed Slough (%) 0 % 0 %   Wound Bed Eschar (%) 0 % 0 %   Wound Bed Fibrin (%) 0 % 0 %   State of Healing Early/partial granulation Fully granulated   Wound Odor None None       No associated orders.       Wound 02/09/24 2 Leg Left;Lower (Active)   Date First Assessed/Time First Assessed: 02/09/24 1453    Wound Number (Wound Clinic Only): 2  Location: Leg  Wound Location Orientation: Left;Lower      Assessments 2/9/2024  2:54 PM 3/8/2024  1:55 PM   Wound Image        Site Assessment Moist;Yellow;Pink;Red;Edema --   Closure Not approximated --   Drainage Amount Large --   Drainage Description Green --   Treatments Cleansed;Saline;Compression --   Dressing Kerlix roll;Tape --   Dressing Changed New --   Wound Length (cm) 24 cm --   Wound Width (cm) 28 cm --   Wound Surface Area (cm^2) 672 cm^2 --   Wound Depth (cm) 0.1 cm --   Wound Volume (cm^3) 67.2 cm^3 --   Margins Attached edges --   Non-staged Wound Description Full thickness --   Kamryn-wound Assessment Edema;Pink;Hemosiderin staining;Dry --   Wound Granulation Tissue Pink --   Wound Bed Granulation (%) 50 % --   Wound Bed Epithelium (%) 0 % --   Wound Bed Slough (%) 50 % --   Wound Bed Eschar (%) 0 % --   Wound Bed Fibrin (%) 0 % --   State of Healing Early/partial granulation --   Wound Odor None --       No associated orders.       Compression Wrap 02/09/24 Leg Right;Lower (Active)   Placement Date: 02/09/24   Location: Leg  Wound Location Orientation: Right;Lower      Assessments 2/9/2024  2:54 PM 3/8/2024  1:34 PM   Response to Treatment Well tolerated Well tolerated   Compression Layers Single Multilayer   Compression Product Type Tubigrip/Spanda Coflex   Dressing Applied Yes Yes   Compression Wrap Location Toes to Knee Toes to Knee   Compression Wrap Status Clean;Intact;Dry Clean;Intact;Dry       No associated orders.       Compression Wrap 02/09/24 Leg Left;Lower  (Active)   Placement Date: 02/09/24   Location: Leg  Wound Location Orientation: Left;Lower      Assessments 2/9/2024  2:54 PM 3/8/2024  1:34 PM   Response to Treatment Well tolerated Well tolerated   Compression Layers 2 2   Compression Product Type Artiflex 10cm;Coflex lite Coflex   Dressing Applied Yes Yes   Compression Wrap Location Toes to Knee Toes to Knee   Compression Wrap Status Clean;Dry;Intact Clean;Dry;Intact       No associated orders.                 Wound Number: Bilateral legs     Wound Cleaning and Dressings:  Showering directions: May shower with protection  Wound cleansing:  Cleanse with normal saline or wound cleanser  Wound cleaning frequency: with dressing changes. Please wash the entire leg too after wrap removal, thank you.  Wound product: Zinc paste to maceration area, betamethasone cream that patient has to bilateral legs then placed on top, hydrofera transfer on wound bed, Kerramax/super absorbent, and Kerlix, tape  Dressing change frequency:  Change dressing 3x per week      Compression Therapy:  Coflex standard compression wrap please wrap from base of toes to 1 inch below the knee for both legs.                  Follow Up:  Follow up in 2 weeks around 3/15/24.        3/11/2024  NP [id number]

## (undated) NOTE — LETTER
Date: 4/22/2024  Patient name: Adore Covington  YOB: 1945  Medical Record Number: [id number]  Primary Coverage: Payor: MEDICARE / Plan: MEDICARE PART A&B / Product Type: *No Product type* /   Secondary [facility]age: [facility]  Insurance ID: [id number]  Patient Address: [address], [address]  [facility] [address]  Telephone Information:   Home Phone [phone]   Mobile [phone]       Encounter Date: 4/22/2024  Provider: Dontrell Tinajero MD  Diagnosis:     ICD-10-CM   1. Chronic venous hypertension (idiopathic) with ulcer and inflammation of bilateral lower extremity ([facility])  I87.333   2. H/O CHF  Z86.79   3. Class 3 severe obesity due to excess calories with serious comorbidity and body mass index (BMI) of 45.0 to 49.9 in adult ([facility])  E66.01    Z68.42   4. Rash  R21         Compression Wrap 02/09/24 Leg Right;Lower (Active)   Placement Date: 02/09/24   Location: Leg  Wound Location Orientation: Right;Lower      Assessments 2/9/2024  2:54 PM 4/22/2024  1:14 PM   Response to Treatment Well tolerated Well tolerated   Compression Layers Single Single   Compression Product Type Tubigrip/Spanda Tubigrip/Spanda   Dressing Applied Yes No   Compression Wrap Location Toes to Knee Toes to Knee   Compression Wrap Status Clean;Intact;Dry Clean;Intact;Dry       No associated orders.       Compression Wrap 02/09/24 Leg Left;Lower (Active)   Placement Date: 02/09/24   Location: Leg  Wound Location Orientation: Left;Lower      Assessments 2/9/2024  2:54 PM 4/22/2024  1:14 PM   Response to Treatment Well tolerated Well tolerated   Compression Layers 2 Single   Compression Product Type Artiflex 10cm;Coflex lite Tubigrip/Spanda   Dressing Applied Yes Yes   Compression Wrap Location Toes to Knee Toes to Knee   Compression Wrap Status Clean;Dry;Intact Clean;Dry;Intact       No associated orders.       Wound 03/22/24 3 Foot Left;Plantar (Active)   Date First Assessed: 03/22/24    Wound Number (Wound Clinic Only): 3   Location: (c) Foot  Wound Location Orientation: Left;Plantar      Assessments 3/22/2024 11:01 AM 4/22/2024  1:14 PM   Wound Image       Site Assessment Edema;Moist;Pink Dry;Pink   Closure Not approximated Not approximated   Drainage Amount Moderate Scant   Drainage Description Clear Clear   Treatments Cleansed;Compression;Wound  Cleansed;Wound    Dressing 4x4s;Kerlix roll;Hydrofera ready Hydrofera ready;Tape   Dressing Changed New Changed   Dressing Status Clean;Dry;Intact Dressing Changed;Removed;Old drainage   Wound Length (cm) 12 cm 0.5 cm   Wound Width (cm) 7 cm 0.5 cm   Wound Surface Area (cm^2) 84 cm^2 0.25 cm^2   Wound Depth (cm) 0.1 cm 0.1 cm   Wound Volume (cm^3) 8.4 cm^3 0.025 cm^3   Wound Healing % -- 100   Margins Attached edges Attached edges   Non-staged Wound Description Partial thickness Full thickness   Kamryn-wound Assessment Callous;Maceration;Fragile Callous;Dry   Wound Granulation Tissue -- Pink   Wound Bed Granulation (%) -- 20 %   Wound Bed Epithelium (%) 90 % 80 %   Wound Bed Slough (%) -- 0 %   Wound Bed Eschar (%) -- 0 %   Wound Bed Fibrin (%) -- 0 %   State of Healing -- Non-healing   Wound Odor None None       No associated orders.       Wound 04/22/24 3 Leg Right;Lower;Lateral (Active)   Date First Assessed/Time First Assessed: 04/22/24 1334    Wound Number (Wound Clinic Only): 3  Primary Wound Type: Venous Ulcer  Location: Leg  Wound Location Orientation: Right;Lower;Lateral      Assessments 4/22/2024  1:14 PM   Wound Image     Site Assessment Moist;Pink;Edema;Fragile   Closure Not approximated   Drainage Amount Small   Drainage Description Serous;Clear   Treatments Cleansed;Saline;Compression   Dressing Changed New   Dressing Status Clean;Dry;Intact   Wound Length (cm) 0.5 cm   Wound Width (cm) 0.3 cm   Wound Surface Area (cm^2) 0.15 cm^2   Wound Depth (cm) 0.1 cm   Wound Volume (cm^3) 0.015 cm^3   Margins Attached edges   Non-staged Wound Description Partial thickness    Kamryn-wound Assessment Edema;Moist;Fragile;Red   Wound Bed Epithelium (%) 90 %   Wound Odor None       No associated orders.   Wound Number: Right lateral lower leg      Wound Cleaning and Dressings:  Showering directions: May shower with protection  Wound cleansing:  Cleanse with normal saline or wound cleanser  Wound cleaning frequency: with dressing changes.   Wound product: Aquacel Ag bordered foam.      Wound Number: Left plantar foot wound     Wound Cleaning and Dressings:  Showering directions: May shower with protection  Wound cleansing:  Cleanse with normal saline or wound cleanser  Wound cleaning frequency: with dressing changes. Please wash the entire leg too after dressing removal, thank you.  Wound product: nicol silver collagen, hydrofera transfer, medipore or hypafix tape. 2 layer offloading cellona pad.please change the offloading felt pad.   Dressing change frequency:  Change dressing 2x per week      Compression Therapy: to bilateral lower legs   Spandagrip E    Follow Up:  Return in about 1 week (around 4/29/2024) for APN visit x 30 mins.        4/22/2024  [facility] [phone]

## (undated) NOTE — LETTER
[address] [facility] [place]  Authorization for Invasive Procedures  Date: 12/7/2023          Time: 1520    I hereby authorize Dr. Cirilo Sherman, my physician and his/her assistants (if applicable), which may include medical students, residents, and/or fellows, to perform the following surgical operation/ procedure and administer such anesthesia as may be determined necessary by my physician: Gastrostomy-jejunstomy tube placement  on Jalil Garza  2. I recognize that during the surgical operation/procedure, unforeseen conditions may necessitate additional or different procedures than those listed above. I, therefore, further authorize and request that the above-named surgeon, assistants, or designees perform such procedures as are, in their judgment, necessary and desirable. 3.   My surgeon/physician has discussed prior to my surgery the potential benefits, risks and side effects of this procedure; the likelihood of achieving goals; and potential problems that might occur during recuperation. They also discussed reasonable alternatives to the procedure, including risks, benefits, and side effects related to the alternatives and risks related to not receiving this procedure. I have had all my questions answered and I acknowledge that no guarantee has been made as to the result that may be obtained. 4.   Should the need arise during my operation/procedure, which includes change of level of care prior to discharge, I also consent to the administration of blood and/or blood products. Further, I understand that despite careful testing and screening of blood or blood products by collecting agencies, I may still be subject to ill effects as a result of receiving a blood transfusion and/or blood products.   The following are some, but not all, of the potential risks that can occur: fever and allergic reactions, hemolytic reactions, transmission of diseases such as Hepatitis, AIDS and Cytomegalovirus (CMV) and fluid overload. In the event that I wish to have an autologous transfusion of my own blood, or a directed donor transfusion, I will discuss this with my physician. Check only if Refusing Blood or Blood Products  I understand refusal of blood or blood products as deemed necessary by my physician may have serious consequences to my condition to include possible death. I hereby assume responsibility for my refusal and release the hospital, its personnel, and my physicians from any responsibility for the consequences of my refusal.         o  Refuse         5. I authorize the use of any specimen, organs, tissues, body parts or foreign objects that may be removed from my body during the operation/procedure for diagnosis, research or teaching purposes and their subsequent disposal by hospital authorities. I also authorize the release of specimen test results and/or written reports to my treating physician on the hospital medical staff or other referring or consulting physicians involved in my care, at the discretion of the Pathologist or my treating physician. 6.   I consent to the photographing or videotaping of the operations or procedures to be performed, including appropriate portions of my body for medical, scientific, or educational purposes, provided my identity is not revealed by the pictures or by descriptive texts accompanying them. If the procedure has been photographed/videotaped, the surgeon will obtain the original picture, image, videotape or CD. The hospital will not be responsible for storage, release or maintenance of the picture, image, tape or CD.    7.   I consent to the presence of a  or observers in the operating room as deemed necessary by my physician or their designees. 8.   I recognize that in the event my procedure results in extended X-Ray/fluoroscopy time, I may develop a skin reaction. 9.  If I have a Do Not Attempt Resuscitation (DNAR) order in place, that status will be suspended while in the operating room, procedural suite, and during the recovery period unless otherwise explicitly stated by me (or a person authorized to consent on my behalf). The surgeon or my attending physician will determine when the applicable recovery period ends for purposes of reinstating the DNAR order. 10. Patients having a sterilization procedure: I understand that if the procedure is successful the results will be permanent and it will therefore be impossible for me to inseminate, conceive, or bear children. I also understand that the procedure is intended to result in sterility, although the result has not been guaranteed. 11. I acknowledge that my physician has explained sedation/analgesia administration to me including the risk and benefits I consent to the administration of sedation/analgesia as may be necessary or desirable in the judgment of my physician.     I CERTIFY THAT I HAVE READ AND FULLY UNDERSTAND THE ABOVE CONSENT TO OPERATION and/or OTHER PROCEDURE.        ____________________________________       _________________________________      ______________________________  Signature of Patient         Signature of Responsible Person        Printed Name of Responsible Person        ____________________________________      _________________________________      ______________________________       Signature of Witness          Relationship to Patient                       Date                                       Time    Patient Name: Kala Leslie     : 1945                 Printed: 2023      Medical Record #: [id number]                      Page 1 of 2      [facility] My signature below affirms that prior to the time of the procedure; I have explained to the patient and/or his/her legal representative, the risks and benefits involved in the proposed treatment and any reasonable alternative to the proposed treatment. I have also explained the risks and benefits involved in refusal of the proposed treatment and alternatives to the proposed treatment and have answered the patient's questions.  If I have a significant financial interest in a co-management agreement or a significant financial interest in any product or implant, or other significant relationship used in this procedure/surgery, I have disclosed this and had a discussion with my patient.     _______________________________________________________________ _____________________________  Navdeep Leahy of Physician)                                                                                         (Date)                                   (Time)    Patient Name: Woodrow Henderson     : 1945                 Printed: 2023      Medical Record #: [id number]                      Page 2 of 2

## (undated) NOTE — LETTER
Date: 2/9/2024  Patient name: Adore Covington  YOB: 1945  Medical Record Number: [id number]  Primary Coverage: Payor: MEDICARE / Plan: MEDICARE PART A&B / Product Type: *No Product type* /   Secondary [facility]  Insurance ID: [id number]  Patient Address: [address], [address]  [facility] [address]  Telephone Information:   Home Phone [phone]   Mobile [phone]       Encounter Date: 2/9/2024  Provider: Dontrell Tinajero MD  Diagnosis:     ICD-10-CM   1. Chronic venous hypertension (idiopathic) with ulcer and inflammation of bilateral lower extremity ([facility])  I87.333   2. Atrial fibrillation with rapid ventricular response ([facility])  I48.91   3. H/O CHF  Z86.79   4. Class 3 severe obesity due to excess calories with serious comorbidity and body mass index (BMI) of 45.0 to 49.9 in adult ([facility]ly [facility])  E66.01    Z68.42         Wound 02/09/24 1 Leg Right;Lower (Active)   Date First Assessed/Time First Assessed: 02/09/24 1452    Wound Number (Wound Clinic Only): 1  Location: Leg  Wound Location Orientation: Right;Lower      Assessments 2/9/2024  2:54 PM   Wound Image      Site Assessment Clean;Moist;Yellow;Pale;Pink   Closure Not approximated   Drainage Amount Large   Drainage Description Yellow   Treatments Cleansed;Compression;Saline   Dressing Kerlix roll;Tape   Dressing Changed New   Dressing Status Clean;Dry;Intact   Wound Length (cm) 8 cm   Wound Width (cm) 7 cm   Wound Surface Area (cm^2) 56 cm^2   Wound Depth (cm) 0.1 cm   Wound Volume (cm^3) 5.6 cm^3   Margins Attached edges   Non-staged Wound Description Full thickness   Kamryn-wound Assessment Hemosiderin staining;Edema   Wound Granulation Tissue Pink   Wound Bed Granulation (%) 100 %   Wound Bed Epithelium (%) 0 %   Wound Bed Slough (%) 0 %   Wound Bed Eschar (%) 0 %   Wound Bed Fibrin (%) 0 %   State of Healing Early/partial granulation   Wound Odor None       No associated orders.       Wound 02/09/24 2 Leg Left;Lower (Active)    Date First Assessed/Time First Assessed: 02/09/24 1453    Wound Number (Wound Clinic Only): 2  Location: Leg  Wound Location Orientation: Left;Lower      Assessments 2/9/2024  2:54 PM   Wound Image      Site Assessment Moist;Yellow;Pink;Red;Edema   Closure Not approximated   Drainage Amount Large   Drainage Description Green   Treatments Cleansed;Saline;Compression   Dressing Kerlix roll;Tape   Dressing Changed New   Wound Length (cm) 24 cm   Wound Width (cm) 28 cm   Wound Surface Area (cm^2) 672 cm^2   Wound Depth (cm) 0.1 cm   Wound Volume (cm^3) 67.2 cm^3   Margins Attached edges   Non-staged Wound Description Full thickness   Kamryn-wound Assessment Edema;Pink;Hemosiderin staining;Dry   Wound Granulation Tissue Pink   Wound Bed Granulation (%) 50 %   Wound Bed Epithelium (%) 0 %   Wound Bed Slough (%) 50 %   Wound Bed Eschar (%) 0 %   Wound Bed Fibrin (%) 0 %   State of Healing Early/partial granulation   Wound Odor None       No associated orders.       Compression Wrap 02/09/24 Leg Right;Lower (Active)   Placement Date: 02/09/24   Location: Leg  Wound Location Orientation: Right;Lower      No assessment data to display       No associated orders.       Compression Wrap 02/09/24 Leg Left;Lower (Active)   Placement Date: 02/09/24   Location: Leg  Wound Location Orientation: Left;Lower      Assessments 2/9/2024  2:54 PM   Response to Treatment Well tolerated   Compression Layers 2       No associated orders.           Wound Number: 1 right lower leg    Wound Cleaning and Dressings:  Showering directions: May shower with protection  Wound cleansing:  Cleanse with normal saline or wound cleanser  Wound cleaning frequency:  with dressing changes  Wound product: Enluxtra humidifiber, Kerramax/super absorbent, and Kerlix, tape  Dressing change frequency:  Change dressing 3x per week    Compression Therapy:   Coflex lite  Wound Number: 2 left lower leg    Wound Cleaning and Dressings:  Showering directions: May shower  with protection  Wound cleansing:  Cleanse with normal saline or wound cleanser  Wound cleaning frequency: with dressing changes  Wound product: Enluxtra humidifiber, Kerramax/super absorbent, and Kerlix, tape  Dressing change frequency:  Change dressing 3x per week    Compression Therapy:  Spandagrip/ Medigrip E      Follow Up:  Return in about 2 weeks (around 2/23/2024) for MD visit x 30 mins x1.          2/9/2024  NPI [id number]

## (undated) NOTE — LETTER
Date: 3/22/2024  Patient name: Adore Covington  YOB: 1945  Medical Record Number: [id number]  Primary Coverage: Payor: MEDICARE / Plan: MEDICARE PART A&B / Product Type: *No Product type* /   Secondary Coverage: [facility]  Insurance ID: [id number]  Patient Address: [address], [address]  [facility] [address]  Telephone Information:   Home Phone [phone]   Mobile [phone]       Encounter Date: 3/22/2024  Provider: Dontrell Tinajero MD  Diagnosis:     ICD-10-CM   1. Rash  R21         Wound 02/09/24 1 Leg Right;Lower (Active)   Date First Assessed/Time First Assessed: 02/09/24 1452    Wound Number (Wound Clinic Only): 1  Location: Leg  Wound Location Orientation: Right;Lower      Assessments 2/9/2024  2:54 PM 3/22/2024 11:01 AM   Wound Image         Site Assessment Clean;Moist;Yellow;Pale;Pink Clean;Epithelialization;Dry;Intact   Closure Not approximated --   Drainage Amount Large None   Drainage Description Yellow --   Treatments Cleansed;Compression;Saline Cleansed;Compression;Saline   Dressing Kerlix roll;Tape Kerlix roll;Tape;Hydrofera ready   Dressing Changed New Changed   Dressing Status Clean;Dry;Intact Clean;Dry;Intact   Wound Length (cm) 8 cm 0.3 cm   Wound Width (cm) 7 cm 0.2 cm   Wound Surface Area (cm^2) 56 cm^2 0.06 cm^2   Wound Depth (cm) 0.1 cm 0.1 cm   Wound Volume (cm^3) 5.6 cm^3 0.006 cm^3   Wound Healing % -- 100   Margins Attached edges Attached edges;Poorly defined   Non-staged Wound Description Full thickness Partial thickness   Kamryn-wound Assessment Hemosiderin staining;Edema Hemosiderin staining;Edema;Pink   Wound Granulation Tissue Pink --   Wound Bed Granulation (%) 100 % 0 %   Wound Bed Epithelium (%) 0 % 98 %   Wound Bed Slough (%) 0 % 0 %   Wound Bed Eschar (%) 0 % 0 %   Wound Bed Fibrin (%) 0 % 0 %   State of Healing Early/partial granulation --   Wound Odor None None       No associated orders.       Wound 02/09/24 2 Leg Left;Lower (Active)   Date First  Assessed/Time First Assessed: 02/09/24 1453    Wound Number (Wound Clinic Only): 2  Location: Leg  Wound Location Orientation: Left;Lower      Assessments 2/9/2024  2:54 PM 3/22/2024 11:01 AM   Wound Image         Site Assessment Moist;Yellow;Pink;Red;Edema Clean;Dry;Intact;Epithelialization   Closure Not approximated Approximated   Drainage Amount Large None   Drainage Description Green --   Treatments Cleansed;Saline;Compression Cleansed;Saline;Compression   Dressing Kerlix roll;Tape --   Dressing Changed New --   Dressing Status -- Clean;Dry;Intact   Wound Length (cm) 24 cm 0 cm   Wound Width (cm) 28 cm 0 cm   Wound Surface Area (cm^2) 672 cm^2 0 cm^2   Wound Depth (cm) 0.1 cm 0 cm   Wound Volume (cm^3) 67.2 cm^3 0 cm^3   Wound Healing % -- 100   Margins Attached edges Flat and Intact   Non-staged Wound Description Full thickness Not applicable   Kamryn-wound Assessment Edema;Pink;Hemosiderin staining;Dry Hemosiderin staining;Clean;Dry;Intact   Wound Granulation Tissue Pink --   Wound Bed Granulation (%) 50 % --   Wound Bed Epithelium (%) 0 % 100 %   Wound Bed Slough (%) 50 % --   Wound Bed Eschar (%) 0 % --   Wound Bed Fibrin (%) 0 % 0 %   State of Healing Early/partial granulation Epithelialized;Closed wound edges   Wound Odor None None       No associated orders.       Compression Wrap 02/09/24 Leg Right;Lower (Active)   Placement Date: 02/09/24   Location: Leg  Wound Location Orientation: Right;Lower      Assessments 2/9/2024  2:54 PM 3/22/2024 11:01 AM   Response to Treatment Well tolerated Well tolerated   Compression Layers Single Multilayer   Compression Product Type Tubigrip/Spanda Coflex   Dressing Applied Yes Yes   Compression Wrap Location Toes to Knee Toes to Knee   Compression Wrap Status Clean;Intact;Dry Clean;Intact;Dry       No associated orders.       Compression Wrap 02/09/24 Leg Left;Lower (Active)   Placement Date: 02/09/24   Location: Leg  Wound Location Orientation: Left;Lower       Assessments 2/9/2024  2:54 PM 3/22/2024 11:01 AM   Response to Treatment Well tolerated Well tolerated   Compression Layers 2 2   Compression Product Type Artiflex 10cm;Coflex lite Coflex   Dressing Applied Yes Yes   Compression Wrap Location Toes to Knee Toes to Knee   Compression Wrap Status Clean;Dry;Intact Clean;Dry;Intact       No associated orders.       Wound 03/22/24 3 Foot Left (Active)   Date First Assessed: 03/22/24    Wound Number (Wound Clinic Only): 3  Location: (c) Foot  Wound Location Orientation: Left      Assessments 3/22/2024 11:01 AM   Wound Image     Site Assessment Edema;Moist;Pink   Closure Not approximated   Drainage Amount Moderate   Drainage Description Clear   Treatments Cleansed;Compression;Wound    Dressing 4x4s;Kerlix roll;Hydrofera ready   Dressing Changed New   Dressing Status Clean;Dry;Intact   Wound Length (cm) 12 cm   Wound Width (cm) 7 cm   Wound Surface Area (cm^2) 84 cm^2   Wound Depth (cm) 0.1 cm   Wound Volume (cm^3) 8.4 cm^3   Margins Attached edges   Non-staged Wound Description Partial thickness   Kamryn-wound Assessment Callous;Maceration;Fragile   Wound Bed Epithelium (%) 90 %   Wound Odor None       No associated orders.       Wound Number: Bilateral legs     Wound Cleaning and Dressings:  Showering directions: May shower with protection  Wound cleansing:  Cleanse with normal saline or wound cleanser  Wound cleaning frequency: with dressing changes. Please wash the entire leg too after wrap removal, thank you.  Wound product: hydrofera transfer on any open wounds including the left plantar and lateral foot, cover with dry gauze and Kerlix, tape  Dressing change frequency:  Change dressing 3x per week      Compression Therapy:  Coflex standard compression wrap please wrap from base of toes to 1 inch below the knee for both legs.                    Follow Up:  Follow up in 2 weeks around 3/15/24.          3/22/2024  NP [id number]                    Follow  Up:  Return in about 3 weeks (around 4/12/2024) for MD visit for 30 min.

## (undated) NOTE — LETTER
Date: 3/11/2024  Patient name: Adore Covington  YOB: 1945  Medical Record Number: [id number]  Primary Coverage: Payor: MEDICARE / Plan: MEDICARE PART A&B / Product Type: *No Product type* /   Secondary Coverage: [facility]  Insurance ID: [id number]  Patient Address: [address], [address]  [facility] [address]  Telephone Information:   Home Phone [phone]   Mobile [phone]       Encounter Date: 3/8/2024  Provider: Dontrell Tinajero MD  Diagnosis:     ICD-10-CM   1. Chronic venous hypertension (idiopathic) with ulcer and inflammation of bilateral lower extremity ([facility])  I87.333   2. Atrial fibrillation with rapid ventricular response ([facility])  I48.91   3. H/O CHF  Z86.79   4. Class 3 severe obesity due to excess calories with serious comorbidity and body mass index (BMI) of 45.0 to 49.9 in adult ([facility])  E66.01    Z68.42         Wound 02/09/24 1 Leg Right;Lower (Active)   Date First Assessed/Time First Assessed: 02/09/24 1452    Wound Number (Wound Clinic Only): 1  Location: Leg  Wound Location Orientation: Right;Lower      Assessments 2/9/2024  2:54 PM 3/8/2024  1:34 PM   Wound Image           Site Assessment Clean;Moist;Yellow;Pale;Pink Clean;Moist;Pink   Closure Not approximated Not approximated   Drainage Amount Large Large   Drainage Description Yellow Serous;Yellow   Treatments Cleansed;Compression;Saline Cleansed;Compression;Saline   Dressing Kerlix roll;Tape Kerlix roll;Tape   Dressing Changed New New   Dressing Status Clean;Dry;Intact Clean;Dry;Intact   Wound Length (cm) 8 cm 13.5 cm   Wound Width (cm) 7 cm 18 cm   Wound Surface Area (cm^2) 56 cm^2 243 cm^2   Wound Depth (cm) 0.1 cm 0.1 cm   Wound Volume (cm^3) 5.6 cm^3 24.3 cm^3   Wound Healing % -- -334   Margins Attached edges Attached edges;Poorly defined   Non-staged Wound Description Full thickness Full thickness   Kamryn-wound Assessment Hemosiderin staining;Edema Hemosiderin staining;Edema;Pink   Wound Granulation Tissue Pink  Pink;Red   Wound Bed Granulation (%) 100 % 50 %   Wound Bed Epithelium (%) 0 % 50 %   Wound Bed Slough (%) 0 % 0 %   Wound Bed Eschar (%) 0 % 0 %   Wound Bed Fibrin (%) 0 % 0 %   State of Healing Early/partial granulation Fully granulated   Wound Odor None None       No associated orders.       Wound 02/09/24 2 Leg Left;Lower (Active)   Date First Assessed/Time First Assessed: 02/09/24 1453    Wound Number (Wound Clinic Only): 2  Location: Leg  Wound Location Orientation: Left;Lower      Assessments 2/9/2024  2:54 PM 3/8/2024  1:55 PM   Wound Image        Site Assessment Moist;Yellow;Pink;Red;Edema --   Closure Not approximated --   Drainage Amount Large --   Drainage Description Green --   Treatments Cleansed;Saline;Compression --   Dressing Kerlix roll;Tape --   Dressing Changed New --   Wound Length (cm) 24 cm --   Wound Width (cm) 28 cm --   Wound Surface Area (cm^2) 672 cm^2 --   Wound Depth (cm) 0.1 cm --   Wound Volume (cm^3) 67.2 cm^3 --   Margins Attached edges --   Non-staged Wound Description Full thickness --   Kamryn-wound Assessment Edema;Pink;Hemosiderin staining;Dry --   Wound Granulation Tissue Pink --   Wound Bed Granulation (%) 50 % --   Wound Bed Epithelium (%) 0 % --   Wound Bed Slough (%) 50 % --   Wound Bed Eschar (%) 0 % --   Wound Bed Fibrin (%) 0 % --   State of Healing Early/partial granulation --   Wound Odor None --       No associated orders.       Compression Wrap 02/09/24 Leg Right;Lower (Active)   Placement Date: 02/09/24   Location: Leg  Wound Location Orientation: Right;Lower      Assessments 2/9/2024  2:54 PM 3/8/2024  1:34 PM   Response to Treatment Well tolerated Well tolerated   Compression Layers Single Multilayer   Compression Product Type Tubigrip/Spanda Coflex   Dressing Applied Yes Yes   Compression Wrap Location Toes to Knee Toes to Knee   Compression Wrap Status Clean;Intact;Dry Clean;Intact;Dry       No associated orders.       Compression Wrap 02/09/24 Leg Left;Lower  (Active)   Placement Date: 02/09/24   Location: Leg  Wound Location Orientation: Left;Lower      Assessments 2/9/2024  2:54 PM 3/8/2024  1:34 PM   Response to Treatment Well tolerated Well tolerated   Compression Layers 2 2   Compression Product Type Artiflex 10cm;Coflex lite Coflex   Dressing Applied Yes Yes   Compression Wrap Location Toes to Knee Toes to Knee   Compression Wrap Status Clean;Dry;Intact Clean;Dry;Intact       No associated orders.     PLEASE CANCEL HOME HEALTH SERVICES WITH RESIDENTIAL.        Follow Up:  Return in about 2 weeks (around 3/22/2024) for MD visit for 30 min.          3/11/2024  NP [id number]

## (undated) NOTE — LETTER
[address], [place]  Authorization for Invasive Procedures  Date: 12/3/2023           Time: 2879    I hereby authorize Dr. Jaki Alcala, my physician and his/her assistants (if applicable), which may include medical students, residents, and/or fellows, to perform the following surgical operation/ procedure and administer such anesthesia as may be determined necessary by my physician: Esophagogastroduodenoscopy with possible biopsy and possible dilation on [facility]  2. I recognize that during the surgical operation/procedure, unforeseen conditions may necessitate additional or different procedures than those listed above. I, therefore, further authorize and request that the above-named surgeon, assistants, or designees perform such procedures as are, in their judgment, necessary and desirable. 3.   My surgeon/physician has discussed prior to my surgery the potential benefits, risks and side effects of this procedure; the likelihood of achieving goals; and potential problems that might occur during recuperation. They also discussed reasonable alternatives to the procedure, including risks, benefits, and side effects related to the alternatives and risks related to not receiving this procedure. I have had all my questions answered and I acknowledge that no guarantee has been made as to the result that may be obtained. 4.   Should the need arise during my operation/procedure, which includes change of level of care prior to discharge, I also consent to the administration of blood and/or blood products. Further, I understand that despite careful testing and screening of blood or blood products by collecting agencies, I may still be subject to ill effects as a result of receiving a blood transfusion and/or blood products.   The following are some, but not all, of the potential risks that can occur: fever and allergic reactions, hemolytic reactions, transmission of diseases such as Hepatitis, AIDS and Cytomegalovirus (CMV) and fluid overload. In the event that I wish to have an autologous transfusion of my own blood, or a directed donor transfusion, I will discuss this with my physician. Check only if Refusing Blood or Blood Products  I understand refusal of blood or blood products as deemed necessary by my physician may have serious consequences to my condition to include possible death. I hereby assume responsibility for my refusal and release the hospital, its personnel, and my physicians from any responsibility for the consequences of my refusal.         o  Refuse         5. I authorize the use of any specimen, organs, tissues, body parts or foreign objects that may be removed from my body during the operation/procedure for diagnosis, research or teaching purposes and their subsequent disposal by hospital authorities. I also authorize the release of specimen test results and/or written reports to my treating physician on the hospital medical staff or other referring or consulting physicians involved in my care, at the discretion of the Pathologist or my treating physician. 6.   I consent to the photographing or videotaping of the operations or procedures to be performed, including appropriate portions of my body for medical, scientific, or educational purposes, provided my identity is not revealed by the pictures or by descriptive texts accompanying them. If the procedure has been photographed/videotaped, the surgeon will obtain the original picture, image, videotape or CD. The hospital will not be responsible for storage, release or maintenance of the picture, image, tape or CD.    7.   I consent to the presence of a  or observers in the operating room as deemed necessary by my physician or their designees. 8.   I recognize that in the event my procedure results in extended X-Ray/fluoroscopy time, I may develop a skin reaction. 9.  If I have a Do Not Attempt Resuscitation (DNAR) order in place, that status will be suspended while in the operating room, procedural suite, and during the recovery period unless otherwise explicitly stated by me (or a person authorized to consent on my behalf). The surgeon or my attending physician will determine when the applicable recovery period ends for purposes of reinstating the DNAR order. 10. Patients having a sterilization procedure: I understand that if the procedure is successful the results will be permanent and it will therefore be impossible for me to inseminate, conceive, or bear children. I also understand that the procedure is intended to result in sterility, although the result has not been guaranteed. 11. I acknowledge that my physician has explained sedation/analgesia administration to me including the risk and benefits I consent to the administration of sedation/analgesia as may be necessary or desirable in the judgment of my physician.     I CERTIFY THAT I HAVE READ AND FULLY UNDERSTAND THE ABOVE CONSENT TO OPERATION and/or OTHER PROCEDURE.        ____________________________________       _________________________________      ______________________________  Signature of Patient         Signature of Responsible Person        Printed Name of Responsible Person        ____________________________________      _________________________________      ______________________________       Signature of Witness          Relationship to Patient                       Date                                       Time    Patient Name: Woodrow Henderson     : 1945                 Printed: December 3, 2023      Medical Record #: [id number]                      Page 1 of 2          [place] My signature below affirms that prior to the time of the procedure; I have explained to the patient and/or his/her legal representative, the risks and benefits involved in the proposed treatment and any reasonable alternative to the proposed treatment. I have also explained the risks and benefits involved in refusal of the proposed treatment and alternatives to the proposed treatment and have answered the patient's questions.  If I have a significant financial interest in a co-management agreement or a significant financial interest in any product or implant, or other significant relationship used in this procedure/surgery, I have disclosed this and had a discussion with my patient.     _______________________________________________________________ _____________________________  Brooke Fernandes Physician)                                                                                         (Date)                                   (Time)    Patient Name: Carol Alan     : 1945                 Printed: December 3, 2023      Medical Record #: [id number]                      Page 2 of 2